# Patient Record
Sex: FEMALE | Race: WHITE | NOT HISPANIC OR LATINO | Employment: FULL TIME | URBAN - METROPOLITAN AREA
[De-identification: names, ages, dates, MRNs, and addresses within clinical notes are randomized per-mention and may not be internally consistent; named-entity substitution may affect disease eponyms.]

---

## 2017-02-13 DIAGNOSIS — J45.41 MODERATE PERSISTENT ASTHMA WITH ACUTE EXACERBATION: ICD-10-CM

## 2017-02-13 RX ORDER — FLUTICASONE PROPIONATE 50 MCG
1-2 SPRAY, SUSPENSION (ML) NASAL DAILY
Qty: 16 G | Refills: 5 | Status: SHIPPED | OUTPATIENT
Start: 2017-02-13 | End: 2019-11-05

## 2017-02-13 NOTE — TELEPHONE ENCOUNTER
Fluticasone      Last Written Prescription Date: 10/09/15  Last Fill Quantity: 16,  # refills: 5   Last Office Visit with [facility], [facility] or [facility] prescribing provider: 11/29/16

## 2017-02-14 ENCOUNTER — OFFICE VISIT (OUTPATIENT)
Dept: FAMILY MEDICINE | Facility: CLINIC | Age: 52
End: 2017-02-14
Payer: COMMERCIAL

## 2017-02-14 VITALS
TEMPERATURE: 98 F | BODY MASS INDEX: 32.03 KG/M2 | WEIGHT: 203 LBS | HEART RATE: 82 BPM | DIASTOLIC BLOOD PRESSURE: 85 MMHG | SYSTOLIC BLOOD PRESSURE: 146 MMHG

## 2017-02-14 DIAGNOSIS — J01.01 ACUTE RECURRENT MAXILLARY SINUSITIS: Primary | ICD-10-CM

## 2017-02-14 PROCEDURE — 99214 OFFICE O/P EST MOD 30 MIN: CPT | Performed by: NURSE PRACTITIONER

## 2017-02-14 RX ORDER — DOXYCYCLINE 100 MG/1
100 CAPSULE ORAL 2 TIMES DAILY
Qty: 28 CAPSULE | Refills: 0 | Status: SHIPPED | OUTPATIENT
Start: 2017-02-14 | End: 2017-02-28

## 2017-02-14 NOTE — NURSING NOTE
"Chief Complaint   Patient presents with     Sinus Problem       Initial /85  Pulse 82  Temp 98  F (36.7  C) (Oral)  Wt 203 lb (92.1 kg)  LMP 05/15/2014 (Approximate)  BMI 32.03 kg/m2 Estimated body mass index is 32.03 kg/(m^2) as calculated from the following:    Height as of 11/4/16: 5' 6.75\" (1.695 m).    Weight as of this encounter: 203 lb (92.1 kg).  Medication Reconciliation: complete    "

## 2017-02-14 NOTE — PROGRESS NOTES
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  Additional history: as documented    Patient Active Problem List   Diagnosis     Dry eye syndrome     CARDIOVASCULAR SCREENING; LDL GOAL LESS THAN 160     Seasonal allergic rhinitis     Allergic rhinitis due to animal dander     Rhinitis, allergic to other allergen     House dust mite allergy     Moderate persistent asthma     PTSD (post-traumatic stress disorder)     Vitamin D deficiency     Hypersomnia     Esophageal reflux (GERD)     Depression with anxiety     Acute cystitis with hematuria     Past Surgical History   Procedure Laterality Date     Hysteroscopy         Social History   Substance Use Topics     Smoking status: Never Smoker     Smokeless tobacco: Never Used     Alcohol use No     Family History   Problem Relation Age of Onset     Hypertension Mother      Alzheimer Disease Mother      DIABETES Mother      Hypertension Father      Alzheimer Disease Paternal Grandmother      Psychotic Disorder Paternal Grandmother      bipolar     HEART DISEASE Paternal Grandfather      Breast Cancer Maternal Grandmother      Thyroid Disease Brother      CANCER Sister      thyroid     DIABETES Sister      CEREBROVASCULAR DISEASE No family hx of      Glaucoma No family hx of       Macular Degeneration No family hx of          Current Outpatient Prescriptions   Medication Sig Dispense Refill     doxycycline Monohydrate 100 MG CAPS Take 1 capsule (100 mg) by mouth 2 times daily for 14 days 28 capsule 0     fluticasone (FLONASE) 50 MCG/ACT spray Spray 1-2 sprays into both nostrils daily 16 g 5     montelukast (SINGULAIR) 10 MG tablet Take 1 tablet (10 mg) by mouth At Bedtime 90 tablet 1     fluticasone-salmeterol (ADVAIR) 500-50 MCG/DOSE diskus inhaler Inhale 1 puff into the lungs 2 times daily 3 Inhaler 1     albuterol (PROAIR HFA, PROVENTIL HFA, VENTOLIN HFA) 108 (90 BASE) MCG/ACT inhaler Inhale 2 puffs into the lungs every 4 hours as needed for shortness of breath / dyspnea 1 Inhaler 3     amLODIPine (NORVASC) 5 MG tablet Take 1 tablet (5 mg) by mouth daily 90 tablet 3     ALPRAZolam (XANAX) 0.5 MG tablet Take 1-2 tablets (0.5-1 mg) by mouth 3 times daily as needed for anxiety 30 tablet 1     DULoxetine (CYMBALTA) 60 MG capsule Take 1 capsule (60 mg) by mouth 2 times daily 180 capsule 3     omeprazole (PRILOSEC) 20 MG capsule Take 20 mg by mouth daily.       clindamycin (CLEOCIN) 300 MG capsule Take 1 capsule (300 mg) by mouth 4 times daily (Patient not taking: Reported on 2/14/2017) 40 capsule 0     albuterol (2.5 MG/3ML) 0.083% nebulizer solution Take 1 vial (2.5 mg) by nebulization every 4 hours as needed for shortness of breath / dyspnea (Patient not taking: Reported on 2/14/2017) 1 Box 0     nitroglycerin (NITRODUR) 0.4 MG/HR Place 1 patch onto the skin daily Reported on 2/14/2017       armodafinil (NUVIGIL) 150 MG TABS Take 1 tablet (150 mg) by mouth every morning (Patient not taking: Reported on 2/14/2017) 30 tablet 0     lovastatin (MEVACOR) 20 MG tablet Take 1 tablet (20 mg) by mouth At Bedtime (Patient not taking: Reported on 2/14/2017) 90 tablet 3     fish oil-omega-3 fatty acids (FISH OIL) 1000 MG capsule Take 2 g by mouth 3 times daily Reported on 2/14/2017       Allergies    Allergen Reactions     Ampicillin Rash     Cipro [Quinolones] Anaphylaxis     Macrobid [Nitrofuran Derivatives] GI Disturbance     BP Readings from Last 3 Encounters:   02/14/17 146/85   12/23/16 123/67   11/29/16 128/73    Wt Readings from Last 3 Encounters:   02/14/17 203 lb (92.1 kg)   12/23/16 198 lb (89.8 kg)   11/29/16 201 lb 12.8 oz (91.5 kg)              Labs reviewed in EPIC  Problem list, Medication list, Allergies, and Medical/Social/Surgical histories reviewed in [facility] and updated as appropriate.    ROS:  Constitutional, HEENT, cardiovascular, pulmonary, GI, , musculoskeletal, neuro, skin, endocrine and psych systems are negative, except as otherwise noted.    OBJECTIVE:                                                    /85  Pulse 82  Temp 98  F (36.7  C) (Oral)  Wt 203 lb (92.1 kg)  LMP 05/15/2014 (Approximate)  BMI 32.03 kg/m2  Body mass index is 32.03 kg/(m^2).  GENERAL: healthy, alert and no distress  EYES: Eyes grossly normal to inspection, PERRL and conjunctivae and sclerae normal  HENT: ear canals and TM's normal, nose and mouth without ulcers or lesions  RESP: lungs clear to auscultation - no rales, rhonchi or wheezes  CV: regular rate and rhythm, normal S1 S2, no S3 or S4, no murmur, click or rub, no peripheral edema and peripheral pulses strong  NEURO: Normal strength and tone, mentation intact and speech normal  LYMPH: no cervical, supraclavicular, axillary, or inguinal adenopathy    Diagnostic Test Results:  none      ASSESSMENT/PLAN:                                                    Hypertension; controlled   Associated with the following complications:    Heart Disease- MI d/t broken heart disease   Plan:  No changes in the patient's current treatment plan        ICD-10-CM    1. Acute recurrent maxillary sinusitis J01.01 doxycycline Monohydrate 100 MG CAPS       See Patient Instructions: Ibuprophen, push fluids, rest. You can stop antibiotics at 10 days if you are feeling better  as we discussed. Please let me know if you have any health care questions or concerns.     HOUSTON Robertson  [facility]

## 2017-02-14 NOTE — PATIENT INSTRUCTIONS
Ibuprophen, push fluids, rest. You can stop antibiotics at 10 days if you are feeling better as we discussed. Please let me know if you have any health care questions or concerns.   Sinusitis (Antibiotic Treatment)    The sinuses are air-filled spaces within the bones of the face. They connect to the inside of the nose. Sinusitis is an inflammation of the tissue lining the sinus cavity. Sinus inflammation can occur during a cold. It can also be due to allergies to pollens and other particles in the air. Sinusitis can cause symptoms of sinus congestion and fullness. A sinus infection causes fever, headache and facial pain. There is often green or yellow drainage from the nose or into the back of the throat (post-nasal drip). You have been given antibiotics to treat this condition.  Home care:    Take the full course of antibiotics as instructed. Do not stop taking them, even if you feel better.    Drink plenty of water, hot tea, and other liquids. This may help thin mucus. It also may promote sinus drainage.    Heat may help soothe painful areas of the face. Use a towel soaked in hot water. Or,  the shower and direct the hot spray onto your face. Using a vaporizer along with a menthol rub at night may also help.     An expectorant containing guaifenesin may help thin the mucus and promote drainage from the sinuses.    Over-the-counter decongestants may be used unless a similar medicine was prescribed. Nasal sprays work the fastest. Use one that contains phenylephrine or oxymetazoline. First blow the nose gently. Then use the spray. Do not use these medicines more often than directed on the label or symptoms may get worse. You may also use tablets containing pseudoephedrine. Avoid products that combine ingredients, because side effects may be increased. Read labels. You can also ask the pharmacist for help. (NOTE: Persons with high blood pressure should not use decongestants. They can raise blood  pressure.)    Over-the-counter antihistamines may help if allergies contributed to your sinusitis.      Do not use nasal rinses or irrigation during an acute sinus infection, unless told to by your health care provider. Rinsing may spread the infection to other sinuses.    Use acetaminophen or ibuprofen to control pain, unless another pain medicine was prescribed. (If you have chronic liver or kidney disease or ever had a stomach ulcer, talk with your doctor before using these medicines. Aspirin should never be used in anyone under 18 years of age who is ill with a fever. It may cause severe liver damage.)    Don't smoke. This can worsen symptoms.  Follow-up care  Follow up with your healthcare provider or our staff if you are not improving within the next week.  When to seek medical advice  Call your healthcare provider if any of these occur:    Facial pain or headache becoming more severe    Stiff neck    Unusual drowsiness or confusion    Swelling of the forehead or eyelids    Vision problems, including blurred or double vision    Fever of 100.4 F (38 C) or higher, or as directed by your healthcare provider    Seizure    Breathing problems    Symptoms not resolving within 10 days    8387-3205 [employer] [address] [place] [address]. All rights reserved. This information is not intended as a substitute for professional medical care. Always follow your healthcare professional's instructions.

## 2017-02-14 NOTE — MR AVS SNAPSHOT
After Visit Summary   2/14/2017    Lisette LINCOLN Ukaegbu    MRN: [id number]           Patient Information     Date Of Birth          1965        Visit Information        Provider Department      2/14/2017 3:00 PM Prerna Garcia NP [facility]        Today's Diagnoses     Acute recurrent maxillary sinusitis    -  1      Care Instructions    Ibuprophen, push fluids, rest. You can stop antibiotics at 10 days if you are feeling better as we discussed. Please let me know if you have any health care questions or concerns.   Sinusitis (Antibiotic Treatment)    The sinuses are air-filled spaces within the bones of the face. They connect to the inside of the nose. Sinusitis is an inflammation of the tissue lining the sinus cavity. Sinus inflammation can occur during a cold. It can also be due to allergies to pollens and other particles in the air. Sinusitis can cause symptoms of sinus congestion and fullness. A sinus infection causes fever, headache and facial pain. There is often green or yellow drainage from the nose or into the back of the throat (post-nasal drip). You have been given antibiotics to treat this condition.  Home care:    Take the full course of antibiotics as instructed. Do not stop taking them, even if you feel better.    Drink plenty of water, hot tea, and other liquids. This may help thin mucus. It also may promote sinus drainage.    Heat may help soothe painful areas of the face. Use a towel soaked in hot water. Or,  the shower and direct the hot spray onto your face. Using a vaporizer along with a menthol rub at night may also help.     An expectorant containing guaifenesin may help thin the mucus and promote drainage from the sinuses.    Over-the-counter decongestants may be used unless a similar medicine was prescribed. Nasal sprays work the fastest. Use one that contains phenylephrine or oxymetazoline. First blow the nose gently. Then use the spray. Do not use these  medicines more often than directed on the label or symptoms may get worse. You may also use tablets containing pseudoephedrine. Avoid products that combine ingredients, because side effects may be increased. Read labels. You can also ask the pharmacist for help. (NOTE: Persons with high blood pressure should not use decongestants. They can raise blood pressure.)    Over-the-counter antihistamines may help if allergies contributed to your sinusitis.      Do not use nasal rinses or irrigation during an acute sinus infection, unless told to by your health care provider. Rinsing may spread the infection to other sinuses.    Use acetaminophen or ibuprofen to control pain, unless another pain medicine was prescribed. (If you have chronic liver or kidney disease or ever had a stomach ulcer, talk with your doctor before using these medicines. Aspirin should never be used in anyone under 18 years of age who is ill with a fever. It may cause severe liver damage.)    Don't smoke. This can worsen symptoms.  Follow-up care  Follow up with your healthcare provider or our staff if you are not improving within the next week.  When to seek medical advice  Call your healthcare provider if any of these occur:    Facial pain or headache becoming more severe    Stiff neck    Unusual drowsiness or confusion    Swelling of the forehead or eyelids    Vision problems, including blurred or double vision    Fever of 100.4 F (38 C) or higher, or as directed by your healthcare provider    Seizure    Breathing problems    Symptoms not resolving within 10 days    7030-8813 [employer]. [address] [place] [address]. All rights reserved. This information is not intended as a substitute for professional medical care. Always follow your healthcare professional's instructions.              Follow-ups after your visit        Who to contact     Normal or non-critical lab and imaging results will be communicated to you by  MyChart, letter or phone within 4 business days after the clinic has received the results. If you do not hear from us within 7 days, please contact the clinic through [employer] or phone. If you have a critical or abnormal lab result, we will notify you by phone as soon as possible.  Submit refill requests through [email or web address] or call your pharmacy and they will forward the refill request to us. Please allow 3 business days for your refill to be completed.          If you need to speak with a  for additional information , please call: [phone]             Additional Information About Your Visit        [email or web address] Information     [email or web address] gives you secure access to your electronic health record. If you see a primary care provider, you can also send messages to your care team and make appointments. If you have questions, please call your primary care clinic.  If you do not have a primary care provider, please call [phone] and they will assist you.        Care EveryWhere ID     This is your Care EveryWhere ID. This could be used by other organizations to access your [place] medical records  [id number]        Your Vitals Were     Pulse Temperature Last Period BMI (Body Mass Index)          82 98  F (36.7  C) (Oral) 05/15/2014 (Approximate) 32.03 kg/m2         Blood Pressure from Last 3 Encounters:   02/14/17 146/85   12/23/16 123/67   11/29/16 128/73    Weight from Last 3 Encounters:   02/14/17 203 lb (92.1 kg)   12/23/16 198 lb (89.8 kg)   11/29/16 201 lb 12.8 oz (91.5 kg)              Today, you had the following     No orders found for display         Today's Medication Changes          These changes are accurate as of: 2/14/17  3:37 PM.  If you have any questions, ask your nurse or doctor.               Start taking these medicines.        Dose/Directions    doxycycline Monohydrate 100 MG Caps   Used for:  Acute recurrent maxillary sinusitis   Started by:  Prerna Garcia NP        Dose:  100 mg    Take 1 capsule (100 mg) by mouth 2 times daily for 14 days   Quantity:  28 capsule   Refills:  0            Where to get your medicines      These medications were sent to [id number] - [facility] [address] AT [facility] [address]  [address] [facility], [facility] [address]     Phone:  [phone]     doxycycline Monohydrate 100 MG Caps                Primary Care Provider Office Phone # Fax #    Kolton Yin PA-C [phone]       [facility] [address] [facility] [address]        Thank you!     Thank you for choosing [facility]  for your care. Our goal is always to provide you with excellent care. Hearing back from our patients is one way we can continue to improve our services. Please take a few minutes to complete the written survey that you may receive in the mail after your visit with us. Thank you!             Your Updated Medication List - Protect others around you: Learn how to safely use, store and throw away your medicines at www.disposemymeds.org.          This list is accurate as of: 2/14/17  3:37 PM.  Always use your most recent med list.                   Brand Name Dispense Instructions for use    * albuterol (2.5 MG/3ML) 0.083% neb solution     1 Box    Take 1 vial (2.5 mg) by nebulization every 4 hours as needed for shortness of breath / dyspnea       * albuterol 108 (90 BASE) MCG/ACT Inhaler    PROAIR HFA/PROVENTIL HFA/VENTOLIN HFA    1 Inhaler    Inhale 2 puffs into the lungs every 4 hours as needed for shortness of breath / dyspnea       ALPRAZolam 0.5 MG tablet    XANAX    30 tablet    Take 1-2 tablets (0.5-1 mg) by mouth 3 times daily as needed for anxiety       amLODIPine 5 MG tablet    NORVASC    90 tablet    Take 1 tablet (5 mg) by mouth daily       armodafinil 150 MG Tabs tablet    NUVIGIL    30 tablet    Take 1 tablet (150 mg) by mouth every morning       clindamycin 300 MG capsule    CLEOCIN    40  capsule    Take 1 capsule (300 mg) by mouth 4 times daily       doxycycline Monohydrate 100 MG Caps     28 capsule    Take 1 capsule (100 mg) by mouth 2 times daily for 14 days       DULoxetine 60 MG EC capsule    CYMBALTA    180 capsule    Take 1 capsule (60 mg) by mouth 2 times daily       fish oil-omega-3 fatty acids 1000 MG capsule      Take 2 g by mouth 3 times daily Reported on 2/14/2017       fluticasone 50 MCG/ACT spray    FLONASE    16 g    Spray 1-2 sprays into both nostrils daily       fluticasone-salmeterol 500-50 MCG/DOSE diskus inhaler    ADVAIR    3 Inhaler    Inhale 1 puff into the lungs 2 times daily       lovastatin 20 MG tablet    MEVACOR    90 tablet    Take 1 tablet (20 mg) by mouth At Bedtime       montelukast 10 MG tablet    SINGULAIR    90 tablet    Take 1 tablet (10 mg) by mouth At Bedtime       nitroglycerin 0.4 MG/HR 24 hr patch    NITRODUR     Place 1 patch onto the skin daily Reported on 2/14/2017       omeprazole 20 MG CR capsule    priLOSEC     Take 20 mg by mouth daily.       * Notice:  This list has 2 medication(s) that are the same as other medications prescribed for you. Read the directions carefully, and ask your doctor or other care provider to review them with you.

## 2017-03-13 ENCOUNTER — RADIANT APPOINTMENT (OUTPATIENT)
Dept: GENERAL RADIOLOGY | Facility: CLINIC | Age: 52
End: 2017-03-13
Attending: PHYSICIAN ASSISTANT
Payer: COMMERCIAL

## 2017-03-13 ENCOUNTER — OFFICE VISIT (OUTPATIENT)
Dept: FAMILY MEDICINE | Facility: CLINIC | Age: 52
End: 2017-03-13
Payer: COMMERCIAL

## 2017-03-13 VITALS
DIASTOLIC BLOOD PRESSURE: 82 MMHG | OXYGEN SATURATION: 99 % | TEMPERATURE: 98.4 F | SYSTOLIC BLOOD PRESSURE: 134 MMHG | HEART RATE: 79 BPM | BODY MASS INDEX: 31.43 KG/M2 | WEIGHT: 199.2 LBS

## 2017-03-13 DIAGNOSIS — R81 GLUCOSURIA: ICD-10-CM

## 2017-03-13 DIAGNOSIS — E11.9 TYPE 2 DIABETES MELLITUS WITHOUT COMPLICATION, WITHOUT LONG-TERM CURRENT USE OF INSULIN (H): ICD-10-CM

## 2017-03-13 DIAGNOSIS — Z78.0 POSTMENOPAUSAL STATUS: ICD-10-CM

## 2017-03-13 DIAGNOSIS — J06.9 VIRAL UPPER RESPIRATORY TRACT INFECTION: ICD-10-CM

## 2017-03-13 DIAGNOSIS — R35.0 URINARY FREQUENCY: Primary | ICD-10-CM

## 2017-03-13 DIAGNOSIS — R10.2 PELVIC PAIN IN FEMALE: ICD-10-CM

## 2017-03-13 DIAGNOSIS — R49.0 HOARSENESS: ICD-10-CM

## 2017-03-13 DIAGNOSIS — J01.01 ACUTE RECURRENT MAXILLARY SINUSITIS: ICD-10-CM

## 2017-03-13 DIAGNOSIS — R10.11 RUQ ABDOMINAL PAIN: ICD-10-CM

## 2017-03-13 LAB
ALBUMIN UR-MCNC: ABNORMAL MG/DL
APPEARANCE UR: CLEAR
BACTERIA #/AREA URNS HPF: ABNORMAL /HPF
BILIRUB UR QL STRIP: ABNORMAL
COLOR UR AUTO: YELLOW
CREAT UR-MCNC: 386 MG/DL
ERYTHROCYTE [DISTWIDTH] IN BLOOD BY AUTOMATED COUNT: 12.3 % (ref 10–15)
GLUCOSE BLD-MCNC: 166 MG/DL (ref 70–99)
GLUCOSE UR STRIP-MCNC: 250 MG/DL
HBA1C MFR BLD: 8 % (ref 4.3–6)
HCT VFR BLD AUTO: 41.6 % (ref 35–47)
HETEROPH AB SER QL: NEGATIVE
HGB BLD-MCNC: 13.8 G/DL (ref 11.7–15.7)
HGB UR QL STRIP: NEGATIVE
HYALINE CASTS #/AREA URNS LPF: ABNORMAL /LPF (ref 0–2)
KETONES UR STRIP-MCNC: ABNORMAL MG/DL
LEUKOCYTE ESTERASE UR QL STRIP: NEGATIVE
MCH RBC QN AUTO: 30.2 PG (ref 26.5–33)
MCHC RBC AUTO-ENTMCNC: 33.2 G/DL (ref 31.5–36.5)
MCV RBC AUTO: 91 FL (ref 78–100)
MICROALBUMIN UR-MCNC: 46 MG/L
MICROALBUMIN/CREAT UR: 11.81 MG/G CR (ref 0–25)
MUCOUS THREADS #/AREA URNS LPF: PRESENT /LPF
NITRATE UR QL: NEGATIVE
NON-SQ EPI CELLS #/AREA URNS LPF: ABNORMAL /LPF
PH UR STRIP: 5 PH (ref 5–7)
PLATELET # BLD AUTO: 253 10E9/L (ref 150–450)
RBC # BLD AUTO: 4.57 10E12/L (ref 3.8–5.2)
RBC #/AREA URNS AUTO: ABNORMAL /HPF (ref 0–2)
SP GR UR STRIP: >1.03 (ref 1–1.03)
URN SPEC COLLECT METH UR: ABNORMAL
UROBILINOGEN UR STRIP-ACNC: 0.2 EU/DL (ref 0.2–1)
WBC # BLD AUTO: 6.6 10E9/L (ref 4–11)
WBC #/AREA URNS AUTO: ABNORMAL /HPF (ref 0–2)

## 2017-03-13 PROCEDURE — 82947 ASSAY GLUCOSE BLOOD QUANT: CPT | Performed by: PHYSICIAN ASSISTANT

## 2017-03-13 PROCEDURE — 82043 UR ALBUMIN QUANTITATIVE: CPT | Performed by: PHYSICIAN ASSISTANT

## 2017-03-13 PROCEDURE — 81001 URINALYSIS AUTO W/SCOPE: CPT | Performed by: PHYSICIAN ASSISTANT

## 2017-03-13 PROCEDURE — 86308 HETEROPHILE ANTIBODY SCREEN: CPT | Performed by: PHYSICIAN ASSISTANT

## 2017-03-13 PROCEDURE — 85027 COMPLETE CBC AUTOMATED: CPT | Performed by: PHYSICIAN ASSISTANT

## 2017-03-13 PROCEDURE — 36415 COLL VENOUS BLD VENIPUNCTURE: CPT | Performed by: PHYSICIAN ASSISTANT

## 2017-03-13 PROCEDURE — 99207 C FOOT EXAM  NO CHARGE: CPT | Performed by: PHYSICIAN ASSISTANT

## 2017-03-13 PROCEDURE — 70210 X-RAY EXAM OF SINUSES: CPT

## 2017-03-13 PROCEDURE — 99214 OFFICE O/P EST MOD 30 MIN: CPT | Performed by: PHYSICIAN ASSISTANT

## 2017-03-13 PROCEDURE — 83036 HEMOGLOBIN GLYCOSYLATED A1C: CPT | Performed by: PHYSICIAN ASSISTANT

## 2017-03-13 RX ORDER — SULFAMETHOXAZOLE/TRIMETHOPRIM 800-160 MG
1 TABLET ORAL 2 TIMES DAILY
Qty: 20 TABLET | Refills: 0 | Status: SHIPPED | OUTPATIENT
Start: 2017-03-13 | End: 2017-05-04

## 2017-03-13 RX ORDER — DESOGESTREL AND ETHINYL ESTRADIOL 0.15-0.03
1 KIT ORAL DAILY
Qty: 28 TABLET | Refills: 0 | Status: SHIPPED | OUTPATIENT
Start: 2017-03-13 | End: 2017-03-16

## 2017-03-13 RX ORDER — METFORMIN HCL 500 MG
500 TABLET, EXTENDED RELEASE 24 HR ORAL
Qty: 180 TABLET | Refills: 0 | Status: SHIPPED | OUTPATIENT
Start: 2017-03-13 | End: 2017-05-04

## 2017-03-13 NOTE — PROGRESS NOTES
SUBJECTIVE:                                                    Lisette Owens is a 52 year old female who presents to clinic today for the following health issues:      RESPIRATORY SYMPTOMS      Duration: 3-4 months     Description  rhinorrhea, facial pain/pressure, cough, chills and ear pain bilateral    Severity: severe    Accompanying signs and symptoms: None    History (predisposing factors):  none    Precipitating or alleviating factors: None    Therapies tried and outcome:  Patient has been on 3 courses of antibiotics and just can't seem to get better.      URINARY TRACT SYMPTOMS      Duration: 3 months     Description  dysuria, frequency and urgency    Intensity:  moderate    Accompanying signs and symptoms:  Fever/chills: no   Flank pain YES  Nausea and vomiting: YES  Vaginal symptoms: none  Abdominal/Pelvic Pain: YES    History  History of frequent UTI's: YES  History of kidney stones: no   Sexually Active: no   Possibility of pregnancy: No    Precipitating or alleviating factors: None    Therapies tried and outcome: none          Some bladder pain. Some irritative voiding symptoms. Some nocturia.  Some intermittent ruq abd pain. No n/v/d/c .   Problem list and histories reviewed & adjusted, as indicated.  Additional history: as documented    Patient Active Problem List   Diagnosis     Dry eye syndrome     CARDIOVASCULAR SCREENING; LDL GOAL LESS THAN 160     Seasonal allergic rhinitis     Allergic rhinitis due to animal dander     Rhinitis, allergic to other allergen     House dust mite allergy     Moderate persistent asthma     PTSD (post-traumatic stress disorder)     Vitamin D deficiency     Hypersomnia     Esophageal reflux (GERD)     Depression with anxiety     Acute cystitis with hematuria     Type 2 diabetes mellitus without complication, without long-term current use of insulin (H)     Past Surgical History   Procedure Laterality Date     Hysteroscopy         Social History   Substance Use Topics      Smoking status: Never Smoker     Smokeless tobacco: Never Used     Alcohol use No     Family History   Problem Relation Age of Onset     Hypertension Mother      Alzheimer Disease Mother      DIABETES Mother      Hypertension Father      Alzheimer Disease Paternal Grandmother      Psychotic Disorder Paternal Grandmother      bipolar     HEART DISEASE Paternal Grandfather      Breast Cancer Maternal Grandmother      Thyroid Disease Brother      CANCER Sister      thyroid     DIABETES Sister      CEREBROVASCULAR DISEASE No family hx of      Glaucoma No family hx of      Macular Degeneration No family hx of          BP Readings from Last 3 Encounters:   03/13/17 134/82   02/14/17 146/85   12/23/16 123/67    Wt Readings from Last 3 Encounters:   03/13/17 199 lb 3.2 oz (90.4 kg)   02/14/17 203 lb (92.1 kg)   12/23/16 198 lb (89.8 kg)                    Reviewed and updated as needed this visit by clinical staff  Tobacco  Allergies  Meds  Problems  Med Hx  Surg Hx  Fam Hx  Soc Hx        Reviewed and updated as needed this visit by Provider  Tobacco  Allergies  Meds  Problems  Med Hx  Surg Hx  Fam Hx  Soc Hx          All other systems negative except as outline above  OBJECTIVE:  Eye exam - right eye normal lid, conjunctiva, cornea, pupil and fundus, left eye normal lid, conjunctiva, cornea, pupil and fundus.  ENT exam reveals - bilateral TM fluid noted, neck without nodes, throat normal without erythema or exudate and sinuses tender.  CHEST:no tachypnea, retractions or cyanosis, mild inspiratory wheezing heard diffusely throughout both lungs and S1, S2 normal, no murmur, no gallop, rate regular.  Thyroid not palpable, not enlarged, no nodules detected.  ABDOMEN: mild tenderness in the RUQ and lower abdomen area, without rebound, guarding, mass or organomegaly. Abdomen is soft and bowel sounds are normal.  Foot exam - both sides normal; no swelling, tenderness or skin or vascular lesions. Color and  temperature is normal. Sensation is intact. Peripheral pulses are palpable. Toenails are normal.    Lisette was seen today for cough and uti.    Diagnoses and all orders for this visit:    Urinary frequency  -     *UA reflex to Microscopic and Culture ([facility] (except [place] and [place])  -     Urine Microscopic    Viral upper respiratory tract infection  -     XR Sinus 1/2 Views; Future  -     CBC with platelets  -     Mononucleosis screen    Hoarseness  -     OTOLARYNGOLOGY REFERRAL    Pelvic pain in female    Postmenopausal status  -     desogestrel-ethinyl estradiol (APRI) 0.15-30 MG-MCG per tablet; Take 1 tablet by mouth daily    RUQ abdominal pain  -     US Abdomen Limited; Future    Glucosuria  -     Glucose whole blood  -     Hemoglobin A1c    Type 2 diabetes mellitus without complication, without long-term current use of insulin (H)  -     order for DME; Glucometer, brand as covered by insurance.  -     order for DME; Glucometer, brand as covered by insurance.  -     order for DME; Lancets bid and prn  -     FOOT EXAM  -     Albumin Random Urine Quantitative  -     aspirin  MG EC tablet; Take 1 tablet (325 mg) by mouth daily  -     DIABETES EDUCATOR REFERRAL  -     metFORMIN (GLUCOPHAGE-XR) 500 MG 24 hr tablet; Take 1 tablet (500 mg) by mouth daily (with dinner) Take 1 tab daily for 1 week, then increase to two tabs daily thereafter    Acute recurrent maxillary sinusitis  -     sulfamethoxazole-trimethoprim (BACTRIM DS/SEPTRA DS) 800-160 MG per tablet; Take 1 tablet by mouth 2 times daily      Patient Instructions     Blood sugar goals:  Prior to meals:    At bedtime:  110-150    More exercise        Diabetes: Understanding Carbohydrates, Fats, and Protein  Food is a source of fuel and nourishment for your body. It s also a source of pleasure. Having diabetes doesn t mean you have to eat special foods or give up desserts. Instead, your dietitian can show you how  to plan meals to suit your body. To start, learn how different foods affect blood sugar.    Carbohydrates  Carbohydrates are the main source of fuel for the body. Carbohydrates raise blood sugar. Many people think carbohydrates are only found in pasta or bread. But carbohydrates are actually in many kinds of foods:    Sugars occur naturally in foods such as fruit, milk, honey, and molasses. Sugars can also be added to many foods, from cereals and yogurt to candy and desserts. Sugars raise blood sugar.    Starches are found in bread, cereals, pasta, and dried beans. They re also found in corn, peas, potatoes, yam, acorn squash, and butternut squash. Starches also raise blood sugar.     Fiber is found in foods such as vegetables, fruits, and whole grains. Unlike other carbs, fiber isn t digested or absorbed. So it doesn t raise blood sugar. In fact, fiber can help keep blood sugar from rising too fast. It also helps keep blood cholesterol at a healthy level.     Did You Know?  Even though carbohydrates raise blood sugar, it s best to have some in every meal. They are an important part of a healthy diet.   Fat  Fat is an energy source that can be stored until needed. Fat does not raise blood sugar. However, it can raise blood cholesterol, increasing the risk of heart disease. Fat is also high in calories, which can cause weight gain. Not all types of fat are the same.  More Healthy:    Monounsaturated fats are mostly found in vegetable oils, such as olive, canola, and peanut oils. They are also found in avocados and some nuts. Monounsaturated fats are healthy for your heart. That s because they lower LDL (unhealthy) cholesterol.    Polyunsaturated fats are mostly found in vegetable oils, such as corn, safflower, and soybean oils. They are also found in some seeds, nuts, and fish. Polyunsaturated fats lower LDL (unhealthy) cholesterol. So, choosing them instead of saturated fats is healthy for your heart. Certain  unsaturated fats can help lower triglycerides.   Less Healthy:    Saturated fats are found in animal products, such as meat, poultry, whole milk, lard, and butter. Saturated fats raise LDL cholesterol and are not healthy for your heart.    Hydrogenated oils and trans fats are formed when vegetable oils are processed into solid fats. They are found in many processed foods. Hydrogenated oils and trans fats raise LDL cholesterol and lower HDL (healthy) cholesterol. They are not healthy for your heart.  Protein  Protein helps the body build and repair muscle and other tissue. Protein has little or no effect on blood sugar. However, many foods that contain protein also contain saturated fat. By choosing low-fat protein sources, you can get the benefits of protein without the extra fat:    Plant protein is found in dry beans and peas, nuts, and soy products, such as tofu and soymilk. These sources tend to be cholesterol-free and low in saturated fat.    Animal protein is found in fish, poultry, meat, cheese, milk, and eggs. These contain cholesterol and can be high in saturated fat. Aim for lean, lower-fat choices.    7584-3572 [employer]. [address] [place] [address]. All rights reserved. This information is not intended as a substitute for professional medical care. Always follow your healthcare professional's instructions.        Eating Out When You Have Diabetes  Eating right is an important part of keeping your blood sugar in your target range. You just need to make healthy choices.    Tips for restaurant meals  When you eat away from home try these tips:    Try to schedule your dining-out meal at your normal meal time. Make a reservation if possible, so you don't have to wait to eat. If you can't make a reservation, try to arrive at the restaurant at a less-busy time to cut down your wait time.    Call ahead to see if the restaurant can meet your dietary needs if you've never been there  before. Or you can go online to see the menu ahead of time.    Carry some crackers with you in case the restaurant needs you to wait until you can be served.    Ask how foods are prepared before you order.    Instead of fried, sautéed, or breaded foods, choose ones that are broiled, steamed, grilled, or baked.    Ask for sauces, gravies, and dressings on the side.    Only eat an amount that fits your meal plan. Remember: You can take home the leftovers.    Save dessert for special occasions. Then choose a small dessert or share one with a friend or family member.  Make healthy choices  Fast food    Garden salad with light dressing on the side    Baked potato with vegetables or herbs    Broiled, roasted, or grilled chicken sandwich    Sliced turkey or lean roast beef sandwich  Mexican    Chicken enchilada, without cheese or sour cream     Small burrito with whole beans and chicken    Whole beans (not refried) and rice    Chicken or fish fajitas  Steakhouse    Grilled or broiled lean cuts of beef    Baked potato with vegetables or herbs    Broiled or baked chicken (don t eat the skin)    Steamed vegetables  Asian    Steamed dumplings or potstickers    Broiled, boiled, or steamed meats or fish    Sushi or sashimi    Steamed rice or boiled noodles (one serving is equal to 1/3 cup)    8753-1812 [employer]. [address] [place] [address]. All rights reserved. This information is not intended as a substitute for professional medical care. Always follow your healthcare professional's instructions.        Before You Start Your Diabetes Exercise Plan  Fitness plays a special role for people who have diabetes. Being fit means becoming healthier by adding activity to your day. Talk to your doctor or other health care provider before getting started. He or she may want you to have a checkup before you become more active. Also, having certain tests first helps you and your doctor learn how you will  respond to a fitness program.    Your Checkup  A medical checkup helps ensure that your fitness plan will bring you the most benefit. It also keeps at a minimum the risks that may come with physical activity. As part of your checkup:    You may have a test called a hemoglobin A1c. The A1c test measures your average glucose (sugar) level over 2 to 3 months. A1C is usually given as a percentage. But it is now also given as a number representing estimated Average Glucose (eAG). Unlike the A1C percentage, eAG gives you a number similar to the numbers listed on your daily glucose monitor.    Your doctor may check the health of your heart. Diabetes can cause heart problems. But a fitness program can help these problems get better. Being fit can also help improve your cholesterol and blood pressure levels.    Your doctor may check the health of your feet. People who have diabetes can have problems with their feet. Your doctor can check your feet before you become more active.  If You Have an Exercise Stress Test  An exercise stress test can show how your heart responds to activity. You will have small electrodes placed on your chest. You will then walk on a treadmill or ride an exercise bike while your heart rate is monitored. If you have this test, your doctor will give you more details.       9284-2873 [employer]. [address] [place] [address], [place] [address]. All rights reserved. This information is not intended as a substitute for professional medical care. Always follow your healthcare professional's instructions.        Healthy Meals for Diabetes  Ask your healthcare team to help you make a meal plan that fits your needs. Your meal plan tells you when to eat your meals and snacks, what kinds of foods to eat, and how much of each food to eat. You don t have to give up all the foods you like. But you do need to follow some guidelines.  Choose healthy carbohydrates    Starches, sugars, and fiber are all types  of carbohydrates. Fiber can help lower your cholesterol and triglycerides. Fiber is also healthy for your heart. You should have 20 to 35 grams of total fiber each day. Fiber-rich foods include:    Whole-grain breads and cereals    Bulgur wheat    Brown rice       Whole-wheat pasta    Fruits and vegetables    Dry beans, and peas   It s important to keep track of the amount of carbohydrates you eat. This can help you keep the right balance of physical activity and medicine. The amount of carbohydrates needed will vary for each person. It depends on many things such as your health, the medicines you take, and how active you are. Your healthcare team will help you figure out the right amount of carbohydrates for you. You may start with around 45 to 60 grams of carbohydrates per meal, depending on your situation.   Here are some examples of foods containing about 15 grams of carbohydrates (1 serving of carbohydrates):    1/2 cup of canned or frozen fruit    A small piece of fresh fruit (4 ounces)    1 slice of bread    1/2 cup of oatmeal    1/3 cup of rice    4 to 6 crackers    1/2 English muffin    1/2 cup of black beans    1/4 of a large baked potato (3 ounces)    2/3 cup of plain fat-free yogurt    1 cup of soup    1/2 cup of casserole    6 chicken nuggets    2-inch square brownie or cake without frosting    2 small cookies    1/2 cup of ice cream or sherbet  Choose healthy protein foods  Eating protein that is low in fat can help you control your weight. It also helps keep your heart healthy. Low-fat protein foods include:    Fish    Plant proteins, such as dry beans and peas, nuts, and soy products like tofu and soymilk    Lean meat with all visible fat removed    Poultry with the skin removed    Low-fat or nonfat milk, cheese, and yogurt  Limit unhealthy fats and sugar  Saturated and trans fats are unhealthy for your heart. They raise LDL (bad) cholesterol. Fat is also high in calories, so it can make you gain  weight. To cut down on unhealthy fats and sugar, limit these foods:    Butter or margarine    Palm and palm kernel oils and coconut oil    Cream    Cheese    Crouch    Lunch meats       Ice cream    Sweet bakery goods such as pies, muffins, and donuts    Jams and jellies    Candy bars    Regular sodas   How much to eat  The amount of food you eat affects your blood sugar. It also affects your weight. Your health care team will tell you how much of each type of food you should eat.    Use measuring cups and spoons and a food scale to measure serving sizes.    Learn what a correct serving size looks like on your plate. This will help when you are away from home and can t measure your servings.    Eat only the number of servings given on your meal plan for each food. Don t take seconds.    Learn to read food labels. Be sure to look at serving size, total carbohydrates, fiber, calories, sugar, and saturated and trans fats.  When to eat  Your meal plan will likely include breakfast, lunch, dinner, and some snacks.    Try to eat your meals and snacks at about the same times each day.    Eat all your meals and snacks. Skipping a meal or snack can make your blood sugar drop too low. It can also cause you to eat too much at the next meal or snack. Then your blood sugar could get too high.    9071-5150 [employer]. [address] [place] [address], [place] [address]. All rights reserved. This information is not intended as a substitute for professional medical care. Always follow your healthcare professional's instructions.        Managing Stress When You Have Diabetes  Getting used to life with a chronic condition can be hard. You might find yourself feeling angry, sad, or even afraid. Rest assured, these feelings are normal. But excess stress or sadness can actually affect your blood sugar. Learn to watch for signs of these feelings. And know that you can get help.  Talking with your health care team  Learning to control  blood sugar can sometimes be frustrating. You may have questions or fears about how diabetes may change your life. Your health care team is there to help you and answer questions. They can show you how to follow your meal plan, be more active, and check your blood sugar. Don t be afraid to ask your health care team for help.  Asking others for help    You don t have to deal with diabetes alone. Support from family, friends, or a diabetes support group can help you take better care of yourself. Ask others to:    Listen to your feelings. This will help you work through fear or anger.    Eat the same meals you eat. Your meal plan will be healthy for family and friends, too.    Exercise with you. Exercise is good for everyone. It strengthens the heart and helps relieve stress.    Go with you to visit your health care team. This will help your loved ones learn what you need to do.  Taking time to relax  Learning to relax and doing things you enjoy may reduce stress. Staying active also helps.    Ways to relax  To relax your muscles and calm your mind:    Sit or lie back in a chair. Take a slow, deep breath. Hold it for 5 counts. Then breathe out slowly through the mouth. Keep doing this until you feel relaxed.    As you breathe deeply, tense and then relax the muscles in your body. Start with your feet and work up your body to your neck and face.    Picture yourself in a peaceful place, such as the beach. Feel the warm sand. Hear the waves. Smell the ocean. Doing this will help you feel more relaxed.  Activities that can help  Focus your mind on things you like. This may include:    Enjoying a hobby    Meditating or praying    Taking a walk with a friend    Exercising    Taking care of pets    Keeping a journal    Joining a social club or group    Learning yoga or leticia chi    Spending time with people you care about  Recognize depression  Many people feel sad or down when they first hear that they have diabetes. But  frequent feelings of helplessness or hopelessness are symptoms of depression. Although depression is a serious problem, it can be treated. If you are having trouble sleeping or eating, or if you feel overwhelmed, contact your health care provider. Don t wait! Get the support you need to feel good about managing diabetes.     2181-9506 [employer]. [address] [place] [address], [place] [address]. All rights reserved. This information is not intended as a substitute for professional medical care. Always follow your healthcare professional's instructions.        Diabetes: Meal Planning  You can help keep your blood sugar level in your target range by eating healthy foods. Your health care team can help you create a low-fat, nutritious meal plan. Take an active role in your diabetes management by following your meal plan and working with your health care team.    Make Your Meal Plan  A meal plan gives guidelines for the types and amounts of food you should eat. The goal is to balance food and insulin (or other diabetes medications). Your dietitian will help you make a flexible meal plan that includes many foods that you like.  Watch Serving Sizes  Your meal plan will group foods by servings. To learn how much a serving is, start by measuring food portions at each meal. Soon you ll know what a serving looks like on your plate. Ask your health care provider about how to balance servings of different foods.  Eat from All the Food Groups  The basis of a healthy meal plan is variety (eating lots of different foods). Choose lean meats, fresh fruits and vegetables, whole grains, and low-fat or nonfat dairy products. Eating a wide variety of foods provides the nutrients your body needs. It can also keep you from getting bored with your meal plan.  Learn About Carbohydrates, Fats, and Protein    Carbohydrates are starches and sugars. They are found in many foods, including fruit, bread, pasta, milk, and sweets. Of all  the foods you eat, carbohydrates have the most effect on your blood sugar. Your dietitian may teach you about carb counting, a way to figure out the amount of carbohydrates in a meal.    Fats have the most calories. They also have the most effect on your weight and your risk of heart disease. When you have diabetes, it s important to control your weight and protect your heart. Foods that are high in fat include whole milk, cheese, snack foods, and desserts.    Protein is important for building and repairing muscles and bones. Choose low-fat protein sources, such as fish, egg whites, and skinless chicken.  Reduce Liquid Sugars  Extra calories from sodas, sports drinks, and fruit drinks make it hard to keep blood sugar in range. Cut as many liquid sugars from your meal plan as you can.  This includes most fruit juices, which are often high in natural or added sugar. Instead, drink plenty of water and other sugar-free beverages.  Eat Less Fat  If you need to lose weight, try to reduce the amount of fat in your diet. This can also help lower your cholesterol level to keep blood vessels healthier. Cut fat by using only small amounts of liquid oil for cooking. Read food labels carefully to avoid foods with unhealthy trans fats.     Timing Your Meals  When it comes to blood sugar control, when you eat is as important as what you eat. You may need to eat several small meals spaced evenly throughout the day to stay in your target range. So don t skip breakfast or wait until late in the day to get most of your calories. Doing so can cause your blood sugar to rise too high or fall too low.     4543-3667 [employer]. [address] [place] [address], [place] [address]. All rights reserved. This information is not intended as a substitute for professional medical care. Always follow your healthcare professional's instructions.        Oral Medications for Type 2 Diabetes  Diabetes pills can help to manage your blood sugar.  These pills are not insulin. They work to manage your blood sugar in several ways. You may be given a combination of medications. Always follow your health care provider's instructions.  Some pills may put you at higher risk for low blood sugar (hypoglycemia). Watch for symptoms of low blood sugar. Symptoms are listed below. Call your doctor if low blood sugar occurs often.  Type of diabetes pills  Sulfonylureas  These pills help your body make more insulin. They are usually taken 30 minutes before a meal. Possible side effects include hypoglycemia.  Alpha-glucosidase inhibitors  These pills slow the digestion of sugars and starches. They can help keep your blood sugar from going too high after a meal. Take them with the first bite of each main meal. Possible side effects include:    Abdominal pain    Diarrhea    Excess gas (flatulence)    Thiazolidinediones  These pills help your muscle cells use insulin better. Your doctor may order lab tests to check the function of your liver before prescribing these pills and regularly while you are taking them. Possible side effects include:     Weight gain    Extra fluid in your body and swelling    Increased risk for heart failure    Osteoporosis and increased risk for broken bones  Biguanides  These pills help control the amount of glucose in your blood. They do this by decreasing the amount of glucose made by your liver and helping your muscles use insulin more effectively. These medications are usually taken with with or after each meal. Possible side effects include:    Diarrhea    Nausea    Vomiting    Abdominal bloating    Excess gas (flatulence)    Metallic taste in mouth   Meglitinides  These pills increase your insulin for a short period of time. They are usually taken before meals. Possible side effects include:    Low blood sugar    Diarrhea    Headache     Slightly increased risk for heart problems  DPP-4 inhibitors  These pills help lower blood sugar levels in  people with type 2 diabetes. They are less likely to cause hypoglycemia, unless you take them with a sulfonylurea. They are taken once a day. Possible side effects include:    Upper respiratory tract infection    Stuffy or runny nose    Sore throat    Headache  Other side effects are under investigation.  SGLT-2 inhibitors  These pills help lower blood sugar levels in people with type 2 diabetes by increasing the amount of sugar that leaks into the urine. Possible side effects include:    Urinary tract infections    Genital infections, especially in women   Dopamine D2 receptor agonist (bromocriptine mesylate)  These pills help lower blood sugar levels in people with type 2 diabetes. Possible side effects of this medicine include:    Nausea    Vomiting    Fatigue    Dizziness    Headaches  Combination pills  These medications may help keep your blood glucose within your target range. They also help your pancreas make more insulin and may help your muscles use insulin more effectively. Side effects depend on which type of combination you use. Your healthcare provider can tell you more.     Watch for symptoms of hypoglycemia    Headaches    Shakiness or dizziness    Hunger    Cold, clammy skin; sweating    A hard, fast heartbeat    Confusion or irritability           6604-3517 [employer] [address] [place] [address], [place] [address]. All rights reserved. This information is not intended as a substitute for professional medical care. Always follow your healthcare professional's instructions.

## 2017-03-13 NOTE — PATIENT INSTRUCTIONS
Blood sugar goals:  Prior to meals:    At bedtime:  110-150    More exercise        Diabetes: Understanding Carbohydrates, Fats, and Protein  Food is a source of fuel and nourishment for your body. It s also a source of pleasure. Having diabetes doesn t mean you have to eat special foods or give up desserts. Instead, your dietitian can show you how to plan meals to suit your body. To start, learn how different foods affect blood sugar.    Carbohydrates  Carbohydrates are the main source of fuel for the body. Carbohydrates raise blood sugar. Many people think carbohydrates are only found in pasta or bread. But carbohydrates are actually in many kinds of foods:    Sugars occur naturally in foods such as fruit, milk, honey, and molasses. Sugars can also be added to many foods, from cereals and yogurt to candy and desserts. Sugars raise blood sugar.    Starches are found in bread, cereals, pasta, and dried beans. They re also found in corn, peas, potatoes, yam, acorn squash, and butternut squash. Starches also raise blood sugar.     Fiber is found in foods such as vegetables, fruits, and whole grains. Unlike other carbs, fiber isn t digested or absorbed. So it doesn t raise blood sugar. In fact, fiber can help keep blood sugar from rising too fast. It also helps keep blood cholesterol at a healthy level.     Did You Know?  Even though carbohydrates raise blood sugar, it s best to have some in every meal. They are an important part of a healthy diet.   Fat  Fat is an energy source that can be stored until needed. Fat does not raise blood sugar. However, it can raise blood cholesterol, increasing the risk of heart disease. Fat is also high in calories, which can cause weight gain. Not all types of fat are the same.  More Healthy:    Monounsaturated fats are mostly found in vegetable oils, such as olive, canola, and peanut oils. They are also found in avocados and some nuts. Monounsaturated fats are healthy for your  heart. That s because they lower LDL (unhealthy) cholesterol.    Polyunsaturated fats are mostly found in vegetable oils, such as corn, safflower, and soybean oils. They are also found in some seeds, nuts, and fish. Polyunsaturated fats lower LDL (unhealthy) cholesterol. So, choosing them instead of saturated fats is healthy for your heart. Certain unsaturated fats can help lower triglycerides.   Less Healthy:    Saturated fats are found in animal products, such as meat, poultry, whole milk, lard, and butter. Saturated fats raise LDL cholesterol and are not healthy for your heart.    Hydrogenated oils and trans fats are formed when vegetable oils are processed into solid fats. They are found in many processed foods. Hydrogenated oils and trans fats raise LDL cholesterol and lower HDL (healthy) cholesterol. They are not healthy for your heart.  Protein  Protein helps the body build and repair muscle and other tissue. Protein has little or no effect on blood sugar. However, many foods that contain protein also contain saturated fat. By choosing low-fat protein sources, you can get the benefits of protein without the extra fat:    Plant protein is found in dry beans and peas, nuts, and soy products, such as tofu and soymilk. These sources tend to be cholesterol-free and low in saturated fat.    Animal protein is found in fish, poultry, meat, cheese, milk, and eggs. These contain cholesterol and can be high in saturated fat. Aim for lean, lower-fat choices.    1758-1339 [employer]. [address] [address], [place] [address]. All rights reserved. This information is not intended as a substitute for professional medical care. Always follow your healthcare professional's instructions.        Eating Out When You Have Diabetes  Eating right is an important part of keeping your blood sugar in your target range. You just need to make healthy choices.    Tips for restaurant meals  When you eat away from home try  these tips:    Try to schedule your dining-out meal at your normal meal time. Make a reservation if possible, so you don't have to wait to eat. If you can't make a reservation, try to arrive at the restaurant at a less-busy time to cut down your wait time.    Call ahead to see if the restaurant can meet your dietary needs if you've never been there before. Or you can go online to see the menu ahead of time.    Carry some crackers with you in case the restaurant needs you to wait until you can be served.    Ask how foods are prepared before you order.    Instead of fried, sautéed, or breaded foods, choose ones that are broiled, steamed, grilled, or baked.    Ask for sauces, gravies, and dressings on the side.    Only eat an amount that fits your meal plan. Remember: You can take home the leftovers.    Save dessert for special occasions. Then choose a small dessert or share one with a friend or family member.  Make healthy choices  Fast food    Garden salad with light dressing on the side    Baked potato with vegetables or herbs    Broiled, roasted, or grilled chicken sandwich    Sliced turkey or lean roast beef sandwich  Mexican    Chicken enchilada, without cheese or sour cream     Small burrito with whole beans and chicken    Whole beans (not refried) and rice    Chicken or fish fajitas  Steakhouse    Grilled or broiled lean cuts of beef    Baked potato with vegetables or herbs    Broiled or baked chicken (don t eat the skin)    Steamed vegetables  Asian    Steamed dumplings or potstickers    Broiled, boiled, or steamed meats or fish    Sushi or sashimi    Steamed rice or boiled noodles (one serving is equal to 1/3 cup)    5354-1634 [employer]. [address] [place] [address], [place] [address]. All rights reserved. This information is not intended as a substitute for professional medical care. Always follow your healthcare professional's instructions.        Before You Start Your Diabetes Exercise  Plan  Fitness plays a special role for people who have diabetes. Being fit means becoming healthier by adding activity to your day. Talk to your doctor or other health care provider before getting started. He or she may want you to have a checkup before you become more active. Also, having certain tests first helps you and your doctor learn how you will respond to a fitness program.    Your Checkup  A medical checkup helps ensure that your fitness plan will bring you the most benefit. It also keeps at a minimum the risks that may come with physical activity. As part of your checkup:    You may have a test called a hemoglobin A1c. The A1c test measures your average glucose (sugar) level over 2 to 3 months. A1C is usually given as a percentage. But it is now also given as a number representing estimated Average Glucose (eAG). Unlike the A1C percentage, eAG gives you a number similar to the numbers listed on your daily glucose monitor.    Your doctor may check the health of your heart. Diabetes can cause heart problems. But a fitness program can help these problems get better. Being fit can also help improve your cholesterol and blood pressure levels.    Your doctor may check the health of your feet. People who have diabetes can have problems with their feet. Your doctor can check your feet before you become more active.  If You Have an Exercise Stress Test  An exercise stress test can show how your heart responds to activity. You will have small electrodes placed on your chest. You will then walk on a treadmill or ride an exercise bike while your heart rate is monitored. If you have this test, your doctor will give you more details.       1440-8916 [employer]. [address] [place] [address], [place] [address]. All rights reserved. This information is not intended as a substitute for professional medical care. Always follow your healthcare professional's instructions.        Healthy Meals for Diabetes  Ask your  healthcare team to help you make a meal plan that fits your needs. Your meal plan tells you when to eat your meals and snacks, what kinds of foods to eat, and how much of each food to eat. You don t have to give up all the foods you like. But you do need to follow some guidelines.  Choose healthy carbohydrates    Starches, sugars, and fiber are all types of carbohydrates. Fiber can help lower your cholesterol and triglycerides. Fiber is also healthy for your heart. You should have 20 to 35 grams of total fiber each day. Fiber-rich foods include:    Whole-grain breads and cereals    Bulgur wheat    Brown rice       Whole-wheat pasta    Fruits and vegetables    Dry beans, and peas   It s important to keep track of the amount of carbohydrates you eat. This can help you keep the right balance of physical activity and medicine. The amount of carbohydrates needed will vary for each person. It depends on many things such as your health, the medicines you take, and how active you are. Your healthcare team will help you figure out the right amount of carbohydrates for you. You may start with around 45 to 60 grams of carbohydrates per meal, depending on your situation.   Here are some examples of foods containing about 15 grams of carbohydrates (1 serving of carbohydrates):    1/2 cup of canned or frozen fruit    A small piece of fresh fruit (4 ounces)    1 slice of bread    1/2 cup of oatmeal    1/3 cup of rice    4 to 6 crackers    1/2 English muffin    1/2 cup of black beans    1/4 of a large baked potato (3 ounces)    2/3 cup of plain fat-free yogurt    1 cup of soup    1/2 cup of casserole    6 chicken nuggets    2-inch square brownie or cake without frosting    2 small cookies    1/2 cup of ice cream or sherbet  Choose healthy protein foods  Eating protein that is low in fat can help you control your weight. It also helps keep your heart healthy. Low-fat protein foods include:    Fish    Plant proteins, such as dry  beans and peas, nuts, and soy products like tofu and soymilk    Lean meat with all visible fat removed    Poultry with the skin removed    Low-fat or nonfat milk, cheese, and yogurt  Limit unhealthy fats and sugar  Saturated and trans fats are unhealthy for your heart. They raise LDL (bad) cholesterol. Fat is also high in calories, so it can make you gain weight. To cut down on unhealthy fats and sugar, limit these foods:    Butter or margarine    Palm and palm kernel oils and coconut oil    Cream    Cheese    Crouch    Lunch meats       Ice cream    Sweet bakery goods such as pies, muffins, and donuts    Jams and jellies    Candy bars    Regular sodas   How much to eat  The amount of food you eat affects your blood sugar. It also affects your weight. Your health care team will tell you how much of each type of food you should eat.    Use measuring cups and spoons and a food scale to measure serving sizes.    Learn what a correct serving size looks like on your plate. This will help when you are away from home and can t measure your servings.    Eat only the number of servings given on your meal plan for each food. Don t take seconds.    Learn to read food labels. Be sure to look at serving size, total carbohydrates, fiber, calories, sugar, and saturated and trans fats.  When to eat  Your meal plan will likely include breakfast, lunch, dinner, and some snacks.    Try to eat your meals and snacks at about the same times each day.    Eat all your meals and snacks. Skipping a meal or snack can make your blood sugar drop too low. It can also cause you to eat too much at the next meal or snack. Then your blood sugar could get too high.    1681-2760 [employer]. [address] [place] [address], [place] [address]. All rights reserved. This information is not intended as a substitute for professional medical care. Always follow your healthcare professional's instructions.        Managing Stress When You Have  Diabetes  Getting used to life with a chronic condition can be hard. You might find yourself feeling angry, sad, or even afraid. Rest assured, these feelings are normal. But excess stress or sadness can actually affect your blood sugar. Learn to watch for signs of these feelings. And know that you can get help.  Talking with your health care team  Learning to control blood sugar can sometimes be frustrating. You may have questions or fears about how diabetes may change your life. Your health care team is there to help you and answer questions. They can show you how to follow your meal plan, be more active, and check your blood sugar. Don t be afraid to ask your health care team for help.  Asking others for help    You don t have to deal with diabetes alone. Support from family, friends, or a diabetes support group can help you take better care of yourself. Ask others to:    Listen to your feelings. This will help you work through fear or anger.    Eat the same meals you eat. Your meal plan will be healthy for family and friends, too.    Exercise with you. Exercise is good for everyone. It strengthens the heart and helps relieve stress.    Go with you to visit your health care team. This will help your loved ones learn what you need to do.  Taking time to relax  Learning to relax and doing things you enjoy may reduce stress. Staying active also helps.    Ways to relax  To relax your muscles and calm your mind:    Sit or lie back in a chair. Take a slow, deep breath. Hold it for 5 counts. Then breathe out slowly through the mouth. Keep doing this until you feel relaxed.    As you breathe deeply, tense and then relax the muscles in your body. Start with your feet and work up your body to your neck and face.    Picture yourself in a peaceful place, such as the beach. Feel the warm sand. Hear the waves. Smell the ocean. Doing this will help you feel more relaxed.  Activities that can help  Focus your mind on things you  like. This may include:    Enjoying a hobby    Meditating or praying    Taking a walk with a friend    Exercising    Taking care of pets    Keeping a journal    Joining a social club or group    Learning yoga or leticia chi    Spending time with people you care about  Recognize depression  Many people feel sad or down when they first hear that they have diabetes. But frequent feelings of helplessness or hopelessness are symptoms of depression. Although depression is a serious problem, it can be treated. If you are having trouble sleeping or eating, or if you feel overwhelmed, contact your health care provider. Don t wait! Get the support you need to feel good about managing diabetes.     9008-4642 [employer]. [address] [place] [address], [place] [address]. All rights reserved. This information is not intended as a substitute for professional medical care. Always follow your healthcare professional's instructions.        Diabetes: Meal Planning  You can help keep your blood sugar level in your target range by eating healthy foods. Your health care team can help you create a low-fat, nutritious meal plan. Take an active role in your diabetes management by following your meal plan and working with your health care team.    Make Your Meal Plan  A meal plan gives guidelines for the types and amounts of food you should eat. The goal is to balance food and insulin (or other diabetes medications). Your dietitian will help you make a flexible meal plan that includes many foods that you like.  Watch Serving Sizes  Your meal plan will group foods by servings. To learn how much a serving is, start by measuring food portions at each meal. Soon you ll know what a serving looks like on your plate. Ask your health care provider about how to balance servings of different foods.  Eat from All the Food Groups  The basis of a healthy meal plan is variety (eating lots of different foods). Choose lean meats, fresh fruits and  vegetables, whole grains, and low-fat or nonfat dairy products. Eating a wide variety of foods provides the nutrients your body needs. It can also keep you from getting bored with your meal plan.  Learn About Carbohydrates, Fats, and Protein    Carbohydrates are starches and sugars. They are found in many foods, including fruit, bread, pasta, milk, and sweets. Of all the foods you eat, carbohydrates have the most effect on your blood sugar. Your dietitian may teach you about carb counting, a way to figure out the amount of carbohydrates in a meal.    Fats have the most calories. They also have the most effect on your weight and your risk of heart disease. When you have diabetes, it s important to control your weight and protect your heart. Foods that are high in fat include whole milk, cheese, snack foods, and desserts.    Protein is important for building and repairing muscles and bones. Choose low-fat protein sources, such as fish, egg whites, and skinless chicken.  Reduce Liquid Sugars  Extra calories from sodas, sports drinks, and fruit drinks make it hard to keep blood sugar in range. Cut as many liquid sugars from your meal plan as you can.  This includes most fruit juices, which are often high in natural or added sugar. Instead, drink plenty of water and other sugar-free beverages.  Eat Less Fat  If you need to lose weight, try to reduce the amount of fat in your diet. This can also help lower your cholesterol level to keep blood vessels healthier. Cut fat by using only small amounts of liquid oil for cooking. Read food labels carefully to avoid foods with unhealthy trans fats.     Timing Your Meals  When it comes to blood sugar control, when you eat is as important as what you eat. You may need to eat several small meals spaced evenly throughout the day to stay in your target range. So don t skip breakfast or wait until late in the day to get most of your calories. Doing so can cause your blood sugar to rise  too high or fall too low.     4651-8496 [employer]. [address] [place] [address], [place] [address]. All rights reserved. This information is not intended as a substitute for professional medical care. Always follow your healthcare professional's instructions.        Oral Medications for Type 2 Diabetes  Diabetes pills can help to manage your blood sugar. These pills are not insulin. They work to manage your blood sugar in several ways. You may be given a combination of medications. Always follow your health care provider's instructions.  Some pills may put you at higher risk for low blood sugar (hypoglycemia). Watch for symptoms of low blood sugar. Symptoms are listed below. Call your doctor if low blood sugar occurs often.  Type of diabetes pills  Sulfonylureas  These pills help your body make more insulin. They are usually taken 30 minutes before a meal. Possible side effects include hypoglycemia.  Alpha-glucosidase inhibitors  These pills slow the digestion of sugars and starches. They can help keep your blood sugar from going too high after a meal. Take them with the first bite of each main meal. Possible side effects include:    Abdominal pain    Diarrhea    Excess gas (flatulence)    Thiazolidinediones  These pills help your muscle cells use insulin better. Your doctor may order lab tests to check the function of your liver before prescribing these pills and regularly while you are taking them. Possible side effects include:     Weight gain    Extra fluid in your body and swelling    Increased risk for heart failure    Osteoporosis and increased risk for broken bones  Biguanides  These pills help control the amount of glucose in your blood. They do this by decreasing the amount of glucose made by your liver and helping your muscles use insulin more effectively. These medications are usually taken with with or after each meal. Possible side effects  include:    Diarrhea    Nausea    Vomiting    Abdominal bloating    Excess gas (flatulence)    Metallic taste in mouth   Meglitinides  These pills increase your insulin for a short period of time. They are usually taken before meals. Possible side effects include:    Low blood sugar    Diarrhea    Headache     Slightly increased risk for heart problems  DPP-4 inhibitors  These pills help lower blood sugar levels in people with type 2 diabetes. They are less likely to cause hypoglycemia, unless you take them with a sulfonylurea. They are taken once a day. Possible side effects include:    Upper respiratory tract infection    Stuffy or runny nose    Sore throat    Headache  Other side effects are under investigation.  SGLT-2 inhibitors  These pills help lower blood sugar levels in people with type 2 diabetes by increasing the amount of sugar that leaks into the urine. Possible side effects include:    Urinary tract infections    Genital infections, especially in women   Dopamine D2 receptor agonist (bromocriptine mesylate)  These pills help lower blood sugar levels in people with type 2 diabetes. Possible side effects of this medicine include:    Nausea    Vomiting    Fatigue    Dizziness    Headaches  Combination pills  These medications may help keep your blood glucose within your target range. They also help your pancreas make more insulin and may help your muscles use insulin more effectively. Side effects depend on which type of combination you use. Your healthcare provider can tell you more.     Watch for symptoms of hypoglycemia    Headaches    Shakiness or dizziness    Hunger    Cold, clammy skin; sweating    A hard, fast heartbeat    Confusion or irritability           2644-5323 [employer]. [address] [place] [address], [place] [address]. All rights reserved. This information is not intended as a substitute for professional medical care. Always follow your healthcare professional's  instructions.

## 2017-03-13 NOTE — NURSING NOTE
"Chief Complaint   Patient presents with     Cough     UTI       Initial /85  Pulse 79  Temp 98.4  F (36.9  C) (Oral)  Wt 199 lb 3.2 oz (90.4 kg)  LMP 05/15/2014 (Approximate)  SpO2 99%  BMI 31.43 kg/m2 Estimated body mass index is 31.43 kg/(m^2) as calculated from the following:    Height as of 11/4/16: 5' 6.75\" (1.695 m).    Weight as of this encounter: 199 lb 3.2 oz (90.4 kg).  Medication Reconciliation: complete       Leidy Hinojosa CMA        "

## 2017-03-13 NOTE — MR AVS SNAPSHOT
After Visit Summary   3/13/2017    Lisette LINCOLN Ukaegbu    MRN: [id number]           Patient Information     Date Of Birth          1965        Visit Information        Provider Department      3/13/2017 1:40 PM Kolton Yin PA-C [facility]        Today's Diagnoses     Urinary frequency    -  1    Viral upper respiratory tract infection        Hoarseness        Pelvic pain in female        Postmenopausal status        RUQ abdominal pain        Glucosuria        Type 2 diabetes mellitus without complication, without long-term current use of insulin (H)        Acute recurrent maxillary sinusitis          Care Instructions    Blood sugar goals:  Prior to meals:    At bedtime:  110-150    More exercise        Diabetes: Understanding Carbohydrates, Fats, and Protein  Food is a source of fuel and nourishment for your body. It s also a source of pleasure. Having diabetes doesn t mean you have to eat special foods or give up desserts. Instead, your dietitian can show you how to plan meals to suit your body. To start, learn how different foods affect blood sugar.    Carbohydrates  Carbohydrates are the main source of fuel for the body. Carbohydrates raise blood sugar. Many people think carbohydrates are only found in pasta or bread. But carbohydrates are actually in many kinds of foods:    Sugars occur naturally in foods such as fruit, milk, honey, and molasses. Sugars can also be added to many foods, from cereals and yogurt to candy and desserts. Sugars raise blood sugar.    Starches are found in bread, cereals, pasta, and dried beans. They re also found in corn, peas, potatoes, yam, acorn squash, and butternut squash. Starches also raise blood sugar.     Fiber is found in foods such as vegetables, fruits, and whole grains. Unlike other carbs, fiber isn t digested or absorbed. So it doesn t raise blood sugar. In fact, fiber can help keep blood sugar from rising too fast. It also helps  keep blood cholesterol at a healthy level.     Did You Know?  Even though carbohydrates raise blood sugar, it s best to have some in every meal. They are an important part of a healthy diet.   Fat  Fat is an energy source that can be stored until needed. Fat does not raise blood sugar. However, it can raise blood cholesterol, increasing the risk of heart disease. Fat is also high in calories, which can cause weight gain. Not all types of fat are the same.  More Healthy:    Monounsaturated fats are mostly found in vegetable oils, such as olive, canola, and peanut oils. They are also found in avocados and some nuts. Monounsaturated fats are healthy for your heart. That s because they lower LDL (unhealthy) cholesterol.    Polyunsaturated fats are mostly found in vegetable oils, such as corn, safflower, and soybean oils. They are also found in some seeds, nuts, and fish. Polyunsaturated fats lower LDL (unhealthy) cholesterol. So, choosing them instead of saturated fats is healthy for your heart. Certain unsaturated fats can help lower triglycerides.   Less Healthy:    Saturated fats are found in animal products, such as meat, poultry, whole milk, lard, and butter. Saturated fats raise LDL cholesterol and are not healthy for your heart.    Hydrogenated oils and trans fats are formed when vegetable oils are processed into solid fats. They are found in many processed foods. Hydrogenated oils and trans fats raise LDL cholesterol and lower HDL (healthy) cholesterol. They are not healthy for your heart.  Protein  Protein helps the body build and repair muscle and other tissue. Protein has little or no effect on blood sugar. However, many foods that contain protein also contain saturated fat. By choosing low-fat protein sources, you can get the benefits of protein without the extra fat:    Plant protein is found in dry beans and peas, nuts, and soy products, such as tofu and soymilk. These sources tend to be cholesterol-free  and low in saturated fat.    Animal protein is found in fish, poultry, meat, cheese, milk, and eggs. These contain cholesterol and can be high in saturated fat. Aim for lean, lower-fat choices.    6554-0742 [employer]. [address] [place] [address], [place] [address]. All rights reserved. This information is not intended as a substitute for professional medical care. Always follow your healthcare professional's instructions.        Eating Out When You Have Diabetes  Eating right is an important part of keeping your blood sugar in your target range. You just need to make healthy choices.    Tips for restaurant meals  When you eat away from home try these tips:    Try to schedule your dining-out meal at your normal meal time. Make a reservation if possible, so you don't have to wait to eat. If you can't make a reservation, try to arrive at the restaurant at a less-busy time to cut down your wait time.    Call ahead to see if the restaurant can meet your dietary needs if you've never been there before. Or you can go online to see the menu ahead of time.    Carry some crackers with you in case the restaurant needs you to wait until you can be served.    Ask how foods are prepared before you order.    Instead of fried, sautéed, or breaded foods, choose ones that are broiled, steamed, grilled, or baked.    Ask for sauces, gravies, and dressings on the side.    Only eat an amount that fits your meal plan. Remember: You can take home the leftovers.    Save dessert for special occasions. Then choose a small dessert or share one with a friend or family member.  Make healthy choices  Fast food    Garden salad with light dressing on the side    Baked potato with vegetables or herbs    Broiled, roasted, or grilled chicken sandwich    Sliced turkey or lean roast beef sandwich  Mexican    Chicken enchilada, without cheese or sour cream     Small burrito with whole beans and chicken    Whole beans (not refried) and  rice    Chicken or fish fajitas  Steakhouse    Grilled or broiled lean cuts of beef    Baked potato with vegetables or herbs    Broiled or baked chicken (don t eat the skin)    Steamed vegetables  Asian    Steamed dumplings or potstickers    Broiled, boiled, or steamed meats or fish    Sushi or sashimi    Steamed rice or boiled noodles (one serving is equal to 1/3 cup)    2539-0304 [employer]. [address] [place] [address]. All rights reserved. This information is not intended as a substitute for professional medical care. Always follow your healthcare professional's instructions.        Before You Start Your Diabetes Exercise Plan  Fitness plays a special role for people who have diabetes. Being fit means becoming healthier by adding activity to your day. Talk to your doctor or other health care provider before getting started. He or she may want you to have a checkup before you become more active. Also, having certain tests first helps you and your doctor learn how you will respond to a fitness program.    Your Checkup  A medical checkup helps ensure that your fitness plan will bring you the most benefit. It also keeps at a minimum the risks that may come with physical activity. As part of your checkup:    You may have a test called a hemoglobin A1c. The A1c test measures your average glucose (sugar) level over 2 to 3 months. A1C is usually given as a percentage. But it is now also given as a number representing estimated Average Glucose (eAG). Unlike the A1C percentage, eAG gives you a number similar to the numbers listed on your daily glucose monitor.    Your doctor may check the health of your heart. Diabetes can cause heart problems. But a fitness program can help these problems get better. Being fit can also help improve your cholesterol and blood pressure levels.    Your doctor may check the health of your feet. People who have diabetes can have problems with their feet. Your  doctor can check your feet before you become more active.  If You Have an Exercise Stress Test  An exercise stress test can show how your heart responds to activity. You will have small electrodes placed on your chest. You will then walk on a treadmill or ride an exercise bike while your heart rate is monitored. If you have this test, your doctor will give you more details.       9976-9054 [employer]. [address] [place] [address], [place] [address]. All rights reserved. This information is not intended as a substitute for professional medical care. Always follow your healthcare professional's instructions.        Healthy Meals for Diabetes  Ask your healthcare team to help you make a meal plan that fits your needs. Your meal plan tells you when to eat your meals and snacks, what kinds of foods to eat, and how much of each food to eat. You don t have to give up all the foods you like. But you do need to follow some guidelines.  Choose healthy carbohydrates    Starches, sugars, and fiber are all types of carbohydrates. Fiber can help lower your cholesterol and triglycerides. Fiber is also healthy for your heart. You should have 20 to 35 grams of total fiber each day. Fiber-rich foods include:    Whole-grain breads and cereals    Bulgur wheat    Brown rice       Whole-wheat pasta    Fruits and vegetables    Dry beans, and peas   It s important to keep track of the amount of carbohydrates you eat. This can help you keep the right balance of physical activity and medicine. The amount of carbohydrates needed will vary for each person. It depends on many things such as your health, the medicines you take, and how active you are. Your healthcare team will help you figure out the right amount of carbohydrates for you. You may start with around 45 to 60 grams of carbohydrates per meal, depending on your situation.   Here are some examples of foods containing about 15 grams of carbohydrates (1 serving of  carbohydrates):    1/2 cup of canned or frozen fruit    A small piece of fresh fruit (4 ounces)    1 slice of bread    1/2 cup of oatmeal    1/3 cup of rice    4 to 6 crackers    1/2 English muffin    1/2 cup of black beans    1/4 of a large baked potato (3 ounces)    2/3 cup of plain fat-free yogurt    1 cup of soup    1/2 cup of casserole    6 chicken nuggets    2-inch square brownie or cake without frosting    2 small cookies    1/2 cup of ice cream or sherbet  Choose healthy protein foods  Eating protein that is low in fat can help you control your weight. It also helps keep your heart healthy. Low-fat protein foods include:    Fish    Plant proteins, such as dry beans and peas, nuts, and soy products like tofu and soymilk    Lean meat with all visible fat removed    Poultry with the skin removed    Low-fat or nonfat milk, cheese, and yogurt  Limit unhealthy fats and sugar  Saturated and trans fats are unhealthy for your heart. They raise LDL (bad) cholesterol. Fat is also high in calories, so it can make you gain weight. To cut down on unhealthy fats and sugar, limit these foods:    Butter or margarine    Palm and palm kernel oils and coconut oil    Cream    Cheese    Crouch    Lunch meats       Ice cream    Sweet bakery goods such as pies, muffins, and donuts    Jams and jellies    Candy bars    Regular sodas   How much to eat  The amount of food you eat affects your blood sugar. It also affects your weight. Your health care team will tell you how much of each type of food you should eat.    Use measuring cups and spoons and a food scale to measure serving sizes.    Learn what a correct serving size looks like on your plate. This will help when you are away from home and can t measure your servings.    Eat only the number of servings given on your meal plan for each food. Don t take seconds.    Learn to read food labels. Be sure to look at serving size, total carbohydrates, fiber, calories, sugar, and saturated  and trans fats.  When to eat  Your meal plan will likely include breakfast, lunch, dinner, and some snacks.    Try to eat your meals and snacks at about the same times each day.    Eat all your meals and snacks. Skipping a meal or snack can make your blood sugar drop too low. It can also cause you to eat too much at the next meal or snack. Then your blood sugar could get too high.    7069-8818 [employer]. [address] [place] [address], [place] [address]. All rights reserved. This information is not intended as a substitute for professional medical care. Always follow your healthcare professional's instructions.        Managing Stress When You Have Diabetes  Getting used to life with a chronic condition can be hard. You might find yourself feeling angry, sad, or even afraid. Rest assured, these feelings are normal. But excess stress or sadness can actually affect your blood sugar. Learn to watch for signs of these feelings. And know that you can get help.  Talking with your health care team  Learning to control blood sugar can sometimes be frustrating. You may have questions or fears about how diabetes may change your life. Your health care team is there to help you and answer questions. They can show you how to follow your meal plan, be more active, and check your blood sugar. Don t be afraid to ask your health care team for help.  Asking others for help    You don t have to deal with diabetes alone. Support from family, friends, or a diabetes support group can help you take better care of yourself. Ask others to:    Listen to your feelings. This will help you work through fear or anger.    Eat the same meals you eat. Your meal plan will be healthy for family and friends, too.    Exercise with you. Exercise is good for everyone. It strengthens the heart and helps relieve stress.    Go with you to visit your health care team. This will help your loved ones learn what you need to do.  Taking time to  relax  Learning to relax and doing things you enjoy may reduce stress. Staying active also helps.    Ways to relax  To relax your muscles and calm your mind:    Sit or lie back in a chair. Take a slow, deep breath. Hold it for 5 counts. Then breathe out slowly through the mouth. Keep doing this until you feel relaxed.    As you breathe deeply, tense and then relax the muscles in your body. Start with your feet and work up your body to your neck and face.    Picture yourself in a peaceful place, such as the beach. Feel the warm sand. Hear the waves. Smell the ocean. Doing this will help you feel more relaxed.  Activities that can help  Focus your mind on things you like. This may include:    Enjoying a hobby    Meditating or praying    Taking a walk with a friend    Exercising    Taking care of pets    Keeping a journal    Joining a social club or group    Learning yoga or leticia chi    Spending time with people you care about  Recognize depression  Many people feel sad or down when they first hear that they have diabetes. But frequent feelings of helplessness or hopelessness are symptoms of depression. Although depression is a serious problem, it can be treated. If you are having trouble sleeping or eating, or if you feel overwhelmed, contact your health care provider. Don t wait! Get the support you need to feel good about managing diabetes.     4349-0637 [employer]. [address] [place] [address], [place] [address]. All rights reserved. This information is not intended as a substitute for professional medical care. Always follow your healthcare professional's instructions.        Diabetes: Meal Planning  You can help keep your blood sugar level in your target range by eating healthy foods. Your health care team can help you create a low-fat, nutritious meal plan. Take an active role in your diabetes management by following your meal plan and working with your health care team.    Make Your Meal Plan  A meal  plan gives guidelines for the types and amounts of food you should eat. The goal is to balance food and insulin (or other diabetes medications). Your dietitian will help you make a flexible meal plan that includes many foods that you like.  Watch Serving Sizes  Your meal plan will group foods by servings. To learn how much a serving is, start by measuring food portions at each meal. Soon you ll know what a serving looks like on your plate. Ask your health care provider about how to balance servings of different foods.  Eat from All the Food Groups  The basis of a healthy meal plan is variety (eating lots of different foods). Choose lean meats, fresh fruits and vegetables, whole grains, and low-fat or nonfat dairy products. Eating a wide variety of foods provides the nutrients your body needs. It can also keep you from getting bored with your meal plan.  Learn About Carbohydrates, Fats, and Protein    Carbohydrates are starches and sugars. They are found in many foods, including fruit, bread, pasta, milk, and sweets. Of all the foods you eat, carbohydrates have the most effect on your blood sugar. Your dietitian may teach you about carb counting, a way to figure out the amount of carbohydrates in a meal.    Fats have the most calories. They also have the most effect on your weight and your risk of heart disease. When you have diabetes, it s important to control your weight and protect your heart. Foods that are high in fat include whole milk, cheese, snack foods, and desserts.    Protein is important for building and repairing muscles and bones. Choose low-fat protein sources, such as fish, egg whites, and skinless chicken.  Reduce Liquid Sugars  Extra calories from sodas, sports drinks, and fruit drinks make it hard to keep blood sugar in range. Cut as many liquid sugars from your meal plan as you can.  This includes most fruit juices, which are often high in natural or added sugar. Instead, drink plenty of water  and other sugar-free beverages.  Eat Less Fat  If you need to lose weight, try to reduce the amount of fat in your diet. This can also help lower your cholesterol level to keep blood vessels healthier. Cut fat by using only small amounts of liquid oil for cooking. Read food labels carefully to avoid foods with unhealthy trans fats.     Timing Your Meals  When it comes to blood sugar control, when you eat is as important as what you eat. You may need to eat several small meals spaced evenly throughout the day to stay in your target range. So don t skip breakfast or wait until late in the day to get most of your calories. Doing so can cause your blood sugar to rise too high or fall too low.     6999-1953 [employer]. [address] [place] [address], [place] [address]. All rights reserved. This information is not intended as a substitute for professional medical care. Always follow your healthcare professional's instructions.        Oral Medications for Type 2 Diabetes  Diabetes pills can help to manage your blood sugar. These pills are not insulin. They work to manage your blood sugar in several ways. You may be given a combination of medications. Always follow your health care provider's instructions.  Some pills may put you at higher risk for low blood sugar (hypoglycemia). Watch for symptoms of low blood sugar. Symptoms are listed below. Call your doctor if low blood sugar occurs often.  Type of diabetes pills  Sulfonylureas  These pills help your body make more insulin. They are usually taken 30 minutes before a meal. Possible side effects include hypoglycemia.  Alpha-glucosidase inhibitors  These pills slow the digestion of sugars and starches. They can help keep your blood sugar from going too high after a meal. Take them with the first bite of each main meal. Possible side effects include:    Abdominal pain    Diarrhea    Excess gas (flatulence)    Thiazolidinediones  These pills help your muscle cells use  insulin better. Your doctor may order lab tests to check the function of your liver before prescribing these pills and regularly while you are taking them. Possible side effects include:     Weight gain    Extra fluid in your body and swelling    Increased risk for heart failure    Osteoporosis and increased risk for broken bones  Biguanides  These pills help control the amount of glucose in your blood. They do this by decreasing the amount of glucose made by your liver and helping your muscles use insulin more effectively. These medications are usually taken with with or after each meal. Possible side effects include:    Diarrhea    Nausea    Vomiting    Abdominal bloating    Excess gas (flatulence)    Metallic taste in mouth   Meglitinides  These pills increase your insulin for a short period of time. They are usually taken before meals. Possible side effects include:    Low blood sugar    Diarrhea    Headache     Slightly increased risk for heart problems  DPP-4 inhibitors  These pills help lower blood sugar levels in people with type 2 diabetes. They are less likely to cause hypoglycemia, unless you take them with a sulfonylurea. They are taken once a day. Possible side effects include:    Upper respiratory tract infection    Stuffy or runny nose    Sore throat    Headache  Other side effects are under investigation.  SGLT-2 inhibitors  These pills help lower blood sugar levels in people with type 2 diabetes by increasing the amount of sugar that leaks into the urine. Possible side effects include:    Urinary tract infections    Genital infections, especially in women   Dopamine D2 receptor agonist (bromocriptine mesylate)  These pills help lower blood sugar levels in people with type 2 diabetes. Possible side effects of this medicine include:    Nausea    Vomiting    Fatigue    Dizziness    Headaches  Combination pills  These medications may help keep your blood glucose within your target range. They also help  your pancreas make more insulin and may help your muscles use insulin more effectively. Side effects depend on which type of combination you use. Your healthcare provider can tell you more.     Watch for symptoms of hypoglycemia    Headaches    Shakiness or dizziness    Hunger    Cold, clammy skin; sweating    A hard, fast heartbeat    Confusion or irritability           9391-5579 [employer]. [address] [place] [address]. All rights reserved. This information is not intended as a substitute for professional medical care. Always follow your healthcare professional's instructions.              Follow-ups after your visit        Additional Services     DIABETES EDUCATOR REFERRAL       DIABETES SELF MANAGEMENT TRAINING (DSMT)      Your provider has referred you to Diabetes Education: [facility]: Diabetes Education - All [facility] [phone]   [email or web address]    Type of training and number of hours: New Diagnosis: Initial group DSMT - 10 hours.      Medicare covers: 10 hours of initial DSMT in 12 month period from the time of first visit, plus 2 hours of follow-up DSMT annually, and additional hours as requested for insulin training.    Diabetes Type: Type 2 - On Oral Medication             Diabetes Co-Morbidities: none               A1C Goal:  <7.0       A1C is: Lab Results       Component                Value               Date                       A1C                      8.0                 03/13/2017              If an urgent visit is needed or A1C is above 12, Care Team to call the Diabetes Education Team at [phone] or send an In Basket message to the Diabetes Education Pool (P DIAB ED-PATIENT CARE).    Diabetes Education Topics: Comprehensive Knowledge Assessment and Instruction          MEDICAL NUTRITION THERAPY (MNT) for Diabetes    Medical Nutrition Therapy with a Registered Dietitian can be provided in coordination with  Diabetes Self-Management Training to assist in achieving optimal diabetes management.                           Medicare will cover: 3 hours initial MNT in 12 month period after first visit, plus 2 hours of follow-up MNT annually    Please be aware that coverage of these services is subject to the terms and limitations of your health insurance plan.  Call member services at your health plan to determine Diabetes Self-Management Training benefits and ask which blood glucose monitor brands are covered by your plan.      Please bring the following with you to your appointment:    (1)  List of current medications   (2)  List of Blood Glucose Monitor brands that are covered by your insurance plan  (3)  Blood Glucose Monitor and log book  (4)   Food records for the 3 days prior to your visit    The Certified Diabetes Educator may make diabetes medication adjustments per the CDE Protocol and Collaborative Practice Agreement.            OTOLARYNGOLOGY REFERRAL       Your provider has referred you to: GREGORY: [facility] [phone]   [email or web address]    Please be aware that coverage of these services is subject to the terms and limitations of your health insurance plan.  Call member services at your health plan with any benefit or coverage questions.      Please bring the following with you to your appointment:    (1) Any X-Rays, CTs or MRIs which have been performed.  Contact the facility where they were done to arrange for  prior to your scheduled appointment.   (2) List of current medications  (3) This referral request   (4) Any documents/labs given to you for this referral                  Future tests that were ordered for you today     Open Future Orders        Priority Expected Expires Ordered    US Abdomen Limited Routine  3/13/2018 3/13/2017            Who to contact     Normal or non-critical lab and imaging results will be communicated to you by MyChart, letter  or phone within 4 business days after the clinic has received the results. If you do not hear from us within 7 days, please contact the clinic through AddFleet or phone. If you have a critical or abnormal lab result, we will notify you by phone as soon as possible.  Submit refill requests through AddFleet or call your pharmacy and they will forward the refill request to us. Please allow 3 business days for your refill to be completed.          If you need to speak with a  for additional information , please call: [phone]             Additional Information About Your Visit        AddFleet Information     AddFleet gives you secure access to your electronic health record. If you see a primary care provider, you can also send messages to your care team and make appointments. If you have questions, please call your primary care clinic.  If you do not have a primary care provider, please call [phone] and they will assist you.        Care EveryWhere ID     This is your Care EveryWhere ID. This could be used by other organizations to access your [place] medical records  [id number]        Your Vitals Were     Pulse Temperature Last Period Pulse Oximetry BMI (Body Mass Index)       79 98.4  F (36.9  C) (Oral) 05/15/2014 (Approximate) 99% 31.43 kg/m2        Blood Pressure from Last 3 Encounters:   03/13/17 134/82   02/14/17 146/85   12/23/16 123/67    Weight from Last 3 Encounters:   03/13/17 199 lb 3.2 oz (90.4 kg)   02/14/17 203 lb (92.1 kg)   12/23/16 198 lb (89.8 kg)              We Performed the Following     *UA reflex to Microscopic and Culture ([facility] (except [place] and [place])     Albumin Random Urine Quantitative     CBC with platelets     DIABETES EDUCATOR REFERRAL     FOOT EXAM     Glucose whole blood     Hemoglobin A1c     Mononucleosis screen     OTOLARYNGOLOGY REFERRAL     Urine Microscopic          Today's Medication Changes          These changes  are accurate as of: 3/13/17  3:19 PM.  If you have any questions, ask your nurse or doctor.               Start taking these medicines.        Dose/Directions    aspirin  MG EC tablet   Used for:  Type 2 diabetes mellitus without complication, without long-term current use of insulin (H)   Started by:  Kolton Yin PA-C        Dose:  325 mg   Take 1 tablet (325 mg) by mouth daily   Quantity:  90 tablet   Refills:  3       desogestrel-ethinyl estradiol 0.15-30 MG-MCG per tablet   Commonly known as:  APRI   Used for:  Postmenopausal status   Started by:  Kolton Yin PA-C        Dose:  1 tablet   Take 1 tablet by mouth daily   Quantity:  28 tablet   Refills:  0       metFORMIN 500 MG 24 hr tablet   Commonly known as:  GLUCOPHAGE-XR   Used for:  Type 2 diabetes mellitus without complication, without long-term current use of insulin (H)   Started by:  Kolton Yin PA-C        Dose:  500 mg   Take 1 tablet (500 mg) by mouth daily (with dinner) Take 1 tab daily for 1 week, then increase to two tabs daily thereafter   Quantity:  180 tablet   Refills:  0       * order for DME   Used for:  Type 2 diabetes mellitus without complication, without long-term current use of insulin (H)   Started by:  Kolton Yin PA-C        Glucometer, brand as covered by insurance.   Quantity:  1 each   Refills:  0       * order for DME   Used for:  Type 2 diabetes mellitus without complication, without long-term current use of insulin (H)   Started by:  Kolton Yin PA-C        Glucometer, brand as covered by insurance.   Quantity:  1 each   Refills:  0       * order for DME   Used for:  Type 2 diabetes mellitus without complication, without long-term current use of insulin (H)   Started by:  Kolton Yin PA-C        Lancets bid and prn   Quantity:  200 each   Refills:  4       sulfamethoxazole-trimethoprim 800-160 MG per tablet   Commonly known as:  BACTRIM DS/SEPTRA DS   Used for:  Acute recurrent  maxillary sinusitis   Started by:  Kolton Yin PA-C        Dose:  1 tablet   Take 1 tablet by mouth 2 times daily   Quantity:  20 tablet   Refills:  0       * Notice:  This list has 3 medication(s) that are the same as other medications prescribed for you. Read the directions carefully, and ask your doctor or other care provider to review them with you.         Where to get your medicines      These medications were sent to [facility] [address] [place] [address] AT [address]  [address] [facility] [address]     Phone:  [phone]     aspirin  MG EC tablet    desogestrel-ethinyl estradiol 0.15-30 MG-MCG per tablet    metFORMIN 500 MG 24 hr tablet    sulfamethoxazole-trimethoprim 800-160 MG per tablet         Some of these will need a paper prescription and others can be bought over the counter.  Ask your nurse if you have questions.     Bring a paper prescription for each of these medications     order for DME    order for DME    order for DME                Primary Care Provider Office Phone # Fax #    Kolton Yin PA-C [phone]       [facility] [address] [facility] [address]        Thank you!     Thank you for choosing [facility]  for your care. Our goal is always to provide you with excellent care. Hearing back from our patients is one way we can continue to improve our services. Please take a few minutes to complete the written survey that you may receive in the mail after your visit with us. Thank you!             Your Updated Medication List - Protect others around you: Learn how to safely use, store and throw away your medicines at www.disposemymeds.org.          This list is accurate as of: 3/13/17  3:19 PM.  Always use your most recent med list.                   Brand Name Dispense Instructions for use    * albuterol (2.5 MG/3ML) 0.083% neb solution     1 Box    Take 1 vial (2.5 mg) by  nebulization every 4 hours as needed for shortness of breath / dyspnea       * albuterol 108 (90 BASE) MCG/ACT Inhaler    PROAIR HFA/PROVENTIL HFA/VENTOLIN HFA    1 Inhaler    Inhale 2 puffs into the lungs every 4 hours as needed for shortness of breath / dyspnea       ALPRAZolam 0.5 MG tablet    XANAX    30 tablet    Take 1-2 tablets (0.5-1 mg) by mouth 3 times daily as needed for anxiety       amLODIPine 5 MG tablet    NORVASC    90 tablet    Take 1 tablet (5 mg) by mouth daily       armodafinil 150 MG Tabs tablet    NUVIGIL    30 tablet    Take 1 tablet (150 mg) by mouth every morning       aspirin  MG EC tablet     90 tablet    Take 1 tablet (325 mg) by mouth daily       clindamycin 300 MG capsule    CLEOCIN    40 capsule    Take 1 capsule (300 mg) by mouth 4 times daily       desogestrel-ethinyl estradiol 0.15-30 MG-MCG per tablet    APRI    28 tablet    Take 1 tablet by mouth daily       DULoxetine 60 MG EC capsule    CYMBALTA    180 capsule    Take 1 capsule (60 mg) by mouth 2 times daily       fish oil-omega-3 fatty acids 1000 MG capsule      Take 2 g by mouth 3 times daily Reported on 2/14/2017       fluticasone 50 MCG/ACT spray    FLONASE    16 g    Spray 1-2 sprays into both nostrils daily       fluticasone-salmeterol 500-50 MCG/DOSE diskus inhaler    ADVAIR    3 Inhaler    Inhale 1 puff into the lungs 2 times daily       lovastatin 20 MG tablet    MEVACOR    90 tablet    Take 1 tablet (20 mg) by mouth At Bedtime       metFORMIN 500 MG 24 hr tablet    GLUCOPHAGE-XR    180 tablet    Take 1 tablet (500 mg) by mouth daily (with dinner) Take 1 tab daily for 1 week, then increase to two tabs daily thereafter       montelukast 10 MG tablet    SINGULAIR    90 tablet    Take 1 tablet (10 mg) by mouth At Bedtime       nitroglycerin 0.4 MG/HR 24 hr patch    NITRODUR     Place 1 patch onto the skin daily Reported on 2/14/2017       omeprazole 20 MG CR capsule    priLOSEC     Take 20 mg by mouth daily.       *  order for DME     1 each    Glucometer, brand as covered by insurance.       * order for DME     1 each    Glucometer, brand as covered by insurance.       * order for DME     200 each    Lancets bid and prn       sulfamethoxazole-trimethoprim 800-160 MG per tablet    BACTRIM DS/SEPTRA DS    20 tablet    Take 1 tablet by mouth 2 times daily       * Notice:  This list has 5 medication(s) that are the same as other medications prescribed for you. Read the directions carefully, and ask your doctor or other care provider to review them with you.

## 2017-03-14 ENCOUNTER — TELEPHONE (OUTPATIENT)
Dept: FAMILY MEDICINE | Facility: CLINIC | Age: 52
End: 2017-03-14

## 2017-03-14 NOTE — TELEPHONE ENCOUNTER
Patient wants to know why she was prescribed a contraceptive, Reclipsen, for her newly diagnosed diabetes. Ok to leave a message.  She has had a hysterectomy. Please advise.

## 2017-03-16 ENCOUNTER — RADIANT APPOINTMENT (OUTPATIENT)
Dept: ULTRASOUND IMAGING | Facility: CLINIC | Age: 52
End: 2017-03-16
Attending: PHYSICIAN ASSISTANT
Payer: COMMERCIAL

## 2017-03-16 DIAGNOSIS — R10.13 ABDOMINAL PAIN, EPIGASTRIC: ICD-10-CM

## 2017-03-16 PROCEDURE — 76705 ECHO EXAM OF ABDOMEN: CPT

## 2017-03-29 DIAGNOSIS — F43.10 PTSD (POST-TRAUMATIC STRESS DISORDER): ICD-10-CM

## 2017-03-29 DIAGNOSIS — F41.8 DEPRESSION WITH ANXIETY: ICD-10-CM

## 2017-03-29 NOTE — TELEPHONE ENCOUNTER
DULOXETINE    Last Written Prescription Date: 7-21-16  Last Fill Quantity: 180, # refills: 3  Last Office Visit with [facility], P or [facility] prescribing provider: 3-13-17        BP Readings from Last 3 Encounters:   03/13/17 134/82   02/14/17 146/85   12/23/16 123/67     Pulse: (for [place])  Creatinine   Date Value Ref Range Status   04/15/2016 0.81 0.52 - 1.04 mg/dL Final   ]    Last PHQ-9 score on record=   PHQ-9 SCORE 11/29/2016   Total Score -   Total Score 19

## 2017-03-29 NOTE — TELEPHONE ENCOUNTER
Routing refill request to provider for review/approval because:  Patient due for labs in April and PHQ >4        Lab Results   Component Value Date    AST 31 04/15/2016     Lab Results   Component Value Date    ALT 48 04/15/2016

## 2017-03-30 RX ORDER — DULOXETIN HYDROCHLORIDE 60 MG/1
60 CAPSULE, DELAYED RELEASE ORAL 2 TIMES DAILY
Qty: 180 CAPSULE | Refills: 3 | Status: SHIPPED | OUTPATIENT
Start: 2017-03-30 | End: 2017-08-18

## 2017-04-21 ENCOUNTER — ALLIED HEALTH/NURSE VISIT (OUTPATIENT)
Dept: EDUCATION SERVICES | Facility: CLINIC | Age: 52
End: 2017-04-21
Payer: COMMERCIAL

## 2017-04-21 VITALS — BODY MASS INDEX: 29.38 KG/M2 | WEIGHT: 186.2 LBS

## 2017-04-21 DIAGNOSIS — E11.9 TYPE 2 DIABETES MELLITUS WITHOUT COMPLICATION, WITHOUT LONG-TERM CURRENT USE OF INSULIN (H): Primary | ICD-10-CM

## 2017-04-21 DIAGNOSIS — J45.41 MODERATE PERSISTENT ASTHMA WITH ACUTE EXACERBATION: ICD-10-CM

## 2017-04-21 DIAGNOSIS — J45.40 MODERATE PERSISTENT ASTHMA, UNCOMPLICATED: ICD-10-CM

## 2017-04-21 PROCEDURE — G0108 DIAB MANAGE TRN  PER INDIV: HCPCS

## 2017-04-21 RX ORDER — ALBUTEROL SULFATE 0.83 MG/ML
1 SOLUTION RESPIRATORY (INHALATION) EVERY 4 HOURS PRN
Qty: 1 BOX | Refills: 1 | Status: SHIPPED | OUTPATIENT
Start: 2017-04-21 | End: 2017-08-18

## 2017-04-21 NOTE — MR AVS SNAPSHOT
After Visit Summary   4/21/2017    Lisette LINCOLN Ukaegbu    MRN: [id number]           Patient Information     Date Of Birth          1965        Visit Information        Provider Department      4/21/2017 9:00 AM [facility]        Care Instructions    1. Check your blood sugars twice daily (fasting and an alternative time). If you run out of test strips call me     2. Aim for around 30-45 grams of carbohydrates at meals and 0-15 grams at snacks.     3. Activity helps to lower blood sugars. Recommend 30 minutes 5 days/week     FOLLOW-UP: Tuesday June 20th at 1:30pm at the [facility]         Follow-ups after your visit        Your next 10 appointments already scheduled     Jun 20, 2017  1:30 PM CDT   Diabetic Education with [facility]   [facility] [place] ([facility])    [address] [facility]  [place] [id number] [phone]              Who to contact     If you have questions or need follow up information about today's clinic visit or your schedule please contact [facility] directly at [phone].  Normal or non-critical lab and imaging results will be communicated to you by Guestyhart, letter or phone within 4 business days after the clinic has received the results. If you do not hear from us within 7 days, please contact the clinic through Twiliot or phone. If you have a critical or abnormal lab result, we will notify you by phone as soon as possible.  Submit refill requests through iOmando or call your pharmacy and they will forward the refill request to us. Please allow 3 business days for your refill to be completed.          Additional Information About Your Visit        Guestyhart Information     iOmando gives you secure access to your electronic health record. If you see a primary care provider, you can also send messages to your care team and make appointments. If you have questions, please call your primary  [facility] clinic.  If you do not have a primary care provider, please call [phone] and they will assist you.        Care EveryWhere ID     This is your Care EveryWhere ID. This could be used by other organizations to access your [facility] medical records  [id number]        Your Vitals Were     Last Period                   05/15/2014 (Approximate)            Blood Pressure from Last 3 Encounters:   03/13/17 134/82   02/14/17 146/85   12/23/16 123/67    Weight from Last 3 Encounters:   03/13/17 199 lb 3.2 oz (90.4 kg)   02/14/17 203 lb (92.1 kg)   12/23/16 198 lb (89.8 kg)              Today, you had the following     No orders found for display       Primary Care Provider Office Phone # Fax #    Kolton Yin PA-C [phone]       [facility] [address]  [facility] [address]        Thank you!     Thank you for choosing [facility]  for your care. Our goal is always to provide you with excellent care. Hearing back from our patients is one way we can continue to improve our services. Please take a few minutes to complete the written survey that you may receive in the mail after your visit with us. Thank you!             Your Updated Medication List - Protect others around you: Learn how to safely use, store and throw away your medicines at www.disposemymeds.org.          This list is accurate as of: 4/21/17 10:04 AM.  Always use your most recent med list.                   Brand Name Dispense Instructions for use    * albuterol (2.5 MG/3ML) 0.083% neb solution     1 Box    Take 1 vial (2.5 mg) by nebulization every 4 hours as needed for shortness of breath / dyspnea       * albuterol 108 (90 BASE) MCG/ACT Inhaler    PROAIR HFA/PROVENTIL HFA/VENTOLIN HFA    1 Inhaler    Inhale 2 puffs into the lungs every 4 hours as needed for shortness of breath / dyspnea       ALPRAZolam 0.5 MG tablet    XANAX    30 tablet    Take 1-2 tablets (0.5-1 mg) by mouth 3 times daily as needed for  anxiety       amLODIPine 5 MG tablet    NORVASC    90 tablet    Take 1 tablet (5 mg) by mouth daily       armodafinil 150 MG Tabs tablet    NUVIGIL    30 tablet    Take 1 tablet (150 mg) by mouth every morning       aspirin  MG EC tablet     90 tablet    Take 1 tablet (325 mg) by mouth daily       clindamycin 300 MG capsule    CLEOCIN    40 capsule    Take 1 capsule (300 mg) by mouth 4 times daily       DULoxetine 60 MG EC capsule    CYMBALTA    180 capsule    Take 1 capsule (60 mg) by mouth 2 times daily       fish oil-omega-3 fatty acids 1000 MG capsule      Take 2 g by mouth 3 times daily Reported on 2/14/2017       fluticasone 50 MCG/ACT spray    FLONASE    16 g    Spray 1-2 sprays into both nostrils daily       fluticasone-salmeterol 500-50 MCG/DOSE diskus inhaler    ADVAIR    3 Inhaler    Inhale 1 puff into the lungs 2 times daily       lovastatin 20 MG tablet    MEVACOR    90 tablet    Take 1 tablet (20 mg) by mouth At Bedtime       metFORMIN 500 MG 24 hr tablet    GLUCOPHAGE-XR    180 tablet    Take 1 tablet (500 mg) by mouth daily (with dinner) Take 1 tab daily for 1 week, then increase to two tabs daily thereafter       montelukast 10 MG tablet    SINGULAIR    90 tablet    Take 1 tablet (10 mg) by mouth At Bedtime       nitroglycerin 0.4 MG/HR 24 hr patch    NITRODUR     Place 1 patch onto the skin daily Reported on 2/14/2017       omeprazole 20 MG CR capsule    priLOSEC     Take 20 mg by mouth daily.       * order for DME     1 each    Glucometer, brand as covered by insurance.       * order for DME     1 each    Glucometer, brand as covered by insurance.       * order for DME     200 each    Lancets bid and prn       sulfamethoxazole-trimethoprim 800-160 MG per tablet    BACTRIM DS/SEPTRA DS    20 tablet    Take 1 tablet by mouth 2 times daily       * Notice:  This list has 5 medication(s) that are the same as other medications prescribed for you. Read the directions carefully, and ask your doctor  or other care provider to review them with you.

## 2017-04-21 NOTE — PROGRESS NOTES
Diabetes Self Management Training: Initial Assessment Visit for Newly Diagnosed Patients (Complete AADE Goals Flowsheet)    Lisette Owens presents today for education and evaluation of glucose control related to Type 2 diabetes.    She is accompanied by self    Patient's diabetes management related comments/concerns: feels that stress and recent trauma (loss of daughter ~9 months ago) sparked onset of type 2 diabetes, though she does feel she has some insulin resistance in her genetics. Has decreased her starch intake and has seen her blood sugars improve.     Patient's emotional response to diabetes: expresses readiness to learn    Patient would like this visit to be focused around the following diabetes-related behaviors and goals: Diabetes pathophysiology, Assistance with making lifestyle changes, Self-care behavioral goal setting, Healthy Eating, Being Active and Monitoring    ASSESSMENT:  Patient Problem List and Family Medical History reviewed for relevant medical history, current medical status, and diabetes risk factors.    Current Diabetes Management per Patient:  Taking diabetes medications?   yes:     Diabetes Medication(s)     Biguanides Sig    metFORMIN (GLUCOPHAGE-XR) 500 MG 24 hr tablet Take 1 tablet (500 mg) by mouth daily with breakfast and 1 tablet (500 mg) with dinner         Past Diabetes Education: Newly diagnosed    Patient glucose self monitoring as follows: 2-4 times daily.   BG meter: Accu-Chek [place] meter  BG results (most to least recent): fasting glucose- 98, 108, 105, 120, 111, 114, 112, 135, pre-lunch glucose- 93, post-lunch glucose- 87, pre-supper glucose- 90, 109, post-supper glucose- 111, 168, 133, 98 and bedtime- 93, 95, 97, 89     BG values are: In goal  Patient's most recent   Lab Results   Component Value Date    A1C 8.0 03/13/2017    is not meeting goal of <7.0    Nutrition:  Patient eats 3 meals per day and has a low intake of carbohydrates. She has cut out all  "bread/noodles/rice/pizza since her dx though still does have potatoes and some starchy vegetables. She typically consumes a lot of vegetables but doesn't like salad dressing. She does not eat a lot of meat, reports it is related to it's texture. She consumes veggie burgers and recognizes they are higher in carbohydrate than a meat source of protein. She has been consuming some WG crackers such as Triscuits. She is consuming nuts such as macadamia, almonds and cashews. She will not consume fruit as she realized it raises her blood sugars too much. She watches the amount of sodium in her diet. She reports having sweet potatoes but the portion is very small.     Breakfast -  Protein drink + super foods + veggie burger (or may not)     Beverages: Tea -unsweetened (used to consume but hasn't been since dx as wasn't sure she could have it and Water throughout/day. Stated she has never consumed sugar sweetened beverages     Cultural/Gnosticism diet restrictions: none mentioned      Biggest Challenge to Healthy Eating: portion control and knowing what to eat    Physical Activity:    Resistance training/cardio (15-20 min) 3-4 days/week  Stated she has noticed the benefit exercise has on her blood sugars     Diabetes Risk Factors:  family history, age over 45 years, hyperlipidemia, hypertriglyceridemia and overweight/obesity    Diabetes Complications:  Not discussed today.    Vitals:  Wt 186 lb 3.2 oz (84.5 kg)  LMP 05/15/2014 (Approximate)  BMI 29.38 kg/m2  Estimated body mass index is 29.38 kg/(m^2) as calculated from the following:    Height as of 11/4/16: 5' 6.75\" (1.695 m).    Weight as of this encounter: 186 lb 3.2 oz (84.5 kg).   Last 3 BP:   BP Readings from Last 3 Encounters:   03/13/17 134/82   02/14/17 146/85   12/23/16 123/67       History   Smoking Status     Never Smoker   Smokeless Tobacco     Never Used       Labs:  Lab Results   Component Value Date    A1C 8.0 03/13/2017     Lab Results   Component Value " Date    GLC 85 04/15/2016     Lab Results   Component Value Date    LDL 94 04/11/2014     HDL Cholesterol   Date Value Ref Range Status   04/11/2014 46 (L) >50 mg/dL Final   ]  GFR Estimate   Date Value Ref Range Status   04/15/2016 75 >60 mL/min/1.7m2 Final     Comment:     Non  GFR Calc     GFR Estimate If Black   Date Value Ref Range Status   04/15/2016 >90   GFR Calc   >60 mL/min/1.7m2 Final     Lab Results   Component Value Date    CR 0.81 04/15/2016     No results found for: MICROALBUMIN    Socio/Economic Considerations:    Support system: family    Health Beliefs and Attitudes:   Patient Activation Measure Survey Score:  KUMAR Score (Last Two) 11/26/2014   KUMAR Raw Score 52   Activation Score 100   KUMAR Level 4       Stage of Change: ACTION (Actively working towards change)      Diabetes knowledge and skills assessment:     Patient is knowledgeable in diabetes management concepts related to: Healthy Eating, Being Active and Monitoring    Patient needs further education on the following diabetes management concepts: Healthy Eating, Being Active, Monitoring, Taking Medication, Problem Solving, Reducing Risks and Healthy Coping    Barriers to Learning Assessment: No Barriers identified    Based on learning assessment above, most appropriate setting for further diabetes education would be: Group class or Individual setting.    INTERVENTION:   Education provided today on:  AADE Self-Care Behaviors:  Healthy Eating: carbohydrate counting, consistency in amount, composition, and timing of food intake, weight reduction, heart healthy diet, portion control, plate planning method and label reading  Being Active: relationship to blood glucose and describe appropriate activity program  Monitoring: log and interpret results, individual blood glucose targets and frequency of monitoring  Taking Medication: action of prescribed medication, side effects of prescribed medications and when to take  medications  Healthy Coping: utilize support systems and methods for coping with stress-patient stated she is currently in therapy and is in a grief support group     Opportunities for ongoing education and support in diabetes-self management were discussed.    Pt verbalized understanding of concepts discussed and recommendations provided today.       Education Materials Provided:  [facility] Understanding Diabetes Booklet, Safe Disposal Options for Needles & Syringes, BG Log Sheet, Medication Information on Metformin and My Plate Planner    PLAN:  See Patient Instructions for co-developed, patient-stated behavior change goals.  AVS printed and provided to patient today.    FOLLOW-UP:  Follow-up appointment scheduled on June 20th.  Education topics to cover at the next diabetes education visit(s): BG review, heart healthy eating, medications, reducing risks- A1c update?  Chart routed to referring provider.    Ongoing plan for education and support: Written resources (magazines, books, etc.), Follow-up visit with diabetes educator in 2 months and Follow-up with primary care provider    Jeannette Ashraf RD, LD, CDE  Time Spent: 60 minutes  Encounter Type: Individual    Any diabetes medication dose changes were made via the CDE Protocol and Collaborative Practice Agreement with the patient's referring provider. A copy of this encounter was shared with the provider.

## 2017-04-21 NOTE — PATIENT INSTRUCTIONS
1. Check your blood sugars twice daily (fasting and an alternative time). If you run out of test strips call me     2. Aim for around 30-45 grams of carbohydrates at meals and 0-15 grams at snacks.     3. Activity helps to lower blood sugars. Recommend 30 minutes 5 days/week     FOLLOW-UP: Tuesday June 20th at 1:30pm at the [facility]

## 2017-04-21 NOTE — Clinical Note
YAZ Machado only- I saw Lisette today for initial diab ed. I will see her again in 2 months  Thank you, Jeannette Ashraf RD, LD, CDE

## 2017-04-24 DIAGNOSIS — J45.40 MODERATE PERSISTENT ASTHMA, UNCOMPLICATED: ICD-10-CM

## 2017-04-24 RX ORDER — ALBUTEROL SULFATE 90 UG/1
2 AEROSOL, METERED RESPIRATORY (INHALATION) EVERY 4 HOURS PRN
Qty: 1 INHALER | Refills: 3 | Status: SHIPPED | OUTPATIENT
Start: 2017-04-24 | End: 2017-08-18

## 2017-04-24 NOTE — TELEPHONE ENCOUNTER
Christiano       Last Written Prescription Date: 02/11/17  Last Fill Quantity: 8.5, # refills: 3    Last Office Visit with [facility], [facility] or [facility] prescribing provider:  03/13/17   Future Office Visit:       Date of Last Asthma Action Plan Letter:   Asthma Action Plan Q1 Year    Topic Date Due     Asthma Action Plan - yearly  11/29/2017      Asthma Control Test:   ACT Total Scores 12/15/2016   ACT TOTAL SCORE (Goal Greater than or Equal to 20) 21   In the past 12 months, how many times did you visit the emergency room for your asthma without being admitted to the hospital? 0   In the past 12 months, how many times were you hospitalized overnight because of your asthma? 0       Date of Last Spirometry Test:   No results found for this or any previous visit.

## 2017-05-04 ENCOUNTER — OFFICE VISIT (OUTPATIENT)
Dept: FAMILY MEDICINE | Facility: CLINIC | Age: 52
End: 2017-05-04
Payer: COMMERCIAL

## 2017-05-04 VITALS
BODY MASS INDEX: 28.72 KG/M2 | WEIGHT: 182 LBS | TEMPERATURE: 98.6 F | OXYGEN SATURATION: 97 % | RESPIRATION RATE: 18 BRPM | DIASTOLIC BLOOD PRESSURE: 82 MMHG | HEART RATE: 78 BPM | SYSTOLIC BLOOD PRESSURE: 135 MMHG

## 2017-05-04 DIAGNOSIS — R82.90 NONSPECIFIC FINDING ON EXAMINATION OF URINE: ICD-10-CM

## 2017-05-04 DIAGNOSIS — N30.01 ACUTE CYSTITIS WITH HEMATURIA: ICD-10-CM

## 2017-05-04 DIAGNOSIS — R30.0 DYSURIA: Primary | ICD-10-CM

## 2017-05-04 DIAGNOSIS — R00.2 PALPITATIONS: ICD-10-CM

## 2017-05-04 LAB
ALBUMIN UR-MCNC: ABNORMAL MG/DL
APPEARANCE UR: ABNORMAL
BACTERIA #/AREA URNS HPF: ABNORMAL /HPF
BILIRUB UR QL STRIP: NEGATIVE
CAOX CRY #/AREA URNS HPF: ABNORMAL /HPF
COLOR UR AUTO: YELLOW
GLUCOSE UR STRIP-MCNC: NEGATIVE MG/DL
HGB UR QL STRIP: ABNORMAL
KETONES UR STRIP-MCNC: NEGATIVE MG/DL
LEUKOCYTE ESTERASE UR QL STRIP: ABNORMAL
NITRATE UR QL: NEGATIVE
PH UR STRIP: 5 PH (ref 5–7)
RBC #/AREA URNS AUTO: ABNORMAL /HPF (ref 0–2)
SP GR UR STRIP: >1.03 (ref 1–1.03)
URN SPEC COLLECT METH UR: ABNORMAL
UROBILINOGEN UR STRIP-ACNC: 0.2 EU/DL (ref 0.2–1)
WBC #/AREA URNS AUTO: ABNORMAL /HPF (ref 0–2)

## 2017-05-04 PROCEDURE — 99214 OFFICE O/P EST MOD 30 MIN: CPT | Performed by: PHYSICIAN ASSISTANT

## 2017-05-04 PROCEDURE — 81001 URINALYSIS AUTO W/SCOPE: CPT | Performed by: PHYSICIAN ASSISTANT

## 2017-05-04 PROCEDURE — 87186 SC STD MICRODIL/AGAR DIL: CPT | Performed by: PHYSICIAN ASSISTANT

## 2017-05-04 PROCEDURE — 87088 URINE BACTERIA CULTURE: CPT | Performed by: PHYSICIAN ASSISTANT

## 2017-05-04 PROCEDURE — 87086 URINE CULTURE/COLONY COUNT: CPT | Performed by: PHYSICIAN ASSISTANT

## 2017-05-04 RX ORDER — DILTIAZEM HYDROCHLORIDE 240 MG/1
240 CAPSULE, EXTENDED RELEASE ORAL DAILY
Qty: 90 CAPSULE | Refills: 1 | Status: SHIPPED | OUTPATIENT
Start: 2017-05-04 | End: 2017-08-18

## 2017-05-04 RX ORDER — SULFAMETHOXAZOLE/TRIMETHOPRIM 800-160 MG
1 TABLET ORAL 2 TIMES DAILY
Qty: 14 TABLET | Refills: 0 | Status: SHIPPED | OUTPATIENT
Start: 2017-05-04 | End: 2017-08-18

## 2017-05-04 NOTE — PROGRESS NOTES
SUBJECTIVE:                                                    Lisette LINCOLN Ukaegbu is a 52 year old female who presents to clinic today for the following health issues:      URINARY TRACT SYMPTOMS     Onset: 3 days    Description:   Painful urination (Dysuria): YES  Blood in urine (Hematuria): no   Delay in urine (Hesitency): YES    Intensity: moderate    Progression of Symptoms:  worsening    Accompanying Signs & Symptoms:  Fever/chills: no   Flank pain YES  Nausea and vomiting: no   Any vaginal symptoms: none  Abdominal/Pelvic Pain: YES   History:   History of frequent UTI's: YES  History of kidney stones: no   Sexually Active: YES  Possibility of pregnancy: No    Precipitating factors:            Therapies Tried and outcome:       Still daily palpitations. Occasional dizziness. No chest pain/. Reviewed zio patch test results from last fall. Pac/pvc's and short run of psvt.       Problem list and histories reviewed & adjusted, as indicated.  Additional history: as documented        Reviewed and updated as needed this visit by clinical staff  Tobacco  Meds       Reviewed and updated as needed this visit by Provider         All other systems negative except as outline above  OBJECTIVE:  Eye exam - right eye normal lid, conjunctiva, cornea, pupil and fundus, left eye normal lid, conjunctiva, cornea, pupil and fundus.  Thyroid not palpable, not enlarged, no nodules detected.  CHEST:chest clear to IPPA, no tachypnea, retractions or cyanosis and S1, S2 normal, no murmur, no gallop, rate regular.  The abdomen is soft without tenderness, guarding, mass, rebound or organomegaly. Bowel sounds are normal. No CVA tenderness or inguinal adenopathy noted.    Lisette was seen today for uti.    Diagnoses and all orders for this visit:    Dysuria  -     *UA reflex to Microscopic and Culture ([place] [facility] (except [place] and [place])  -     Urine Microscopic    Palpitations  -     diltiazem (TIAZAC) 240 MG 24 hr ER  beaded capsule; Take 1 capsule (240 mg) by mouth daily    Acute cystitis with hematuria  -     sulfamethoxazole-trimethoprim (BACTRIM DS/SEPTRA DS) 800-160 MG per tablet; Take 1 tablet by mouth 2 times daily      D/c amlodipine   Advised supportive and symptomatic treatment.  Follow up with Provider - if condition persists or worsens.

## 2017-05-04 NOTE — MR AVS SNAPSHOT
After Visit Summary   5/4/2017    Lisette LINCOLN Ukaegbu    MRN: [id number]           Patient Information     Date Of Birth          1965        Visit Information        Provider Department      5/4/2017 2:40 PM Kolton Yin PA-C [facility]        Today's Diagnoses     Dysuria    -  1    Palpitations        Acute cystitis with hematuria           Follow-ups after your visit        Your next 10 appointments already scheduled     Jun 20, 2017  1:30 PM CDT   Diabetic Education with [facility]   [facility] ([facility])    [address]  [place] [id number] [phone]              Who to contact     Normal or non-critical lab and imaging results will be communicated to you by Benefitterhart, letter or phone within 4 business days after the clinic has received the results. If you do not hear from us within 7 days, please contact the clinic through Benefitterhart or phone. If you have a critical or abnormal lab result, we will notify you by phone as soon as possible.  Submit refill requests through [facility] or call your pharmacy and they will forward the refill request to us. Please allow 3 business days for your refill to be completed.          If you need to speak with a  for additional information , please call: [phone]             Additional Information About Your Visit        Crystalplex Information     [employer]plex gives you secure access to your electronic health record. If you see a primary care provider, you can also send messages to your care team and make appointments. If you have questions, please call your primary care clinic.  If you do not have a primary care provider, please call [phone] and they will assist you.        Care EveryWhere ID     This is your Care EveryWhere ID. This could be used by other organizations to access your [facility] medical records  [id number]        Your Vitals Were     Pulse Temperature  Respirations Last Period Pulse Oximetry BMI (Body Mass Index)    78 98.6  F (37  C) (Tympanic) 18 05/15/2014 (Approximate) 97% 28.72 kg/m2       Blood Pressure from Last 3 Encounters:   05/04/17 135/82   03/13/17 134/82   02/14/17 146/85    Weight from Last 3 Encounters:   05/04/17 182 lb (82.6 kg)   04/21/17 186 lb 3.2 oz (84.5 kg)   03/13/17 199 lb 3.2 oz (90.4 kg)              We Performed the Following     *UA reflex to Microscopic and Culture ([place] [facility] (except [place] and [place])     Urine Microscopic          Today's Medication Changes          These changes are accurate as of: 5/4/17  4:00 PM.  If you have any questions, ask your nurse or doctor.               Start taking these medicines.        Dose/Directions    diltiazem 240 MG 24 hr ER beaded capsule   Commonly known as:  TIAZAC   Used for:  Palpitations   Started by:  Kolton Yin PA-C        Dose:  240 mg   Take 1 capsule (240 mg) by mouth daily   Quantity:  90 capsule   Refills:  1         Stop taking these medicines if you haven't already. Please contact your care team if you have questions.     amLODIPine 5 MG tablet   Commonly known as:  NORVASC   Stopped by:  Kolton Yin PA-C           armodafinil 150 MG Tabs tablet   Commonly known as:  NUVIGIL   Stopped by:  Kolton Yin PA-C           clindamycin 300 MG capsule   Commonly known as:  CLEOCIN   Stopped by:  Kolton Yin PA-C           lovastatin 20 MG tablet   Commonly known as:  MEVACOR   Stopped by:  Kolton Yin PA-C           metFORMIN 500 MG 24 hr tablet   Commonly known as:  GLUCOPHAGE-XR   Stopped by:  Kolton Yin PA-C           montelukast 10 MG tablet   Commonly known as:  SINGULAIR   Stopped by:  Kolton Yin PA-C           omeprazole 20 MG CR capsule   Commonly known as:  priLOSEC   Stopped by:  Kolton Yin PA-C           order for DME   Stopped by:  Kolton Yin PA-C                Where to get your  medicines      These medications were sent to [id number] [place] - [address]  [address], [place] [address]     Phone:  [phone]     diltiazem 240 MG 24 hr ER beaded capsule    sulfamethoxazole-trimethoprim 800-160 MG per tablet                Primary Care Provider Office Phone # Fax #    Kolton Yni PA-C [phone]       [facility] [address] [facility] [address]        Thank you!     Thank you for choosing [facility]  for your care. Our goal is always to provide you with excellent care. Hearing back from our patients is one way we can continue to improve our services. Please take a few minutes to complete the written survey that you may receive in the mail after your visit with us. Thank you!             Your Updated Medication List - Protect others around you: Learn how to safely use, store and throw away your medicines at www.disposemymeds.org.          This list is accurate as of: 5/4/17  4:00 PM.  Always use your most recent med list.                   Brand Name Dispense Instructions for use    * albuterol (2.5 MG/3ML) 0.083% neb solution     1 Box    Take 1 vial (2.5 mg) by nebulization every 4 hours as needed for shortness of breath / dyspnea       * albuterol 108 (90 BASE) MCG/ACT Inhaler    PROAIR HFA/PROVENTIL HFA/VENTOLIN HFA    1 Inhaler    Inhale 2 puffs into the lungs every 4 hours as needed for shortness of breath / dyspnea       ALPRAZolam 0.5 MG tablet    XANAX    30 tablet    Take 1-2 tablets (0.5-1 mg) by mouth 3 times daily as needed for anxiety       aspirin  MG EC tablet     90 tablet    Take 1 tablet (325 mg) by mouth daily       diltiazem 240 MG 24 hr ER beaded capsule    TIAZAC    90 capsule    Take 1 capsule (240 mg) by mouth daily       DULoxetine 60 MG EC capsule    CYMBALTA    180 capsule    Take 1 capsule (60 mg) by mouth 2 times daily        fish oil-omega-3 fatty acids 1000 MG capsule      Take 2 g by mouth 3 times daily Reported on 2/14/2017       fluticasone 50 MCG/ACT spray    FLONASE    16 g    Spray 1-2 sprays into both nostrils daily       fluticasone-salmeterol 500-50 MCG/DOSE diskus inhaler    ADVAIR    3 Inhaler    Inhale 1 puff into the lungs 2 times daily       nitroglycerin 0.4 MG/HR 24 hr patch    NITRODUR     Place 1 patch onto the skin daily Reported on 2/14/2017       sulfamethoxazole-trimethoprim 800-160 MG per tablet    BACTRIM DS/SEPTRA DS    14 tablet    Take 1 tablet by mouth 2 times daily       * Notice:  This list has 2 medication(s) that are the same as other medications prescribed for you. Read the directions carefully, and ask your doctor or other care provider to review them with you.

## 2017-05-06 LAB
BACTERIA SPEC CULT: ABNORMAL
MICRO REPORT STATUS: ABNORMAL
MICROORGANISM SPEC CULT: ABNORMAL
SPECIMEN SOURCE: ABNORMAL

## 2017-05-11 RX ORDER — CEPHALEXIN 500 MG/1
500 CAPSULE ORAL 2 TIMES DAILY
Qty: 20 CAPSULE | Refills: 0 | Status: SHIPPED | OUTPATIENT
Start: 2017-05-11 | End: 2017-08-18

## 2017-06-20 DIAGNOSIS — J45.41 MODERATE PERSISTENT ASTHMA WITH ACUTE EXACERBATION: ICD-10-CM

## 2017-06-20 DIAGNOSIS — J30.2 SEASONAL ALLERGIC RHINITIS, UNSPECIFIED ALLERGIC RHINITIS TRIGGER: ICD-10-CM

## 2017-06-20 RX ORDER — MONTELUKAST SODIUM 10 MG/1
10 TABLET ORAL AT BEDTIME
Qty: 90 TABLET | Refills: 1 | Status: SHIPPED | OUTPATIENT
Start: 2017-06-20 | End: 2017-08-18

## 2017-06-20 NOTE — TELEPHONE ENCOUNTER
Montelukast       Last Written Prescription Date: 03/17/17  Last Fill Quantity: 90, # refills: 1    Last Office Visit with [facility], [facility] or [facility] prescribing provider:  05/04/17   Future Office Visit:       Date of Last Asthma Action Plan Letter:   Asthma Action Plan Q1 Year    Topic Date Due     Asthma Action Plan - yearly  11/29/2017      Asthma Control Test:   ACT Total Scores 12/15/2016   ACT TOTAL SCORE (Goal Greater than or Equal to 20) 21   In the past 12 months, how many times did you visit the emergency room for your asthma without being admitted to the hospital? 0   In the past 12 months, how many times were you hospitalized overnight because of your asthma? 0       Date of Last Spirometry Test:   No results found for this or any previous visit.

## 2017-06-30 DIAGNOSIS — E11.9 TYPE 2 DIABETES MELLITUS WITHOUT COMPLICATION, WITHOUT LONG-TERM CURRENT USE OF INSULIN (H): ICD-10-CM

## 2017-06-30 RX ORDER — METFORMIN HCL 500 MG
TABLET, EXTENDED RELEASE 24 HR ORAL
Qty: 60 TABLET | Refills: 0 | Status: SHIPPED | OUTPATIENT
Start: 2017-06-30 | End: 2017-08-07

## 2017-06-30 NOTE — LETTER
[facility]  [address]  [place] [address]  [phone]      July 1, 2017      Lisette LINCOLN Ukaegbu  [address] [place] [address]        Lisette     Your medication has been approved for metformin.    However, you are due for a follow up appointment for further refills. Please schedule this visit at your earliest convenience.    [facility]

## 2017-06-30 NOTE — TELEPHONE ENCOUNTER
Metformin         Last Written Prescription Date: 06/06/17  Last Fill Quantity: 60, # refills: 0  Last Office Visit with [facility], [facility] or [facility] prescribing provider:  05/04/17        BP Readings from Last 3 Encounters:   05/04/17 135/82   03/13/17 134/82   02/14/17 146/85     Lab Results   Component Value Date    MICROL 46 03/13/2017     Lab Results   Component Value Date    UMALCR 11.81 03/13/2017     Creatinine   Date Value Ref Range Status   04/15/2016 0.81 0.52 - 1.04 mg/dL Final   ]  GFR Estimate   Date Value Ref Range Status   04/15/2016 75 >60 mL/min/1.7m2 Final     Comment:     Non  GFR Calc   04/11/2014 70 >60 mL/min/1.7m2 Final     GFR Estimate If Black   Date Value Ref Range Status   04/15/2016 >90   GFR Calc   >60 mL/min/1.7m2 Final   04/11/2014 85 >60 mL/min/1.7m2 Final     Lab Results   Component Value Date    CHOL 190 04/11/2014     Lab Results   Component Value Date    HDL 46 04/11/2014     Lab Results   Component Value Date    LDL 94 04/11/2014     Lab Results   Component Value Date    TRIG 251 04/11/2014     Lab Results   Component Value Date    CHOLHDLRATIO 4.1 04/11/2014     Lab Results   Component Value Date    AST 31 04/15/2016     Lab Results   Component Value Date    ALT 48 04/15/2016     Lab Results   Component Value Date    A1C 8.0 03/13/2017     Potassium   Date Value Ref Range Status   11/29/2016 3.6 3.4 - 5.3 mmol/L Final

## 2017-08-07 DIAGNOSIS — E11.9 TYPE 2 DIABETES MELLITUS WITHOUT COMPLICATION, WITHOUT LONG-TERM CURRENT USE OF INSULIN (H): ICD-10-CM

## 2017-08-07 NOTE — TELEPHONE ENCOUNTER
Metformin         Last Written Prescription Date: 6/30/17  Last Fill Quantity: 30, # refills: 0  Last Office Visit with [facility], [facility] or [facility] prescribing provider:  5/4/17        BP Readings from Last 3 Encounters:   05/04/17 135/82   03/13/17 134/82   02/14/17 146/85     Lab Results   Component Value Date    MICROL 46 03/13/2017     Lab Results   Component Value Date    UMALCR 11.81 03/13/2017     Creatinine   Date Value Ref Range Status   04/15/2016 0.81 0.52 - 1.04 mg/dL Final   ]  GFR Estimate   Date Value Ref Range Status   04/15/2016 75 >60 mL/min/1.7m2 Final     Comment:     Non  GFR Calc   04/11/2014 70 >60 mL/min/1.7m2 Final     GFR Estimate If Black   Date Value Ref Range Status   04/15/2016 >90   GFR Calc   >60 mL/min/1.7m2 Final   04/11/2014 85 >60 mL/min/1.7m2 Final     Lab Results   Component Value Date    CHOL 190 04/11/2014     Lab Results   Component Value Date    HDL 46 04/11/2014     Lab Results   Component Value Date    LDL 94 04/11/2014     Lab Results   Component Value Date    TRIG 251 04/11/2014     Lab Results   Component Value Date    CHOLHDLRATIO 4.1 04/11/2014     Lab Results   Component Value Date    AST 31 04/15/2016     Lab Results   Component Value Date    ALT 48 04/15/2016     Lab Results   Component Value Date    A1C 8.0 03/13/2017     Potassium   Date Value Ref Range Status   11/29/2016 3.6 3.4 - 5.3 mmol/L Final

## 2017-08-07 NOTE — TELEPHONE ENCOUNTER
Routing refill request to provider for review/approval because:  Zhane given x1 and patient did not follow up, please advise  Patient needs to be seen because:  Due for follow up.  Alison Rush RN

## 2017-08-08 RX ORDER — METFORMIN HCL 500 MG
TABLET, EXTENDED RELEASE 24 HR ORAL
Qty: 60 TABLET | Refills: 0 | Status: SHIPPED | OUTPATIENT
Start: 2017-08-08 | End: 2017-08-18

## 2017-08-18 ENCOUNTER — OFFICE VISIT (OUTPATIENT)
Dept: FAMILY MEDICINE | Facility: CLINIC | Age: 52
End: 2017-08-18
Payer: COMMERCIAL

## 2017-08-18 VITALS
OXYGEN SATURATION: 98 % | RESPIRATION RATE: 18 BRPM | BODY MASS INDEX: 27.93 KG/M2 | DIASTOLIC BLOOD PRESSURE: 79 MMHG | HEART RATE: 80 BPM | WEIGHT: 177 LBS | SYSTOLIC BLOOD PRESSURE: 132 MMHG

## 2017-08-18 DIAGNOSIS — J45.40 MODERATE PERSISTENT ASTHMA, UNCOMPLICATED: ICD-10-CM

## 2017-08-18 DIAGNOSIS — F41.8 DEPRESSION WITH ANXIETY: ICD-10-CM

## 2017-08-18 DIAGNOSIS — E11.9 TYPE 2 DIABETES MELLITUS WITHOUT COMPLICATION, WITHOUT LONG-TERM CURRENT USE OF INSULIN (H): ICD-10-CM

## 2017-08-18 DIAGNOSIS — R00.2 PALPITATIONS: ICD-10-CM

## 2017-08-18 DIAGNOSIS — J45.41 MODERATE PERSISTENT ASTHMA WITH ACUTE EXACERBATION: ICD-10-CM

## 2017-08-18 DIAGNOSIS — Z12.11 SPECIAL SCREENING FOR MALIGNANT NEOPLASMS, COLON: Primary | ICD-10-CM

## 2017-08-18 DIAGNOSIS — F43.10 PTSD (POST-TRAUMATIC STRESS DISORDER): ICD-10-CM

## 2017-08-18 LAB
ANION GAP SERPL CALCULATED.3IONS-SCNC: 12 MMOL/L (ref 3–14)
BUN SERPL-MCNC: 27 MG/DL (ref 7–30)
CALCIUM SERPL-MCNC: 9.3 MG/DL (ref 8.5–10.1)
CHLORIDE SERPL-SCNC: 106 MMOL/L (ref 94–109)
CO2 SERPL-SCNC: 21 MMOL/L (ref 20–32)
CREAT SERPL-MCNC: 0.92 MG/DL (ref 0.52–1.04)
GFR SERPL CREATININE-BSD FRML MDRD: 64 ML/MIN/1.7M2
GLUCOSE BLD-MCNC: 95 MG/DL (ref 70–99)
GLUCOSE SERPL-MCNC: 98 MG/DL (ref 70–99)
HBA1C MFR BLD: 5.6 % (ref 4.3–6)
POTASSIUM SERPL-SCNC: 4.3 MMOL/L (ref 3.4–5.3)
SODIUM SERPL-SCNC: 139 MMOL/L (ref 133–144)

## 2017-08-18 PROCEDURE — 99214 OFFICE O/P EST MOD 30 MIN: CPT | Performed by: PHYSICIAN ASSISTANT

## 2017-08-18 PROCEDURE — 82947 ASSAY GLUCOSE BLOOD QUANT: CPT | Performed by: PHYSICIAN ASSISTANT

## 2017-08-18 PROCEDURE — 36415 COLL VENOUS BLD VENIPUNCTURE: CPT | Performed by: PHYSICIAN ASSISTANT

## 2017-08-18 PROCEDURE — 80048 BASIC METABOLIC PNL TOTAL CA: CPT | Performed by: PHYSICIAN ASSISTANT

## 2017-08-18 PROCEDURE — 83036 HEMOGLOBIN GLYCOSYLATED A1C: CPT | Performed by: PHYSICIAN ASSISTANT

## 2017-08-18 RX ORDER — ALBUTEROL SULFATE 90 UG/1
2 AEROSOL, METERED RESPIRATORY (INHALATION) EVERY 4 HOURS PRN
Qty: 1 INHALER | Refills: 3 | Status: SHIPPED | OUTPATIENT
Start: 2017-08-18 | End: 2018-01-05

## 2017-08-18 RX ORDER — DULOXETIN HYDROCHLORIDE 60 MG/1
60 CAPSULE, DELAYED RELEASE ORAL 2 TIMES DAILY
Qty: 180 CAPSULE | Refills: 3 | Status: SHIPPED | OUTPATIENT
Start: 2017-08-18 | End: 2019-02-09

## 2017-08-18 RX ORDER — METFORMIN HCL 500 MG
TABLET, EXTENDED RELEASE 24 HR ORAL
Qty: 180 TABLET | Refills: 1 | Status: SHIPPED | OUTPATIENT
Start: 2017-08-18 | End: 2017-12-29

## 2017-08-18 RX ORDER — DILTIAZEM HYDROCHLORIDE 240 MG/1
240 CAPSULE, EXTENDED RELEASE ORAL DAILY
Qty: 90 CAPSULE | Refills: 1 | Status: SHIPPED | OUTPATIENT
Start: 2017-08-18 | End: 2018-05-03

## 2017-08-18 RX ORDER — ALPRAZOLAM 0.5 MG
.5-1 TABLET ORAL 3 TIMES DAILY PRN
Qty: 30 TABLET | Refills: 1 | Status: SHIPPED | OUTPATIENT
Start: 2017-08-18 | End: 2020-12-03

## 2017-08-18 RX ORDER — MONTELUKAST SODIUM 10 MG/1
10 TABLET ORAL AT BEDTIME
Qty: 90 TABLET | Refills: 1 | Status: SHIPPED | OUTPATIENT
Start: 2017-08-18 | End: 2018-01-05

## 2017-08-18 RX ORDER — ALBUTEROL SULFATE 0.83 MG/ML
1 SOLUTION RESPIRATORY (INHALATION) EVERY 4 HOURS PRN
Qty: 1 BOX | Refills: 1 | Status: SHIPPED | OUTPATIENT
Start: 2017-08-18 | End: 2018-01-05

## 2017-08-18 NOTE — PROGRESS NOTES
SUBJECTIVE:   Lisette LINCOLN Ukaegbu is a 52 year old female who presents to clinic today for the following health issues:      Diabetes Follow-up      Patient is checking blood sugars: daily--running     Diabetic concerns: None     Symptoms of hypoglycemia (low blood sugar): none     Paresthesias (numbness or burning in feet) or sores: No     Date of last diabetic eye exam: 2017        Amount of exercise or physical activity: 4-5 days/week for an average of 15-30 minutes    Problems taking medications regularly: No    Medication side effects: none  Diet: diabetic          Problem list and histories reviewed & adjusted, as indicated.  Additional history: as documented        Reviewed and updated as needed this visit by clinical staffMeds       Reviewed and updated as needed this visit by Provider         All other systems negative except as outline above  OBJECTIVE:    Eye exam - right eye normal lid, conjunctiva, cornea, pupil and fundus, left eye normal lid, conjunctiva, cornea, pupil and fundus.  Thyroid not palpable, not enlarged, no nodules detected.  CHEST:chest clear to IPPA, no tachypnea, retractions or cyanosis and S1, S2 normal, no murmur, no gallop, rate regular.  Foot exam - both sides normal; no swelling, tenderness or skin or vascular lesions. Color and temperature is normal. Sensation is intact. Peripheral pulses are palpable. Toenails are normal.    Lisette was seen today for diabetes.    Diagnoses and all orders for this visit:    Special screening for malignant neoplasms, colon  -     GASTROENTEROLOGY ADULT REF PROCEDURE ONLY    Type 2 diabetes mellitus without complication, without long-term current use of insulin (H)  -     metFORMIN (GLUCOPHAGE-XR) 500 MG 24 hr tablet; TAKE 1 TABLET BY MOUTH ONCE DAILY WITH DINNER FOR 1 WEEK THEN INCREASE TO 2 TABLETS BY MOUTH DAILY THEREAFTER.  -     Hemoglobin A1c  -     Basic metabolic panel  (Ca, Cl, CO2, Creat, Gluc, K, Na, BUN)  -     Glucose, whole  blood    Moderate persistent asthma with acute exacerbation  -     montelukast (SINGULAIR) 10 MG tablet; Take 1 tablet (10 mg) by mouth At Bedtime  -     fluticasone-salmeterol (ADVAIR) 500-50 MCG/DOSE diskus inhaler; Inhale 1 puff into the lungs 2 times daily    Palpitations  -     diltiazem (TIAZAC) 240 MG 24 hr ER beaded capsule; Take 1 capsule (240 mg) by mouth daily    Moderate persistent asthma, uncomplicated  -     albuterol (PROAIR HFA/PROVENTIL HFA/VENTOLIN HFA) 108 (90 BASE) MCG/ACT Inhaler; Inhale 2 puffs into the lungs every 4 hours as needed for shortness of breath / dyspnea  -     albuterol (2.5 MG/3ML) 0.083% neb solution; Take 1 vial (2.5 mg) by nebulization every 4 hours as needed for shortness of breath / dyspnea    PTSD (post-traumatic stress disorder)  -     DULoxetine (CYMBALTA) 60 MG EC capsule; Take 1 capsule (60 mg) by mouth 2 times daily  -     ALPRAZolam (XANAX) 0.5 MG tablet; Take 1-2 tablets (0.5-1 mg) by mouth 3 times daily as needed for anxiety    Depression with anxiety  -     DULoxetine (CYMBALTA) 60 MG EC capsule; Take 1 capsule (60 mg) by mouth 2 times daily      work on lifestyle modification  Recheck in 6mos.

## 2017-08-18 NOTE — MR AVS SNAPSHOT
After Visit Summary   8/18/2017    Lisette LINCOLN Ukaegbu    MRN: [id number]           Patient Information     Date Of Birth          1965        Visit Information        Provider Department      8/18/2017 2:20 PM Kolton Yin PA-C [facility]        Today's Diagnoses     Special screening for malignant neoplasms, colon    -  1    Type 2 diabetes mellitus without complication, without long-term current use of insulin (H)        Moderate persistent asthma with acute exacerbation        Palpitations        Moderate persistent asthma, uncomplicated        PTSD (post-traumatic stress disorder)        Depression with anxiety           Follow-ups after your visit        Additional Services     GASTROENTEROLOGY ADULT REF PROCEDURE ONLY       Last Lab Result: Creatinine (mg/dL)       Date                     Value                 04/15/2016               0.81             ----------  There is no height or weight on file to calculate BMI.     Needed:  No  Language:  English    Patient will be contacted to schedule procedure.     Please be aware that coverage of these services is subject to the terms and limitations of your health insurance plan.  Call member services at your health plan with any benefit or coverage questions.  Any procedures must be performed at a [place] facility OR coordinated by your clinic's referral office.    Please bring the following with you to your appointment:    (1) Any X-Rays, CTs or MRIs which have been performed.  Contact the facility where they were done to arrange for  prior to your scheduled appointment.    (2) List of current medications   (3) This referral request   (4) Any documents/labs given to you for this referral                  Who to contact     Normal or non-critical lab and imaging results will be communicated to you by MyChart, letter or phone within 4 business days after the clinic has received the results. If you do not hear from  us within 7 days, please contact the clinic through [employer] or phone. If you have a critical or abnormal lab result, we will notify you by phone as soon as possible.  Submit refill requests through [employer] or call your pharmacy and they will forward the refill request to us. Please allow 3 business days for your refill to be completed.          If you need to speak with a  for additional information , please call: [phone]             Additional Information About Your Visit        nxtControl Information     [employer] gives you secure access to your electronic health record. If you see a primary care provider, you can also send messages to your care team and make appointments. If you have questions, please call your primary care clinic.  If you do not have a primary care provider, please call [phone] and they will assist you.        Care EveryWhere ID     This is your Care EveryWhere ID. This could be used by other organizations to access your [facility] medical records  [id number]        Your Vitals Were     Pulse Respirations Last Period Pulse Oximetry BMI (Body Mass Index)       80 18 05/15/2014 (Approximate) 98% 27.93 kg/m2        Blood Pressure from Last 3 Encounters:   08/18/17 132/79   05/04/17 135/82   03/13/17 134/82    Weight from Last 3 Encounters:   08/18/17 177 lb (80.3 kg)   05/04/17 182 lb (82.6 kg)   04/21/17 186 lb 3.2 oz (84.5 kg)              We Performed the Following     Basic metabolic panel  (Ca, Cl, CO2, Creat, Gluc, K, Na, BUN)     GASTROENTEROLOGY ADULT REF PROCEDURE ONLY     Glucose, whole blood     Hemoglobin A1c          Today's Medication Changes          These changes are accurate as of: 8/18/17  3:15 PM.  If you have any questions, ask your nurse or doctor.               These medicines have changed or have updated prescriptions.        Dose/Directions    metFORMIN 500 MG 24 hr tablet   Commonly known as:  GLUCOPHAGE-XR   This may have changed:  See the new  instructions.   Used for:  Type 2 diabetes mellitus without complication, without long-term current use of insulin (H)   Changed by:  Kolton Yin PA-C        TAKE 1 TABLET BY MOUTH ONCE DAILY WITH DINNER FOR 1 WEEK THEN INCREASE TO 2 TABLETS BY MOUTH DAILY THEREAFTER.   Quantity:  180 tablet   Refills:  1         Stop taking these medicines if you haven't already. Please contact your care team if you have questions.     cephALEXin 500 MG capsule   Commonly known as:  KEFLEX   Stopped by:  Kolton Yin PA-C           sulfamethoxazole-trimethoprim 800-160 MG per tablet   Commonly known as:  BACTRIM DS/SEPTRA DS   Stopped by:  Kolton Yin PA-C                Where to get your medicines      These medications were sent to [facility] [address] [place] [address] AT [address] [address]     Phone:  [phone]     albuterol (2.5 MG/3ML) 0.083% neb solution    albuterol 108 (90 BASE) MCG/ACT Inhaler    diltiazem 240 MG 24 hr ER beaded capsule    DULoxetine 60 MG EC capsule    fluticasone-salmeterol 500-50 MCG/DOSE diskus inhaler    metFORMIN 500 MG 24 hr tablet    montelukast 10 MG tablet         Some of these will need a paper prescription and others can be bought over the counter.  Ask your nurse if you have questions.     Bring a paper prescription for each of these medications     ALPRAZolam 0.5 MG tablet                Primary Care Provider Office Phone # Fax #    Kolton Yin PA-C [phone]       [address] [place] [address]        Equal Access to Services     [facility] AH: Hadii yaakov peña Sofaisal, waaxda luqadaha, qaybta kaalmada adeabby, brice stone. So [facility] [phone].    ATENCIÓN: Si habla español, tiene a mosquera disposición servicios gratuitos de asistencia lingüística. Llame al [phone].    We comply with applicable federal civil rights  laws and [place] laws. We do not discriminate on the basis of race, color, national origin, age, disability sex, sexual orientation or gender identity.            Thank you!     Thank you for choosing [facility]  for your care. Our goal is always to provide you with excellent care. Hearing back from our patients is one way we can continue to improve our services. Please take a few minutes to complete the written survey that you may receive in the mail after your visit with us. Thank you!             Your Updated Medication List - Protect others around you: Learn how to safely use, store and throw away your medicines at www.disposemymeds.org.          This list is accurate as of: 8/18/17  3:15 PM.  Always use your most recent med list.                   Brand Name Dispense Instructions for use Diagnosis    * albuterol 108 (90 BASE) MCG/ACT Inhaler    PROAIR HFA/PROVENTIL HFA/VENTOLIN HFA    1 Inhaler    Inhale 2 puffs into the lungs every 4 hours as needed for shortness of breath / dyspnea    Moderate persistent asthma, uncomplicated       * albuterol (2.5 MG/3ML) 0.083% neb solution     1 Box    Take 1 vial (2.5 mg) by nebulization every 4 hours as needed for shortness of breath / dyspnea    Moderate persistent asthma, uncomplicated       ALPRAZolam 0.5 MG tablet    XANAX    30 tablet    Take 1-2 tablets (0.5-1 mg) by mouth 3 times daily as needed for anxiety    PTSD (post-traumatic stress disorder)       aspirin  MG EC tablet     90 tablet    Take 1 tablet (325 mg) by mouth daily    Type 2 diabetes mellitus without complication, without long-term current use of insulin (H)       diltiazem 240 MG 24 hr ER beaded capsule    TIAZAC    90 capsule    Take 1 capsule (240 mg) by mouth daily    Palpitations       DULoxetine 60 MG EC capsule    CYMBALTA    180 capsule    Take 1 capsule (60 mg) by mouth 2 times daily    PTSD (post-traumatic stress disorder), Depression with anxiety       fish oil-omega-3  fatty acids 1000 MG capsule      Take 2 g by mouth 3 times daily Reported on 2/14/2017        fluticasone 50 MCG/ACT spray    FLONASE    16 g    Spray 1-2 sprays into both nostrils daily    Moderate persistent asthma with acute exacerbation       fluticasone-salmeterol 500-50 MCG/DOSE diskus inhaler    ADVAIR    3 Inhaler    Inhale 1 puff into the lungs 2 times daily    Moderate persistent asthma with acute exacerbation       metFORMIN 500 MG 24 hr tablet    GLUCOPHAGE-XR    180 tablet    TAKE 1 TABLET BY MOUTH ONCE DAILY WITH DINNER FOR 1 WEEK THEN INCREASE TO 2 TABLETS BY MOUTH DAILY THEREAFTER.    Type 2 diabetes mellitus without complication, without long-term current use of insulin (H)       montelukast 10 MG tablet    SINGULAIR    90 tablet    Take 1 tablet (10 mg) by mouth At Bedtime    Moderate persistent asthma with acute exacerbation       nitroGLYcerin 0.4 MG/HR 24 hr patch    NITRODUR     Place 1 patch onto the skin daily Reported on 2/14/2017        * Notice:  This list has 2 medication(s) that are the same as other medications prescribed for you. Read the directions carefully, and ask your doctor or other care provider to review them with you.

## 2017-08-19 ASSESSMENT — ASTHMA QUESTIONNAIRES: ACT_TOTALSCORE: 22

## 2017-12-29 ENCOUNTER — TELEPHONE (OUTPATIENT)
Dept: FAMILY MEDICINE | Facility: CLINIC | Age: 52
End: 2017-12-29

## 2017-12-29 DIAGNOSIS — E11.9 TYPE 2 DIABETES MELLITUS WITHOUT COMPLICATION, WITHOUT LONG-TERM CURRENT USE OF INSULIN (H): ICD-10-CM

## 2017-12-29 RX ORDER — METFORMIN HCL 500 MG
TABLET, EXTENDED RELEASE 24 HR ORAL
Qty: 120 TABLET | Refills: 0 | Status: SHIPPED | OUTPATIENT
Start: 2017-12-29 | End: 2018-04-21

## 2017-12-29 NOTE — TELEPHONE ENCOUNTER
Med not on list.  Need to know why patient is requesting RX. Left message on voice mail for patient to call clinic. [phone]

## 2018-01-03 RX ORDER — SUCRALFATE 1 G/1
TABLET ORAL 4 TIMES DAILY
Qty: 120 TABLET | OUTPATIENT
Start: 2018-01-03

## 2018-01-05 ENCOUNTER — OFFICE VISIT (OUTPATIENT)
Dept: FAMILY MEDICINE | Facility: CLINIC | Age: 53
End: 2018-01-05
Payer: COMMERCIAL

## 2018-01-05 VITALS
SYSTOLIC BLOOD PRESSURE: 122 MMHG | TEMPERATURE: 98.1 F | HEART RATE: 73 BPM | BODY MASS INDEX: 27.61 KG/M2 | DIASTOLIC BLOOD PRESSURE: 77 MMHG | WEIGHT: 175 LBS | OXYGEN SATURATION: 95 %

## 2018-01-05 DIAGNOSIS — J45.41 MODERATE PERSISTENT ASTHMA WITH ACUTE EXACERBATION: ICD-10-CM

## 2018-01-05 DIAGNOSIS — J20.9 ACUTE BRONCHITIS, UNSPECIFIED ORGANISM: Primary | ICD-10-CM

## 2018-01-05 DIAGNOSIS — F41.8 DEPRESSION WITH ANXIETY: ICD-10-CM

## 2018-01-05 DIAGNOSIS — Z12.11 SPECIAL SCREENING FOR MALIGNANT NEOPLASMS, COLON: ICD-10-CM

## 2018-01-05 DIAGNOSIS — J45.40 MODERATE PERSISTENT ASTHMA, UNCOMPLICATED: ICD-10-CM

## 2018-01-05 PROCEDURE — 99213 OFFICE O/P EST LOW 20 MIN: CPT | Performed by: PHYSICIAN ASSISTANT

## 2018-01-05 RX ORDER — PREDNISONE 20 MG/1
20 TABLET ORAL 2 TIMES DAILY
Qty: 14 TABLET | Refills: 0 | Status: SHIPPED | OUTPATIENT
Start: 2018-01-05 | End: 2019-02-26

## 2018-01-05 RX ORDER — ALBUTEROL SULFATE 0.83 MG/ML
1 SOLUTION RESPIRATORY (INHALATION) EVERY 4 HOURS PRN
Qty: 1 BOX | Refills: 1 | Status: SHIPPED | OUTPATIENT
Start: 2018-01-05 | End: 2019-05-11

## 2018-01-05 RX ORDER — MONTELUKAST SODIUM 10 MG/1
10 TABLET ORAL AT BEDTIME
Qty: 90 TABLET | Refills: 1 | Status: SHIPPED | OUTPATIENT
Start: 2018-01-05 | End: 2018-05-03

## 2018-01-05 RX ORDER — ALBUTEROL SULFATE 90 UG/1
2 AEROSOL, METERED RESPIRATORY (INHALATION) EVERY 4 HOURS PRN
Qty: 1 INHALER | Refills: 3 | Status: SHIPPED | OUTPATIENT
Start: 2018-01-05 | End: 2018-05-03

## 2018-01-05 RX ORDER — AZITHROMYCIN 250 MG/1
TABLET, FILM COATED ORAL
Qty: 6 TABLET | Refills: 0 | Status: SHIPPED | OUTPATIENT
Start: 2018-01-05 | End: 2018-03-12

## 2018-01-05 NOTE — LETTER
My Depression Action Plan  Name: Lisette LINCOLN Ukaegbu   Date of Birth 1965  Date: 1/4/2018    My doctor: Kolton Yin   [address] [place] [id number] [phone]          GREEN    ZONE   Good Control    What it looks like:     Things are going generally well. You have normal up s and down s. You may even feel depressed from time to time, but bad moods usually last less than a day.   What you need to do:  1. Continue to care for yourself (see self care plan)  2. Check your depression survival kit and update it as needed  3. Follow your physician s recommendations including any medication.  4. Do not stop taking medication unless you consult with your physician first.           YELLOW         ZONE Getting Worse    What it looks like:     Depression is starting to interfere with your life.     It may be hard to get out of bed; you may be starting to isolate yourself from others.    Symptoms of depression are starting to last most all day and this has happened for several days.     You may have suicidal thoughts but they are not constant.   What you need to do:     1. Call your care team, your response to treatment will improve if you keep your care team informed of your progress. Yellow periods are signs an adjustment may need to be made.     2. Continue your self-care, even if you have to fake it!    3. Talk to someone in your support network    4. Open up your depression survival kit           RED    ZONE Medical Alert - Get Help    What it looks like:     Depression is seriously interfering with your life.     You may experience these or other symptoms: You can t get out of bed most days, can t work or engage in other necessary activities, you have trouble taking care of basic hygiene, or basic responsibilities, thoughts of suicide or death that will not go away, self-injurious behavior.     What you need to do:  1. Call your care team and  request a same-day appointment. If they are not available (weekends or after hours) call your local crisis line, emergency room or 911.      Electronically signed by: Chikis Jamison, January 4, 2018    Depression Self Care Plan / Survival Kit    Self-Care for Depression  Here s the deal. Your body and mind are really not as separate as most people think.  What you do and think affects how you feel and how you feel influences what you do and think. This means if you do things that people who feel good do, it will help you feel better.  Sometimes this is all it takes.  There is also a place for medication and therapy depending on how severe your depression is, so be sure to consult with your medical provider and/ or Behavioral Health Consultant if your symptoms are worsening or not improving.     In order to better manage my stress, I will:    Exercise  Get some form of exercise, every day. This will help reduce pain and release endorphins, the  feel good  chemicals in your brain. This is almost as good as taking antidepressants!  This is not the same as joining a gym and then never going! (they count on that by the way ) It can be as simple as just going for a walk or doing some gardening, anything that will get you moving.      Hygiene   Maintain good hygiene (Get out of bed in the morning, Make your bed, Brush your teeth, Take a shower, and Get dressed like you were going to work, even if you are unemployed).  If your clothes don't fit try to get ones that do.    Diet  I will strive to eat foods that are good for me, drink plenty of water, and avoid excessive sugar, caffeine, alcohol, and other mood-altering substances.  Some foods that are helpful in depression are: complex carbohydrates, B vitamins, flaxseed, fish or fish oil, fresh fruits and vegetables.    Psychotherapy  I agree to participate in Individual Therapy (if recommended).    Medication  If prescribed medications, I agree to take them.  Missing doses  can result in serious side effects.  I understand that drinking alcohol, or other illicit drug use, may cause potential side effects.  I will not stop my medication abruptly without first discussing it with my provider.    Staying Connected With Others  I will stay in touch with my friends, family members, and my primary care provider/team.    Use your imagination  Be creative.  We all have a creative side; it doesn t matter if it s oil painting, sand castles, or mud pies! This will also kick up the endorphins.    Witness Beauty  (AKA stop and smell the roses) Take a look outside, even in mid-winter. Notice colors, textures. Watch the squirrels and birds.     Service to others  Be of service to others.  There is always someone else in need.  By helping others we can  get out of ourselves  and remember the really important things.  This also provides opportunities for practicing all the other parts of the program.    Humor  Laugh and be silly!  Adjust your TV habits for less news and crime-drama and more comedy.    Control your stress  Try breathing deep, massage therapy, biofeedback, and meditation. Find time to relax each day.     My support system    Clinic Contact:  Phone number:    Contact 1:  Phone number:    Contact 2:  Phone number:    Jainism/:  Phone number:    Therapist:  Phone number:    Local crisis center:    Phone number:    Other community support:  Phone number:

## 2018-01-05 NOTE — NURSING NOTE
"Chief Complaint   Patient presents with     Asthma       Initial /77  Pulse 73  Temp 98.1  F (36.7  C) (Tympanic)  Wt 175 lb (79.4 kg)  LMP 05/15/2014 (Approximate)  SpO2 95%  BMI 27.61 kg/m2 Estimated body mass index is 27.61 kg/(m^2) as calculated from the following:    Height as of 11/4/16: 5' 6.75\" (1.695 m).    Weight as of this encounter: 175 lb (79.4 kg).  Medication Reconciliation: complete       Leidy Hinojosa CMA      "

## 2018-01-05 NOTE — MR AVS SNAPSHOT
After Visit Summary   1/5/2018    Lisette LINCOLN Ukaegbu    MRN: [id number]           Patient Information     Date Of Birth          1965        Visit Information        Provider Department      1/5/2018 8:20 AM Kolton Yin PA-C [facility]        Today's Diagnoses     Acute bronchitis, unspecified organism    -  1    Depression with anxiety        Moderate persistent asthma        Special screening for malignant neoplasms, colon        Moderate persistent asthma with acute exacerbation        Moderate persistent asthma, uncomplicated           Follow-ups after your visit        Additional Services     GASTROENTEROLOGY ADULT REF PROCEDURE ONLY       Last Lab Result: Creatinine (mg/dL)       Date                     Value                 08/18/2017               0.92             ----------  There is no height or weight on file to calculate BMI.     Needed:  No  Language:  English    Patient will be contacted to schedule procedure.     Please be aware that coverage of these services is subject to the terms and limitations of your health insurance plan.  Call member services at your health plan with any benefit or coverage questions.  Any procedures must be performed at a [place] facility OR coordinated by your clinic's referral office.    Please bring the following with you to your appointment:    (1) Any X-Rays, CTs or MRIs which have been performed.  Contact the facility where they were done to arrange for  prior to your scheduled appointment.    (2) List of current medications   (3) This referral request   (4) Any documents/labs given to you for this referral                  Who to contact     Normal or non-critical lab and imaging results will be communicated to you by MyChart, letter or phone within 4 business days after the clinic has received the results. If you do not hear from us within 7 days, please contact the clinic through MyChart or phone. If you have a  critical or abnormal lab result, we will notify you by phone as soon as possible.  Submit refill requests through EUDOWEB or call your pharmacy and they will forward the refill request to us. Please allow 3 business days for your refill to be completed.          If you need to speak with a  for additional information , please call: [phone]             Additional Information About Your Visit        [facility] Information     EUDOWEB gives you secure access to your electronic health record. If you see a primary care provider, you can also send messages to your care team and make appointments. If you have questions, please call your primary care clinic.  If you do not have a primary care provider, please call [phone] and they will assist you.        Care EveryWhere ID     This is your Care EveryWhere ID. This could be used by other organizations to access your [place] medical records  [id number]        Your Vitals Were     Pulse Temperature Last Period Pulse Oximetry BMI (Body Mass Index)       73 98.1  F (36.7  C) (Tympanic) 05/15/2014 (Approximate) 95% 27.61 kg/m2        Blood Pressure from Last 3 Encounters:   01/05/18 122/77   08/18/17 132/79   05/04/17 135/82    Weight from Last 3 Encounters:   01/05/18 175 lb (79.4 kg)   08/18/17 177 lb (80.3 kg)   05/04/17 182 lb (82.6 kg)              We Performed the Following     DEPRESSION ACTION PLAN (DAP)     GASTROENTEROLOGY ADULT REF PROCEDURE ONLY          Today's Medication Changes          These changes are accurate as of: 1/5/18  9:08 AM.  If you have any questions, ask your nurse or doctor.               Start taking these medicines.        Dose/Directions    azithromycin 250 MG tablet   Commonly known as:  ZITHROMAX   Used for:  Acute bronchitis, unspecified organism   Started by:  Kolton Yin PA-C        Two tablets first day, then one tablet daily for four days.   Quantity:  6 tablet   Refills:  0       predniSONE 20 MG tablet    Commonly known as:  DELTASONE   Used for:  Acute bronchitis, unspecified organism   Started by:  Kolton Yin PA-C        Dose:  20 mg   Take 1 tablet (20 mg) by mouth 2 times daily for 7 days   Quantity:  14 tablet   Refills:  0            Where to get your medicines      These medications were sent to [id number] - [place], [place] - [address] [address]  [address] [address] [address]    Hours:  test fax successful 4/5/04 kr Phone:  [phone]     albuterol (2.5 MG/3ML) 0.083% neb solution    albuterol 108 (90 BASE) MCG/ACT Inhaler    azithromycin 250 MG tablet    fluticasone-salmeterol 500-50 MCG/DOSE diskus inhaler    montelukast 10 MG tablet    predniSONE 20 MG tablet                Primary Care Provider Office Phone # Fax #    Kolton Yin PA-C [phone]       [address]  [place] [address]        Equal Access to Services     [facility]: Hadii aad ku hadasho Soomaali, waaxda luqadaha, qaybta kaalmada adeegyada, waxay idiin hayradamesn jeri tuttle . So [facility] [phone].    ATENCIÓN: Si habla español, tiene a mosquera disposición servicios gratuitos de asistencia lingüística. [facility] [phone].    We comply with applicable federal civil rights laws and [place] laws. We do not discriminate on the basis of race, color, national origin, age, disability, sex, sexual orientation, or gender identity.            Thank you!     Thank you for choosing [facility]  for your care. Our goal is always to provide you with excellent care. Hearing back from our patients is one way we can continue to improve our services. Please take a few minutes to complete the written survey that you may receive in the mail after your visit with us. Thank you!             Your Updated Medication List - Protect others around you: Learn how to safely use, store and throw away your medicines at www.disposemymeds.org.          This list is accurate as of: 1/5/18   9:08 AM.  Always use your most recent med list.                   Brand Name Dispense Instructions for use Diagnosis    * albuterol 108 (90 BASE) MCG/ACT Inhaler    PROAIR HFA/PROVENTIL HFA/VENTOLIN HFA    1 Inhaler    Inhale 2 puffs into the lungs every 4 hours as needed for shortness of breath / dyspnea    Moderate persistent asthma, uncomplicated       * albuterol (2.5 MG/3ML) 0.083% neb solution     1 Box    Take 1 vial (2.5 mg) by nebulization every 4 hours as needed for shortness of breath / dyspnea    Moderate persistent asthma, uncomplicated       ALPRAZolam 0.5 MG tablet    XANAX    30 tablet    Take 1-2 tablets (0.5-1 mg) by mouth 3 times daily as needed for anxiety    PTSD (post-traumatic stress disorder)       aspirin  MG EC tablet     90 tablet    Take 1 tablet (325 mg) by mouth daily    Type 2 diabetes mellitus without complication, without long-term current use of insulin (H)       azithromycin 250 MG tablet    ZITHROMAX    6 tablet    Two tablets first day, then one tablet daily for four days.    Acute bronchitis, unspecified organism       diltiazem 240 MG 24 hr ER beaded capsule    TIAZAC    90 capsule    Take 1 capsule (240 mg) by mouth daily    Palpitations       DULoxetine 60 MG EC capsule    CYMBALTA    180 capsule    Take 1 capsule (60 mg) by mouth 2 times daily    PTSD (post-traumatic stress disorder), Depression with anxiety       fish oil-omega-3 fatty acids 1000 MG capsule      Take 2 g by mouth 3 times daily Reported on 2/14/2017        fluticasone 50 MCG/ACT spray    FLONASE    16 g    Spray 1-2 sprays into both nostrils daily    Moderate persistent asthma with acute exacerbation       fluticasone-salmeterol 500-50 MCG/DOSE diskus inhaler    ADVAIR    3 Inhaler    Inhale 1 puff into the lungs 2 times daily    Moderate persistent asthma with acute exacerbation       metFORMIN 500 MG 24 hr tablet    GLUCOPHAGE-XR    120 tablet    TAKE 1 TABLET BY MOUTH ONCE DAILY WITH DINNER FOR 1  WEEK THEN INCREASE TO 2 TABLETS BY MOUTH DAILY THEREAFTER.    Type 2 diabetes mellitus without complication, without long-term current use of insulin (H)       montelukast 10 MG tablet    SINGULAIR    90 tablet    Take 1 tablet (10 mg) by mouth At Bedtime    Moderate persistent asthma with acute exacerbation       nitroGLYcerin 0.4 MG/HR 24 hr patch    NITRODUR     Place 1 patch onto the skin daily Reported on 2/14/2017        predniSONE 20 MG tablet    DELTASONE    14 tablet    Take 1 tablet (20 mg) by mouth 2 times daily for 7 days    Acute bronchitis, unspecified organism       * Notice:  This list has 2 medication(s) that are the same as other medications prescribed for you. Read the directions carefully, and ask your doctor or other care provider to review them with you.

## 2018-01-05 NOTE — PROGRESS NOTES
SUBJECTIVE:   Lisette Owens is a 52 year old female who presents to clinic today for the following health issues:      Asthma Follow-Up    Was ACT completed today?    Yes    ACT Total Scores 8/18/2017   ACT TOTAL SCORE -   ASTHMA ER VISITS -   ASTHMA HOSPITALIZATIONS -   ACT TOTAL SCORE (Goal Greater than or Equal to 20) 22   In the past 12 months, how many times did you visit the emergency room for your asthma without being admitted to the hospital? 0   In the past 12 months, how many times were you hospitalized overnight because of your asthma? 0       Recent asthma triggers that patient is dealing with: None            Amount of exercise or physical activity: 4-5 days/week for an average of 30-45 minutes    Problems taking medications regularly: No    Medication side effects: none    Diet: regular (no restrictions)            Problem list and histories reviewed & adjusted, as indicated.  Additional history: as documented    Patient Active Problem List   Diagnosis     Dry eye syndrome     CARDIOVASCULAR SCREENING; LDL GOAL LESS THAN 160     Seasonal allergic rhinitis     Allergic rhinitis due to animal dander     Rhinitis, allergic to other allergen     House dust mite allergy     Moderate persistent asthma     PTSD (post-traumatic stress disorder)     Vitamin D deficiency     Hypersomnia     Esophageal reflux (GERD)     Depression with anxiety     Acute cystitis with hematuria     Type 2 diabetes mellitus without complication, without long-term current use of insulin (H)     Past Surgical History:   Procedure Laterality Date     HYSTEROSCOPY         Social History   Substance Use Topics     Smoking status: Never Smoker     Smokeless tobacco: Never Used     Alcohol use No     Family History   Problem Relation Age of Onset     Hypertension Mother      Alzheimer Disease Mother      DIABETES Mother      Hypertension Father      Alzheimer Disease Paternal Grandmother      Psychotic Disorder Paternal Grandmother       bipolar     HEART DISEASE Paternal Grandfather      Breast Cancer Maternal Grandmother      Thyroid Disease Brother      CANCER Sister      thyroid     DIABETES Sister      CEREBROVASCULAR DISEASE No family hx of      Glaucoma No family hx of      Macular Degeneration No family hx of          Current Outpatient Prescriptions   Medication Sig Dispense Refill     metFORMIN (GLUCOPHAGE-XR) 500 MG 24 hr tablet TAKE 1 TABLET BY MOUTH ONCE DAILY WITH DINNER FOR 1 WEEK THEN INCREASE TO 2 TABLETS BY MOUTH DAILY THEREAFTER. 120 tablet 0     montelukast (SINGULAIR) 10 MG tablet Take 1 tablet (10 mg) by mouth At Bedtime 90 tablet 1     diltiazem (TIAZAC) 240 MG 24 hr ER beaded capsule Take 1 capsule (240 mg) by mouth daily 90 capsule 1     albuterol (PROAIR HFA/PROVENTIL HFA/VENTOLIN HFA) 108 (90 BASE) MCG/ACT Inhaler Inhale 2 puffs into the lungs every 4 hours as needed for shortness of breath / dyspnea 1 Inhaler 3     fluticasone-salmeterol (ADVAIR) 500-50 MCG/DOSE diskus inhaler Inhale 1 puff into the lungs 2 times daily 3 Inhaler 1     DULoxetine (CYMBALTA) 60 MG EC capsule Take 1 capsule (60 mg) by mouth 2 times daily 180 capsule 3     ALPRAZolam (XANAX) 0.5 MG tablet Take 1-2 tablets (0.5-1 mg) by mouth 3 times daily as needed for anxiety 30 tablet 1     albuterol (2.5 MG/3ML) 0.083% neb solution Take 1 vial (2.5 mg) by nebulization every 4 hours as needed for shortness of breath / dyspnea 1 Box 1     aspirin  MG EC tablet Take 1 tablet (325 mg) by mouth daily 90 tablet 3     fluticasone (FLONASE) 50 MCG/ACT spray Spray 1-2 sprays into both nostrils daily 16 g 5     nitroglycerin (NITRODUR) 0.4 MG/HR Place 1 patch onto the skin daily Reported on 2/14/2017       fish oil-omega-3 fatty acids (FISH OIL) 1000 MG capsule Take 2 g by mouth 3 times daily Reported on 2/14/2017       Allergies   Allergen Reactions     Ampicillin Rash     Cipro [Quinolones] Anaphylaxis     Macrobid [Nitrofuran Derivatives] GI Disturbance      Recent Labs   Lab Test  08/18/17   1500  03/13/17   1449  11/29/16   1711  04/15/16   1605 03/30/16 04/11/14   0934   A1C  5.6  8.0*   --    --    --    --    LDL   --    --    --    --    --   94   HDL   --    --    --    --    --   46*   TRIG   --    --    --    --    --   251*   ALT   --    --    --   48   --    --    CR  0.92   --    --   0.81   --   0.86   GFRESTIMATED  64   --    --   75   --   70   GFRESTBLACK  78   --    --   >90   GFR Calc     --   85   POTASSIUM  4.3   --   3.6  3.7   --   4.1   TSH   --    --   0.65   --   2.35   --       BP Readings from Last 3 Encounters:   01/05/18 122/77   08/18/17 132/79   05/04/17 135/82    Wt Readings from Last 3 Encounters:   01/05/18 175 lb (79.4 kg)   08/18/17 177 lb (80.3 kg)   05/04/17 182 lb (82.6 kg)                  Labs reviewed in EPIC          Reviewed and updated as needed this visit by [facility]       Reviewed and updated as needed this visit by Provider         All other systems negative except as outline above  OBJECTIVE:  ENT exam reveals - bilateral TM fluid noted, neck without nodes, throat normal without erythema or exudate, maxillary sinus tender, post nasal drip noted, nasal mucosa congested and nasal mucosa pale and congested.  CHEST:no tachypnea, retractions or cyanosis, mild inspiratory wheezing heard diffusely throughout both lungs, mild expiratory wheezing heard diffusely throughout both lungs and S1, S2 normal, no murmur, no gallop, rate regular.  Lisette was seen today for asthma.    Diagnoses and all orders for this visit:    Acute bronchitis, unspecified organism  -     predniSONE (DELTASONE) 20 MG tablet; Take 1 tablet (20 mg) by mouth 2 times daily for 7 days  -     azithromycin (ZITHROMAX) 250 MG tablet; Two tablets first day, then one tablet daily for four days.    Depression with anxiety  -     DEPRESSION ACTION PLAN (DAP)    Moderate persistent asthma    Special screening for malignant  neoplasms, colon  -     GASTROENTEROLOGY ADULT REF PROCEDURE ONLY    Moderate persistent asthma with acute exacerbation  -     montelukast (SINGULAIR) 10 MG tablet; Take 1 tablet (10 mg) by mouth At Bedtime  -     fluticasone-salmeterol (ADVAIR) 500-50 MCG/DOSE diskus inhaler; Inhale 1 puff into the lungs 2 times daily    Moderate persistent asthma, uncomplicated  -     albuterol (PROAIR HFA/PROVENTIL HFA/VENTOLIN HFA) 108 (90 BASE) MCG/ACT Inhaler; Inhale 2 puffs into the lungs every 4 hours as needed for shortness of breath / dyspnea  -     albuterol (2.5 MG/3ML) 0.083% neb solution; Take 1 vial (2.5 mg) by nebulization every 4 hours as needed for shortness of breath / dyspnea      Advised supportive and symptomatic treatment.  Follow up with Provider - if condition persists or worsens.

## 2018-01-23 ENCOUNTER — TELEPHONE (OUTPATIENT)
Dept: FAMILY MEDICINE | Facility: CLINIC | Age: 53
End: 2018-01-23

## 2018-01-23 DIAGNOSIS — J45.41 MODERATE PERSISTENT ASTHMA WITH ACUTE EXACERBATION: Primary | ICD-10-CM

## 2018-01-23 RX ORDER — PREDNISONE 20 MG/1
60 TABLET ORAL DAILY
Qty: 9 TABLET | Refills: 0 | Status: SHIPPED | OUTPATIENT
Start: 2018-01-23 | End: 2018-01-26

## 2018-01-23 NOTE — TELEPHONE ENCOUNTER
Patient was seen recently for asthma and a cough. She got better, but she is having a flare up. The nebs aren't holding her very long. She wants to know if she can get some prednisone for a few days. If she could start at 60 mg for a couple of days it will help her better. She also inhaled a lot of hair dust in the air the other day while caring for her animals.  Please advise.  She will make an appt. For Thursday. Ok to leave a message.

## 2018-03-12 ENCOUNTER — OFFICE VISIT (OUTPATIENT)
Dept: FAMILY MEDICINE | Facility: CLINIC | Age: 53
End: 2018-03-12
Payer: COMMERCIAL

## 2018-03-12 VITALS
DIASTOLIC BLOOD PRESSURE: 78 MMHG | TEMPERATURE: 98.3 F | WEIGHT: 181.6 LBS | OXYGEN SATURATION: 97 % | SYSTOLIC BLOOD PRESSURE: 110 MMHG | BODY MASS INDEX: 28.66 KG/M2 | HEART RATE: 77 BPM

## 2018-03-12 DIAGNOSIS — J45.41 ASTHMATIC BRONCHITIS, MODERATE PERSISTENT, WITH ACUTE EXACERBATION: Primary | ICD-10-CM

## 2018-03-12 PROCEDURE — 94640 AIRWAY INHALATION TREATMENT: CPT | Performed by: FAMILY MEDICINE

## 2018-03-12 PROCEDURE — 99214 OFFICE O/P EST MOD 30 MIN: CPT | Mod: 25 | Performed by: FAMILY MEDICINE

## 2018-03-12 RX ORDER — DOXYCYCLINE 100 MG/1
100 CAPSULE ORAL 2 TIMES DAILY
Qty: 14 CAPSULE | Refills: 0 | Status: SHIPPED | OUTPATIENT
Start: 2018-03-12 | End: 2018-03-16

## 2018-03-12 RX ORDER — PREDNISONE 20 MG/1
TABLET ORAL
Qty: 20 TABLET | Refills: 0 | Status: SHIPPED | OUTPATIENT
Start: 2018-03-12 | End: 2018-03-16

## 2018-03-12 NOTE — MR AVS SNAPSHOT
After Visit Summary   3/12/2018    Lisetet LINCOLN Ukaegbu    MRN: [id number]           Patient Information     Date Of Birth          1965        Visit Information        Provider Department      3/12/2018 3:00 PM Luzma Sauceda MD [facility]        Today's Diagnoses     Asthmatic bronchitis, moderate persistent, with acute exacerbation    -  1       Follow-ups after your visit        Follow-up notes from your care team     Return in about 2 weeks (around 3/26/2018), or if symptoms worsen or fail to improve.      Who to contact     Normal or non-critical lab and imaging results will be communicated to you by Steviehart, letter or phone within 4 business days after the clinic has received the results. If you do not hear from us within 7 days, please contact the clinic through WedWut or phone. If you have a critical or abnormal lab result, we will notify you by phone as soon as possible.  Submit refill requests through AlertEnterprise or call your pharmacy and they will forward the refill request to us. Please allow 3 business days for your refill to be completed.          If you need to speak with a  for additional information , please call: [phone]             Additional Information About Your Visit        MyChart Information     AlertEnterprise gives you secure access to your electronic health record. If you see a primary care provider, you can also send messages to your care team and make appointments. If you have questions, please call your primary care clinic.  If you do not have a primary care provider, please call [phone] and they will assist you.        Care EveryWhere ID     This is your Care EveryWhere ID. This could be used by other organizations to access your [place] medical records  [id number]        Your Vitals Were     Pulse Temperature Last Period Pulse Oximetry BMI (Body Mass Index)       77 98.3  F (36.8  C) (Tympanic) 05/15/2014 (Approximate) 90% 28.66 kg/m2         Blood Pressure from Last 3 Encounters:   03/12/18 148/75   01/05/18 122/77   08/18/17 132/79    Weight from Last 3 Encounters:   03/12/18 181 lb 9.6 oz (82.4 kg)   01/05/18 175 lb (79.4 kg)   08/18/17 177 lb (80.3 kg)              Today, you had the following     No orders found for display         Today's Medication Changes          These changes are accurate as of 3/12/18  4:14 PM.  If you have any questions, ask your nurse or doctor.               Start taking these medicines.        Dose/Directions    doxycycline 100 MG capsule   Commonly known as:  VIBRAMYCIN   Used for:  Asthmatic bronchitis, moderate persistent, with acute exacerbation   Started by:  Luzma Sauceda MD        Dose:  100 mg   Take 1 capsule (100 mg) by mouth 2 times daily for 7 days   Quantity:  14 capsule   Refills:  0       predniSONE 20 MG tablet   Commonly known as:  DELTASONE   Used for:  Asthmatic bronchitis, moderate persistent, with acute exacerbation   Started by:  Luzma Sauceda MD        Take 3 tabs (60 mg) by mouth daily x 3 days, 2 tabs (40 mg) daily x 3 days, 1 tab (20 mg) daily x 3 days, then 1/2 tab (10 mg) x 3 days.   Quantity:  20 tablet   Refills:  0            Where to get your medicines      These medications were sent to [id number] - [place], [place] - [address] [address]  [address] [address] [address]    Hours:  test fax successful 4/5/04 kr Phone:  [phone]     doxycycline 100 MG capsule    predniSONE 20 MG tablet                Primary Care Provider Office Phone # Fax #    Kolton Yin PA-C [phone]       [address] [place] [address]        Equal Access to Services     TYSON NARVAEZ AH: Matteo Ricci, obdulio patel, amanda morales, brice Malin [facility] [phone].    ATENCIÓN: Si habla español, tiene a mosquera disposición servicios gratuitos de asistencia lingüística. Llame al [phone].    We  comply with applicable federal civil rights laws and [place] laws. We do not discriminate on the basis of race, color, national origin, age, disability, sex, sexual orientation, or gender identity.            Thank you!     Thank you for choosing [facility]  for your care. Our goal is always to provide you with excellent care. Hearing back from our patients is one way we can continue to improve our services. Please take a few minutes to complete the written survey that you may receive in the mail after your visit with us. Thank you!             Your Updated Medication List - Protect others around you: Learn how to safely use, store and throw away your medicines at www.disposemymeds.org.          This list is accurate as of 3/12/18  4:14 PM.  Always use your most recent med list.                   Brand Name Dispense Instructions for use Diagnosis    * albuterol 108 (90 BASE) MCG/ACT Inhaler    PROAIR HFA/PROVENTIL HFA/VENTOLIN HFA    1 Inhaler    Inhale 2 puffs into the lungs every 4 hours as needed for shortness of breath / dyspnea    Moderate persistent asthma, uncomplicated       * albuterol (2.5 MG/3ML) 0.083% neb solution     1 Box    Take 1 vial (2.5 mg) by nebulization every 4 hours as needed for shortness of breath / dyspnea    Moderate persistent asthma, uncomplicated       ALPRAZolam 0.5 MG tablet    XANAX    30 tablet    Take 1-2 tablets (0.5-1 mg) by mouth 3 times daily as needed for anxiety    PTSD (post-traumatic stress disorder)       aspirin  MG EC tablet     90 tablet    Take 1 tablet (325 mg) by mouth daily    Type 2 diabetes mellitus without complication, without long-term current use of insulin (H)       diltiazem 240 MG 24 hr ER beaded capsule    TIAZAC    90 capsule    Take 1 capsule (240 mg) by mouth daily    Palpitations       doxycycline 100 MG capsule    VIBRAMYCIN    14 capsule    Take 1 capsule (100 mg) by mouth 2 times daily for 7 days    Asthmatic bronchitis,  moderate persistent, with acute exacerbation       DULoxetine 60 MG EC capsule    CYMBALTA    180 capsule    Take 1 capsule (60 mg) by mouth 2 times daily    PTSD (post-traumatic stress disorder), Depression with anxiety       fish oil-omega-3 fatty acids 1000 MG capsule      Take 2 g by mouth 3 times daily Reported on 2/14/2017        fluticasone 50 MCG/ACT spray    FLONASE    16 g    Spray 1-2 sprays into both nostrils daily    Moderate persistent asthma with acute exacerbation       fluticasone-salmeterol 500-50 MCG/DOSE diskus inhaler    ADVAIR    3 Inhaler    Inhale 1 puff into the lungs 2 times daily    Moderate persistent asthma with acute exacerbation       metFORMIN 500 MG 24 hr tablet    GLUCOPHAGE-XR    120 tablet    TAKE 1 TABLET BY MOUTH ONCE DAILY WITH DINNER FOR 1 WEEK THEN INCREASE TO 2 TABLETS BY MOUTH DAILY THEREAFTER.    Type 2 diabetes mellitus without complication, without long-term current use of insulin (H)       montelukast 10 MG tablet    SINGULAIR    90 tablet    Take 1 tablet (10 mg) by mouth At Bedtime    Moderate persistent asthma with acute exacerbation       nitroGLYcerin 0.4 MG/HR 24 hr patch    NITRODUR     Place 1 patch onto the skin daily Reported on 2/14/2017        predniSONE 20 MG tablet    DELTASONE    20 tablet    Take 3 tabs (60 mg) by mouth daily x 3 days, 2 tabs (40 mg) daily x 3 days, 1 tab (20 mg) daily x 3 days, then 1/2 tab (10 mg) x 3 days.    Asthmatic bronchitis, moderate persistent, with acute exacerbation       * Notice:  This list has 2 medication(s) that are the same as other medications prescribed for you. Read the directions carefully, and ask your doctor or other care provider to review them with you.

## 2018-03-12 NOTE — NURSING NOTE
The following nebulizer treatment was given:     MEDICATION: Duoneb  : Calvin  LOT #: [id number]  EXPIRATION DATE:  05/2019  NDC # 7109-7007-87       Magdalena Patel MA

## 2018-03-12 NOTE — NURSING NOTE
"Chief Complaint   Patient presents with     Sick       Initial /75  Pulse 77  Temp 98.3  F (36.8  C) (Tympanic)  Wt 181 lb 9.6 oz (82.4 kg)  LMP 05/15/2014 (Approximate)  SpO2 90%  BMI 28.66 kg/m2 Estimated body mass index is 28.66 kg/(m^2) as calculated from the following:    Height as of 11/4/16: 5' 6.75\" (1.695 m).    Weight as of this encounter: 181 lb 9.6 oz (82.4 kg).  Medication Reconciliation: complete   Urmila Calderon MA      "

## 2018-03-12 NOTE — PROGRESS NOTES
SUBJECTIVE:  Lisette LINCOLN Ukaegbu is a 53 year old female who presents with the following concerns;              Symptoms: cc Present Absent Comment   Fever/Chills   x Low grade at night   Fatigue  x     Muscle Aches  x     Eye Irritation   x    Sneezing   x    Nasal Ray/Drg  x  pnd clear   Sinus Pressure/Pain   x    Loss of smell   x    Dental pain   x    Sore Throat  x  Slight from drainage   Swollen Glands       Ear Pain/Fullness   x    Cough  x  Productive yellow   Wheeze  x     Chest Pain  x     Shortness of breath  x     Rash   x    Other         Symptom duration:  started last thursday   Sympom severity:  moderate   Treatments tried:  albuterol nebs and inhalers   Contacts:  work-teacher       Medications updated and reviewed.  Current Outpatient Prescriptions   Medication     montelukast (SINGULAIR) 10 MG tablet     albuterol (PROAIR HFA/PROVENTIL HFA/VENTOLIN HFA) 108 (90 BASE) MCG/ACT Inhaler     albuterol (2.5 MG/3ML) 0.083% neb solution     fluticasone-salmeterol (ADVAIR) 500-50 MCG/DOSE diskus inhaler     metFORMIN (GLUCOPHAGE-XR) 500 MG 24 hr tablet     diltiazem (TIAZAC) 240 MG 24 hr ER beaded capsule     DULoxetine (CYMBALTA) 60 MG EC capsule     aspirin  MG EC tablet     fluticasone (FLONASE) 50 MCG/ACT spray     fish oil-omega-3 fatty acids (FISH OIL) 1000 MG capsule     ALPRAZolam (XANAX) 0.5 MG tablet     nitroglycerin (NITRODUR) 0.4 MG/HR     No current facility-administered medications for this visit.        Past, family and surgical history is updated and reviewed in the record.    ROS:  Other than noted above, general, HEENT, respiratory, cardiac and gastrointestinal systems are negative.    OBJECTIVE:  /75  Pulse 77  Temp 98.3  F (36.8  C) (Tympanic)  Wt 181 lb 9.6 oz (82.4 kg)  LMP 05/15/2014 (Approximate)  SpO2 90%  BMI 28.66 kg/m2  GENERAL:  Alert, no acute distress  EYES:  PERRL, EOM normal, conjunctiva and lids normal  HEENT:  Ears and TMs normal, oral mucosa and  posterior oropharynx normal  RESP:  Lungs clear to auscultation.  CV: normal rate, regular rhythm, no murmur or gallop.        Assessment:  Lisette was seen today for sick.    Diagnoses and all orders for this visit:    Asthmatic bronchitis, moderate persistent, with acute exacerbation  -     predniSONE (DELTASONE) 20 MG tablet; Take 3 tabs (60 mg) by mouth daily x 3 days, 2 tabs (40 mg) daily x 3 days, 1 tab (20 mg) daily x 3 days, then 1/2 tab (10 mg) x 3 days.  -     doxycycline (VIBRAMYCIN) 100 MG capsule; Take 1 capsule (100 mg) by mouth 2 times daily for 7 days           Follow up if symptoms fail to improve or worsen.      The patient was in agreement with the plan today and had no questions or concerns prior to leaving the clinic.    Luzma Sauceda M.D    [facility]

## 2018-03-13 ASSESSMENT — ASTHMA QUESTIONNAIRES: ACT_TOTALSCORE: 15

## 2018-03-16 ENCOUNTER — OFFICE VISIT (OUTPATIENT)
Dept: FAMILY MEDICINE | Facility: CLINIC | Age: 53
End: 2018-03-16
Payer: COMMERCIAL

## 2018-03-16 ENCOUNTER — TELEPHONE (OUTPATIENT)
Dept: FAMILY MEDICINE | Facility: CLINIC | Age: 53
End: 2018-03-16

## 2018-03-16 ENCOUNTER — NURSE TRIAGE (OUTPATIENT)
Dept: NURSING | Facility: CLINIC | Age: 53
End: 2018-03-16

## 2018-03-16 VITALS
SYSTOLIC BLOOD PRESSURE: 132 MMHG | WEIGHT: 181 LBS | DIASTOLIC BLOOD PRESSURE: 82 MMHG | OXYGEN SATURATION: 96 % | BODY MASS INDEX: 28.56 KG/M2 | HEART RATE: 74 BPM | TEMPERATURE: 98.5 F | RESPIRATION RATE: 16 BRPM

## 2018-03-16 DIAGNOSIS — R05.9 COUGH: ICD-10-CM

## 2018-03-16 DIAGNOSIS — J20.9 ACUTE BRONCHITIS, UNSPECIFIED ORGANISM: Primary | ICD-10-CM

## 2018-03-16 PROCEDURE — 99213 OFFICE O/P EST LOW 20 MIN: CPT | Performed by: PHYSICIAN ASSISTANT

## 2018-03-16 RX ORDER — CODEINE PHOSPHATE AND GUAIFENESIN 10; 100 MG/5ML; MG/5ML
2 SOLUTION ORAL
Qty: 120 ML | Refills: 0 | Status: SHIPPED | OUTPATIENT
Start: 2018-03-16 | End: 2018-06-11

## 2018-03-16 RX ORDER — METHYLPREDNISOLONE 4 MG
TABLET, DOSE PACK ORAL
Qty: 21 TABLET | Refills: 0 | Status: SHIPPED | OUTPATIENT
Start: 2018-03-16 | End: 2018-06-11

## 2018-03-16 RX ORDER — CLARITHROMYCIN 250 MG/1
250 TABLET, FILM COATED ORAL 2 TIMES DAILY
Qty: 20 TABLET | Refills: 0 | Status: SHIPPED | OUTPATIENT
Start: 2018-03-16 | End: 2018-09-19

## 2018-03-16 RX ORDER — BENZONATATE 200 MG/1
200 CAPSULE ORAL 3 TIMES DAILY PRN
Qty: 21 CAPSULE | Refills: 0 | Status: SHIPPED | OUTPATIENT
Start: 2018-03-16 | End: 2018-06-11

## 2018-03-16 NOTE — TELEPHONE ENCOUNTER
"Pharmacy inquiring about drug interaction;  Review of EMR reveals; \"      Warnings Override History for clarithromycin (BIAXIN) 250 MG tablet [[id number]]   Overridden by Kolton Yin PA-C on 03/16/18 0928   Drug-Drug   1. MACROLIDES AND KETOLIDES / SALMETEROL [Level: Major]   Other Orders: fluticasone-salmeterol (ADVAIR) 500-50 MCG/DOSE diskus inhaler      2. MACROLIDES / DILTIAZEM [Level: Major]   Other Orders: diltiazem (TIAZAC) 240 MG 24 hr ER beaded capsule        Read to pharmacist verbatim and accepted  Kristie Palumbo RN  FNA    Additional Information    Pharmacy calling with prescription question and triager answers question    Negative: [1] DOUBLE DOSE (an extra dose or lesser amount) of over-the-counter (OTC) drug AND [2] NO symptoms (all triage questions negative)    Negative: [1] DOUBLE DOSE (an extra dose or lesser amount) of antibiotic drug AND [2] NO symptoms (all triage questions negative)    Negative: [1] DOUBLE DOSE (an extra dose or lesser amount) of prescription drug AND [2] NO symptoms (Exception: a double dose of antibiotics)    Negative: Diabetes drug error or overdose (e.g., insulin or extra dose)    Negative: [1] Request for URGENT new prescription or refill of \"essential\" medication (i.e., likelihood of harm to patient if not taken) AND [2] triager unable to fill per unit policy    Negative: [1] Prescription not at pharmacy AND [2] was prescribed today by PCP    Negative: Pharmacy calling with prescription questions and triager unable to answer question    Negative: Caller has URGENT medication question about med that PCP prescribed and triager unable to answer question    Negative: Caller has NON-URGENT medication question about med that PCP prescribed and triager unable to answer question    Negative: Caller requesting a NON-URGENT new prescription or refill and triager unable to refill per unit policy    Negative: MORE THAN A DOUBLE DOSE of a prescription or over-the-counter (OTC) " drug    Negative: [1] DOUBLE DOSE (an extra dose or lesser amount) of over-the-counter (OTC) drug AND [2] any symptoms (e.g., dizziness, nausea, pain, sleepiness)    Negative: [1] DOUBLE DOSE (an extra dose or lesser amount) of prescription drug AND [2] any symptoms (e.g., dizziness, nausea, pain, sleepiness)    Negative: Took another person's prescription drug    Protocols used: MEDICATION QUESTION CALL-ADULT-

## 2018-03-16 NOTE — MR AVS SNAPSHOT
After Visit Summary   3/16/2018    Lisette LINCOLN Ukaegbu    MRN: [id number]           Patient Information     Date Of Birth          1965        Visit Information        Provider Department      3/16/2018 3:00 PM Kolton Yin PA-C [facility]        Today's Diagnoses     Acute bronchitis, unspecified organism    -  1    Cough           Follow-ups after your visit        Your next 10 appointments already scheduled     Mar 27, 2018  1:20 PM CDT   Office Visit with Kolton Yin PA-C   [facility] ([facility])    [address]  [place] [id number] [phone]           Bring a current list of meds and any records pertaining to this visit. For Physicals, please bring immunization records and any forms needing to be filled out. Please arrive 10 minutes early to complete paperwork.              Who to contact     Normal or non-critical lab and imaging results will be communicated to you by Vicarioushart, letter or phone within 4 business days after the clinic has received the results. If you do not hear from us within 7 days, please contact the clinic through iLinkt or phone. If you have a critical or abnormal lab result, we will notify you by phone as soon as possible.  Submit refill requests through [facility] or call your pharmacy and they will forward the refill request to us. Please allow 3 business days for your refill to be completed.          If you need to speak with a  for additional information , please call: [phone]             Additional Information About Your Visit        [facility] Information     [facility] gives you secure access to your electronic health record. If you see a primary care provider, you can also send messages to your care team and make appointments. If you have questions, please call your primary care clinic.  If you do not have a primary care provider, please call [phone] and they will assist you.         Care EveryWhere ID     This is your Care EveryWhere ID. This could be used by other organizations to access your [facility] medical records  [id number]        Your Vitals Were     Pulse Temperature Respirations Last Period Pulse Oximetry BMI (Body Mass Index)    74 98.5  F (36.9  C) (Tympanic) 16 05/15/2014 (Approximate) 96% 28.56 kg/m2       Blood Pressure from Last 3 Encounters:   03/16/18 132/82   03/12/18 110/78   01/05/18 122/77    Weight from Last 3 Encounters:   03/16/18 181 lb (82.1 kg)   03/12/18 181 lb 9.6 oz (82.4 kg)   01/05/18 175 lb (79.4 kg)              Today, you had the following     No orders found for display         Today's Medication Changes          These changes are accurate as of 3/16/18  3:33 PM.  If you have any questions, ask your nurse or doctor.               Start taking these medicines.        Dose/Directions    benzonatate 200 MG capsule   Commonly known as:  TESSALON   Used for:  Cough   Started by:  Kolton Yin PA-C        Dose:  200 mg   Take 1 capsule (200 mg) by mouth 3 times daily as needed for cough   Quantity:  21 capsule   Refills:  0       clarithromycin 250 MG tablet   Commonly known as:  BIAXIN   Used for:  Acute bronchitis, unspecified organism   Started by:  Kolton Yin PA-C        Dose:  250 mg   Take 1 tablet (250 mg) by mouth 2 times daily   Quantity:  20 tablet   Refills:  0       guaiFENesin-codeine 100-10 MG/5ML Soln solution   Commonly known as:  ROBITUSSIN AC   Used for:  Cough   Started by:  Kolton Yin PA-C        Dose:  2 tsp.   Take 10 mLs by mouth nightly as needed   Quantity:  120 mL   Refills:  0       methylPREDNISolone 4 MG tablet   Commonly known as:  MEDROL DOSEPAK   Used for:  Acute bronchitis, unspecified organism   Started by:  Kolton Yin PA-C        Follow package instructions   Quantity:  21 tablet   Refills:  0         Stop taking these medicines if you haven't already. Please contact your care team if you have  questions.     doxycycline 100 MG capsule   Commonly known as:  VIBRAMYCIN   Stopped by:  Kolton Yin PA-C           predniSONE 20 MG tablet   Commonly known as:  DELTASONE   Stopped by:  Kolton Yin PA-C                Where to get your medicines      These medications were sent to [id number] - [place], [place] - [address]  [address] [address]    Hours:  test fax successful 4/5/04 kr Phone:  [phone]     benzonatate 200 MG capsule    clarithromycin 250 MG tablet    methylPREDNISolone 4 MG tablet         Some of these will need a paper prescription and others can be bought over the counter.  Ask your nurse if you have questions.     Bring a paper prescription for each of these medications     guaiFENesin-codeine 100-10 MG/5ML Soln solution                Primary Care Provider Office Phone # Fax #    Kolton Yin PA-C [phone]       [address] [place] [address]        Equal Access to Services     [facility]: Hadii aad ku hadasho Soomaali, waaxda luqadaha, qaybta kaalmada adeegyada, waxay idiin hayaan jeri khvini tuttle . So [facility] [place] [phone].    ATENCIÓN: Si habla español, tiene a mosquera disposición servicios gratuitos de asistencia lingüística. Llame al [phone].    We comply with applicable federal civil rights laws and [place] laws. We do not discriminate on the basis of race, color, national origin, age, disability, sex, sexual orientation, or gender identity.            Thank you!     Thank you for choosing [facility]  for your care. Our goal is always to provide you with excellent care. Hearing back from our patients is one way we can continue to improve our services. Please take a few minutes to complete the written survey that you may receive in the mail after your visit with us. Thank you!             Your Updated Medication List - Protect others around you: Learn how to safely use, store and  throw away your medicines at www.disposemymeds.org.          This list is accurate as of 3/16/18  3:33 PM.  Always use your most recent med list.                   Brand Name Dispense Instructions for use Diagnosis    * albuterol 108 (90 BASE) MCG/ACT Inhaler    PROAIR HFA/PROVENTIL HFA/VENTOLIN HFA    1 Inhaler    Inhale 2 puffs into the lungs every 4 hours as needed for shortness of breath / dyspnea    Moderate persistent asthma, uncomplicated       * albuterol (2.5 MG/3ML) 0.083% neb solution     1 Box    Take 1 vial (2.5 mg) by nebulization every 4 hours as needed for shortness of breath / dyspnea    Moderate persistent asthma, uncomplicated       ALPRAZolam 0.5 MG tablet    XANAX    30 tablet    Take 1-2 tablets (0.5-1 mg) by mouth 3 times daily as needed for anxiety    PTSD (post-traumatic stress disorder)       aspirin  MG EC tablet     90 tablet    Take 1 tablet (325 mg) by mouth daily    Type 2 diabetes mellitus without complication, without long-term current use of insulin (H)       benzonatate 200 MG capsule    TESSALON    21 capsule    Take 1 capsule (200 mg) by mouth 3 times daily as needed for cough    Cough       clarithromycin 250 MG tablet    BIAXIN    20 tablet    Take 1 tablet (250 mg) by mouth 2 times daily    Acute bronchitis, unspecified organism       diltiazem 240 MG 24 hr ER beaded capsule    TIAZAC    90 capsule    Take 1 capsule (240 mg) by mouth daily    Palpitations       DULoxetine 60 MG EC capsule    CYMBALTA    180 capsule    Take 1 capsule (60 mg) by mouth 2 times daily    PTSD (post-traumatic stress disorder), Depression with anxiety       fish oil-omega-3 fatty acids 1000 MG capsule      Take 2 g by mouth 3 times daily Reported on 2/14/2017        fluticasone 50 MCG/ACT spray    FLONASE    16 g    Spray 1-2 sprays into both nostrils daily    Moderate persistent asthma with acute exacerbation       fluticasone-salmeterol 500-50 MCG/DOSE diskus inhaler    ADVAIR    3 Inhaler     Inhale 1 puff into the lungs 2 times daily    Moderate persistent asthma with acute exacerbation       guaiFENesin-codeine 100-10 MG/5ML Soln solution    ROBITUSSIN AC    120 mL    Take 10 mLs by mouth nightly as needed    Cough       metFORMIN 500 MG 24 hr tablet    GLUCOPHAGE-XR    120 tablet    TAKE 1 TABLET BY MOUTH ONCE DAILY WITH DINNER FOR 1 WEEK THEN INCREASE TO 2 TABLETS BY MOUTH DAILY THEREAFTER.    Type 2 diabetes mellitus without complication, without long-term current use of insulin (H)       methylPREDNISolone 4 MG tablet    MEDROL DOSEPAK    21 tablet    Follow package instructions    Acute bronchitis, unspecified organism       montelukast 10 MG tablet    SINGULAIR    90 tablet    Take 1 tablet (10 mg) by mouth At Bedtime    Moderate persistent asthma with acute exacerbation       nitroGLYcerin 0.4 MG/HR 24 hr patch    NITRODUR     Place 1 patch onto the skin daily Reported on 2/14/2017        * Notice:  This list has 2 medication(s) that are the same as other medications prescribed for you. Read the directions carefully, and ask your doctor or other care provider to review them with you.

## 2018-03-19 NOTE — TELEPHONE ENCOUNTER
She's had a history of some gi intolerance to erythromycin, not an allergy. She'll be ok to take the biaxin I prescribed.

## 2018-04-21 DIAGNOSIS — E11.9 TYPE 2 DIABETES MELLITUS WITHOUT COMPLICATION, WITHOUT LONG-TERM CURRENT USE OF INSULIN (H): ICD-10-CM

## 2018-04-22 NOTE — TELEPHONE ENCOUNTER
"Requested Prescriptions   Pending Prescriptions Disp Refills     metFORMIN (GLUCOPHAGE-XR) 500 MG 24 hr tablet [Pharmacy Med Name: MetFORMIN HCl ER Oral Tablet Extended Release 24 Hour 500 MG] 120 tablet 0    Last Written Prescription Date:  03/02/18  Last Fill Quantity: 120,  # refills: 0   Last office visit: 3/16/2018 with prescribing provider:  EMILEE Yin Future Office Visit:     Sig: TAKE 1 TABLET BY MOUTH ONCE DAILY WITH DINNER FOR 1 WEEK. THEN INCREASE TO 2 TABLETS BY MOUTH DAILY THEREAFTER.    Biguanide Agents Failed    4/21/2018 11:06 AM       Failed - Patient has documented LDL within the past 12 mos.    Recent Labs   Lab Test  04/11/14   0934   LDL  94            Failed - Patient has had a Microalbumin in the past 12 mos.    Recent Labs   Lab Test  03/13/17   1412   MICROL  46   UMALCR  11.81            Failed - Patient has documented A1c within the specified period of time.    Recent Labs   Lab Test  08/18/17   1500   A1C  5.6            Passed - Blood pressure less than 140/90 in past 6 months    BP Readings from Last 3 Encounters:   03/16/18 132/82   03/12/18 110/78   01/05/18 122/77                Passed - Patient is age 10 or older       Passed - Patient's CR is NOT>1.4 OR Patient's EGFR is NOT<45 within past 12 mos.    Recent Labs   Lab Test  08/18/17   1500   GFRESTIMATED  64   GFRESTBLACK  78       Recent Labs   Lab Test  08/18/17   1500   CR  0.92            Passed - Patient does NOT have a diagnosis of CHF.       Passed - Patient is not pregnant       Passed - Patient has not had a positive pregnancy test within the past 12 mos.        Passed - Recent (6 mo) or future (30 days) visit within the authorizing provider's specialty    Patient had office visit in the last 6 months or has a visit in the next 30 days with authorizing provider or within the authorizing provider's specialty.  See \"Patient Info\" tab in inbasket, or \"Choose Columns\" in Meds & Orders section of the refill encounter.        "

## 2018-04-27 ENCOUNTER — TELEPHONE (OUTPATIENT)
Dept: FAMILY MEDICINE | Facility: CLINIC | Age: 53
End: 2018-04-27

## 2018-04-27 RX ORDER — METFORMIN HCL 500 MG
1000 TABLET, EXTENDED RELEASE 24 HR ORAL
Qty: 60 TABLET | Refills: 0 | Status: SHIPPED | OUTPATIENT
Start: 2018-04-27 | End: 2018-05-03

## 2018-04-27 NOTE — TELEPHONE ENCOUNTER
Reason for Call:  Medication or medication refill:    Do you use a [facility]?  Name of the pharmacy and phone number for the current request:  [facility]    Name of the medication requested: Metformin    Other request: She is completely out of medication. Did make an appointment to be seen next week. She would like it sent in today if possible.     Can we leave a detailed message on this number? YES    Phone number patient can be reached at: Home number on file [phone] (home)    Best Time: any     Call taken on 4/27/2018 at 10:12 AM by Yelena Light

## 2018-04-27 NOTE — TELEPHONE ENCOUNTER
3 attempts have been made to contact patient. Please approve/deny medication and staff will send letter. Thank you. Aliyah Snyder MA

## 2018-04-27 NOTE — TELEPHONE ENCOUNTER
Script refilled by provider just a few minutes ago.  Patient notified and voiced understanding and agreement.  Alison Rush RN

## 2018-05-03 ENCOUNTER — TELEPHONE (OUTPATIENT)
Dept: FAMILY MEDICINE | Facility: CLINIC | Age: 53
End: 2018-05-03

## 2018-05-03 ENCOUNTER — OFFICE VISIT (OUTPATIENT)
Dept: FAMILY MEDICINE | Facility: CLINIC | Age: 53
End: 2018-05-03
Payer: COMMERCIAL

## 2018-05-03 VITALS
HEIGHT: 67 IN | WEIGHT: 187 LBS | HEART RATE: 63 BPM | OXYGEN SATURATION: 98 % | BODY MASS INDEX: 29.35 KG/M2 | RESPIRATION RATE: 18 BRPM | DIASTOLIC BLOOD PRESSURE: 77 MMHG | SYSTOLIC BLOOD PRESSURE: 131 MMHG | TEMPERATURE: 97.8 F

## 2018-05-03 DIAGNOSIS — F41.8 DEPRESSION WITH ANXIETY: ICD-10-CM

## 2018-05-03 DIAGNOSIS — R21 RASH: ICD-10-CM

## 2018-05-03 DIAGNOSIS — J45.41 MODERATE PERSISTENT ASTHMA WITH ACUTE EXACERBATION: ICD-10-CM

## 2018-05-03 DIAGNOSIS — L01.00 IMPETIGO: ICD-10-CM

## 2018-05-03 DIAGNOSIS — E11.9 TYPE 2 DIABETES MELLITUS WITHOUT COMPLICATION, WITHOUT LONG-TERM CURRENT USE OF INSULIN (H): ICD-10-CM

## 2018-05-03 DIAGNOSIS — F43.10 PTSD (POST-TRAUMATIC STRESS DISORDER): ICD-10-CM

## 2018-05-03 DIAGNOSIS — J45.40 MODERATE PERSISTENT ASTHMA, UNCOMPLICATED: ICD-10-CM

## 2018-05-03 DIAGNOSIS — R00.2 PALPITATIONS: ICD-10-CM

## 2018-05-03 DIAGNOSIS — Z13.6 CARDIOVASCULAR SCREENING; LDL GOAL LESS THAN 160: Primary | ICD-10-CM

## 2018-05-03 DIAGNOSIS — Z12.11 SPECIAL SCREENING FOR MALIGNANT NEOPLASMS, COLON: ICD-10-CM

## 2018-05-03 LAB
CHOLEST SERPL-MCNC: 186 MG/DL
CREAT UR-MCNC: 62 MG/DL
HBA1C MFR BLD: 6 % (ref 0–5.6)
HDLC SERPL-MCNC: 53 MG/DL
LDLC SERPL CALC-MCNC: 95 MG/DL
MICROALBUMIN UR-MCNC: 6 MG/L
MICROALBUMIN/CREAT UR: 9.82 MG/G CR (ref 0–25)
NONHDLC SERPL-MCNC: 133 MG/DL
TRIGL SERPL-MCNC: 191 MG/DL

## 2018-05-03 PROCEDURE — 99207 C FOOT EXAM  NO CHARGE: CPT | Performed by: PHYSICIAN ASSISTANT

## 2018-05-03 PROCEDURE — 36415 COLL VENOUS BLD VENIPUNCTURE: CPT | Performed by: PHYSICIAN ASSISTANT

## 2018-05-03 PROCEDURE — 80061 LIPID PANEL: CPT | Performed by: PHYSICIAN ASSISTANT

## 2018-05-03 PROCEDURE — 82043 UR ALBUMIN QUANTITATIVE: CPT | Performed by: PHYSICIAN ASSISTANT

## 2018-05-03 PROCEDURE — 83036 HEMOGLOBIN GLYCOSYLATED A1C: CPT | Performed by: PHYSICIAN ASSISTANT

## 2018-05-03 PROCEDURE — 99214 OFFICE O/P EST MOD 30 MIN: CPT | Performed by: PHYSICIAN ASSISTANT

## 2018-05-03 RX ORDER — LISINOPRIL 5 MG/1
5 TABLET ORAL DAILY
Qty: 90 TABLET | Refills: 3 | Status: SHIPPED | OUTPATIENT
Start: 2018-05-03 | End: 2019-05-11

## 2018-05-03 RX ORDER — ALBUTEROL SULFATE 90 UG/1
2 AEROSOL, METERED RESPIRATORY (INHALATION) EVERY 4 HOURS PRN
Qty: 1 INHALER | Refills: 11 | Status: SHIPPED | OUTPATIENT
Start: 2018-05-03 | End: 2019-05-11

## 2018-05-03 RX ORDER — MONTELUKAST SODIUM 10 MG/1
10 TABLET ORAL AT BEDTIME
Qty: 90 TABLET | Refills: 3 | Status: SHIPPED | OUTPATIENT
Start: 2018-05-03 | End: 2019-05-11

## 2018-05-03 RX ORDER — METFORMIN HCL 500 MG
1000 TABLET, EXTENDED RELEASE 24 HR ORAL
Qty: 180 TABLET | Refills: 1 | Status: SHIPPED | OUTPATIENT
Start: 2018-05-03 | End: 2018-11-26

## 2018-05-03 RX ORDER — DILTIAZEM HYDROCHLORIDE 240 MG/1
240 CAPSULE, EXTENDED RELEASE ORAL DAILY
Qty: 90 CAPSULE | Refills: 1 | Status: SHIPPED | OUTPATIENT
Start: 2018-05-03 | End: 2018-11-03

## 2018-05-03 RX ORDER — DOXYCYCLINE 100 MG/1
100 TABLET ORAL 2 TIMES DAILY
Qty: 60 TABLET | Refills: 0 | Status: SHIPPED | OUTPATIENT
Start: 2018-05-03 | End: 2018-05-17

## 2018-05-03 NOTE — MR AVS SNAPSHOT
After Visit Summary   5/3/2018    Lisette LINCOLN Ukaegbu    MRN: [id number]           Patient Information     Date Of Birth          1965        Visit Information        Provider Department      5/3/2018 7:40 AM Kolton Yin PA-C [facility]        Today's Diagnoses     CARDIOVASCULAR SCREENING; LDL GOAL LESS THAN 160    -  1    Type 2 diabetes mellitus without complication, without long-term current use of insulin (H)        Special screening for malignant neoplasms, colon        Palpitations        PTSD (post-traumatic stress disorder)        Depression with anxiety        Moderate persistent asthma with acute exacerbation        Moderate persistent asthma, uncomplicated        Rash        Impetigo           Follow-ups after your visit        Additional Services     DERMATOLOGY REFERRAL       Your provider has referred you to: [facility]: [facility] [phone]   [email or web address]    Please be aware that coverage of these services is subject to the terms and limitations of your health insurance plan.  Call member services at your health plan with any benefit or coverage questions.      Please bring the following with you to your appointment:    (1) Any X-Rays, CTs or MRIs which have been performed.  Contact the facility where they were done to arrange for  prior to your scheduled appointment.    (2) List of current medications  (3) This referral request   (4) Any documents/labs given to you for this referral            GASTROENTEROLOGY ADULT REF PROCEDURE ONLY [facility] [phone]; No Provider Preference       Last Lab Result: Creatinine (mg/dL)       Date                     Value                 08/18/2017               0.92             ----------  Body mass index is 29.51 kg/(m^2).     Needed:  No  Language:  English    Patient will be contacted to schedule procedure.     Please be aware that coverage of  these services is subject to the terms and limitations of your health insurance plan.  Call member services at your health plan with any benefit or coverage questions.  Any procedures must be performed at a [facility] facility OR coordinated by your clinic's referral office.    Please bring the following with you to your appointment:    (1) Any X-Rays, CTs or MRIs which have been performed.  Contact the facility where they were done to arrange for  prior to your scheduled appointment.    (2) List of current medications   (3) This referral request   (4) Any documents/labs given to you for this referral                  Who to contact     Normal or non-critical lab and imaging results will be communicated to you by HumanAPIhart, letter or phone within 4 business days after the clinic has received the results. If you do not hear from us within 7 days, please contact the clinic through Everypostt or phone. If you have a critical or abnormal lab result, we will notify you by phone as soon as possible.  Submit refill requests through [employer] or call your pharmacy and they will forward the refill request to us. Please allow 3 business days for your refill to be completed.          If you need to speak with a  for additional information , please call: [phone]             Additional Information About Your Visit        [employer] Information     [employer] gives you secure access to your electronic health record. If you see a primary care provider, you can also send messages to your care team and make appointments. If you have questions, please call your primary care clinic.  If you do not have a primary care provider, please call [phone] and they will assist you.        Care EveryWhere ID     This is your Care EveryWhere ID. This could be used by other organizations to access your [facility] medical records  [id number]        Your Vitals Were     Pulse Temperature Respirations Height Last Period Pulse  "Oximetry    63 97.8  F (36.6  C) (Tympanic) 18 5' 6.75\" (1.695 m) 05/15/2014 (Approximate) 98%    BMI (Body Mass Index)                   29.51 kg/m2            Blood Pressure from Last 3 Encounters:   05/03/18 131/77   03/16/18 132/82   03/12/18 110/78    Weight from Last 3 Encounters:   05/03/18 187 lb (84.8 kg)   03/16/18 181 lb (82.1 kg)   03/12/18 181 lb 9.6 oz (82.4 kg)              We Performed the Following     Albumin Random Urine Quantitative with Creat Ratio     DERMATOLOGY REFERRAL     FOOT EXAM     GASTROENTEROLOGY ADULT REF PROCEDURE ONLY [facility] [phone]; No Provider Preference     Hemoglobin A1c     Lipid panel reflex to direct LDL Fasting          Today's Medication Changes          These changes are accurate as of 5/3/18  8:30 AM.  If you have any questions, ask your nurse or doctor.               Start taking these medicines.        Dose/Directions    doxycycline Monohydrate 100 MG Tabs   Used for:  Impetigo, Rash   Started by:  Kolton Yin PA-C        Dose:  100 mg   Take 100 mg by mouth 2 times daily for 14 days   Quantity:  60 tablet   Refills:  0       lisinopril 5 MG tablet   Commonly known as:  PRINIVIL/ZESTRIL   Used for:  Type 2 diabetes mellitus without complication, without long-term current use of insulin (H)   Started by:  Kolton Yin PA-C        Dose:  5 mg   Take 1 tablet (5 mg) by mouth daily   Quantity:  90 tablet   Refills:  3            Where to get your medicines      These medications were sent to [id number] - [place], [place] - [address] [address]  [address] [address] [address]    Hours:  test fax successful 4/5/04  Phone:  [phone]     albuterol 108 (90 Base) MCG/ACT Inhaler    diltiazem 240 MG 24 hr ER beaded capsule    doxycycline Monohydrate 100 MG Tabs    fluticasone-salmeterol 500-50 MCG/DOSE diskus inhaler    lisinopril 5 MG tablet    metFORMIN 500 MG 24 hr tablet    montelukast 10 MG tablet                " Primary Care Provider Office Phone # Fax #    Kolton Yin PA-C [phone]       [address]  [facility] [address]        Equal Access to Services     VERA NARVAEZ : Hadii aad ku hadasho Soomaali, waaxda luqadaha, qaybta kaalmada adeegyada, waxay idiin hayradamesn adebernardino lagunas laRitacasa stone. So [facility] [phone].    ATENCIÓN: Si habla español, tiene a mosquera disposición servicios gratuitos de asistencia lingüística. [facility] [phone].    We comply with applicable federal civil rights laws and [place] laws. We do not discriminate on the basis of race, color, national origin, age, disability, sex, sexual orientation, or gender identity.            Thank you!     Thank you for choosing [facility]  for your care. Our goal is always to provide you with excellent care. Hearing back from our patients is one way we can continue to improve our services. Please take a few minutes to complete the written survey that you may receive in the mail after your visit with us. Thank you!             Your Updated Medication List - Protect others around you: Learn how to safely use, store and throw away your medicines at www.disposemymeds.org.          This list is accurate as of 5/3/18  8:30 AM.  Always use your most recent med list.                   Brand Name Dispense Instructions for use Diagnosis    * albuterol (2.5 MG/3ML) 0.083% neb solution     1 Box    Take 1 vial (2.5 mg) by nebulization every 4 hours as needed for shortness of breath / dyspnea    Moderate persistent asthma, uncomplicated       * albuterol 108 (90 Base) MCG/ACT Inhaler    PROAIR HFA/PROVENTIL HFA/VENTOLIN HFA    1 Inhaler    Inhale 2 puffs into the lungs every 4 hours as needed for shortness of breath / dyspnea    Moderate persistent asthma, uncomplicated       ALPRAZolam 0.5 MG tablet    XANAX    30 tablet    Take 1-2 tablets (0.5-1 mg) by mouth 3 times daily as needed for anxiety    PTSD (post-traumatic stress disorder)        aspirin 325 MG EC tablet     90 tablet    Take 1 tablet (325 mg) by mouth daily    Type 2 diabetes mellitus without complication, without long-term current use of insulin (H)       benzonatate 200 MG capsule    TESSALON    21 capsule    Take 1 capsule (200 mg) by mouth 3 times daily as needed for cough    Cough       clarithromycin 250 MG tablet    BIAXIN    20 tablet    Take 1 tablet (250 mg) by mouth 2 times daily    Acute bronchitis, unspecified organism       diltiazem 240 MG 24 hr ER beaded capsule    TIAZAC    90 capsule    Take 1 capsule (240 mg) by mouth daily    Palpitations       doxycycline Monohydrate 100 MG Tabs     60 tablet    Take 100 mg by mouth 2 times daily for 14 days    Impetigo, Rash       DULoxetine 60 MG EC capsule    CYMBALTA    180 capsule    Take 1 capsule (60 mg) by mouth 2 times daily    PTSD (post-traumatic stress disorder), Depression with anxiety       fish oil-omega-3 fatty acids 1000 MG capsule      Take 2 g by mouth 3 times daily Reported on 2/14/2017        fluticasone 50 MCG/ACT spray    FLONASE    16 g    Spray 1-2 sprays into both nostrils daily    Moderate persistent asthma with acute exacerbation       fluticasone-salmeterol 500-50 MCG/DOSE diskus inhaler    ADVAIR    3 Inhaler    Inhale 1 puff into the lungs 2 times daily    Moderate persistent asthma with acute exacerbation       guaiFENesin-codeine 100-10 MG/5ML Soln solution    ROBITUSSIN AC    120 mL    Take 10 mLs by mouth nightly as needed    Cough       lisinopril 5 MG tablet    PRINIVIL/ZESTRIL    90 tablet    Take 1 tablet (5 mg) by mouth daily    Type 2 diabetes mellitus without complication, without long-term current use of insulin (H)       metFORMIN 500 MG 24 hr tablet    GLUCOPHAGE-XR    180 tablet    Take 2 tablets (1,000 mg) by mouth daily (with dinner)    Type 2 diabetes mellitus without complication, without long-term current use of insulin (H)       methylPREDNISolone 4 MG tablet    MEDROL DOSEPAK    21  tablet    Follow package instructions    Acute bronchitis, unspecified organism       montelukast 10 MG tablet    SINGULAIR    90 tablet    Take 1 tablet (10 mg) by mouth At Bedtime    Moderate persistent asthma with acute exacerbation       nitroGLYcerin 0.4 MG/HR 24 hr patch    NITRODUR     Place 1 patch onto the skin daily Reported on 2/14/2017        * Notice:  This list has 2 medication(s) that are the same as other medications prescribed for you. Read the directions carefully, and ask your doctor or other care provider to review them with you.

## 2018-05-03 NOTE — PROGRESS NOTES
SUBJECTIVE:   Lisette LINCOLN Ukaegbu is a 53 year old female who presents to clinic today for the following health issues:      Diabetes Follow-up    Patient is checking blood sugars: once daily.  Results are as follows:         am - [id number]    Diabetic concerns: None     Symptoms of hypoglycemia (low blood sugar): none     Paresthesias (numbness or burning in feet) or sores: No     Date of last diabetic eye exam: 2018    BP Readings from Last 2 Encounters:   05/03/18 131/77   03/16/18 132/82     Hemoglobin A1C (%)   Date Value   05/03/2018 6.0 (H)   08/18/2017 5.6     LDL Cholesterol Calculated (mg/dL)   Date Value   04/11/2014 94   07/28/2005 119@       Amount of exercise or physical activity: 2-3 days/week for an average of 15-30 minutes    Problems taking medications regularly: No    Medication side effects: none    Diet: regular (no restrictions)            Problem list and histories reviewed & adjusted, as indicated.  Additional history: as documented        Reviewed and updated as needed this visit by clinical staff  Tobacco  Allergies  Meds  Problems  Med Hx  Surg Hx  Fam Hx  Soc Hx        Reviewed and updated as needed this visit by Provider  Tobacco  Allergies  Meds  Problems  Med Hx  Surg Hx  Fam Hx  Soc Hx        All other systems negative except as outline above  OBJECTIVE:  Eye exam - right eye normal lid, conjunctiva, cornea, pupil and fundus, left eye normal lid, conjunctiva, cornea, pupil and fundus.  Thyroid not palpable, not enlarged, no nodules detected.  CHEST:chest clear to IPPA, no tachypnea, retractions or cyanosis and S1, S2 normal, no murmur, no gallop, rate regular.  Foot exam - both sides normal; no swelling, tenderness or skin or vascular lesions. Color and temperature is normal. Sensation is intact. Peripheral pulses are palpable. Toenails are normal.  Two open sores on right shin with some surrounding redness and tenderness.     Lisette was seen today for  diabetes.    Diagnoses and all orders for this visit:    CARDIOVASCULAR SCREENING; LDL GOAL LESS THAN 160  -     Lipid panel reflex to direct LDL Fasting    Type 2 diabetes mellitus without complication, without long-term current use of insulin (H)  -     Hemoglobin A1c  -     FOOT EXAM  -     metFORMIN (GLUCOPHAGE-XR) 500 MG 24 hr tablet; Take 2 tablets (1,000 mg) by mouth daily (with dinner)  -     Albumin Random Urine Quantitative with Creat Ratio  -     lisinopril (PRINIVIL/ZESTRIL) 5 MG tablet; Take 1 tablet (5 mg) by mouth daily    Special screening for malignant neoplasms, colon  -     GASTROENTEROLOGY ADULT REF PROCEDURE ONLY [facility] [phone]; No Provider Preference    Palpitations  -     diltiazem (TIAZAC) 240 MG 24 hr ER beaded capsule; Take 1 capsule (240 mg) by mouth daily    PTSD (post-traumatic stress disorder)    Depression with anxiety    Moderate persistent asthma with acute exacerbation  -     fluticasone-salmeterol (ADVAIR) 500-50 MCG/DOSE diskus inhaler; Inhale 1 puff into the lungs 2 times daily  -     montelukast (SINGULAIR) 10 MG tablet; Take 1 tablet (10 mg) by mouth At Bedtime    Moderate persistent asthma, uncomplicated  -     albuterol (PROAIR HFA/PROVENTIL HFA/VENTOLIN HFA) 108 (90 Base) MCG/ACT Inhaler; Inhale 2 puffs into the lungs every 4 hours as needed for shortness of breath / dyspnea    Rash  -     doxycycline Monohydrate 100 MG TABS; Take 100 mg by mouth 2 times daily for 14 days  -     DERMATOLOGY REFERRAL    Impetigo  -     doxycycline Monohydrate 100 MG TABS; Take 100 mg by mouth 2 times daily for 14 days      work on lifestyle modification  Recheck in 6 mos

## 2018-05-03 NOTE — TELEPHONE ENCOUNTER
Ramakrishna called with a question on the qty for Doxycycline -  1 tablet twice a for 14 day and the qty is 60 and should 28.  Please advise.

## 2018-05-03 NOTE — TELEPHONE ENCOUNTER
Please clarify medication 1 tabs twice daily for 14 days is 28 tabs. Order states 60 tabs   doxycycline Monohydrate 100 MG TABS 60 tablet 0 5/3/2018 5/17/2018 --     Sig: Take 100 mg by mouth 2 times daily for 14 days     Class: E-Prescribe

## 2018-06-11 ENCOUNTER — OFFICE VISIT (OUTPATIENT)
Dept: DERMATOLOGY | Facility: CLINIC | Age: 53
End: 2018-06-11
Payer: COMMERCIAL

## 2018-06-11 VITALS — TEMPERATURE: 97.6 F | SYSTOLIC BLOOD PRESSURE: 113 MMHG | HEART RATE: 87 BPM | DIASTOLIC BLOOD PRESSURE: 65 MMHG

## 2018-06-11 DIAGNOSIS — L28.1 PRURIGO NODULARIS: Primary | ICD-10-CM

## 2018-06-11 PROCEDURE — 36415 COLL VENOUS BLD VENIPUNCTURE: CPT | Performed by: DERMATOLOGY

## 2018-06-11 PROCEDURE — 99243 OFF/OP CNSLTJ NEW/EST LOW 30: CPT | Mod: 25 | Performed by: DERMATOLOGY

## 2018-06-11 PROCEDURE — 86235 NUCLEAR ANTIGEN ANTIBODY: CPT | Performed by: DERMATOLOGY

## 2018-06-11 PROCEDURE — 96910 PHOTCHMTX TAR&UVB/PTRLTM&UVB: CPT | Performed by: DERMATOLOGY

## 2018-06-11 PROCEDURE — 86038 ANTINUCLEAR ANTIBODIES: CPT | Performed by: DERMATOLOGY

## 2018-06-11 RX ORDER — GABAPENTIN 100 MG/1
100 CAPSULE ORAL 3 TIMES DAILY
Qty: 90 CAPSULE | Refills: 1 | Status: SHIPPED | OUTPATIENT
Start: 2018-06-11 | End: 2018-09-19

## 2018-06-11 NOTE — LETTER
6/11/2018         RE: Lisette Owens  [address] [place] [address]        Dear Colleague,    Thank you for referring your patient, Lisette Owens, to the [facility]. Please see a copy of my visit note below.    Lisette Owens , a 53 year old year old female patient, I was asked to see by Dr. Yin for prurigo nodularis.  She has had this for years.  Bx x2.  Got worse after her daughter was killed last summer.  Patient reports the following symptoms:  itching .  Patient reports the following previous treatments [place] TAC.  Patient reports the following modifying factors none.  Associated symptoms: none.  Patient has no other skin complaints today.  Remainder of the HPI, Meds, PMH, Allergies, FH, and SH was reviewed in chart.      Past Medical History:   Diagnosis Date     Allergic rhinitis due to animal dander      Amblyopia     LT     Arthritis      Colon polyp      Endometriosis      House dust mite allergy      Moderate persistent asthma      Rhinitis, allergic to other allergen      Seasonal allergic rhinitis 7/25/05 skin tests pos. for: cat/dog/horse/DM/M/T/G/RW per Dr. Granado    pt. did IT from 7/06 to 11/5/07 to: cat/dog/DM/M/T/G/W dc'd per self.      Type 2 diabetes mellitus without complication, without long-term current use of insulin (H) 3/13/2017       Past Surgical History:   Procedure Laterality Date     HYSTEROSCOPY          Family History   Problem Relation Age of Onset     Hypertension Mother      Alzheimer Disease Mother      DIABETES Mother      Hypertension Father      Skin Cancer Father      Alzheimer Disease Paternal Grandmother      Psychotic Disorder Paternal Grandmother      bipolar     HEART DISEASE Paternal Grandfather      Breast Cancer Maternal Grandmother      Thyroid Disease Brother      Thyroid Disease Sister      DIABETES Sister      Skin Cancer Sister      CEREBROVASCULAR DISEASE No family hx of      Glaucoma No family hx of      Macular Degeneration  No family hx of        Social History     Social History     Marital status:      Spouse name: N/A     Number of children: N/A     Years of education: N/A     Occupational History     Not on file.     Social History Main Topics     Smoking status: Never Smoker     Smokeless tobacco: Never Used     Alcohol use No     Drug use: No     Sexual activity: Yes     Partners: Male     Other Topics Concern     Not on file     Social History Narrative       Outpatient Encounter Prescriptions as of 6/11/2018   Medication Sig Dispense Refill     albuterol (2.5 MG/3ML) 0.083% neb solution Take 1 vial (2.5 mg) by nebulization every 4 hours as needed for shortness of breath / dyspnea 1 Box 1     ALPRAZolam (XANAX) 0.5 MG tablet Take 1-2 tablets (0.5-1 mg) by mouth 3 times daily as needed for anxiety 30 tablet 1     aspirin  MG EC tablet Take 1 tablet (325 mg) by mouth daily 90 tablet 3     DULoxetine (CYMBALTA) 60 MG EC capsule Take 1 capsule (60 mg) by mouth 2 times daily 180 capsule 3     fluticasone (FLONASE) 50 MCG/ACT spray Spray 1-2 sprays into both nostrils daily 16 g 5     nitroglycerin (NITRODUR) 0.4 MG/HR Place 1 patch onto the skin daily Reported on 2/14/2017       albuterol (PROAIR HFA/PROVENTIL HFA/VENTOLIN HFA) 108 (90 Base) MCG/ACT Inhaler Inhale 2 puffs into the lungs every 4 hours as needed for shortness of breath / dyspnea 1 Inhaler 11     clarithromycin (BIAXIN) 250 MG tablet Take 1 tablet (250 mg) by mouth 2 times daily 20 tablet 0     diltiazem (TIAZAC) 240 MG 24 hr ER beaded capsule Take 1 capsule (240 mg) by mouth daily 90 capsule 1     fish oil-omega-3 fatty acids (FISH OIL) 1000 MG capsule Take 2 g by mouth 3 times daily Reported on 2/14/2017       fluticasone-salmeterol (ADVAIR) 500-50 MCG/DOSE diskus inhaler Inhale 1 puff into the lungs 2 times daily 3 Inhaler 11     lisinopril (PRINIVIL/ZESTRIL) 5 MG tablet Take 1 tablet (5 mg) by mouth daily 90 tablet 3     metFORMIN (GLUCOPHAGE-XR) 500 MG  24 hr tablet Take 2 tablets (1,000 mg) by mouth daily (with dinner) 180 tablet 1     montelukast (SINGULAIR) 10 MG tablet Take 1 tablet (10 mg) by mouth At Bedtime 90 tablet 3     [DISCONTINUED] benzonatate (TESSALON) 200 MG capsule Take 1 capsule (200 mg) by mouth 3 times daily as needed for cough 21 capsule 0     [DISCONTINUED] guaiFENesin-codeine (ROBITUSSIN AC) 100-10 MG/5ML SOLN solution Take 10 mLs by mouth nightly as needed 120 mL 0     [DISCONTINUED] methylPREDNISolone (MEDROL DOSEPAK) 4 MG tablet Follow package instructions 21 tablet 0     No facility-administered encounter medications on file as of 6/11/2018.              Review Of Systems  Skin: As above  Eyes: negative  Ears/Nose/Throat: negative  Respiratory: No shortness of breath, dyspnea on exertion, cough, or hemoptysis  Cardiovascular: negative  Gastrointestinal: negative  Genitourinary: negative  Musculoskeletal: negative  Neurologic: negative  Psychiatric: negative  Hematologic/Lymphatic/Immunologic: negative  Endocrine: negative      O:   NAD, WDWN, Alert & Oriented, Mood & Affect wnl, Vitals stable   Here today alone   /65  Pulse 87  Temp 97.6  F (36.4  C) (Tympanic)  LMP 05/15/2014 (Approximate)   General appearance tejal ii   Vitals stable   Alert, oriented and in no acute distress     White scarred macules on trunk and ext   Red hyperkeratotic papules on trunkan d ant legs       The remainder of expanded problem focused exam was unremarkable; the following areas were examined:  scalp/hair, conjunctiva/lids, face, neck, lips, chest, digits/nails, RUE, LUE.      Eyes: Conjunctivae/lids:Normal     ENT: Lips, buccal mucosa, tongue: normal    MSK:Normal    Cardiovascular: peripheral edema none    Pulm: Breathing Normal    Lymph Nodes: No Head and Neck Lymphadenopathy     Neuro/Psych: Orientation:Normal; Mood/Affect:Normal      A/P:  1. Prurigo nodularis  Pathophysiology discussed with pateint   claritin daily  zuyrtec bedtime  Gabapentin  100 three times daily  nbuvb discussed with patient   Check izabela today   Skin care regimen reviewed with patient: Eliminate harsh soaps, i.e. Dial, zest, irsih spring; Mild soaps such as Cetaphil or Dove sensitive skin, avoid hot or cold showers, aggressive use of emollients including vanicream, cetaphil or cerave discussed with patient.  Return to clinic 4 weeks  NARROWBAND ULTRAVIOLET B (NBUVB) CONSENT FORM  Narrowband ultraviolet B (NB UVB) is a type of phototherapy (light treatment) used to treat various skin conditions, including psoriasis, atopic dermatitis (eczema), and itching. This treatment exposes your skin to ultraviolet (UV) light for varying lengths of time. Possible benefits include improvement of existing lesions and reduction of new lesions. NB UVB will not lead to a permanent cure, but can effectively control or improve your condition, sometimes over extended periods of time.   Each patient will vary in the number of treatments required per week and the time it will take to reach significant improvement or clearance. Most patients initially require 3 treatments per week. Typically, treatments start with only a few seconds of UV light exposure and gradually increase as determined by your provider. It may take 15 to 25 treatments or longer to improve your condition. Not all patients will respond quickly or clear completely. However, in many cases, NBUVB has resulted in near-total clearing or remission.   The possible risks and side effects of NB UVB are:       Sunburn or blistering. This may occur at any time during therapy. Certain medications may also cause you to sunburn. Please inform us of any medications you are taking, especially new medications during your treatment.      Theoretical increased risk of skin cancer. However, this has not been demonstrated in many studies with psoriasis patients and UVB treatment.       Dryness and itching.       Skin aging, including an increase in freckling,  wrinkles, and dark spots.       Eye damage and cataracts. This is preventable with required protective goggles worn during treatments.       Increased frequency of cold sores (herpes labialis). If you have a history of cold sores, apply sunscreen on your lips to reduce the risk of an outbreak.       Increase in genital cancer in men with long-term UVB exposure (>300 treatments). This risk is decreased with use of a shield on the genital area.       Worsening of other medical conditions, such as lupus erythematosus or other sun-sensitive conditions.     I have fully read and understand the above information. I have discussed the nature of the proposed treatment, as well as treatment alternatives, with my provider. I understand that no one completely knows the long-term effects of NBUVB. I authorize my provider to prescribe NBUVB. This authorization also extends to my provider s associates to carry out treatment. I understand that I am free to withdraw my consent and stop treatment at any time.           Eye protection was put in place.  Zinc oxide applied to lips and nipples for photoprotection.  minerial oil applied.     154 mj 23 seconds      Complications none; skin care routine.  The patient was discharged in good condition and will return at next office visit for continued NBUVB.    Skin care regimen reviewed with patient: Eliminate harsh soaps, i.e. Dial, zest, irsih spring; Mild soaps such as Cetaphil or Dove sensitive skin, avoid hot or cold showers, aggressive use of emollients including vanicream, cetaphil or cerave discussed with patient.        Again, thank you for allowing me to participate in the care of your patient.        Sincerely,        Charlie Encarnacion MD

## 2018-06-11 NOTE — PROGRESS NOTES
Lisette Owens , a 53 year old year old female patient, I was asked to see by Dr. Yin for prurigo nodularis.  She has had this for years.  Bx x2.  Got worse after her daughter was killed last summer.  Patient reports the following symptoms:  itching .  Patient reports the following previous treatments [place] TAC.  Patient reports the following modifying factors none.  Associated symptoms: none.  Patient has no other skin complaints today.  Remainder of the HPI, Meds, PMH, Allergies, FH, and SH was reviewed in chart.      Past Medical History:   Diagnosis Date     Allergic rhinitis due to animal dander      Amblyopia     LT     Arthritis      Colon polyp      Endometriosis      House dust mite allergy      Moderate persistent asthma      Rhinitis, allergic to other allergen      Seasonal allergic rhinitis 7/25/05 skin tests pos. for: cat/dog/horse/DM/M/T/G/RW per Dr. Granado    pt. did IT from 7/06 to 11/5/07 to: cat/dog/DM/M/T/G/W dc'd per self.      Type 2 diabetes mellitus without complication, without long-term current use of insulin (H) 3/13/2017       Past Surgical History:   Procedure Laterality Date     HYSTEROSCOPY          Family History   Problem Relation Age of Onset     Hypertension Mother      Alzheimer Disease Mother      DIABETES Mother      Hypertension Father      Skin Cancer Father      Alzheimer Disease Paternal Grandmother      Psychotic Disorder Paternal Grandmother      bipolar     HEART DISEASE Paternal Grandfather      Breast Cancer Maternal Grandmother      Thyroid Disease Brother      Thyroid Disease Sister      DIABETES Sister      Skin Cancer Sister      CEREBROVASCULAR DISEASE No family hx of      Glaucoma No family hx of      Macular Degeneration No family hx of        Social History     Social History     Marital status:      Spouse name: N/A     Number of children: N/A     Years of education: N/A     Occupational History     Not on file.     Social History Main Topics      Smoking status: Never Smoker     Smokeless tobacco: Never Used     Alcohol use No     Drug use: No     Sexual activity: Yes     Partners: Male     Other Topics Concern     Not on file     Social History Narrative       Outpatient Encounter Prescriptions as of 6/11/2018   Medication Sig Dispense Refill     albuterol (2.5 MG/3ML) 0.083% neb solution Take 1 vial (2.5 mg) by nebulization every 4 hours as needed for shortness of breath / dyspnea 1 Box 1     ALPRAZolam (XANAX) 0.5 MG tablet Take 1-2 tablets (0.5-1 mg) by mouth 3 times daily as needed for anxiety 30 tablet 1     aspirin  MG EC tablet Take 1 tablet (325 mg) by mouth daily 90 tablet 3     DULoxetine (CYMBALTA) 60 MG EC capsule Take 1 capsule (60 mg) by mouth 2 times daily 180 capsule 3     fluticasone (FLONASE) 50 MCG/ACT spray Spray 1-2 sprays into both nostrils daily 16 g 5     nitroglycerin (NITRODUR) 0.4 MG/HR Place 1 patch onto the skin daily Reported on 2/14/2017       albuterol (PROAIR HFA/PROVENTIL HFA/VENTOLIN HFA) 108 (90 Base) MCG/ACT Inhaler Inhale 2 puffs into the lungs every 4 hours as needed for shortness of breath / dyspnea 1 Inhaler 11     clarithromycin (BIAXIN) 250 MG tablet Take 1 tablet (250 mg) by mouth 2 times daily 20 tablet 0     diltiazem (TIAZAC) 240 MG 24 hr ER beaded capsule Take 1 capsule (240 mg) by mouth daily 90 capsule 1     fish oil-omega-3 fatty acids (FISH OIL) 1000 MG capsule Take 2 g by mouth 3 times daily Reported on 2/14/2017       fluticasone-salmeterol (ADVAIR) 500-50 MCG/DOSE diskus inhaler Inhale 1 puff into the lungs 2 times daily 3 Inhaler 11     lisinopril (PRINIVIL/ZESTRIL) 5 MG tablet Take 1 tablet (5 mg) by mouth daily 90 tablet 3     metFORMIN (GLUCOPHAGE-XR) 500 MG 24 hr tablet Take 2 tablets (1,000 mg) by mouth daily (with dinner) 180 tablet 1     montelukast (SINGULAIR) 10 MG tablet Take 1 tablet (10 mg) by mouth At Bedtime 90 tablet 3     [DISCONTINUED] benzonatate (TESSALON) 200 MG capsule Take 1  capsule (200 mg) by mouth 3 times daily as needed for cough 21 capsule 0     [DISCONTINUED] guaiFENesin-codeine (ROBITUSSIN AC) 100-10 MG/5ML SOLN solution Take 10 mLs by mouth nightly as needed 120 mL 0     [DISCONTINUED] methylPREDNISolone (MEDROL DOSEPAK) 4 MG tablet Follow package instructions 21 tablet 0     No facility-administered encounter medications on file as of 6/11/2018.              Review Of Systems  Skin: As above  Eyes: negative  Ears/Nose/Throat: negative  Respiratory: No shortness of breath, dyspnea on exertion, cough, or hemoptysis  Cardiovascular: negative  Gastrointestinal: negative  Genitourinary: negative  Musculoskeletal: negative  Neurologic: negative  Psychiatric: negative  Hematologic/Lymphatic/Immunologic: negative  Endocrine: negative      O:   NAD, WDWN, Alert & Oriented, Mood & Affect wnl, Vitals stable   Here today alone   /65  Pulse 87  Temp 97.6  F (36.4  C) (Tympanic)  LMP 05/15/2014 (Approximate)   General appearance tejal ii   Vitals stable   Alert, oriented and in no acute distress     White scarred macules on trunk and ext   Red hyperkeratotic papules on trunkan d ant legs       The remainder of expanded problem focused exam was unremarkable; the following areas were examined:  scalp/hair, conjunctiva/lids, face, neck, lips, chest, digits/nails, RUE, LUE.      Eyes: Conjunctivae/lids:Normal     ENT: Lips, buccal mucosa, tongue: normal    MSK:Normal    Cardiovascular: peripheral edema none    Pulm: Breathing Normal    Lymph Nodes: No Head and Neck Lymphadenopathy     Neuro/Psych: Orientation:Normal; Mood/Affect:Normal      A/P:  1. Prurigo nodularis  Pathophysiology discussed with pateint   claritin daily  zuyrtec bedtime  Gabapentin 100 three times daily  nbuvb discussed with patient   Check izabela today   Skin care regimen reviewed with patient: Eliminate harsh soaps, i.e. Dial, zest, irsih spring; Mild soaps such as Cetaphil or Dove sensitive skin, avoid hot or cold  showers, aggressive use of emollients including vanicream, cetaphil or cerave discussed with patient.  Return to clinic 4 weeks  NARROWBAND ULTRAVIOLET B (NBUVB) CONSENT FORM  Narrowband ultraviolet B (NB UVB) is a type of phototherapy (light treatment) used to treat various skin conditions, including psoriasis, atopic dermatitis (eczema), and itching. This treatment exposes your skin to ultraviolet (UV) light for varying lengths of time. Possible benefits include improvement of existing lesions and reduction of new lesions. NB UVB will not lead to a permanent cure, but can effectively control or improve your condition, sometimes over extended periods of time.   Each patient will vary in the number of treatments required per week and the time it will take to reach significant improvement or clearance. Most patients initially require 3 treatments per week. Typically, treatments start with only a few seconds of UV light exposure and gradually increase as determined by your provider. It may take 15 to 25 treatments or longer to improve your condition. Not all patients will respond quickly or clear completely. However, in many cases, NBUVB has resulted in near-total clearing or remission.   The possible risks and side effects of NB UVB are:       Sunburn or blistering. This may occur at any time during therapy. Certain medications may also cause you to sunburn. Please inform us of any medications you are taking, especially new medications during your treatment.      Theoretical increased risk of skin cancer. However, this has not been demonstrated in many studies with psoriasis patients and UVB treatment.       Dryness and itching.       Skin aging, including an increase in freckling, wrinkles, and dark spots.       Eye damage and cataracts. This is preventable with required protective goggles worn during treatments.       Increased frequency of cold sores (herpes labialis). If you have a history of cold sores, apply  sunscreen on your lips to reduce the risk of an outbreak.       Increase in genital cancer in men with long-term UVB exposure (>300 treatments). This risk is decreased with use of a shield on the genital area.       Worsening of other medical conditions, such as lupus erythematosus or other sun-sensitive conditions.     I have fully read and understand the above information. I have discussed the nature of the proposed treatment, as well as treatment alternatives, with my provider. I understand that no one completely knows the long-term effects of NBUVB. I authorize my provider to prescribe NBUVB. This authorization also extends to my provider s associates to carry out treatment. I understand that I am free to withdraw my consent and stop treatment at any time.           Eye protection was put in place.  Zinc oxide applied to lips and nipples for photoprotection.  minerial oil applied.     154 mj 23 seconds      Complications none; skin care routine.  The patient was discharged in good condition and will return at next office visit for continued NBUVB.    Skin care regimen reviewed with patient: Eliminate harsh soaps, i.e. Dial, zest, irsih spring; Mild soaps such as Cetaphil or Dove sensitive skin, avoid hot or cold showers, aggressive use of emollients including vanicream, cetaphil or cerave discussed with patient.

## 2018-06-11 NOTE — PATIENT INSTRUCTIONS
-Claritin every morning  -Zyrtec every night  -Gabapentin as directed  -Begin UVB light treatments 3x/ week  -Recheck in 4 weeks

## 2018-06-11 NOTE — NURSING NOTE
"Initial /65  Pulse 87  Temp 97.6  F (36.4  C) (Tympanic)  LMP 05/15/2014 (Approximate) Estimated body mass index is 29.51 kg/(m^2) as calculated from the following:    Height as of 5/3/18: 1.695 m (5' 6.75\").    Weight as of 5/3/18: 84.8 kg (187 lb). .    Jenn Hawk LPN    "

## 2018-06-11 NOTE — MR AVS SNAPSHOT
After Visit Summary   6/11/2018    Lisette LINCOLN Ukaegbu    MRN: [id number]           Patient Information     Date Of Birth          1965        Visit Information        Provider Department      6/11/2018 1:30 PM Charlie Encarnacion MD [facility]        Care Instructions    -Claritin every morning  -Zyrtec every night  -Gabapentin as directed  -Begin UVB light treatments 3x/ week  -Recheck in 4 weeks          Follow-ups after your visit        Who to contact     If you have questions or need follow up information about today's clinic visit or your schedule please contact [facility] directly at [phone].  Normal or non-critical lab and imaging results will be communicated to you by MyChart, letter or phone within 4 business days after the clinic has received the results. If you do not hear from us within 7 days, please contact the clinic through ChangeTiphart or phone. If you have a critical or abnormal lab result, we will notify you by phone as soon as possible.  Submit refill requests through [email or web address] or call your pharmacy and they will forward the refill request to us. Please allow 3 business days for your refill to be completed.          Additional Information About Your Visit        MyChart Information     [email or web address] gives you secure access to your electronic health record. If you see a primary care provider, you can also send messages to your care team and make appointments. If you have questions, please call your primary care clinic.  If you do not have a primary care provider, please call [phone] and they will assist you.        Care EveryWhere ID     This is your Care EveryWhere ID. This could be used by other organizations to access your [place] medical records  [id number]        Your Vitals Were     Pulse Temperature Last Period             87 97.6  F (36.4  C) (Tympanic) 05/15/2014 (Approximate)          Blood Pressure from Last 3 Encounters:   06/11/18 113/65    05/03/18 131/77   03/16/18 132/82    Weight from Last 3 Encounters:   05/03/18 84.8 kg (187 lb)   03/16/18 82.1 kg (181 lb)   03/12/18 82.4 kg (181 lb 9.6 oz)              Today, you had the following     No orders found for display       Primary Care Provider Office Phone # Fax #    Kolton Valdezalison Yin PA-C [phone]       [address] [place] [address]        Equal Access to Services     [facility]: Hadii aad ku hadasho Soomaali, waaxda luqadaha, qaybta kaalmada adeegyada, waxay idiin hayaan adeeg khvini tuttle . So [facility] [phone].    ATENCIÓN: Si habla español, tiene a mosquera disposición servicios gratuitos de asistencia lingüística. [facility] [phone].    We comply with applicable federal civil rights laws and [place] laws. We do not discriminate on the basis of race, color, national origin, age, disability, sex, sexual orientation, or gender identity.            Thank you!     Thank you for choosing [facility]  for your care. Our goal is always to provide you with excellent care. Hearing back from our patients is one way we can continue to improve our services. Please take a few minutes to complete the written survey that you may receive in the mail after your visit with us. Thank you!             Your Updated Medication List - Protect others around you: Learn how to safely use, store and throw away your medicines at www.disposemymeds.org.          This list is accurate as of 6/11/18  2:24 PM.  Always use your most recent med list.                   Brand Name Dispense Instructions for use Diagnosis    * albuterol (2.5 MG/3ML) 0.083% neb solution     1 Box    Take 1 vial (2.5 mg) by nebulization every 4 hours as needed for shortness of breath / dyspnea    Moderate persistent asthma, uncomplicated       * albuterol 108 (90 Base) MCG/ACT Inhaler    PROAIR HFA/PROVENTIL HFA/VENTOLIN HFA    1 Inhaler    Inhale 2 puffs into the lungs every 4 hours as needed for shortness  of breath / dyspnea    Moderate persistent asthma, uncomplicated       ALPRAZolam 0.5 MG tablet    XANAX    30 tablet    Take 1-2 tablets (0.5-1 mg) by mouth 3 times daily as needed for anxiety    PTSD (post-traumatic stress disorder)       aspirin 325 MG EC tablet     90 tablet    Take 1 tablet (325 mg) by mouth daily    Type 2 diabetes mellitus without complication, without long-term current use of insulin (H)       clarithromycin 250 MG tablet    BIAXIN    20 tablet    Take 1 tablet (250 mg) by mouth 2 times daily    Acute bronchitis, unspecified organism       diltiazem 240 MG 24 hr ER beaded capsule    TIAZAC    90 capsule    Take 1 capsule (240 mg) by mouth daily    Palpitations       DULoxetine 60 MG EC capsule    CYMBALTA    180 capsule    Take 1 capsule (60 mg) by mouth 2 times daily    PTSD (post-traumatic stress disorder), Depression with anxiety       fish oil-omega-3 fatty acids 1000 MG capsule      Take 2 g by mouth 3 times daily Reported on 2/14/2017        fluticasone 50 MCG/ACT spray    FLONASE    16 g    Spray 1-2 sprays into both nostrils daily    Moderate persistent asthma with acute exacerbation       fluticasone-salmeterol 500-50 MCG/DOSE diskus inhaler    ADVAIR    3 Inhaler    Inhale 1 puff into the lungs 2 times daily    Moderate persistent asthma with acute exacerbation       lisinopril 5 MG tablet    PRINIVIL/ZESTRIL    90 tablet    Take 1 tablet (5 mg) by mouth daily    Type 2 diabetes mellitus without complication, without long-term current use of insulin (H)       metFORMIN 500 MG 24 hr tablet    GLUCOPHAGE-XR    180 tablet    Take 2 tablets (1,000 mg) by mouth daily (with dinner)    Type 2 diabetes mellitus without complication, without long-term current use of insulin (H)       montelukast 10 MG tablet    SINGULAIR    90 tablet    Take 1 tablet (10 mg) by mouth At Bedtime    Moderate persistent asthma with acute exacerbation       nitroGLYcerin 0.4 MG/HR 24 hr patch    NITRODUR      Place 1 patch onto the skin daily Reported on 2/14/2017        * Notice:  This list has 2 medication(s) that are the same as other medications prescribed for you. Read the directions carefully, and ask your doctor or other care provider to review them with you.

## 2018-06-12 LAB
ANA SER QL IF: NEGATIVE
ENA RNP IGG SER IA-ACNC: 0.3 AI (ref 0–0.9)
ENA SCL70 IGG SER IA-ACNC: <0.2 AI (ref 0–0.9)
ENA SM IGG SER-ACNC: <0.2 AI (ref 0–0.9)
ENA SS-A IGG SER IA-ACNC: <0.2 AI (ref 0–0.9)
ENA SS-B IGG SER IA-ACNC: <0.2 AI (ref 0–0.9)

## 2018-06-13 ENCOUNTER — OFFICE VISIT (OUTPATIENT)
Dept: DERMATOLOGY | Facility: CLINIC | Age: 53
End: 2018-06-13
Payer: COMMERCIAL

## 2018-06-13 DIAGNOSIS — L28.1 PRURIGO NODULARIS: Primary | ICD-10-CM

## 2018-06-13 PROCEDURE — 96910 PHOTCHMTX TAR&UVB/PTRLTM&UVB: CPT | Performed by: PHYSICIAN ASSISTANT

## 2018-06-13 NOTE — MR AVS SNAPSHOT
After Visit Summary   6/13/2018    Lisette LINCOLN Ukaegbu    MRN: [id number]           Patient Information     Date Of Birth          1965        Visit Information        Provider Department      6/13/2018 1:30 PM Tawana Ravi PA-C [facility]        Today's Diagnoses     Prurigo nodularis    -  1       Follow-ups after your visit        Your next 10 appointments already scheduled     Jun 14, 2018  1:30 PM CDT   [facility] Visit with [place] [facility]   [facility]    [address] [place]  [place] [address]   [phone]            Jun 18, 2018  1:30 PM CDT   [place] Visit [place] [facility] [place]   [facility]    [address]  [place] [address]   [phone]            Jun 20, 2018  1:45 PM CDT   [place] [facility]   [facility]    [address]  [place] [address]   [phone]            Jun 21, 2018  1:30 PM CDT   [place] [facility] [place]   [facility]    [address]  [place] [address]   [phone]            Jun 25, 2018  1:30 PM CDT   [place] [facility] [place]   [facility]    [address]  [place] [address]   [phone]            Jun 26, 2018  1:30 PM CDT   [place] [facility] [place]   [facility]    [address]  [place] [address]   [phone]            Jun 29, 2018  1:30 PM CDT   [place] [facility] [place]   [facility]    [address]  [place] [address]   [phone]            Jul 02, 2018  1:30 PM CDT   [place] [facility] [place]   [facility]    [address]  [place] [address]    [phone]            Jul 03, 2018  1:30 PM CDT   [place] [facility] [place] [facility] [place]   [facility]    [address]  [facility] [address] [phone]            Jul 05, 2018  1:30 PM CDT   [place] Visit with [place] [facility] [place]   [facility]    [address]  [facility] [address] [phone]              Who to contact     If you have questions or need follow up information about today's clinic visit or your schedule please contact [facility] directly at [phone].  Normal or non-critical lab and imaging results will be communicated to you by GeoPayhart, letter or phone within 4 business days after the clinic has received the results. If you do not hear from us within 7 days, please contact the clinic through GeoPayhart or phone. If you have a critical or abnormal lab result, we will notify you by phone as soon as possible.  Submit refill requests through IDbyME or call your pharmacy and they will forward the refill request to us. Please allow 3 business days for your refill to be completed.          Additional Information About Your Visit        MyChart Information     IDbyME gives you secure access to your electronic health record. If you see a primary care provider, you can also send messages to your care team and make appointments. If you have questions, please call your primary care clinic.  If you do not have a primary care provider, please call [phone] and they will assist you.        Care EveryWhere ID     This is your Care EveryWhere ID. This could be used by other organizations to access your [place] medical records  [id number]        Your Vitals Were     Last Period                   05/15/2014 (Approximate)            Blood Pressure from Last 3 Encounters:   06/11/18 113/65   05/03/18 131/77   03/16/18 132/82    Weight from Last 3 Encounters:   05/03/18 84.8 kg (187 lb)    03/16/18 82.1 kg (181 lb)   03/12/18 82.4 kg (181 lb 9.6 oz)              We Performed the Following     PHOTOCHEMOTHERAPY WITH UV-B        Primary Care Provider Office Phone # Fax #    Kolton Yin PA-C [phone] [address] [place] [address]        Equal Access to Services     [facility]: Hadii aad ku hadasho Soomaali, waaxda luqadaha, qaybta kaalmada adeegyada, waxay idiin hayaan adeeg kharash la'aan . So [facility] [phone].    ATENCIÓN: Si habla espelia, tiene a mosquera disposición servicios gratuitos de asistencia lingüística. Llame al [phone].    We comply with applicable federal civil rights laws and [place] laws. We do not discriminate on the basis of race, color, national origin, age, disability, sex, sexual orientation, or gender identity.            Thank you!     Thank you for choosing [facility]  for your care. Our goal is always to provide you with excellent care. Hearing back from our patients is one way we can continue to improve our services. Please take a few minutes to complete the written survey that you may receive in the mail after your visit with us. Thank you!             Your Updated Medication List - Protect others around you: Learn how to safely use, store and throw away your medicines at www.disposemymeds.org.          This list is accurate as of 6/13/18  1:59 PM.  Always use your most recent med list.                   Brand Name Dispense Instructions for use Diagnosis    * albuterol (2.5 MG/3ML) 0.083% neb solution     1 Box    Take 1 vial (2.5 mg) by nebulization every 4 hours as needed for shortness of breath / dyspnea    Moderate persistent asthma, uncomplicated       * albuterol 108 (90 Base) MCG/ACT Inhaler    PROAIR HFA/PROVENTIL HFA/VENTOLIN HFA    1 Inhaler    Inhale 2 puffs into the lungs every 4 hours as needed for shortness of breath / dyspnea    Moderate persistent asthma, uncomplicated       ALPRAZolam 0.5 MG tablet     XANAX    30 tablet    Take 1-2 tablets (0.5-1 mg) by mouth 3 times daily as needed for anxiety    PTSD (post-traumatic stress disorder)       aspirin 325 MG EC tablet     90 tablet    Take 1 tablet (325 mg) by mouth daily    Type 2 diabetes mellitus without complication, without long-term current use of insulin (H)       clarithromycin 250 MG tablet    BIAXIN    20 tablet    Take 1 tablet (250 mg) by mouth 2 times daily    Acute bronchitis, unspecified organism       diltiazem 240 MG 24 hr ER beaded capsule    TIAZAC    90 capsule    Take 1 capsule (240 mg) by mouth daily    Palpitations       DULoxetine 60 MG EC capsule    CYMBALTA    180 capsule    Take 1 capsule (60 mg) by mouth 2 times daily    PTSD (post-traumatic stress disorder), Depression with anxiety       fish oil-omega-3 fatty acids 1000 MG capsule      Take 2 g by mouth 3 times daily Reported on 2/14/2017        fluticasone 50 MCG/ACT spray    FLONASE    16 g    Spray 1-2 sprays into both nostrils daily    Moderate persistent asthma with acute exacerbation       fluticasone-salmeterol 500-50 MCG/DOSE diskus inhaler    ADVAIR    3 Inhaler    Inhale 1 puff into the lungs 2 times daily    Moderate persistent asthma with acute exacerbation       gabapentin 100 MG capsule    NEURONTIN    90 capsule    Take 1 capsule (100 mg) by mouth 3 times daily    Prurigo nodularis       lisinopril 5 MG tablet    PRINIVIL/ZESTRIL    90 tablet    Take 1 tablet (5 mg) by mouth daily    Type 2 diabetes mellitus without complication, without long-term current use of insulin (H)       metFORMIN 500 MG 24 hr tablet    GLUCOPHAGE-XR    180 tablet    Take 2 tablets (1,000 mg) by mouth daily (with dinner)    Type 2 diabetes mellitus without complication, without long-term current use of insulin (H)       montelukast 10 MG tablet    SINGULAIR    90 tablet    Take 1 tablet (10 mg) by mouth At Bedtime    Moderate persistent asthma with acute exacerbation       nitroGLYcerin 0.4 MG/HR  24 hr patch    NITRODUR     Place 1 patch onto the skin daily Reported on 2/14/2017        * Notice:  This list has 2 medication(s) that are the same as other medications prescribed for you. Read the directions carefully, and ask your doctor or other care provider to review them with you.

## 2018-06-13 NOTE — PROGRESS NOTES
NB-UVB treatment for prurigo nodularis  Treatment #2  No issues   photoproection on eyes, lips, nipples  169 mj 27 seconds today  Mineral oil applied  Goal 3 times weekly x 12 weeks

## 2018-06-14 ENCOUNTER — OFFICE VISIT (OUTPATIENT)
Dept: DERMATOLOGY | Facility: CLINIC | Age: 53
End: 2018-06-14
Payer: COMMERCIAL

## 2018-06-14 DIAGNOSIS — L28.1 PRURIGO NODULARIS: Primary | ICD-10-CM

## 2018-06-14 PROCEDURE — 96910 PHOTCHMTX TAR&UVB/PTRLTM&UVB: CPT | Performed by: PHYSICIAN ASSISTANT

## 2018-06-14 NOTE — PROGRESS NOTES
NB-UVB treatment for prurigo nodularis  Treatment #3  No issues   photoproection on eyes, lips, nipples  180 mj 30 seconds today  Mineral oil applied  Goal 3 times weekly x 12 weeks

## 2018-06-14 NOTE — MR AVS SNAPSHOT
After Visit Summary   6/14/2018    Lisette LINCOLN Ukaegbu    MRN: [id number]           Patient Information     Date Of Birth          1965        Visit Information        Provider Department      6/14/2018 1:30 PM Tawana Ravi PA-C [facility]        Today's Diagnoses     Prurigo nodularis    -  1       Follow-ups after your visit        Your next 10 appointments already scheduled     Jun 18, 2018  1:30 PM CDT   [place] [facility]   [facility] ([facility])    [address]  [facility] [place] [place] [address]   [phone]            Jun 20, 2018  1:45 PM CDT   [place] [facility]   [facility] ([facility])    [address]  [facility] [place] [facility] [place] [address]   [phone]            Jun 21, 2018  1:30 PM CDT   [place] [facility]   [facility]    [address]  [facility] [place] [place] [address]   [phone]            Jun 25, 2018  1:30 PM CDT   [place] [facility]   [facility] ([facility]    [address]  [facility] [place] [place] [address]   [phone]            Jun 26, 2018  1:30 PM CDT   [place]   [facility]    [address]  [facility] [place] [facility] [place] [address]   [phone]            Jun 29, 2018  1:30 PM CDT   [place] [facility]   [facility] ([facility]    [address]  [facility] [place] [facility] [place] [address]   [phone]            Jul 02, 2018  1:30 PM CDT   [place] [facility]   [facility]    [address]  [facility] [place] [facility] [place] [address]   [phone]            Jul 03, 2018  1:30 PM CDT   [place] [facility]   [facility] ([facility]    [address]  [place] [address]    [phone]            Jul 05, 2018  1:30 PM CDT   [place] Visit [place]   [facility] ([facility]    [address]  [facility] [place] [address] [phone]            Jul 09, 2018  1:30 PM CDT   [place] Visit [place]   [facility] ([facility])    [address]  [facility] [place] [address] [phone]              Who to contact     If you have questions or need follow up information about today's clinic visit or your schedule please contact [facility] directly at [phone].  Normal or non-critical lab and imaging results will be communicated to you by [employer], letter or phone within 4 business days after the clinic has received the results. If you do not hear from us within 7 days, please contact the clinic through [employer] or phone. If you have a critical or abnormal lab result, we will notify you by phone as soon as possible.  Submit refill requests through [employer] or call your pharmacy and they will forward the refill request to us. Please allow 3 business days for your refill to be completed.          Additional Information About Your Visit        MyChart Information     [employer] gives you secure access to your electronic health record. If you see a primary care provider, you can also send messages to your care team and make appointments. If you have questions, please call your primary care clinic.  If you do not have a primary care provider, please call [phone] and they will assist you.        Care EveryWhere ID     This is your Care EveryWhere ID. This could be used by other organizations to access your [place] medical records  [id number]        Your Vitals Were     Last Period                   05/15/2014 (Approximate)            Blood Pressure from Last 3 Encounters:   06/11/18 113/65   05/03/18 131/77   03/16/18 132/82    Weight from Last 3 Encounters:   05/03/18 84.8 kg (187 lb)    03/16/18 82.1 kg (181 lb)   03/12/18 82.4 kg (181 lb 9.6 oz)              We Performed the Following     PHOTOCHEMOTHERAPY WITH UV-B        Primary Care Provider Office Phone # Fax #    Kolton Yin PA-C [phone] [address] [place] [address]        Equal Access to Services     [facility]: Hadii aad ku hadasho Soomaali, waaxda luqadaha, qaybta kaalmada adeegyada, waxay idiin hayaan adeeg kharash la'aan . So [facility] [phone].    ATENCIÓN: Si habla espelia, tiene a mosquera disposición servicios gratuitos de asistencia lingüística. Llame al [phone].    We comply with applicable federal civil rights laws and [place] laws. We do not discriminate on the basis of race, color, national origin, age, disability, sex, sexual orientation, or gender identity.            Thank you!     Thank you for choosing [facility]  for your care. Our goal is always to provide you with excellent care. Hearing back from our patients is one way we can continue to improve our services. Please take a few minutes to complete the written survey that you may receive in the mail after your visit with us. Thank you!             Your Updated Medication List - Protect others around you: Learn how to safely use, store and throw away your medicines at www.disposemymeds.org.          This list is accurate as of 6/14/18  4:42 PM.  Always use your most recent med list.                   Brand Name Dispense Instructions for use Diagnosis    * albuterol (2.5 MG/3ML) 0.083% neb solution     1 Box    Take 1 vial (2.5 mg) by nebulization every 4 hours as needed for shortness of breath / dyspnea    Moderate persistent asthma, uncomplicated       * albuterol 108 (90 Base) MCG/ACT Inhaler    PROAIR HFA/PROVENTIL HFA/VENTOLIN HFA    1 Inhaler    Inhale 2 puffs into the lungs every 4 hours as needed for shortness of breath / dyspnea    Moderate persistent asthma, uncomplicated       ALPRAZolam 0.5 MG tablet     XANAX    30 tablet    Take 1-2 tablets (0.5-1 mg) by mouth 3 times daily as needed for anxiety    PTSD (post-traumatic stress disorder)       aspirin 325 MG EC tablet     90 tablet    Take 1 tablet (325 mg) by mouth daily    Type 2 diabetes mellitus without complication, without long-term current use of insulin (H)       clarithromycin 250 MG tablet    BIAXIN    20 tablet    Take 1 tablet (250 mg) by mouth 2 times daily    Acute bronchitis, unspecified organism       diltiazem 240 MG 24 hr ER beaded capsule    TIAZAC    90 capsule    Take 1 capsule (240 mg) by mouth daily    Palpitations       DULoxetine 60 MG EC capsule    CYMBALTA    180 capsule    Take 1 capsule (60 mg) by mouth 2 times daily    PTSD (post-traumatic stress disorder), Depression with anxiety       fish oil-omega-3 fatty acids 1000 MG capsule      Take 2 g by mouth 3 times daily Reported on 2/14/2017        fluticasone 50 MCG/ACT spray    FLONASE    16 g    Spray 1-2 sprays into both nostrils daily    Moderate persistent asthma with acute exacerbation       fluticasone-salmeterol 500-50 MCG/DOSE diskus inhaler    ADVAIR    3 Inhaler    Inhale 1 puff into the lungs 2 times daily    Moderate persistent asthma with acute exacerbation       gabapentin 100 MG capsule    NEURONTIN    90 capsule    Take 1 capsule (100 mg) by mouth 3 times daily    Prurigo nodularis       lisinopril 5 MG tablet    PRINIVIL/ZESTRIL    90 tablet    Take 1 tablet (5 mg) by mouth daily    Type 2 diabetes mellitus without complication, without long-term current use of insulin (H)       metFORMIN 500 MG 24 hr tablet    GLUCOPHAGE-XR    180 tablet    Take 2 tablets (1,000 mg) by mouth daily (with dinner)    Type 2 diabetes mellitus without complication, without long-term current use of insulin (H)       montelukast 10 MG tablet    SINGULAIR    90 tablet    Take 1 tablet (10 mg) by mouth At Bedtime    Moderate persistent asthma with acute exacerbation       nitroGLYcerin 0.4 MG/HR  24 hr patch    NITRODUR     Place 1 patch onto the skin daily Reported on 2/14/2017        * Notice:  This list has 2 medication(s) that are the same as other medications prescribed for you. Read the directions carefully, and ask your doctor or other care provider to review them with you.

## 2018-06-14 NOTE — LETTER
6/14/2018         RE: Lisette Owens  [address] [place] [address]        Dear Colleague,    Thank you for referring your patient, Lisette Owens, to the [facility]. Please see a copy of my visit note below.    NB-UVB treatment for prurigo nodularis  Treatment #3  No issues   photoproection on eyes, lips, nipples  180 mj 30 seconds today  Mineral oil applied  Goal 3 times weekly x 12 weeks    Again, thank you for allowing me to participate in the care of your patient.        Sincerely,        Tawana Rodrigez PA-C

## 2018-06-18 ENCOUNTER — OFFICE VISIT (OUTPATIENT)
Dept: DERMATOLOGY | Facility: CLINIC | Age: 53
End: 2018-06-18
Payer: COMMERCIAL

## 2018-06-18 DIAGNOSIS — L28.1 PRURIGO NODULARIS: Primary | ICD-10-CM

## 2018-06-18 PROCEDURE — 96910 PHOTCHMTX TAR&UVB/PTRLTM&UVB: CPT | Performed by: DERMATOLOGY

## 2018-06-18 NOTE — LETTER
6/18/2018         RE: Lisette Owens  [address] [place] [address]        Dear Colleague,    Thank you for referring your patient, Lisette Owens, to the [facility]. Please see a copy of my visit note below.    NB-UVB treatment for prurigo nodularis  Treatment #4  No issues   photoproection on eyes, lips, nipples  205 mj 31 seconds today  Mineral oil applied  Goal 3 times weekly x 12 weeks    Again, thank you for allowing me to participate in the care of your patient.        Sincerely,        Charlie Encarnacion MD

## 2018-06-18 NOTE — PROGRESS NOTES
NB-UVB treatment for prurigo nodularis  Treatment #4  No issues   photoproection on eyes, lips, nipples  205 mj 31 seconds today  Mineral oil applied  Goal 3 times weekly x 12 weeks

## 2018-06-18 NOTE — MR AVS SNAPSHOT
After Visit Summary   6/18/2018    Lisette LINCOLN Ukaegbu    MRN: [id number]           Patient Information     Date Of Birth          1965        Visit Information        Provider Department      6/18/2018 1:30 PM Charlie Encarnacion MD [facility]        Today's Diagnoses     Prurigo nodularis    -  1       Follow-ups after your visit        Your next 10 appointments already scheduled     Jun 20, 2018  1:45 PM CDT   [place]   [facility] ([facility])    [address]  [facility] [place] [address]   [phone]            Jun 21, 2018  1:30 PM CDT   [place]   [facility] ([facility])    [address]  [facility] [place] [address]   [phone]            Jun 25, 2018  1:30 PM CDT   [place]   [facility] ([facility])    [address]  [facility] [place] [address]   [phone]            Jun 26, 2018  1:30 PM CDT   [place]   [facility] ([facility])    [address]  [facility] [place] [address]   [phone]            Jun 29, 2018  1:30 PM CDT   [place]   [facility] ([facility])    [address]  [facility] [place] [address]   [phone]            Jul 02, 2018  1:30 PM CDT   [place] with [place]   [facility] ([facility])    [address]  [facility] [place] [address]   [phone]            Jul 03, 2018  1:30 PM CDT   [place]   [facility] ([facility])    [address]  [facility] [place] [address]   [phone]            Jul 05, 2018  1:30 PM CDT   [place] Visit [place]   [facility] ([facility])    [address]  [place] [address]    [phone]            Jul 09, 2018  1:30 PM CDT   [facility] Visit with [place] [facility]   [facility]    [address] [facility] [address] [phone]            Jul 09, 2018  1:45 PM CDT   Return Visit with Charlie Encarnacion MD   [facility] ([facility])    [address] [facility] [address] [phone]              Who to contact     If you have questions or need follow up information about today's clinic visit or your schedule please contact [facility] directly at [phone].  Normal or non-critical lab and imaging results will be communicated to you by [email or web address], letter or phone within 4 business days after the clinic has received the results. If you do not hear from us within 7 days, please contact the clinic through [email or web address] or phone. If you have a critical or abnormal lab result, we will notify you by phone as soon as possible.  Submit refill requests through [employer] or call your pharmacy and they will forward the refill request to us. Please allow 3 business days for your refill to be completed.          Additional Information About Your Visit        [employer] Information     [employer] gives you secure access to your electronic health record. If you see a primary care provider, you can also send messages to your care team and make appointments. If you have questions, please call your primary care clinic.  If you do not have a primary care provider, please call [phone] and they will assist you.        Care EveryWhere ID     This is your Care EveryWhere ID. This could be used by other organizations to access your [place] medical records  [id number]        Your Vitals Were     Last Period                   05/15/2014 (Approximate)            Blood Pressure from Last 3 Encounters:   06/11/18 113/65   05/03/18 131/77   03/16/18 132/82    Weight from Last 3 Encounters:   05/03/18 84.8 kg (187  lb)   03/16/18 82.1 kg (181 lb)   03/12/18 82.4 kg (181 lb 9.6 oz)              We Performed the Following     CHARGE - PHOTOCHEMOTHERAPY WITH UV-B        Primary Care Provider Office Phone # Fax #    Kolton Yin PA-C [phone] [address]  [place] [address]        Equal Access to Services     [facility]: Hadii aad ku hadasho Soomaali, waaxda luqadaha, qaybta kaalmada adeegyada, waxay idiin hayaan adeeg kharash la'aan . So [facility] [phone].    ATENCIÓN: Si habla espelia, tiene a mosquera disposición servicios gratuitos de asistencia lingüística. Llame al [phone].    We comply with applicable federal civil rights laws and [place] laws. We do not discriminate on the basis of race, color, national origin, age, disability, sex, sexual orientation, or gender identity.            Thank you!     Thank you for choosing [facility]  for your care. Our goal is always to provide you with excellent care. Hearing back from our patients is one way we can continue to improve our services. Please take a few minutes to complete the written survey that you may receive in the mail after your visit with us. Thank you!             Your Updated Medication List - Protect others around you: Learn how to safely use, store and throw away your medicines at www.disposemymeds.org.          This list is accurate as of 6/18/18  1:58 PM.  Always use your most recent med list.                   Brand Name Dispense Instructions for use Diagnosis    * albuterol (2.5 MG/3ML) 0.083% neb solution     1 Box    Take 1 vial (2.5 mg) by nebulization every 4 hours as needed for shortness of breath / dyspnea    Moderate persistent asthma, uncomplicated       * albuterol 108 (90 Base) MCG/ACT Inhaler    PROAIR HFA/PROVENTIL HFA/VENTOLIN HFA    1 Inhaler    Inhale 2 puffs into the lungs every 4 hours as needed for shortness of breath / dyspnea    Moderate persistent asthma, uncomplicated       ALPRAZolam 0.5  MG tablet    XANAX    30 tablet    Take 1-2 tablets (0.5-1 mg) by mouth 3 times daily as needed for anxiety    PTSD (post-traumatic stress disorder)       aspirin 325 MG EC tablet     90 tablet    Take 1 tablet (325 mg) by mouth daily    Type 2 diabetes mellitus without complication, without long-term current use of insulin (H)       clarithromycin 250 MG tablet    BIAXIN    20 tablet    Take 1 tablet (250 mg) by mouth 2 times daily    Acute bronchitis, unspecified organism       diltiazem 240 MG 24 hr ER beaded capsule    TIAZAC    90 capsule    Take 1 capsule (240 mg) by mouth daily    Palpitations       DULoxetine 60 MG EC capsule    CYMBALTA    180 capsule    Take 1 capsule (60 mg) by mouth 2 times daily    PTSD (post-traumatic stress disorder), Depression with anxiety       fish oil-omega-3 fatty acids 1000 MG capsule      Take 2 g by mouth 3 times daily Reported on 2/14/2017        fluticasone 50 MCG/ACT spray    FLONASE    16 g    Spray 1-2 sprays into both nostrils daily    Moderate persistent asthma with acute exacerbation       fluticasone-salmeterol 500-50 MCG/DOSE diskus inhaler    ADVAIR    3 Inhaler    Inhale 1 puff into the lungs 2 times daily    Moderate persistent asthma with acute exacerbation       gabapentin 100 MG capsule    NEURONTIN    90 capsule    Take 1 capsule (100 mg) by mouth 3 times daily    Prurigo nodularis       lisinopril 5 MG tablet    PRINIVIL/ZESTRIL    90 tablet    Take 1 tablet (5 mg) by mouth daily    Type 2 diabetes mellitus without complication, without long-term current use of insulin (H)       metFORMIN 500 MG 24 hr tablet    GLUCOPHAGE-XR    180 tablet    Take 2 tablets (1,000 mg) by mouth daily (with dinner)    Type 2 diabetes mellitus without complication, without long-term current use of insulin (H)       montelukast 10 MG tablet    SINGULAIR    90 tablet    Take 1 tablet (10 mg) by mouth At Bedtime    Moderate persistent asthma with acute exacerbation        nitroGLYcerin 0.4 MG/HR 24 hr patch    NITRODUR     Place 1 patch onto the skin daily Reported on 2/14/2017        * Notice:  This list has 2 medication(s) that are the same as other medications prescribed for you. Read the directions carefully, and ask your doctor or other care provider to review them with you.

## 2018-06-20 ENCOUNTER — OFFICE VISIT (OUTPATIENT)
Dept: DERMATOLOGY | Facility: CLINIC | Age: 53
End: 2018-06-20
Payer: COMMERCIAL

## 2018-06-20 DIAGNOSIS — L28.1 PRURIGO NODULARIS: Primary | ICD-10-CM

## 2018-06-20 PROCEDURE — 96910 PHOTCHMTX TAR&UVB/PTRLTM&UVB: CPT | Performed by: PHYSICIAN ASSISTANT

## 2018-06-20 NOTE — PROGRESS NOTES
NB-UVB treatment for prurigo nodularis  Treatment #5  No issues   photoproection on eyes, lips, nipples  235 mj 33 seconds today  Mineral oil applied  Goal 3 times weekly x 12 weeks

## 2018-06-20 NOTE — MR AVS SNAPSHOT
After Visit Summary   6/20/2018    Lisette LINCOLN Ukaegbu    MRN: [id number]           Patient Information     Date Of Birth          1965        Visit Information        Provider Department      6/20/2018 1:45 PM Tawana Ravi PA-C [facility]        Today's Diagnoses     Prurigo nodularis    -  1       Follow-ups after your visit        Your next 10 appointments already scheduled     Jun 21, 2018  1:30 PM CDT   [place] Visit with [place]   [facility]    [address]  [facility] [place] [address]   [phone]            Jun 25, 2018  1:30 PM CDT   [place] Visit with [place]   [facility]    [address]  [facility] [place] [address]   [phone]            Jun 26, 2018  1:30 PM CDT   [place] Visit with [place]   [facility]    [address]  [place] [address]   [phone]            Jun 29, 2018  1:30 PM CDT   [place] with [place]   [facility]    [address]  [facility] [place] [address]   [phone]            Jul 02, 2018  1:30 PM CDT   [place] Visit with [place]   [facility]    [address]  [facility] [place] [address]   [phone]            Jul 03, 2018  1:30 PM CDT   [place] Visit with [place]   [facility]    [address]  [facility] [place] [address]   [phone]            Jul 05, 2018  1:30 PM CDT   [place] Visit with [place]   [facility]    [address]  [facility] [place] [address]   [phone]            Jul 09, 2018  1:30 PM CDT   [place] Visit with [place]   [facility]    [address]  [place] [address]    [phone]            Jul 09, 2018  1:45 PM CDT   Return Visit with Charlie Encarnacion MD   [facility] ([facility]    [address] [place] [id number] [phone]            Jul 11, 2018  1:30 PM CDT   Return Visit with HARPREET MARIEE PROC ROOM   [facility] ([facility])    [address] [address] [phone]              Who to contact     If you have questions or need follow up information about today's clinic visit or your schedule please contact [facility] directly at [phone].  Normal or non-critical lab and imaging results will be communicated to you by Hooptaphart, letter or phone within 4 business days after the clinic has received the results. If you do not hear from us within 7 days, please contact the clinic through Hooptaphart or phone. If you have a critical or abnormal lab result, we will notify you by phone as soon as possible.  Submit refill requests through ComQi or call your pharmacy and they will forward the refill request to us. Please allow 3 business days for your refill to be completed.          Additional Information About Your Visit        HooptapharLemko Information     ComQi gives you secure access to your electronic health record. If you see a primary care provider, you can also send messages to your care team and make appointments. If you have questions, please call your primary care clinic.  If you do not have a primary care provider, please call [phone] and they will assist you.        Care EveryWhere ID     This is your Care EveryWhere ID. This could be used by other organizations to access your [place] medical records  [id number]        Your Vitals Were     Last Period                   05/15/2014 (Approximate)            Blood Pressure from Last 3 Encounters:   06/11/18 113/65   05/03/18 131/77   03/16/18 132/82    Weight from Last 3 Encounters:   05/03/18 84.8 kg (187  lb)   03/16/18 82.1 kg (181 lb)   03/12/18 82.4 kg (181 lb 9.6 oz)              We Performed the Following     PHOTOCHEMOTHERAPY WITH UV-B        Primary Care Provider Office Phone # Fax #    Kolton Yin PA-C [phone] [address]  [place] [address]        Equal Access to Services     [facility]: Hadii aad ku hadasho Soomaali, waaxda luqadaha, qaybta kaalmada adeegyada, waxay idiin hayaan adeeg kharash la'aan . So [facility] [phone].    ATENCIÓN: Si rodrick briones, tiene a mosquera disposición servicios gratuitos de asistencia lingüística. Llame al [phone].    We comply with applicable federal civil rights laws and [place] laws. We do not discriminate on the basis of race, color, national origin, age, disability, sex, sexual orientation, or gender identity.            Thank you!     Thank you for choosing [facility]  for your care. Our goal is always to provide you with excellent care. Hearing back from our patients is one way we can continue to improve our services. Please take a few minutes to complete the written survey that you may receive in the mail after your visit with us. Thank you!             Your Updated Medication List - Protect others around you: Learn how to safely use, store and throw away your medicines at www.disposemymeds.org.          This list is accurate as of 6/20/18  2:35 PM.  Always use your most recent med list.                   Brand Name Dispense Instructions for use Diagnosis    * albuterol (2.5 MG/3ML) 0.083% neb solution     1 Box    Take 1 vial (2.5 mg) by nebulization every 4 hours as needed for shortness of breath / dyspnea    Moderate persistent asthma, uncomplicated       * albuterol 108 (90 Base) MCG/ACT Inhaler    PROAIR HFA/PROVENTIL HFA/VENTOLIN HFA    1 Inhaler    Inhale 2 puffs into the lungs every 4 hours as needed for shortness of breath / dyspnea    Moderate persistent asthma, uncomplicated       ALPRAZolam 0.5 MG  tablet    XANAX    30 tablet    Take 1-2 tablets (0.5-1 mg) by mouth 3 times daily as needed for anxiety    PTSD (post-traumatic stress disorder)       aspirin 325 MG EC tablet     90 tablet    Take 1 tablet (325 mg) by mouth daily    Type 2 diabetes mellitus without complication, without long-term current use of insulin (H)       clarithromycin 250 MG tablet    BIAXIN    20 tablet    Take 1 tablet (250 mg) by mouth 2 times daily    Acute bronchitis, unspecified organism       diltiazem 240 MG 24 hr ER beaded capsule    TIAZAC    90 capsule    Take 1 capsule (240 mg) by mouth daily    Palpitations       DULoxetine 60 MG EC capsule    CYMBALTA    180 capsule    Take 1 capsule (60 mg) by mouth 2 times daily    PTSD (post-traumatic stress disorder), Depression with anxiety       fish oil-omega-3 fatty acids 1000 MG capsule      Take 2 g by mouth 3 times daily Reported on 2/14/2017        fluticasone 50 MCG/ACT spray    FLONASE    16 g    Spray 1-2 sprays into both nostrils daily    Moderate persistent asthma with acute exacerbation       fluticasone-salmeterol 500-50 MCG/DOSE diskus inhaler    ADVAIR    3 Inhaler    Inhale 1 puff into the lungs 2 times daily    Moderate persistent asthma with acute exacerbation       gabapentin 100 MG capsule    NEURONTIN    90 capsule    Take 1 capsule (100 mg) by mouth 3 times daily    Prurigo nodularis       lisinopril 5 MG tablet    PRINIVIL/ZESTRIL    90 tablet    Take 1 tablet (5 mg) by mouth daily    Type 2 diabetes mellitus without complication, without long-term current use of insulin (H)       metFORMIN 500 MG 24 hr tablet    GLUCOPHAGE-XR    180 tablet    Take 2 tablets (1,000 mg) by mouth daily (with dinner)    Type 2 diabetes mellitus without complication, without long-term current use of insulin (H)       montelukast 10 MG tablet    SINGULAIR    90 tablet    Take 1 tablet (10 mg) by mouth At Bedtime    Moderate persistent asthma with acute exacerbation       nitroGLYcerin  0.4 MG/HR 24 hr patch    NITRODUR     Place 1 patch onto the skin daily Reported on 2/14/2017        * Notice:  This list has 2 medication(s) that are the same as other medications prescribed for you. Read the directions carefully, and ask your doctor or other care provider to review them with you.

## 2018-06-20 NOTE — LETTER
6/20/2018         RE: Lisette Owens  [address] [place] [address]        Dear Colleague,    Thank you for referring your patient, Lisette Owens, to the [facility]. Please see a copy of my visit note below.    NB-UVB treatment for prurigo nodularis  Treatment #5  No issues   photoproection on eyes, lips, nipples  235 mj 33 seconds today  Mineral oil applied  Goal 3 times weekly x 12 weeks    Again, thank you for allowing me to participate in the care of your patient.        Sincerely,        Tawana Rodrigez PA-C

## 2018-06-21 ENCOUNTER — OFFICE VISIT (OUTPATIENT)
Dept: DERMATOLOGY | Facility: CLINIC | Age: 53
End: 2018-06-21
Payer: COMMERCIAL

## 2018-06-21 DIAGNOSIS — L28.1 PRURIGO NODULARIS: Primary | ICD-10-CM

## 2018-06-21 PROCEDURE — 96910 PHOTCHMTX TAR&UVB/PTRLTM&UVB: CPT | Performed by: PHYSICIAN ASSISTANT

## 2018-06-21 NOTE — MR AVS SNAPSHOT
After Visit Summary   6/21/2018    Lisette LINCOLN Ukaegbu    MRN: [id number]           Patient Information     Date Of Birth          1965        Visit Information        Provider Department      6/21/2018 1:30 PM Do Bradley PA-C [facility]        Today's Diagnoses     Prurigo nodularis    -  1       Follow-ups after your visit        Your next 10 appointments already scheduled     Jun 25, 2018  1:30 PM CDT   [place] Visit with [place]   [facility] ([facility])    [address]  [facility] [place] [address]   [phone]            Jun 26, 2018  1:30 PM CDT   Return Visit with [place]   [facility] ([facility])    [address]  [facility] [place] [address]   [phone]            Jun 29, 2018  1:30 PM CDT   [place] Visit with [place]   [facility] ([facility])    [address]  [place] [address]   [phone]            Jul 02, 2018  1:30 PM CDT   [place] Visit with [place]   [facility] ([facility])    [address]  [place] [address]   [phone]            Jul 03, 2018  1:30 PM CDT   [place] Visit with [place]   [facility] ([facility])    [address]  [facility] [place] [address]   [phone]            Jul 05, 2018  1:30 PM CDT   [place] Visit with [place]   [facility] ([facility])    [address]  [facility] [place] [place] [address]   [phone]            Jul 09, 2018  1:30 PM CDT   [place] Visit with [place]   [facility] ([facility])    [address] [place] [address]   [phone]            Jul 09, 2018  1:45 PM CDT   Return Visit with Charlie Encarnacion MD   [facility] ([facility])    [address]  [address]   [phone]            Jul 11, 2018  1:30 PM CDT   [place] Visit with [place]   [facility]    [address]  [facility] [address]   [phone]            Jul 12, 2018  1:30 PM CDT   Return Visit with [place]   [facility] ([facility])    [address]  [facility] [place] [address]   [phone]              Who to contact     If you have questions or need follow up information about today's clinic visit or your schedule please contact [facility] directly at [phone].  Normal or non-critical lab and imaging results will be communicated to you by Learneroohart, letter or phone within 4 business days after the clinic has received the results. If you do not hear from us within 7 days, please contact the clinic through Learneroohart or phone. If you have a critical or abnormal lab result, we will notify you by phone as soon as possible.  Submit refill requests through [employer] or call your pharmacy and they will forward the refill request to us. Please allow 3 business days for your refill to be completed.          Additional Information About Your Visit        [employer] Information     [employer] gives you secure access to your electronic health record. If you see a primary care provider, you can also send messages to your care team and make appointments. If you have questions, please call your primary care clinic.  If you do not have a primary care provider, please call [phone] and they will assist you.        Care EveryWhere ID     This is your Care EveryWhere ID. This could be used by other organizations to access your [place] medical records  [id number]        Your Vitals Were     Last Period                   05/15/2014 (Approximate)            Blood Pressure from Last 3 Encounters:   06/11/18 113/65   05/03/18 131/77   03/16/18 132/82    Weight from Last 3 Encounters:   05/03/18 84.8 kg  (187 lb)   03/16/18 82.1 kg (181 lb)   03/12/18 82.4 kg (181 lb 9.6 oz)              We Performed the Following     CHARGE - PHOTOCHEMOTHERAPY WITH UV-B        Primary Care Provider Office Phone # Fax #    Kolton Yin PA-C [phone]       [address]  [place] [address]        Equal Access to Services     [facility] : Hadii aad ku hadasho Soomaali, waaxda luqadaha, qaybta kaalmada adeegyada, waxay idiin hayaan adeeg kharash la'casa . So [facility] [phone].    ATENCIÓN: Si reneela anali, tiene a mosquera disposición servicios gratuitos de asistencia lingüística. [facility] [phone].    We comply with applicable federal civil rights laws and [place] laws. We do not discriminate on the basis of race, color, national origin, age, disability, sex, sexual orientation, or gender identity.            Thank you!     Thank you for choosing [facility]  for your care. Our goal is always to provide you with excellent care. Hearing back from our patients is one way we can continue to improve our services. Please take a few minutes to complete the written survey that you may receive in the mail after your visit with us. Thank you!             Your Updated Medication List - Protect others around you: Learn how to safely use, store and throw away your medicines at www.disposemymeds.org.          This list is accurate as of 6/21/18  1:37 PM.  Always use your most recent med list.                   Brand Name Dispense Instructions for use Diagnosis    * albuterol (2.5 MG/3ML) 0.083% neb solution     1 Box    Take 1 vial (2.5 mg) by nebulization every 4 hours as needed for shortness of breath / dyspnea    Moderate persistent asthma, uncomplicated       * albuterol 108 (90 Base) MCG/ACT Inhaler    PROAIR HFA/PROVENTIL HFA/VENTOLIN HFA    1 Inhaler    Inhale 2 puffs into the lungs every 4 hours as needed for shortness of breath / dyspnea    Moderate persistent asthma, uncomplicated        ALPRAZolam 0.5 MG tablet    XANAX    30 tablet    Take 1-2 tablets (0.5-1 mg) by mouth 3 times daily as needed for anxiety    PTSD (post-traumatic stress disorder)       aspirin 325 MG EC tablet     90 tablet    Take 1 tablet (325 mg) by mouth daily    Type 2 diabetes mellitus without complication, without long-term current use of insulin (H)       clarithromycin 250 MG tablet    BIAXIN    20 tablet    Take 1 tablet (250 mg) by mouth 2 times daily    Acute bronchitis, unspecified organism       diltiazem 240 MG 24 hr ER beaded capsule    TIAZAC    90 capsule    Take 1 capsule (240 mg) by mouth daily    Palpitations       DULoxetine 60 MG EC capsule    CYMBALTA    180 capsule    Take 1 capsule (60 mg) by mouth 2 times daily    PTSD (post-traumatic stress disorder), Depression with anxiety       fish oil-omega-3 fatty acids 1000 MG capsule      Take 2 g by mouth 3 times daily Reported on 2/14/2017        fluticasone 50 MCG/ACT spray    FLONASE    16 g    Spray 1-2 sprays into both nostrils daily    Moderate persistent asthma with acute exacerbation       fluticasone-salmeterol 500-50 MCG/DOSE diskus inhaler    ADVAIR    3 Inhaler    Inhale 1 puff into the lungs 2 times daily    Moderate persistent asthma with acute exacerbation       gabapentin 100 MG capsule    NEURONTIN    90 capsule    Take 1 capsule (100 mg) by mouth 3 times daily    Prurigo nodularis       lisinopril 5 MG tablet    PRINIVIL/ZESTRIL    90 tablet    Take 1 tablet (5 mg) by mouth daily    Type 2 diabetes mellitus without complication, without long-term current use of insulin (H)       metFORMIN 500 MG 24 hr tablet    GLUCOPHAGE-XR    180 tablet    Take 2 tablets (1,000 mg) by mouth daily (with dinner)    Type 2 diabetes mellitus without complication, without long-term current use of insulin (H)       montelukast 10 MG tablet    SINGULAIR    90 tablet    Take 1 tablet (10 mg) by mouth At Bedtime    Moderate persistent asthma with acute exacerbation        nitroGLYcerin 0.4 MG/HR 24 hr patch    NITRODUR     Place 1 patch onto the skin daily Reported on 2/14/2017        * Notice:  This list has 2 medication(s) that are the same as other medications prescribed for you. Read the directions carefully, and ask your doctor or other care provider to review them with you.

## 2018-06-21 NOTE — PROGRESS NOTES
NB-UVB treatment for prurigo nodularis  Treatment #6  No issues   photoproection on eyes, lips, nipples  260 mj 40 seconds today  Mineral oil applied  Goal 3 times weekly x 12 weeks

## 2018-06-21 NOTE — LETTER
6/21/2018         RE: Lisette Owesn  [address] [place] [address]        Dear Colleague,    Thank you for referring your patient, Lisette Owens, to the [facility]. Please see a copy of my visit note below.    NB-UVB treatment for prurigo nodularis  Treatment #6  No issues   photoproection on eyes, lips, nipples  260 mj 40 seconds today  Mineral oil applied  Goal 3 times weekly x 12 weeks    Again, thank you for allowing me to participate in the care of your patient.        Sincerely,        Do Cisse PA-C

## 2018-06-26 ENCOUNTER — OFFICE VISIT (OUTPATIENT)
Dept: DERMATOLOGY | Facility: CLINIC | Age: 53
End: 2018-06-26
Payer: COMMERCIAL

## 2018-06-26 DIAGNOSIS — L28.1 PRURIGO NODULARIS: Primary | ICD-10-CM

## 2018-06-26 PROCEDURE — 96910 PHOTCHMTX TAR&UVB/PTRLTM&UVB: CPT | Performed by: PHYSICIAN ASSISTANT

## 2018-06-26 NOTE — LETTER
6/26/2018         RE: Lisette Owens  [address] [place] [address]        Dear Colleague,    Thank you for referring your patient, Lisette Owens, to the [facility]. Please see a copy of my visit note below.    NB-UVB treatment for prurigo nodularis  Treatment #7  No issues   photoproection on eyes, lips, nipples  286 mj 41 seconds today  Mineral oil applied  Goal 3 times weekly x 12 weeks    Again, thank you for allowing me to participate in the care of your patient.        Sincerely,        Tawana Rodrigez PA-C

## 2018-06-26 NOTE — MR AVS SNAPSHOT
After Visit Summary   6/26/2018    Lisette LINCOLN Ukaegbu    MRN: [id number]           Patient Information     Date Of Birth          1965        Visit Information        Provider Department      6/26/2018 10:00 AM Tawana Ravi PA-C [facility]        Today's Diagnoses     Prurigo nodularis    -  1       Follow-ups after your visit        Your next 10 appointments already scheduled     Jun 28, 2018 10:30 AM CDT   [place] Visit with [place]   [facility] ([facility])    [address]  [place] [address]   [phone]            Jul 02, 2018  1:30 PM CDT   [place] Visit with [place]   [facility] ([facility])    [address]  [place] [address]   [phone]            Jul 03, 2018  1:30 PM CDT   [place] Visit with [place]   [facility] ([facility])    [address]  [place] [address]   [phone]            Jul 05, 2018  1:30 PM CDT   [place]   [facility] ([facility])    [address]  [place] [address]   [phone]            Jul 09, 2018  1:30 PM CDT   [place] Visit with [place]   [facility] ([facility])    [address]  [place] [address]   [phone]            Jul 09, 2018  1:45 PM CDT   Return Visit with Charlie Encarnacion MD   [facility] ([facility])    [address]  [place] [address]   [phone]            Jul 11, 2018  1:30 PM CDT   [place]   [facility] ([facility])    [address] [facility] [place] [address]   [phone]            Jul 12, 2018  1:30 PM CDT   [place] Visit [place]   [facility] ([facility])    [address] [facility]  [address]   [phone]            Jul 16, 2018  1:30 PM CDT   [place] Visit with [place]   [facility] ([facility]    [address]  [facility] [place] [address]   [phone]            Jul 18, 2018  1:30 PM CDT   [place] Visit with [place]   [facility] ([facility])    [address]  [facility] [place] [address]   [phone]              Who to contact     If you have questions or need follow up information about today's clinic visit or your schedule please contact [facility] directly at [phone].  Normal or non-critical lab and imaging results will be communicated to you by [employer], letter or phone within 4 business days after the clinic has received the results. If you do not hear from us within 7 days, please contact the clinic through [employer] or phone. If you have a critical or abnormal lab result, we will notify you by phone as soon as possible.  Submit refill requests through [employer]eting or call your pharmacy and they will forward the refill request to us. Please allow 3 business days for your refill to be completed.          Additional Information About Your Visit        [employer] Information     [employer] gives you secure access to your electronic health record. If you see a primary care provider, you can also send messages to your care team and make appointments. If you have questions, please call your primary care clinic.  If you do not have a primary care provider, please call [phone] and they will assist you.        Care EveryWhere ID     This is your Care EveryWhere ID. This could be used by other organizations to access your [place] medical records  [id number]        Your Vitals Were     Last Period                   05/15/2014 (Approximate)            Blood Pressure from Last 3 Encounters:   06/11/18 113/65   05/03/18 131/77   03/16/18 132/82    Weight from Last 3 Encounters:   05/03/18 84.8 kg  (187 lb)   03/16/18 82.1 kg (181 lb)   03/12/18 82.4 kg (181 lb 9.6 oz)              We Performed the Following     PHOTOCHEMOTHERAPY WITH UV-B        Primary Care Provider Office Phone # Fax #    Kolton Yin PA-C [phone] [address]  [place] [address]        Equal Access to Services     [facility]: Hadii aad ku hadasho Soomaali, waaxda luqadaha, qaybta kaalmada adeegyada, waxay idiin hayaan adeeg kharash la'aan . So [facility] [phone].    ATENCIÓN: Si reneela anali, tiene a mosquera disposición servicios gratuitos de asistencia lingüística. Llame al [phone].    We comply with applicable federal civil rights laws and [place] laws. We do not discriminate on the basis of race, color, national origin, age, disability, sex, sexual orientation, or gender identity.            Thank you!     Thank you for choosing [facility]  for your care. Our goal is always to provide you with excellent care. Hearing back from our patients is one way we can continue to improve our services. Please take a few minutes to complete the written survey that you may receive in the mail after your visit with us. Thank you!             Your Updated Medication List - Protect others around you: Learn how to safely use, store and throw away your medicines at www.disposemymeds.org.          This list is accurate as of 6/26/18 11:59 PM.  Always use your most recent med list.                   Brand Name Dispense Instructions for use Diagnosis    * albuterol (2.5 MG/3ML) 0.083% neb solution     1 Box    Take 1 vial (2.5 mg) by nebulization every 4 hours as needed for shortness of breath / dyspnea    Moderate persistent asthma, uncomplicated       * albuterol 108 (90 Base) MCG/ACT Inhaler    PROAIR HFA/PROVENTIL HFA/VENTOLIN HFA    1 Inhaler    Inhale 2 puffs into the lungs every 4 hours as needed for shortness of breath / dyspnea    Moderate persistent asthma, uncomplicated       ALPRAZolam 0.5 MG  tablet    XANAX    30 tablet    Take 1-2 tablets (0.5-1 mg) by mouth 3 times daily as needed for anxiety    PTSD (post-traumatic stress disorder)       aspirin 325 MG EC tablet     90 tablet    Take 1 tablet (325 mg) by mouth daily    Type 2 diabetes mellitus without complication, without long-term current use of insulin (H)       clarithromycin 250 MG tablet    BIAXIN    20 tablet    Take 1 tablet (250 mg) by mouth 2 times daily    Acute bronchitis, unspecified organism       diltiazem 240 MG 24 hr ER beaded capsule    TIAZAC    90 capsule    Take 1 capsule (240 mg) by mouth daily    Palpitations       DULoxetine 60 MG EC capsule    CYMBALTA    180 capsule    Take 1 capsule (60 mg) by mouth 2 times daily    PTSD (post-traumatic stress disorder), Depression with anxiety       fish oil-omega-3 fatty acids 1000 MG capsule      Take 2 g by mouth 3 times daily Reported on 2/14/2017        fluticasone 50 MCG/ACT spray    FLONASE    16 g    Spray 1-2 sprays into both nostrils daily    Moderate persistent asthma with acute exacerbation       fluticasone-salmeterol 500-50 MCG/DOSE diskus inhaler    ADVAIR    3 Inhaler    Inhale 1 puff into the lungs 2 times daily    Moderate persistent asthma with acute exacerbation       gabapentin 100 MG capsule    NEURONTIN    90 capsule    Take 1 capsule (100 mg) by mouth 3 times daily    Prurigo nodularis       lisinopril 5 MG tablet    PRINIVIL/ZESTRIL    90 tablet    Take 1 tablet (5 mg) by mouth daily    Type 2 diabetes mellitus without complication, without long-term current use of insulin (H)       metFORMIN 500 MG 24 hr tablet    GLUCOPHAGE-XR    180 tablet    Take 2 tablets (1,000 mg) by mouth daily (with dinner)    Type 2 diabetes mellitus without complication, without long-term current use of insulin (H)       montelukast 10 MG tablet    SINGULAIR    90 tablet    Take 1 tablet (10 mg) by mouth At Bedtime    Moderate persistent asthma with acute exacerbation       nitroGLYcerin  0.4 MG/HR 24 hr patch    NITRODUR     Place 1 patch onto the skin daily Reported on 2/14/2017        * Notice:  This list has 2 medication(s) that are the same as other medications prescribed for you. Read the directions carefully, and ask your doctor or other care provider to review them with you.

## 2018-06-27 ENCOUNTER — OFFICE VISIT (OUTPATIENT)
Dept: DERMATOLOGY | Facility: CLINIC | Age: 53
End: 2018-06-27
Payer: COMMERCIAL

## 2018-06-27 DIAGNOSIS — L28.1 PRURIGO NODULARIS: Primary | ICD-10-CM

## 2018-06-27 PROCEDURE — 96910 PHOTCHMTX TAR&UVB/PTRLTM&UVB: CPT | Performed by: PHYSICIAN ASSISTANT

## 2018-06-27 NOTE — MR AVS SNAPSHOT
After Visit Summary   6/27/2018    Lisette LINCOLN Ukaegbu    MRN: [id number]           Patient Information     Date Of Birth          1965        Visit Information        Provider Department      6/27/2018 10:45 AM Tawana Ravi PA-C [facility]        Today's Diagnoses     Prurigo nodularis    -  1       Follow-ups after your visit        Your next 10 appointments already scheduled     Jun 28, 2018 10:30 AM CDT   [place] Visit with [place]   [facility] ([facility])    [address]  [place] [address]   [phone]            Jul 02, 2018  1:30 PM CDT   [place] Visit with [place]   [facility] ([facility])    [address]  [place] [address]   [phone]            Jul 03, 2018  1:30 PM CDT   [place] Visit with [place]   [facility] ([facility])    [address]  [place] [address]   [phone]            Jul 05, 2018  1:30 PM CDT   [place] Visit with [place]   [facility] ([facility])    [address]  [place] [address]   [phone]            Jul 09, 2018  1:30 PM CDT   [place] Visit with [place]   [facility] ([facility])    [address]  [place] [address]   [phone]            Jul 09, 2018  1:45 PM CDT   Return Visit with Charlie Encarnacion MD   [facility] ([facility])    [address]  [place] [address]   [phone]            Jul 11, 2018  1:30 PM CDT   [place] Visit with [place]   [facility] ([facility])    [address] [facility] [place] [address]   [phone]            Jul 12, 2018  1:30 PM CDT   [place] Visit with [place]   [facility] ([facility])    [address] [facility]  [address]   [phone]            Jul 16, 2018  1:30 PM CDT   [place] Visit with [place]   [facility]    [address]  [place] [facility] [place] [address]   [phone]            Jul 18, 2018  1:30 PM CDT   [place] Visit with [place]   [facility] ([facility])    [address]  [facility] [place] [address]   [phone]              Who to contact     If you have questions or need follow up information about today's clinic visit or your schedule please contact [facility] directly at [phone].  Normal or non-critical lab and imaging results will be communicated to you by [employer], letter or phone within 4 business days after the clinic has received the results. If you do not hear from us within 7 days, please contact the clinic through [employer] or phone. If you have a critical or abnormal lab result, we will notify you by phone as soon as possible.  Submit refill requests through [employer] or call your pharmacy and they will forward the refill request to us. Please allow 3 business days for your refill to be completed.          Additional Information About Your Visit        [employer] Information     [employer] gives you secure access to your electronic health record. If you see a primary care provider, you can also send messages to your care team and make appointments. If you have questions, please call your primary care clinic.  If you do not have a primary care provider, please call [phone] and they will assist you.        Care EveryWhere ID     This is your Care EveryWhere ID. This could be used by other organizations to access your [place] medical records  [id number]        Your Vitals Were     Last Period                   05/15/2014 (Approximate)            Blood Pressure from Last 3 Encounters:   06/11/18 113/65   05/03/18 131/77   03/16/18 132/82    Weight from Last 3 Encounters:   05/03/18 84.8 kg  (187 lb)   03/16/18 82.1 kg (181 lb)   03/12/18 82.4 kg (181 lb 9.6 oz)              We Performed the Following     PHOTOCHEMOTHERAPY WITH UV-B        Primary Care Provider Office Phone # Fax #    Koltongisel Yin PA-C [phone] [address]  [place] [address]        Equal Access to Services     [facility]: Hadii aad ku hadasho Soomaali, waaxda luqadaha, qaybta kaalmada adeegyada, waxay idiin hayaan adeeg kharash la'aan . So [facility] [phone].    ATENCIÓN: Si reneela anali, tiene a mosquera disposición servicios gratuitos de asistencia lingüística. Llame al [phone].    We comply with applicable federal civil rights laws and [place] laws. We do not discriminate on the basis of race, color, national origin, age, disability, sex, sexual orientation, or gender identity.            Thank you!     Thank you for choosing [facility]  for your care. Our goal is always to provide you with excellent care. Hearing back from our patients is one way we can continue to improve our services. Please take a few minutes to complete the written survey that you may receive in the mail after your visit with us. Thank you!             Your Updated Medication List - Protect others around you: Learn how to safely use, store and throw away your medicines at www.disposemymeds.org.          This list is accurate as of 6/27/18 11:30 AM.  Always use your most recent med list.                   Brand Name Dispense Instructions for use Diagnosis    * albuterol (2.5 MG/3ML) 0.083% neb solution     1 Box    Take 1 vial (2.5 mg) by nebulization every 4 hours as needed for shortness of breath / dyspnea    Moderate persistent asthma, uncomplicated       * albuterol 108 (90 Base) MCG/ACT Inhaler    PROAIR HFA/PROVENTIL HFA/VENTOLIN HFA    1 Inhaler    Inhale 2 puffs into the lungs every 4 hours as needed for shortness of breath / dyspnea    Moderate persistent asthma, uncomplicated       ALPRAZolam 0.5 MG  tablet    XANAX    30 tablet    Take 1-2 tablets (0.5-1 mg) by mouth 3 times daily as needed for anxiety    PTSD (post-traumatic stress disorder)       aspirin 325 MG EC tablet     90 tablet    Take 1 tablet (325 mg) by mouth daily    Type 2 diabetes mellitus without complication, without long-term current use of insulin (H)       clarithromycin 250 MG tablet    BIAXIN    20 tablet    Take 1 tablet (250 mg) by mouth 2 times daily    Acute bronchitis, unspecified organism       diltiazem 240 MG 24 hr ER beaded capsule    TIAZAC    90 capsule    Take 1 capsule (240 mg) by mouth daily    Palpitations       DULoxetine 60 MG EC capsule    CYMBALTA    180 capsule    Take 1 capsule (60 mg) by mouth 2 times daily    PTSD (post-traumatic stress disorder), Depression with anxiety       fish oil-omega-3 fatty acids 1000 MG capsule      Take 2 g by mouth 3 times daily Reported on 2/14/2017        fluticasone 50 MCG/ACT spray    FLONASE    16 g    Spray 1-2 sprays into both nostrils daily    Moderate persistent asthma with acute exacerbation       fluticasone-salmeterol 500-50 MCG/DOSE diskus inhaler    ADVAIR    3 Inhaler    Inhale 1 puff into the lungs 2 times daily    Moderate persistent asthma with acute exacerbation       gabapentin 100 MG capsule    NEURONTIN    90 capsule    Take 1 capsule (100 mg) by mouth 3 times daily    Prurigo nodularis       lisinopril 5 MG tablet    PRINIVIL/ZESTRIL    90 tablet    Take 1 tablet (5 mg) by mouth daily    Type 2 diabetes mellitus without complication, without long-term current use of insulin (H)       metFORMIN 500 MG 24 hr tablet    GLUCOPHAGE-XR    180 tablet    Take 2 tablets (1,000 mg) by mouth daily (with dinner)    Type 2 diabetes mellitus without complication, without long-term current use of insulin (H)       montelukast 10 MG tablet    SINGULAIR    90 tablet    Take 1 tablet (10 mg) by mouth At Bedtime    Moderate persistent asthma with acute exacerbation       nitroGLYcerin  0.4 MG/HR 24 hr patch    NITRODUR     Place 1 patch onto the skin daily Reported on 2/14/2017        * Notice:  This list has 2 medication(s) that are the same as other medications prescribed for you. Read the directions carefully, and ask your doctor or other care provider to review them with you.

## 2018-06-27 NOTE — PROGRESS NOTES
NB-UVB treatment for prurigo nodularis  Treatment #7  No issues   photoproection on eyes, lips, nipples  315 mj 47 seconds today  Mineral oil applied  Goal 3 times weekly x 12 weeks

## 2018-06-27 NOTE — LETTER
6/27/2018         RE: Lisette Ownes  [address] [place] [address]        Dear Colleague,    Thank you for referring your patient, Lisette Owens, to the [facility]. Please see a copy of my visit note below.    NB-UVB treatment for prurigo nodularis  Treatment #7  No issues   photoproection on eyes, lips, nipples  315 mj 47 seconds today  Mineral oil applied  Goal 3 times weekly x 12 weeks    Again, thank you for allowing me to participate in the care of your patient.        Sincerely,        Tawana Rodrigez PA-C

## 2018-06-28 ENCOUNTER — OFFICE VISIT (OUTPATIENT)
Dept: DERMATOLOGY | Facility: CLINIC | Age: 53
End: 2018-06-28
Payer: COMMERCIAL

## 2018-06-28 DIAGNOSIS — L28.1 PRURIGO NODULARIS: Primary | ICD-10-CM

## 2018-06-28 PROCEDURE — 96910 PHOTCHMTX TAR&UVB/PTRLTM&UVB: CPT | Performed by: PHYSICIAN ASSISTANT

## 2018-06-28 NOTE — PROGRESS NOTES
NB-UVB treatment for prurigo nodularis  Treatment #7  No issues   photoproection on eyes, lips, nipples  286 mj 41 seconds today  Mineral oil applied  Goal 3 times weekly x 12 weeks

## 2018-06-28 NOTE — LETTER
6/28/2018         RE: Lisette Owens  [address] [place] [address]        Dear Colleague,    Thank you for referring your patient, Lisette Owens, to the [facility]. Please see a copy of my visit note below.    NB-UVB treatment for prurigo nodularis  Treatment #8  No issues   photoproection on eyes, lips, nipples  347 mj 53 seconds today  Mineral oil applied  Goal 3 times weekly x 12 weeks    Again, thank you for allowing me to participate in the care of your patient.        Sincerely,        Tawana Rodrigez PA-C

## 2018-06-28 NOTE — MR AVS SNAPSHOT
After Visit Summary   6/28/2018    Lisette LINCOLN Ukaegbu    MRN: [id number]           Patient Information     Date Of Birth          1965        Visit Information        Provider Department      6/28/2018 10:30 AM Tawana Ravi PA-C [facility]        Today's Diagnoses     Prurigo nodularis    -  1       Follow-ups after your visit        Your next 10 appointments already scheduled     Jul 02, 2018  1:30 PM CDT   [place] with [place] [facility]   [facility]    [address]  [place] [address]   [phone]            Jul 03, 2018  1:30 PM CDT   [place] with [place] [facility] [place] [facility]   [facility]    [address]  [place] [address]   [phone]            Jul 05, 2018  1:30 PM CDT   [place] [facility] [facility]   [facility]    [address]  [place] [address]   [phone]            Jul 09, 2018  1:30 PM CDT   [place] [facility] [facility]   [facility]    [address]  [place] [address]   [phone]            Jul 09, 2018  1:45 PM CDT   Return Visit with Charlie Encarnacion MD   [facility]    [address]  [place] [address]   [phone]            Jul 11, 2018  1:30 PM CDT   [place] [facility]   [facility]    [address]  [place] [address]   [phone]            Jul 12, 2018  1:30 PM CDT   [place] [facility]   [facility]    [address] [place] [address]   [phone]            Jul 16, 2018  1:30 PM CDT   [place]   [facility]    [address] [place]  [address]   [phone]            Jul 18, 2018  1:30 PM CDT   [facility]   [facility]    [address]  [facility] [address]   [phone]              Who to contact     If you have questions or need follow up information about today's clinic visit or your schedule please contact [facility] directly at [phone].  Normal or non-critical lab and imaging results will be communicated to you by MOD Systemshart, letter or phone within 4 business days after the clinic has received the results. If you do not hear from us within 7 days, please contact the clinic through MOD Systemshart or phone. If you have a critical or abnormal lab result, we will notify you by phone as soon as possible.  Submit refill requests through [employer] or call your pharmacy and they will forward the refill request to us. Please allow 3 business days for your refill to be completed.          Additional Information About Your Visit        MOD Systemshart Information     [employer] gives you secure access to your electronic health record. If you see a primary care provider, you can also send messages to your care team and make appointments. If you have questions, please call your primary care clinic.  If you do not have a primary care provider, please call [phone] and they will assist you.        Care EveryWhere ID     This is your Care EveryWhere ID. This could be used by other organizations to access your [place] medical records  [id number]        Your Vitals Were     Last Period                   05/15/2014 (Approximate)            Blood Pressure from Last 3 Encounters:   06/11/18 113/65   05/03/18 131/77   03/16/18 132/82    Weight from Last 3 Encounters:   05/03/18 84.8 kg (187 lb)   03/16/18 82.1 kg (181 lb)   03/12/18 82.4 kg (181 lb 9.6 oz)              We Performed the Following     PHOTOCHEMOTHERAPY WITH UV-B        Primary Care Provider Office Phone # Fax #     Kolton Yin PA-C [phone]       [address]  [place] [address]        Equal Access to Services     CHANDRIKATYSON BRICE : Hadii aad ku hadrko Soomaali, waaxda luqadaha, qaybta kaalmada adeegyada, brice junemaria eugenia chase. So [facility] [phone].    ATENCIÓN: Si habla español, tiene a mosquera disposición servicios gratuitos de asistencia lingüística. Llame al [phone].    We comply with applicable federal civil rights laws and [place] laws. We do not discriminate on the basis of race, color, national origin, age, disability, sex, sexual orientation, or gender identity.            Thank you!     Thank you for choosing [facility]  for your care. Our goal is always to provide you with excellent care. Hearing back from our patients is one way we can continue to improve our services. Please take a few minutes to complete the written survey that you may receive in the mail after your visit with us. Thank you!             Your Updated Medication List - Protect others around you: Learn how to safely use, store and throw away your medicines at www.disposemymeds.org.          This list is accurate as of 6/28/18 10:24 PM.  Always use your most recent med list.                   Brand Name Dispense Instructions for use Diagnosis    * albuterol (2.5 MG/3ML) 0.083% neb solution     1 Box    Take 1 vial (2.5 mg) by nebulization every 4 hours as needed for shortness of breath / dyspnea    Moderate persistent asthma, uncomplicated       * albuterol 108 (90 Base) MCG/ACT Inhaler    PROAIR HFA/PROVENTIL HFA/VENTOLIN HFA    1 Inhaler    Inhale 2 puffs into the lungs every 4 hours as needed for shortness of breath / dyspnea    Moderate persistent asthma, uncomplicated       ALPRAZolam 0.5 MG tablet    XANAX    30 tablet    Take 1-2 tablets (0.5-1 mg) by mouth 3 times daily as needed for anxiety    PTSD (post-traumatic stress disorder)       aspirin 325 MG EC tablet     90 tablet     Take 1 tablet (325 mg) by mouth daily    Type 2 diabetes mellitus without complication, without long-term current use of insulin (H)       clarithromycin 250 MG tablet    BIAXIN    20 tablet    Take 1 tablet (250 mg) by mouth 2 times daily    Acute bronchitis, unspecified organism       diltiazem 240 MG 24 hr ER beaded capsule    TIAZAC    90 capsule    Take 1 capsule (240 mg) by mouth daily    Palpitations       DULoxetine 60 MG EC capsule    CYMBALTA    180 capsule    Take 1 capsule (60 mg) by mouth 2 times daily    PTSD (post-traumatic stress disorder), Depression with anxiety       fish oil-omega-3 fatty acids 1000 MG capsule      Take 2 g by mouth 3 times daily Reported on 2/14/2017        fluticasone 50 MCG/ACT spray    FLONASE    16 g    Spray 1-2 sprays into both nostrils daily    Moderate persistent asthma with acute exacerbation       fluticasone-salmeterol 500-50 MCG/DOSE diskus inhaler    ADVAIR    3 Inhaler    Inhale 1 puff into the lungs 2 times daily    Moderate persistent asthma with acute exacerbation       gabapentin 100 MG capsule    NEURONTIN    90 capsule    Take 1 capsule (100 mg) by mouth 3 times daily    Prurigo nodularis       lisinopril 5 MG tablet    PRINIVIL/ZESTRIL    90 tablet    Take 1 tablet (5 mg) by mouth daily    Type 2 diabetes mellitus without complication, without long-term current use of insulin (H)       metFORMIN 500 MG 24 hr tablet    GLUCOPHAGE-XR    180 tablet    Take 2 tablets (1,000 mg) by mouth daily (with dinner)    Type 2 diabetes mellitus without complication, without long-term current use of insulin (H)       montelukast 10 MG tablet    SINGULAIR    90 tablet    Take 1 tablet (10 mg) by mouth At Bedtime    Moderate persistent asthma with acute exacerbation       nitroGLYcerin 0.4 MG/HR 24 hr patch    NITRODUR     Place 1 patch onto the skin daily Reported on 2/14/2017        * Notice:  This list has 2 medication(s) that are the same as other medications prescribed  for you. Read the directions carefully, and ask your doctor or other care provider to review them with you.

## 2018-06-29 NOTE — PROGRESS NOTES
NB-UVB treatment for prurigo nodularis  Treatment #8  No issues   photoproection on eyes, lips, nipples  347 mj 53 seconds today  Mineral oil applied  Goal 3 times weekly x 12 weeks

## 2018-07-02 ENCOUNTER — TRANSFERRED RECORDS (OUTPATIENT)
Dept: HEALTH INFORMATION MANAGEMENT | Facility: CLINIC | Age: 53
End: 2018-07-02

## 2018-07-02 ENCOUNTER — OFFICE VISIT (OUTPATIENT)
Dept: DERMATOLOGY | Facility: CLINIC | Age: 53
End: 2018-07-02
Payer: COMMERCIAL

## 2018-07-02 DIAGNOSIS — L28.1 PRURIGO NODULARIS: Primary | ICD-10-CM

## 2018-07-02 PROCEDURE — 96910 PHOTCHMTX TAR&UVB/PTRLTM&UVB: CPT | Performed by: PHYSICIAN ASSISTANT

## 2018-07-02 NOTE — PROGRESS NOTES
NB-UVB treatment for prurigo nodularis  Treatment #9  No issues   photoproection on eyes, lips, nipples  348 mj 53 seconds today  Mineral oil applied  Goal 3 times weekly x 12 weeks

## 2018-07-02 NOTE — LETTER
7/2/2018         RE: Lisette Owens  [address] [place] [address]        Dear Colleague,    Thank you for referring your patient, Lisette Owens, to the [facility]. Please see a copy of my visit note below.    NB-UVB treatment for prurigo nodularis  Treatment #9  No issues   photoproection on eyes, lips, nipples  348 mj 53 seconds today  Mineral oil applied  Goal 3 times weekly x 12 weeks    Again, thank you for allowing me to participate in the care of your patient.        Sincerely,        Do Cisse PA-C

## 2018-07-02 NOTE — MR AVS SNAPSHOT
After Visit Summary   7/2/2018    Lisette LINCOLN Ukaegbu    MRN: [id number]           Patient Information     Date Of Birth          1965        Visit Information        Provider Department      7/2/2018 1:30 PM Do Bradley PA-C [facility]        Today's Diagnoses     Prurigo nodularis    -  1       Follow-ups after your visit        Your next 10 appointments already scheduled     Jul 02, 2018  1:30 PM CDT   Return Visit with Do Bradley PA-C   [facility] ([facility])    [address]  [place] [address]   [phone]            Jul 03, 2018  1:30 PM CDT   [place] Visit with [place] [facility]   [facility] ([facility])    [address]  [place] [address]   [phone]            Jul 05, 2018  1:30 PM CDT   [place]   [facility] ([facility])    [address]  [place] [address]   [phone]            Jul 09, 2018  1:30 PM CDT   [place]   [facility] ([facility])    [address]  [place] [address]   [phone]            Jul 09, 2018  1:45 PM CDT   Return Visit with Charlie Encarnacion MD   [facility] ([facility])    [address]  [place] [address]   [phone]            Jul 11, 2018  1:30 PM CDT   [place]   [facility] ([facility])    [address]  [place] [address]   [phone]            Jul 12, 2018  1:30 PM CDT   [place]   [facility] ([facility])    [address] [facility] [place] [address]   [phone]            Jul 16, 2018  1:30 PM CDT   [place]   [facility] ([facility])    [address]  [address]   [phone]            Jul 18, 2018  1:30 PM CDT   Return Visit with [facility] PROC ROOM   [facility]    [address]  [facility] [address]   [phone]              Who to contact     If you have questions or need follow up information about today's clinic visit or your schedule please contact [facility] directly at [phone].  Normal or non-critical lab and imaging results will be communicated to you by WISErghart, letter or phone within 4 business days after the clinic has received the results. If you do not hear from us within 7 days, please contact the clinic through W[employer] or phone. If you have a critical or abnormal lab result, we will notify you by phone as soon as possible.  Submit refill requests through [employer] or call your pharmacy and they will forward the refill request to us. Please allow 3 business days for your refill to be completed.          Additional Information About Your Visit        WISErghart Information     [employer] gives you secure access to your electronic health record. If you see a primary care provider, you can also send messages to your care team and make appointments. If you have questions, please call your primary care clinic.  If you do not have a primary care provider, please call [phone] and they will assist you.        Care EveryWhere ID     This is your Care EveryWhere ID. This could be used by other organizations to access your [place] medical records  [id number]        Your Vitals Were     Last Period                   05/15/2014 (Approximate)            Blood Pressure from Last 3 Encounters:   06/11/18 113/65   05/03/18 131/77   03/16/18 132/82    Weight from Last 3 Encounters:   05/03/18 84.8 kg (187 lb)   03/16/18 82.1 kg (181 lb)   03/12/18 82.4 kg (181 lb 9.6 oz)              We Performed the Following     CHARGE - PHOTOCHEMOTHERAPY WITH UV-B        Primary Care Provider Office Phone #  Fax #    Kolton Yin PA-C [phone]       [address]  [place] [address]        Equal Access to Services     VERA NARVAEZ : Hadii aad ku hadrko Soomaali, waaxda luqadaha, qaybta kaalmada adeegyada, waxfrantz lagunas laednamaria eugenia chase. So [facility] [phone].    ATENCIÓN: Si habla español, tiene a mosquera disposición servicios gratuitos de asistencia lingüística. Llame al [phone].    We comply with applicable federal civil rights laws and [place] laws. We do not discriminate on the basis of race, color, national origin, age, disability, sex, sexual orientation, or gender identity.            Thank you!     Thank you for choosing [facility]  for your care. Our goal is always to provide you with excellent care. Hearing back from our patients is one way we can continue to improve our services. Please take a few minutes to complete the written survey that you may receive in the mail after your visit with us. Thank you!             Your Updated Medication List - Protect others around you: Learn how to safely use, store and throw away your medicines at www.disposemymeds.org.          This list is accurate as of 7/2/18 12:28 PM.  Always use your most recent med list.                   Brand Name Dispense Instructions for use Diagnosis    * albuterol (2.5 MG/3ML) 0.083% neb solution     1 Box    Take 1 vial (2.5 mg) by nebulization every 4 hours as needed for shortness of breath / dyspnea    Moderate persistent asthma, uncomplicated       * albuterol 108 (90 Base) MCG/ACT Inhaler    PROAIR HFA/PROVENTIL HFA/VENTOLIN HFA    1 Inhaler    Inhale 2 puffs into the lungs every 4 hours as needed for shortness of breath / dyspnea    Moderate persistent asthma, uncomplicated       ALPRAZolam 0.5 MG tablet    XANAX    30 tablet    Take 1-2 tablets (0.5-1 mg) by mouth 3 times daily as needed for anxiety    PTSD (post-traumatic stress disorder)       aspirin 325 MG EC tablet     90  tablet    Take 1 tablet (325 mg) by mouth daily    Type 2 diabetes mellitus without complication, without long-term current use of insulin (H)       clarithromycin 250 MG tablet    BIAXIN    20 tablet    Take 1 tablet (250 mg) by mouth 2 times daily    Acute bronchitis, unspecified organism       diltiazem 240 MG 24 hr ER beaded capsule    TIAZAC    90 capsule    Take 1 capsule (240 mg) by mouth daily    Palpitations       DULoxetine 60 MG EC capsule    CYMBALTA    180 capsule    Take 1 capsule (60 mg) by mouth 2 times daily    PTSD (post-traumatic stress disorder), Depression with anxiety       fish oil-omega-3 fatty acids 1000 MG capsule      Take 2 g by mouth 3 times daily Reported on 2/14/2017        fluticasone 50 MCG/ACT spray    FLONASE    16 g    Spray 1-2 sprays into both nostrils daily    Moderate persistent asthma with acute exacerbation       fluticasone-salmeterol 500-50 MCG/DOSE diskus inhaler    ADVAIR    3 Inhaler    Inhale 1 puff into the lungs 2 times daily    Moderate persistent asthma with acute exacerbation       gabapentin 100 MG capsule    NEURONTIN    90 capsule    Take 1 capsule (100 mg) by mouth 3 times daily    Prurigo nodularis       lisinopril 5 MG tablet    PRINIVIL/ZESTRIL    90 tablet    Take 1 tablet (5 mg) by mouth daily    Type 2 diabetes mellitus without complication, without long-term current use of insulin (H)       metFORMIN 500 MG 24 hr tablet    GLUCOPHAGE-XR    180 tablet    Take 2 tablets (1,000 mg) by mouth daily (with dinner)    Type 2 diabetes mellitus without complication, without long-term current use of insulin (H)       montelukast 10 MG tablet    SINGULAIR    90 tablet    Take 1 tablet (10 mg) by mouth At Bedtime    Moderate persistent asthma with acute exacerbation       nitroGLYcerin 0.4 MG/HR 24 hr patch    NITRODUR     Place 1 patch onto the skin daily Reported on 2/14/2017        * Notice:  This list has 2 medication(s) that are the same as other medications  prescribed for you. Read the directions carefully, and ask your doctor or other care provider to review them with you.

## 2018-07-03 ENCOUNTER — OFFICE VISIT (OUTPATIENT)
Dept: DERMATOLOGY | Facility: CLINIC | Age: 53
End: 2018-07-03
Payer: COMMERCIAL

## 2018-07-03 DIAGNOSIS — L28.1 PRURIGO NODULARIS: Primary | ICD-10-CM

## 2018-07-03 PROCEDURE — 96910 PHOTCHMTX TAR&UVB/PTRLTM&UVB: CPT | Performed by: PHYSICIAN ASSISTANT

## 2018-07-03 NOTE — MR AVS SNAPSHOT
After Visit Summary   7/3/2018    Lisette LINCOLN Ukaegbu    MRN: [id number]           Patient Information     Date Of Birth          1965        Visit Information        Provider Department      7/3/2018 1:30 PM Tawana Ravi PA-C [facility]        Today's Diagnoses     Prurigo nodularis    -  1       Follow-ups after your visit        Your next 10 appointments already scheduled     Jul 05, 2018  1:30 PM CDT   [place] Visit with [place]   [facility] ([facility]    [address]  [place] [address]   [phone]            Jul 09, 2018  1:30 PM CDT   [place] Visit with [place]   [facility] ([facility]    [address]  [place] [address]   [phone]            Jul 09, 2018  1:45 PM CDT   Return Visit with Charlie Encarnacion MD   [facility] ([facility]    [address]  [facility] [address]   [phone]            Jul 11, 2018  1:30 PM CDT   [place]   [facility]    [address]  [place] [address]   [phone]            Jul 12, 2018  1:30 PM CDT   [place] Visit [place]   [facility] ([facility]    [address]  [place] [address]   [phone]            Jul 16, 2018  1:30 PM CDT   [place] Visit [place]   [facility]    [address] [facility] [place] [address]   [phone]            Jul 18, 2018  1:30 PM CDT   [place] Visit [place]   [facility] ([facility]    [address] [facility] [place] [address]   [phone]              Who to contact     If you have questions or need follow up information about today's clinic visit or your schedule please contact [facility]  directly at [phone].  Normal or non-critical lab and imaging results will be communicated to you by MyChart, letter or phone within 4 business days after the clinic has received the results. If you do not hear from us within 7 days, please contact the clinic through [email or web address] or phone. If you have a critical or abnormal lab result, we will notify you by phone as soon as possible.  Submit refill requests through Tech[employer] or call your pharmacy and they will forward the refill request to us. Please allow 3 business days for your refill to be completed.          Additional Information About Your Visit        [email or web address] Information     Tech[employer] gives you secure access to your electronic health record. If you see a primary care provider, you can also send messages to your care team and make appointments. If you have questions, please call your primary care clinic.  If you do not have a primary care provider, please call [phone] and they will assist you.        Care EveryWhere ID     This is your Care EveryWhere ID. This could be used by other organizations to access your [facility] medical records  [id number]        Your Vitals Were     Last Period                   05/15/2014 (Approximate)            Blood Pressure from Last 3 Encounters:   06/11/18 113/65   05/03/18 131/77   03/16/18 132/82    Weight from Last 3 Encounters:   05/03/18 84.8 kg (187 lb)   03/16/18 82.1 kg (181 lb)   03/12/18 82.4 kg (181 lb 9.6 oz)              We Performed the Following     PHOTOCHEMOTHERAPY WITH UV-B        Primary Care Provider Office Phone # Fax #    Koltongisel Yin PA-C [phone]       [address] [place] [address]        Equal Access to Services     [facility]: Hadii aad ku hadasho Soomaali, waaxda luqadaha, qaybta kaalmada adeegyada, brice tuttle . So [facility] [phone].    ATENCIÓN: Si habla español, tiene a mosquera disposición servicios gratuitos de asistencia lingüística.  Ameena mahoney [phone].    We comply with applicable federal civil rights laws and [place] laws. We do not discriminate on the basis of race, color, national origin, age, disability, sex, sexual orientation, or gender identity.            Thank you!     Thank you for choosing [facility]  for your care. Our goal is always to provide you with excellent care. Hearing back from our patients is one way we can continue to improve our services. Please take a few minutes to complete the written survey that you may receive in the mail after your visit with us. Thank you!             Your Updated Medication List - Protect others around you: Learn how to safely use, store and throw away your medicines at www.disposemymeds.org.          This list is accurate as of 7/3/18 11:59 PM.  Always use your most recent med list.                   Brand Name Dispense Instructions for use Diagnosis    * albuterol (2.5 MG/3ML) 0.083% neb solution     1 Box    Take 1 vial (2.5 mg) by nebulization every 4 hours as needed for shortness of breath / dyspnea    Moderate persistent asthma, uncomplicated       * albuterol 108 (90 Base) MCG/ACT Inhaler    PROAIR HFA/PROVENTIL HFA/VENTOLIN HFA    1 Inhaler    Inhale 2 puffs into the lungs every 4 hours as needed for shortness of breath / dyspnea    Moderate persistent asthma, uncomplicated       ALPRAZolam 0.5 MG tablet    XANAX    30 tablet    Take 1-2 tablets (0.5-1 mg) by mouth 3 times daily as needed for anxiety    PTSD (post-traumatic stress disorder)       aspirin 325 MG EC tablet     90 tablet    Take 1 tablet (325 mg) by mouth daily    Type 2 diabetes mellitus without complication, without long-term current use of insulin (H)       clarithromycin 250 MG tablet    BIAXIN    20 tablet    Take 1 tablet (250 mg) by mouth 2 times daily    Acute bronchitis, unspecified organism       diltiazem 240 MG 24 hr ER beaded capsule    TIAZAC    90 capsule    Take 1 capsule (240 mg) by mouth  daily    Palpitations       DULoxetine 60 MG EC capsule    CYMBALTA    180 capsule    Take 1 capsule (60 mg) by mouth 2 times daily    PTSD (post-traumatic stress disorder), Depression with anxiety       fish oil-omega-3 fatty acids 1000 MG capsule      Take 2 g by mouth 3 times daily Reported on 2/14/2017        fluticasone 50 MCG/ACT spray    FLONASE    16 g    Spray 1-2 sprays into both nostrils daily    Moderate persistent asthma with acute exacerbation       fluticasone-salmeterol 500-50 MCG/DOSE diskus inhaler    ADVAIR    3 Inhaler    Inhale 1 puff into the lungs 2 times daily    Moderate persistent asthma with acute exacerbation       gabapentin 100 MG capsule    NEURONTIN    90 capsule    Take 1 capsule (100 mg) by mouth 3 times daily    Prurigo nodularis       lisinopril 5 MG tablet    PRINIVIL/ZESTRIL    90 tablet    Take 1 tablet (5 mg) by mouth daily    Type 2 diabetes mellitus without complication, without long-term current use of insulin (H)       metFORMIN 500 MG 24 hr tablet    GLUCOPHAGE-XR    180 tablet    Take 2 tablets (1,000 mg) by mouth daily (with dinner)    Type 2 diabetes mellitus without complication, without long-term current use of insulin (H)       montelukast 10 MG tablet    SINGULAIR    90 tablet    Take 1 tablet (10 mg) by mouth At Bedtime    Moderate persistent asthma with acute exacerbation       nitroGLYcerin 0.4 MG/HR 24 hr patch    NITRODUR     Place 1 patch onto the skin daily Reported on 2/14/2017        * Notice:  This list has 2 medication(s) that are the same as other medications prescribed for you. Read the directions carefully, and ask your doctor or other care provider to review them with you.

## 2018-07-05 NOTE — PROGRESS NOTES
NB-UVB treatment for prurigo nodularis  Treatment #10  No issues   photoproection on eyes, lips, nipples  370 mj 55 seconds today  Mineral oil applied  Goal 3 times weekly x 12 weeks

## 2018-07-09 ENCOUNTER — APPOINTMENT (OUTPATIENT)
Dept: DERMATOLOGY | Facility: CLINIC | Age: 53
End: 2018-07-09
Payer: COMMERCIAL

## 2018-07-09 ENCOUNTER — OFFICE VISIT (OUTPATIENT)
Dept: DERMATOLOGY | Facility: CLINIC | Age: 53
End: 2018-07-09
Payer: COMMERCIAL

## 2018-07-09 VITALS — DIASTOLIC BLOOD PRESSURE: 65 MMHG | SYSTOLIC BLOOD PRESSURE: 106 MMHG | HEART RATE: 76 BPM | OXYGEN SATURATION: 97 %

## 2018-07-09 DIAGNOSIS — L28.1 PRURIGO NODULARIS: Primary | ICD-10-CM

## 2018-07-09 PROCEDURE — 96910 PHOTCHMTX TAR&UVB/PTRLTM&UVB: CPT | Performed by: DERMATOLOGY

## 2018-07-09 PROCEDURE — 99212 OFFICE O/P EST SF 10 MIN: CPT | Mod: 25 | Performed by: DERMATOLOGY

## 2018-07-09 NOTE — LETTER
7/9/2018         RE: Lisette Owens  [address] [place] [address]        Dear Colleague,    Thank you for referring your patient, Lisette Owens, to the [facility]. Please see a copy of my visit note below.    Lisette Owens is a 53 year old year old female patient here today for f/u prugio nod.  Clearing on light box and gabapentin and xzyrtec and claritin.  Doing great very happy.  Patient has no other skin complaints today.  Remainder of the HPI, Meds, PMH, Allergies, FH, and SH was reviewed in chart.      Past Medical History:   Diagnosis Date     Allergic rhinitis due to animal dander      Amblyopia     LT     Arthritis      Colon polyp      Endometriosis      House dust mite allergy      Moderate persistent asthma      Rhinitis, allergic to other allergen      Seasonal allergic rhinitis 7/25/05 skin tests pos. for: cat/dog/horse/DM/M/T/G/RW per Dr. Granado    pt. did IT from 7/06 to 11/5/07 to: cat/dog/DM/M/T/G/W dc'd per self.      Type 2 diabetes mellitus without complication, without long-term current use of insulin (H) 3/13/2017       Past Surgical History:   Procedure Laterality Date     HYSTEROSCOPY          Family History   Problem Relation Age of Onset     Hypertension Mother      Alzheimer Disease Mother      Diabetes Mother      Hypertension Father      Skin Cancer Father      Alzheimer Disease Paternal Grandmother      Psychotic Disorder Paternal Grandmother      bipolar     HEART DISEASE Paternal Grandfather      Breast Cancer Maternal Grandmother      Thyroid Disease Brother      Thyroid Disease Sister      Diabetes Sister      Skin Cancer Sister      Cerebrovascular Disease No family hx of      Glaucoma No family hx of      Macular Degeneration No family hx of        Social History     Social History     Marital status:      Spouse name: N/A     Number of children: N/A     Years of education: N/A     Occupational History     Not on file.     Social  History Main Topics     Smoking status: Never Smoker     Smokeless tobacco: Never Used     Alcohol use No     Drug use: No     Sexual activity: Yes     Partners: Male     Other Topics Concern     Not on file     Social History Narrative       Outpatient Encounter Prescriptions as of 7/9/2018   Medication Sig Dispense Refill     albuterol (2.5 MG/3ML) 0.083% neb solution Take 1 vial (2.5 mg) by nebulization every 4 hours as needed for shortness of breath / dyspnea 1 Box 1     albuterol (PROAIR HFA/PROVENTIL HFA/VENTOLIN HFA) 108 (90 Base) MCG/ACT Inhaler Inhale 2 puffs into the lungs every 4 hours as needed for shortness of breath / dyspnea 1 Inhaler 11     ALPRAZolam (XANAX) 0.5 MG tablet Take 1-2 tablets (0.5-1 mg) by mouth 3 times daily as needed for anxiety 30 tablet 1     aspirin  MG EC tablet Take 1 tablet (325 mg) by mouth daily 90 tablet 3     clarithromycin (BIAXIN) 250 MG tablet Take 1 tablet (250 mg) by mouth 2 times daily 20 tablet 0     diltiazem (TIAZAC) 240 MG 24 hr ER beaded capsule Take 1 capsule (240 mg) by mouth daily 90 capsule 1     DULoxetine (CYMBALTA) 60 MG EC capsule Take 1 capsule (60 mg) by mouth 2 times daily 180 capsule 3     fish oil-omega-3 fatty acids (FISH OIL) 1000 MG capsule Take 2 g by mouth 3 times daily Reported on 2/14/2017       fluticasone (FLONASE) 50 MCG/ACT spray Spray 1-2 sprays into both nostrils daily 16 g 5     fluticasone-salmeterol (ADVAIR) 500-50 MCG/DOSE diskus inhaler Inhale 1 puff into the lungs 2 times daily 3 Inhaler 11     gabapentin (NEURONTIN) 100 MG capsule Take 1 capsule (100 mg) by mouth 3 times daily 90 capsule 1     lisinopril (PRINIVIL/ZESTRIL) 5 MG tablet Take 1 tablet (5 mg) by mouth daily 90 tablet 3     metFORMIN (GLUCOPHAGE-XR) 500 MG 24 hr tablet Take 2 tablets (1,000 mg) by mouth daily (with dinner) 180 tablet 1     montelukast (SINGULAIR) 10 MG tablet Take 1 tablet (10 mg) by mouth At Bedtime 90 tablet 3     nitroglycerin (NITRODUR) 0.4  MG/HR Place 1 patch onto the skin daily Reported on 2/14/2017       No facility-administered encounter medications on file as of 7/9/2018.              Review Of Systems  Skin: As above  Eyes: negative  Ears/Nose/Throat: negative  Respiratory: No shortness of breath, dyspnea on exertion, cough, or hemoptysis  Cardiovascular: negative  Gastrointestinal: negative  Genitourinary: negative  Musculoskeletal: negative  Neurologic: negative  Psychiatric: negative  Hematologic/Lymphatic/Immunologic: negative  Endocrine: negative      O:   NAD, WDWN, Alert & Oriented, Mood & Affect wnl, Vitals stable   Here today alone   /65  Pulse 76  LMP 05/15/2014 (Approximate)  SpO2 97%   General appearance normal   Vitals stable   Alert, oriented and in no acute distress     PIH on arm snad legs      Eyes: Conjunctivae/lids:Normal     ENT: Lips, buccal mucosa, tongue: normal    MSK:Normal    Cardiovascular: peripheral edema none    Pulm: Breathing Normal    Neuro/Psych: Orientation:Normal; Mood/Affect:Normal      A/P:  1. Prugio clearing!!!!!  Cont light therapy  gabapenting makes her sleepy would stop for now  Return to clinic 12 weeks    NB-UVB treatment for prurigo nodularis  Treatment #11  No issues   photoproection on eyes, lips, nipples  370 mj 55 seconds today  Mineral oil applied  Goal 3 times weekly x 12 weeks        Again, thank you for allowing me to participate in the care of your patient.        Sincerely,        Charlie Encarnacion MD

## 2018-07-09 NOTE — MR AVS SNAPSHOT
After Visit Summary   7/9/2018    Lisette LINCOLN Ukaegbu    MRN: [id number]           Patient Information     Date Of Birth          1965        Visit Information        Provider Department      7/9/2018 1:45 PM Charlie Encarnacion MD [facility]        Today's Diagnoses     Prurigo nodularis    -  1       Follow-ups after your visit        Your next 10 appointments already scheduled     Jul 11, 2018  1:30 PM CDT   [place] Visit with [place]   [facility] ([facility])    [address] [address]   [phone]            Jul 12, 2018  1:30 PM CDT   [place] Visit with [place]   [facility] ([facility])    [address] [address]   [phone]            Jul 16, 2018  1:30 PM CDT   [place] Visit with [place]   [facility] ([facility])    [address] [address]   [phone]            Jul 19, 2018  1:30 PM CDT   [place] Visit with [place]   [facility] ([facility])    [address] [address]   [phone]              Who to contact     If you have questions or need follow up information about today's clinic visit or your schedule please contact [facility] directly at [phone].  Normal or non-critical lab and imaging results will be communicated to you by MyChart, letter or phone within 4 business days after the clinic has received the results. If you do not hear from us within 7 days, please contact the clinic through MyChart or phone. If you have a critical or abnormal lab result, we will notify you by phone as soon as possible.  Submit refill requests through Mapittrackit or call your pharmacy and they will forward the refill request to us. Please allow 3 business days for your refill to be completed.          Additional Information About  Your Visit        MyChart Information     [employer] gives you secure access to your electronic health record. If you see a primary care provider, you can also send messages to your care team and make appointments. If you have questions, please call your primary care clinic.  If you do not have a primary care provider, please call [phone] and they will assist you.        Care EveryWhere ID     This is your Care EveryWhere ID. This could be used by other organizations to access your [facility] medical records  [id number]        Your Vitals Were     Pulse Last Period Pulse Oximetry             76 05/15/2014 (Approximate) 97%          Blood Pressure from Last 3 Encounters:   07/09/18 106/65   06/11/18 113/65   05/03/18 131/77    Weight from Last 3 Encounters:   05/03/18 84.8 kg (187 lb)   03/16/18 82.1 kg (181 lb)   03/12/18 82.4 kg (181 lb 9.6 oz)              We Performed the Following     CHARGE - PHOTOCHEMOTHERAPY WITH UV-B        Primary Care Provider Office Phone # Fax #    Koltno Yin PA-C [phone]       [address]  [place] [address]        Equal Access to Services     TYSON NARVAEZ : Hadii aad ku hadasho Soomaali, waaxda luqadaha, qaybta kaalmada adeegyada, waxfrantz stone. So [facility] [phone].    ATENCIÓN: Si habla español, tiene a mosquera disposición servicios gratuitos de asistencia lingüística. [facility] [phone].    We comply with applicable federal civil rights laws and [place] laws. We do not discriminate on the basis of race, color, national origin, age, disability, sex, sexual orientation, or gender identity.            Thank you!     Thank you for choosing [facility]  for your care. Our goal is always to provide you with excellent care. Hearing back from our patients is one way we can continue to improve our services. Please take a few minutes to complete the written survey that you may receive in the mail after your visit with  us. Thank you!             Your Updated Medication List - Protect others around you: Learn how to safely use, store and throw away your medicines at www.disposemymeds.org.          This list is accurate as of 7/9/18  1:54 PM.  Always use your most recent med list.                   Brand Name Dispense Instructions for use Diagnosis    * albuterol (2.5 MG/3ML) 0.083% neb solution     1 Box    Take 1 vial (2.5 mg) by nebulization every 4 hours as needed for shortness of breath / dyspnea    Moderate persistent asthma, uncomplicated       * albuterol 108 (90 Base) MCG/ACT Inhaler    PROAIR HFA/PROVENTIL HFA/VENTOLIN HFA    1 Inhaler    Inhale 2 puffs into the lungs every 4 hours as needed for shortness of breath / dyspnea    Moderate persistent asthma, uncomplicated       ALPRAZolam 0.5 MG tablet    XANAX    30 tablet    Take 1-2 tablets (0.5-1 mg) by mouth 3 times daily as needed for anxiety    PTSD (post-traumatic stress disorder)       aspirin 325 MG EC tablet     90 tablet    Take 1 tablet (325 mg) by mouth daily    Type 2 diabetes mellitus without complication, without long-term current use of insulin (H)       clarithromycin 250 MG tablet    BIAXIN    20 tablet    Take 1 tablet (250 mg) by mouth 2 times daily    Acute bronchitis, unspecified organism       diltiazem 240 MG 24 hr ER beaded capsule    TIAZAC    90 capsule    Take 1 capsule (240 mg) by mouth daily    Palpitations       DULoxetine 60 MG EC capsule    CYMBALTA    180 capsule    Take 1 capsule (60 mg) by mouth 2 times daily    PTSD (post-traumatic stress disorder), Depression with anxiety       fish oil-omega-3 fatty acids 1000 MG capsule      Take 2 g by mouth 3 times daily Reported on 2/14/2017        fluticasone 50 MCG/ACT spray    FLONASE    16 g    Spray 1-2 sprays into both nostrils daily    Moderate persistent asthma with acute exacerbation       fluticasone-salmeterol 500-50 MCG/DOSE diskus inhaler    ADVAIR    3 Inhaler    Inhale 1 puff into  the lungs 2 times daily    Moderate persistent asthma with acute exacerbation       gabapentin 100 MG capsule    NEURONTIN    90 capsule    Take 1 capsule (100 mg) by mouth 3 times daily    Prurigo nodularis       lisinopril 5 MG tablet    PRINIVIL/ZESTRIL    90 tablet    Take 1 tablet (5 mg) by mouth daily    Type 2 diabetes mellitus without complication, without long-term current use of insulin (H)       metFORMIN 500 MG 24 hr tablet    GLUCOPHAGE-XR    180 tablet    Take 2 tablets (1,000 mg) by mouth daily (with dinner)    Type 2 diabetes mellitus without complication, without long-term current use of insulin (H)       montelukast 10 MG tablet    SINGULAIR    90 tablet    Take 1 tablet (10 mg) by mouth At Bedtime    Moderate persistent asthma with acute exacerbation       nitroGLYcerin 0.4 MG/HR 24 hr patch    NITRODUR     Place 1 patch onto the skin daily Reported on 2/14/2017        * Notice:  This list has 2 medication(s) that are the same as other medications prescribed for you. Read the directions carefully, and ask your doctor or other care provider to review them with you.

## 2018-07-09 NOTE — PROGRESS NOTES
Lisette Owens is a 53 year old year old female patient here today for f/u prugio nod.  Clearing on light box and gabapentin and xzyrtec and claritin.  Doing great very happy.  Patient has no other skin complaints today.  Remainder of the HPI, Meds, PMH, Allergies, FH, and SH was reviewed in chart.      Past Medical History:   Diagnosis Date     Allergic rhinitis due to animal dander      Amblyopia     LT     Arthritis      Colon polyp      Endometriosis      House dust mite allergy      Moderate persistent asthma      Rhinitis, allergic to other allergen      Seasonal allergic rhinitis 7/25/05 skin tests pos. for: cat/dog/horse/DM/M/T/G/RW per Dr. Granado    pt. did IT from 7/06 to 11/5/07 to: cat/dog/DM/M/T/G/W dc'd per self.      Type 2 diabetes mellitus without complication, without long-term current use of insulin (H) 3/13/2017       Past Surgical History:   Procedure Laterality Date     HYSTEROSCOPY          Family History   Problem Relation Age of Onset     Hypertension Mother      Alzheimer Disease Mother      Diabetes Mother      Hypertension Father      Skin Cancer Father      Alzheimer Disease Paternal Grandmother      Psychotic Disorder Paternal Grandmother      bipolar     HEART DISEASE Paternal Grandfather      Breast Cancer Maternal Grandmother      Thyroid Disease Brother      Thyroid Disease Sister      Diabetes Sister      Skin Cancer Sister      Cerebrovascular Disease No family hx of      Glaucoma No family hx of      Macular Degeneration No family hx of        Social History     Social History     Marital status:      Spouse name: N/A     Number of children: N/A     Years of education: N/A     Occupational History     Not on file.     Social History Main Topics     Smoking status: Never Smoker     Smokeless tobacco: Never Used     Alcohol use No     Drug use: No     Sexual activity: Yes     Partners: Male     Other Topics Concern     Not on file     Social History Narrative        Outpatient Encounter Prescriptions as of 7/9/2018   Medication Sig Dispense Refill     albuterol (2.5 MG/3ML) 0.083% neb solution Take 1 vial (2.5 mg) by nebulization every 4 hours as needed for shortness of breath / dyspnea 1 Box 1     albuterol (PROAIR HFA/PROVENTIL HFA/VENTOLIN HFA) 108 (90 Base) MCG/ACT Inhaler Inhale 2 puffs into the lungs every 4 hours as needed for shortness of breath / dyspnea 1 Inhaler 11     ALPRAZolam (XANAX) 0.5 MG tablet Take 1-2 tablets (0.5-1 mg) by mouth 3 times daily as needed for anxiety 30 tablet 1     aspirin  MG EC tablet Take 1 tablet (325 mg) by mouth daily 90 tablet 3     clarithromycin (BIAXIN) 250 MG tablet Take 1 tablet (250 mg) by mouth 2 times daily 20 tablet 0     diltiazem (TIAZAC) 240 MG 24 hr ER beaded capsule Take 1 capsule (240 mg) by mouth daily 90 capsule 1     DULoxetine (CYMBALTA) 60 MG EC capsule Take 1 capsule (60 mg) by mouth 2 times daily 180 capsule 3     fish oil-omega-3 fatty acids (FISH OIL) 1000 MG capsule Take 2 g by mouth 3 times daily Reported on 2/14/2017       fluticasone (FLONASE) 50 MCG/ACT spray Spray 1-2 sprays into both nostrils daily 16 g 5     fluticasone-salmeterol (ADVAIR) 500-50 MCG/DOSE diskus inhaler Inhale 1 puff into the lungs 2 times daily 3 Inhaler 11     gabapentin (NEURONTIN) 100 MG capsule Take 1 capsule (100 mg) by mouth 3 times daily 90 capsule 1     lisinopril (PRINIVIL/ZESTRIL) 5 MG tablet Take 1 tablet (5 mg) by mouth daily 90 tablet 3     metFORMIN (GLUCOPHAGE-XR) 500 MG 24 hr tablet Take 2 tablets (1,000 mg) by mouth daily (with dinner) 180 tablet 1     montelukast (SINGULAIR) 10 MG tablet Take 1 tablet (10 mg) by mouth At Bedtime 90 tablet 3     nitroglycerin (NITRODUR) 0.4 MG/HR Place 1 patch onto the skin daily Reported on 2/14/2017       No facility-administered encounter medications on file as of 7/9/2018.              Review Of Systems  Skin: As above  Eyes: negative  Ears/Nose/Throat:  negative  Respiratory: No shortness of breath, dyspnea on exertion, cough, or hemoptysis  Cardiovascular: negative  Gastrointestinal: negative  Genitourinary: negative  Musculoskeletal: negative  Neurologic: negative  Psychiatric: negative  Hematologic/Lymphatic/Immunologic: negative  Endocrine: negative      O:   NAD, WDWN, Alert & Oriented, Mood & Affect wnl, Vitals stable   Here today alone   /65  Pulse 76  LMP 05/15/2014 (Approximate)  SpO2 97%   General appearance normal   Vitals stable   Alert, oriented and in no acute distress     PIH on arm snad legs      Eyes: Conjunctivae/lids:Normal     ENT: Lips, buccal mucosa, tongue: normal    MSK:Normal    Cardiovascular: peripheral edema none    Pulm: Breathing Normal    Neuro/Psych: Orientation:Normal; Mood/Affect:Normal      A/P:  1. Prugio clearing!!!!!  Cont light therapy  gabapenting makes her sleepy would stop for now  Return to clinic 12 weeks    NB-UVB treatment for prurigo nodularis  Treatment #11  No issues   photoproection on eyes, lips, nipples  370 mj 55 seconds today  Mineral oil applied  Goal 3 times weekly x 12 weeks

## 2018-07-13 ENCOUNTER — OFFICE VISIT (OUTPATIENT)
Dept: DERMATOLOGY | Facility: CLINIC | Age: 53
End: 2018-07-13
Payer: COMMERCIAL

## 2018-07-13 DIAGNOSIS — L28.1 PRURIGO NODULARIS: Primary | ICD-10-CM

## 2018-07-13 PROCEDURE — 96910 PHOTCHMTX TAR&UVB/PTRLTM&UVB: CPT | Performed by: PHYSICIAN ASSISTANT

## 2018-07-13 NOTE — MR AVS SNAPSHOT
After Visit Summary   7/13/2018    Lisette LINCOLN Ukaegbu    MRN: [id number]           Patient Information     Date Of Birth          1965        Visit Information        Provider Department      7/13/2018 11:30 AM Tawana Ravi PA-C [facility]        Today's Diagnoses     Prurigo nodularis    -  1       Follow-ups after your visit        Your next 10 appointments already scheduled     Jul 16, 2018  1:30 PM CDT   Return Visit with [place] [facility]   [facility]    [address] [address] [phone]            Jul 19, 2018  1:30 PM CDT   Return Visit with [place] [facility]   [facility]    [address] [id number] [phone]              Who to contact     If you have questions or need follow up information about today's clinic visit or your schedule please contact [facility] directly at [phone].  Normal or non-critical lab and imaging results will be communicated to you by [employer], letter or phone within 4 business days after the clinic has received the results. If you do not hear from us within 7 days, please contact the clinic through [employer] or phone. If you have a critical or abnormal lab result, we will notify you by phone as soon as possible.  Submit refill requests through [employer] or call your pharmacy and they will forward the refill request to us. Please allow 3 business days for your refill to be completed.          Additional Information About Your Visit        MyChart Information     [employer] gives you secure access to your electronic health record. If you see a primary care provider, you can also send messages to your care team and make appointments. If you have questions, please call your primary care clinic.  If you do not have a primary care provider, please call [phone] and they will assist you.        Care  EveryWhere ID     This is your Care EveryWhere ID. This could be used by other organizations to access your [place] medical records  [id number]        Your Vitals Were     Last Period                   05/15/2014 (Approximate)            Blood Pressure from Last 3 Encounters:   07/09/18 106/65   06/11/18 113/65   05/03/18 131/77    Weight from Last 3 Encounters:   05/03/18 84.8 kg (187 lb)   03/16/18 82.1 kg (181 lb)   03/12/18 82.4 kg (181 lb 9.6 oz)              We Performed the Following     PHOTOCHEMOTHERAPY WITH UV-B        Primary Care Provider Office Phone # Fax #    Kolton Yin PA-C [phone]       [address] [place] [address]        Equal Access to Services     [facility]: Hadii aad ku hadasho Soomaali, waaxda luqadaha, qaybta kaalmada adeegyada, waxfrantz tuttle . So [facility] [phone].    ATENCIÓN: Si habla español, tiene a mosquera disposición servicios gratuitos de asistencia lingüística. Ameena al [phone].    We comply with applicable federal civil rights laws and [place] laws. We do not discriminate on the basis of race, color, national origin, age, disability, sex, sexual orientation, or gender identity.            Thank you!     Thank you for choosing [facility]  for your care. Our goal is always to provide you with excellent care. Hearing back from our patients is one way we can continue to improve our services. Please take a few minutes to complete the written survey that you may receive in the mail after your visit with us. Thank you!             Your Updated Medication List - Protect others around you: Learn how to safely use, store and throw away your medicines at www.disposemymeds.org.          This list is accurate as of 7/13/18 12:18 PM.  Always use your most recent med list.                   Brand Name Dispense Instructions for use Diagnosis    * albuterol (2.5 MG/3ML) 0.083% neb solution     1 Box    Take 1 vial  (2.5 mg) by nebulization every 4 hours as needed for shortness of breath / dyspnea    Moderate persistent asthma, uncomplicated       * albuterol 108 (90 Base) MCG/ACT Inhaler    PROAIR HFA/PROVENTIL HFA/VENTOLIN HFA    1 Inhaler    Inhale 2 puffs into the lungs every 4 hours as needed for shortness of breath / dyspnea    Moderate persistent asthma, uncomplicated       ALPRAZolam 0.5 MG tablet    XANAX    30 tablet    Take 1-2 tablets (0.5-1 mg) by mouth 3 times daily as needed for anxiety    PTSD (post-traumatic stress disorder)       aspirin 325 MG EC tablet     90 tablet    Take 1 tablet (325 mg) by mouth daily    Type 2 diabetes mellitus without complication, without long-term current use of insulin (H)       clarithromycin 250 MG tablet    BIAXIN    20 tablet    Take 1 tablet (250 mg) by mouth 2 times daily    Acute bronchitis, unspecified organism       diltiazem 240 MG 24 hr ER beaded capsule    TIAZAC    90 capsule    Take 1 capsule (240 mg) by mouth daily    Palpitations       DULoxetine 60 MG EC capsule    CYMBALTA    180 capsule    Take 1 capsule (60 mg) by mouth 2 times daily    PTSD (post-traumatic stress disorder), Depression with anxiety       fish oil-omega-3 fatty acids 1000 MG capsule      Take 2 g by mouth 3 times daily Reported on 2/14/2017        fluticasone 50 MCG/ACT spray    FLONASE    16 g    Spray 1-2 sprays into both nostrils daily    Moderate persistent asthma with acute exacerbation       fluticasone-salmeterol 500-50 MCG/DOSE diskus inhaler    ADVAIR    3 Inhaler    Inhale 1 puff into the lungs 2 times daily    Moderate persistent asthma with acute exacerbation       gabapentin 100 MG capsule    NEURONTIN    90 capsule    Take 1 capsule (100 mg) by mouth 3 times daily    Prurigo nodularis       lisinopril 5 MG tablet    PRINIVIL/ZESTRIL    90 tablet    Take 1 tablet (5 mg) by mouth daily    Type 2 diabetes mellitus without complication, without long-term current use of insulin (H)        metFORMIN 500 MG 24 hr tablet    GLUCOPHAGE-XR    180 tablet    Take 2 tablets (1,000 mg) by mouth daily (with dinner)    Type 2 diabetes mellitus without complication, without long-term current use of insulin (H)       montelukast 10 MG tablet    SINGULAIR    90 tablet    Take 1 tablet (10 mg) by mouth At Bedtime    Moderate persistent asthma with acute exacerbation       nitroGLYcerin 0.4 MG/HR 24 hr patch    NITRODUR     Place 1 patch onto the skin daily Reported on 2/14/2017        * Notice:  This list has 2 medication(s) that are the same as other medications prescribed for you. Read the directions carefully, and ask your doctor or other care provider to review them with you.

## 2018-07-13 NOTE — LETTER
7/13/2018         RE: Lisette Owens  [address] [place] [address]        Dear Colleague,    Thank you for referring your patient, Lisette Owens, to the [facility]. Please see a copy of my visit note below.    NB-UVB treatment for prurigo nodularis  Treatment #12  No issues   photoproection on eyes, lips, nipples  462 mj 1 minute 9 seconds today  Mineral oil applied  Goal 3 times weekly x 12 weeks       Again, thank you for allowing me to participate in the care of your patient.        Sincerely,        Tawana Rodrigez PA-C

## 2018-07-16 ENCOUNTER — OFFICE VISIT (OUTPATIENT)
Dept: DERMATOLOGY | Facility: CLINIC | Age: 53
End: 2018-07-16
Payer: COMMERCIAL

## 2018-07-16 DIAGNOSIS — L28.1 PRURIGO NODULARIS: Primary | ICD-10-CM

## 2018-07-16 PROCEDURE — 96910 PHOTCHMTX TAR&UVB/PTRLTM&UVB: CPT | Performed by: DERMATOLOGY

## 2018-07-16 NOTE — PROGRESS NOTES
NB-UVB treatment for prurigo nodularis  Treatment #13  No issues   photoproection on eyes, lips, nipples  508 mj 1 minute 23 seconds today  Mineral oil applied  Goal 3 times weekly x 12 weeks

## 2018-07-16 NOTE — LETTER
7/16/2018         RE: Lisette Owens  [address] [place] [address]        Dear Colleague,    Thank you for referring your patient, Lisette Owens, to the [facility]. Please see a copy of my visit note below.    NB-UVB treatment for prurigo nodularis  Treatment #13  No issues   photoproection on eyes, lips, nipples  508 mj 1 minute 23 seconds today  Mineral oil applied  Goal 3 times weekly x 12 weeks    Again, thank you for allowing me to participate in the care of your patient.        Sincerely,        Charlie Encarnacion MD

## 2018-07-16 NOTE — MR AVS SNAPSHOT
After Visit Summary   7/16/2018    Lisette LINCOLN Ukaegbu    MRN: [id number]           Patient Information     Date Of Birth          1965        Visit Information        Provider Department      7/16/2018 1:30 PM Charlie Encarnacion MD [facility]        Today's Diagnoses     Prurigo nodularis    -  1       Follow-ups after your visit        Your next 10 appointments already scheduled     Jul 19, 2018  1:30 PM CDT   Return Visit with [facility] PROC ROOM   [facility] ([facility])    [address] [address] [phone]              Who to contact     If you have questions or need follow up information about today's clinic visit or your schedule please contact [facility] directly at [phone].  Normal or non-critical lab and imaging results will be communicated to you by MyChart, letter or phone within 4 business days after the clinic has received the results. If you do not hear from us within 7 days, please contact the clinic through MyChart or phone. If you have a critical or abnormal lab result, we will notify you by phone as soon as possible.  Submit refill requests through [employer] or call your pharmacy and they will forward the refill request to us. Please allow 3 business days for your refill to be completed.          Additional Information About Your Visit        MyChart Information     [employer] gives you secure access to your electronic health record. If you see a primary care provider, you can also send messages to your care team and make appointments. If you have questions, please call your primary care clinic.  If you do not have a primary care provider, please call [phone] and they will assist you.        Care EveryWhere ID     This is your Care EveryWhere ID. This could be used by other organizations to access your [place] medical records  [id number]        Your Vitals Were     Last Period                    05/15/2014 (Approximate)            Blood Pressure from Last 3 Encounters:   07/09/18 106/65   06/11/18 113/65   05/03/18 131/77    Weight from Last 3 Encounters:   05/03/18 84.8 kg (187 lb)   03/16/18 82.1 kg (181 lb)   03/12/18 82.4 kg (181 lb 9.6 oz)              We Performed the Following     CHARGE - PHOTOCHEMOTHERAPY WITH UV-B        Primary Care Provider Office Phone # Fax #    Koltongisel Yin PA-C [phone] [address]  [place] [address]        Equal Access to Services     [facility]: Hadii aad ku hadasho Somindyali, waaxda luqadaha, qaybta kaalmada adebernardinoyada, waxfrantz tuttle . So [facility] [phone].    ATENCIÓN: Si habla español, tiene a mosquera disposición servicios gratuitos de asistencia lingüística. [facility] [phone].    We comply with applicable federal civil rights laws and [place] laws. We do not discriminate on the basis of race, color, national origin, age, disability, sex, sexual orientation, or gender identity.            Thank you!     Thank you for choosing [facility]  for your care. Our goal is always to provide you with excellent care. Hearing back from our patients is one way we can continue to improve our services. Please take a few minutes to complete the written survey that you may receive in the mail after your visit with us. Thank you!             Your Updated Medication List - Protect others around you: Learn how to safely use, store and throw away your medicines at www.disposemymeds.org.          This list is accurate as of 7/16/18  1:50 PM.  Always use your most recent med list.                   Brand Name Dispense Instructions for use Diagnosis    * albuterol (2.5 MG/3ML) 0.083% neb solution     1 Box    Take 1 vial (2.5 mg) by nebulization every 4 hours as needed for shortness of breath / dyspnea    Moderate persistent asthma, uncomplicated       * albuterol 108 (90 Base) MCG/ACT Inhaler    PROAIR  HFA/PROVENTIL HFA/VENTOLIN HFA    1 Inhaler    Inhale 2 puffs into the lungs every 4 hours as needed for shortness of breath / dyspnea    Moderate persistent asthma, uncomplicated       ALPRAZolam 0.5 MG tablet    XANAX    30 tablet    Take 1-2 tablets (0.5-1 mg) by mouth 3 times daily as needed for anxiety    PTSD (post-traumatic stress disorder)       aspirin 325 MG EC tablet     90 tablet    Take 1 tablet (325 mg) by mouth daily    Type 2 diabetes mellitus without complication, without long-term current use of insulin (H)       clarithromycin 250 MG tablet    BIAXIN    20 tablet    Take 1 tablet (250 mg) by mouth 2 times daily    Acute bronchitis, unspecified organism       diltiazem 240 MG 24 hr ER beaded capsule    TIAZAC    90 capsule    Take 1 capsule (240 mg) by mouth daily    Palpitations       DULoxetine 60 MG EC capsule    CYMBALTA    180 capsule    Take 1 capsule (60 mg) by mouth 2 times daily    PTSD (post-traumatic stress disorder), Depression with anxiety       fish oil-omega-3 fatty acids 1000 MG capsule      Take 2 g by mouth 3 times daily Reported on 2/14/2017        fluticasone 50 MCG/ACT spray    FLONASE    16 g    Spray 1-2 sprays into both nostrils daily    Moderate persistent asthma with acute exacerbation       fluticasone-salmeterol 500-50 MCG/DOSE diskus inhaler    ADVAIR    3 Inhaler    Inhale 1 puff into the lungs 2 times daily    Moderate persistent asthma with acute exacerbation       gabapentin 100 MG capsule    NEURONTIN    90 capsule    Take 1 capsule (100 mg) by mouth 3 times daily    Prurigo nodularis       lisinopril 5 MG tablet    PRINIVIL/ZESTRIL    90 tablet    Take 1 tablet (5 mg) by mouth daily    Type 2 diabetes mellitus without complication, without long-term current use of insulin (H)       metFORMIN 500 MG 24 hr tablet    GLUCOPHAGE-XR    180 tablet    Take 2 tablets (1,000 mg) by mouth daily (with dinner)    Type 2 diabetes mellitus without complication, without  long-term current use of insulin (H)       montelukast 10 MG tablet    SINGULAIR    90 tablet    Take 1 tablet (10 mg) by mouth At Bedtime    Moderate persistent asthma with acute exacerbation       nitroGLYcerin 0.4 MG/HR 24 hr patch    NITRODUR     Place 1 patch onto the skin daily Reported on 2/14/2017        * Notice:  This list has 2 medication(s) that are the same as other medications prescribed for you. Read the directions carefully, and ask your doctor or other care provider to review them with you.

## 2018-07-19 ENCOUNTER — OFFICE VISIT (OUTPATIENT)
Dept: DERMATOLOGY | Facility: CLINIC | Age: 53
End: 2018-07-19
Payer: COMMERCIAL

## 2018-07-19 DIAGNOSIS — L28.1 PRURIGO NODULARIS: Primary | ICD-10-CM

## 2018-07-19 PROCEDURE — 96910 PHOTCHMTX TAR&UVB/PTRLTM&UVB: CPT | Performed by: PHYSICIAN ASSISTANT

## 2018-07-19 NOTE — MR AVS SNAPSHOT
After Visit Summary   7/19/2018    Lisette LINCOLN Ukaegbu    MRN: [id number]           Patient Information     Date Of Birth          1965        Visit Information        Provider Department      7/19/2018 3:00 PM Tawana Ravi PA-C [facility]        Today's Diagnoses     Prurigo nodularis    -  1       Follow-ups after your visit        Who to contact     If you have questions or need follow up information about today's clinic visit or your schedule please contact [facility] directly at [phone].  Normal or non-critical lab and imaging results will be communicated to you by Plistenhart, letter or phone within 4 business days after the clinic has received the results. If you do not hear from us within 7 days, please contact the clinic through Edicyt or phone. If you have a critical or abnormal lab result, we will notify you by phone as soon as possible.  Submit refill requests through Litesprite or call your pharmacy and they will forward the refill request to us. Please allow 3 business days for your refill to be completed.          Additional Information About Your Visit        MyChart Information     Litesprite gives you secure access to your electronic health record. If you see a primary care provider, you can also send messages to your care team and make appointments. If you have questions, please call your primary care clinic.  If you do not have a primary care provider, please call [phone] and they will assist you.        Care EveryWhere ID     This is your Care EveryWhere ID. This could be used by other organizations to access your [place] medical records  [id number]        Your Vitals Were     Last Period                   05/15/2014 (Approximate)            Blood Pressure from Last 3 Encounters:   07/09/18 106/65   06/11/18 113/65   05/03/18 131/77    Weight from Last 3 Encounters:   05/03/18 84.8 kg (187 lb)   03/16/18 82.1 kg (181 lb)   03/12/18 82.4 kg  (181 lb 9.6 oz)              We Performed the Following     PHOTOCHEMOTHERAPY WITH UV-B        Primary Care Provider Office Phone # Fax #    Kolton Yin PA-C [phone] [address] [place] [address]        Equal Access to Services     [facility]: Hadii aad ku hadasho Soomaali, waaxda luqadaha, qaybta kaalmada adeegyada, waxay idiin hayaan adeeg khacaciash la'aan . So [facility] [phone].    ATENCIÓN: Si habla español, tiene a mosquera disposición servicios gratuitos de asistencia lingüística. [facility] [phone].    We comply with applicable federal civil rights laws and [place] laws. We do not discriminate on the basis of race, color, national origin, age, disability, sex, sexual orientation, or gender identity.            Thank you!     Thank you for choosing [facility]  for your care. Our goal is always to provide you with excellent care. Hearing back from our patients is one way we can continue to improve our services. Please take a few minutes to complete the written survey that you may receive in the mail after your visit with us. Thank you!             Your Updated Medication List - Protect others around you: Learn how to safely use, store and throw away your medicines at www.disposemymeds.org.          This list is accurate as of 7/19/18  4:27 PM.  Always use your most recent med list.                   Brand Name Dispense Instructions for use Diagnosis    * albuterol (2.5 MG/3ML) 0.083% neb solution     1 Box    Take 1 vial (2.5 mg) by nebulization every 4 hours as needed for shortness of breath / dyspnea    Moderate persistent asthma, uncomplicated       * albuterol 108 (90 Base) MCG/ACT Inhaler    PROAIR HFA/PROVENTIL HFA/VENTOLIN HFA    1 Inhaler    Inhale 2 puffs into the lungs every 4 hours as needed for shortness of breath / dyspnea    Moderate persistent asthma, uncomplicated       ALPRAZolam 0.5 MG tablet    XANAX    30 tablet    Take 1-2 tablets (0.5-1  mg) by mouth 3 times daily as needed for anxiety    PTSD (post-traumatic stress disorder)       aspirin 325 MG EC tablet     90 tablet    Take 1 tablet (325 mg) by mouth daily    Type 2 diabetes mellitus without complication, without long-term current use of insulin (H)       clarithromycin 250 MG tablet    BIAXIN    20 tablet    Take 1 tablet (250 mg) by mouth 2 times daily    Acute bronchitis, unspecified organism       diltiazem 240 MG 24 hr ER beaded capsule    TIAZAC    90 capsule    Take 1 capsule (240 mg) by mouth daily    Palpitations       DULoxetine 60 MG EC capsule    CYMBALTA    180 capsule    Take 1 capsule (60 mg) by mouth 2 times daily    PTSD (post-traumatic stress disorder), Depression with anxiety       fish oil-omega-3 fatty acids 1000 MG capsule      Take 2 g by mouth 3 times daily Reported on 2/14/2017        fluticasone 50 MCG/ACT spray    FLONASE    16 g    Spray 1-2 sprays into both nostrils daily    Moderate persistent asthma with acute exacerbation       fluticasone-salmeterol 500-50 MCG/DOSE diskus inhaler    ADVAIR    3 Inhaler    Inhale 1 puff into the lungs 2 times daily    Moderate persistent asthma with acute exacerbation       gabapentin 100 MG capsule    NEURONTIN    90 capsule    Take 1 capsule (100 mg) by mouth 3 times daily    Prurigo nodularis       lisinopril 5 MG tablet    PRINIVIL/ZESTRIL    90 tablet    Take 1 tablet (5 mg) by mouth daily    Type 2 diabetes mellitus without complication, without long-term current use of insulin (H)       metFORMIN 500 MG 24 hr tablet    GLUCOPHAGE-XR    180 tablet    Take 2 tablets (1,000 mg) by mouth daily (with dinner)    Type 2 diabetes mellitus without complication, without long-term current use of insulin (H)       montelukast 10 MG tablet    SINGULAIR    90 tablet    Take 1 tablet (10 mg) by mouth At Bedtime    Moderate persistent asthma with acute exacerbation       nitroGLYcerin 0.4 MG/HR 24 hr patch    NITRODUR     Place 1 patch  onto the skin daily Reported on 2/14/2017        * Notice:  This list has 2 medication(s) that are the same as other medications prescribed for you. Read the directions carefully, and ask your doctor or other care provider to review them with you.

## 2018-07-19 NOTE — LETTER
7/19/2018         RE: Lisette Owens  [address] [place] [address]        Dear Colleague,    Thank you for referring your patient, Lisette Owens, to the [facility]. Please see a copy of my visit note below.    NB-UVB treatment for prurigo nodularis  Treatment #14  No issues   photoproection on eyes, lips, nipples  554 mj 1 minute 29 seconds today  Mineral oil applied  Goal 3 times weekly x 12 weeks    Again, thank you for allowing me to participate in the care of your patient.        Sincerely,        Tawana Rodrigez PA-C

## 2018-07-19 NOTE — PROGRESS NOTES
NB-UVB treatment for prurigo nodularis  Treatment #14  No issues   photoproection on eyes, lips, nipples  554 mj 1 minute 29 seconds today  Mineral oil applied  Goal 3 times weekly x 12 weeks

## 2018-07-25 ENCOUNTER — OFFICE VISIT (OUTPATIENT)
Dept: DERMATOLOGY | Facility: CLINIC | Age: 53
End: 2018-07-25
Payer: COMMERCIAL

## 2018-07-25 DIAGNOSIS — L28.1 PRURIGO NODULARIS: Primary | ICD-10-CM

## 2018-07-25 PROCEDURE — 96910 PHOTCHMTX TAR&UVB/PTRLTM&UVB: CPT | Performed by: PHYSICIAN ASSISTANT

## 2018-07-25 NOTE — MR AVS SNAPSHOT
After Visit Summary   7/25/2018    Lisette LINCOLN Ukaegbu    MRN: [id number]           Patient Information     Date Of Birth          1965        Visit Information        Provider Department      7/25/2018 1:15 PM Tawana Ravi PA-C [facility]        Today's Diagnoses     Prurigo nodularis    -  1       Follow-ups after your visit        Who to contact     If you have questions or need follow up information about today's clinic visit or your schedule please contact [facility] directly at [phone].  Normal or non-critical lab and imaging results will be communicated to you by Corbus Pharmaceuticalshart, letter or phone within 4 business days after the clinic has received the results. If you do not hear from us within 7 days, please contact the clinic through Crowd Factoryt or phone. If you have a critical or abnormal lab result, we will notify you by phone as soon as possible.  Submit refill requests through [email or web address] or call your pharmacy and they will forward the refill request to us. Please allow 3 business days for your refill to be completed.          Additional Information About Your Visit        MyChart Information     [email or web address] gives you secure access to your electronic health record. If you see a primary care provider, you can also send messages to your care team and make appointments. If you have questions, please call your primary care clinic.  If you do not have a primary care provider, please call [phone] and they will assist you.        Care EveryWhere ID     This is your Care EveryWhere ID. This could be used by other organizations to access your [place] medical records  [id number]        Your Vitals Were     Last Period                   05/15/2014 (Approximate)            Blood Pressure from Last 3 Encounters:   07/09/18 106/65   06/11/18 113/65   05/03/18 131/77    Weight from Last 3 Encounters:   05/03/18 84.8 kg (187 lb)   03/16/18 82.1 kg (181 lb)   03/12/18 82.4 kg  (181 lb 9.6 oz)              We Performed the Following     PHOTOCHEMOTHERAPY WITH UV-B        Primary Care Provider Office Phone # Fax #    Kolton Yin PA-C [phone] [address] [place] [address]        Equal Access to Services     [facility]: Hadii aad ku hadasho Soomaali, waaxda luqadaha, qaybta kaalmada adeegyada, waxay idiin hayaan adeeg khacaciash la'aan . So [facility] [phone].    ATENCIÓN: Si habla español, tiene a mosquera disposición servicios gratuitos de asistencia lingüística. Ll[facility] [phone].    We comply with applicable federal civil rights laws and [place] laws. We do not discriminate on the basis of race, color, national origin, age, disability, sex, sexual orientation, or gender identity.            Thank you!     Thank you for choosing [facility]  for your care. Our goal is always to provide you with excellent care. Hearing back from our patients is one way we can continue to improve our services. Please take a few minutes to complete the written survey that you may receive in the mail after your visit with us. Thank you!             Your Updated Medication List - Protect others around you: Learn how to safely use, store and throw away your medicines at www.disposemymeds.org.          This list is accurate as of 7/25/18 11:23 PM.  Always use your most recent med list.                   Brand Name Dispense Instructions for use Diagnosis    * albuterol (2.5 MG/3ML) 0.083% neb solution     1 Box    Take 1 vial (2.5 mg) by nebulization every 4 hours as needed for shortness of breath / dyspnea    Moderate persistent asthma, uncomplicated       * albuterol 108 (90 Base) MCG/ACT Inhaler    PROAIR HFA/PROVENTIL HFA/VENTOLIN HFA    1 Inhaler    Inhale 2 puffs into the lungs every 4 hours as needed for shortness of breath / dyspnea    Moderate persistent asthma, uncomplicated       ALPRAZolam 0.5 MG tablet    XANAX    30 tablet    Take 1-2 tablets (0.5-1  mg) by mouth 3 times daily as needed for anxiety    PTSD (post-traumatic stress disorder)       aspirin 325 MG EC tablet     90 tablet    Take 1 tablet (325 mg) by mouth daily    Type 2 diabetes mellitus without complication, without long-term current use of insulin (H)       clarithromycin 250 MG tablet    BIAXIN    20 tablet    Take 1 tablet (250 mg) by mouth 2 times daily    Acute bronchitis, unspecified organism       diltiazem 240 MG 24 hr ER beaded capsule    TIAZAC    90 capsule    Take 1 capsule (240 mg) by mouth daily    Palpitations       DULoxetine 60 MG EC capsule    CYMBALTA    180 capsule    Take 1 capsule (60 mg) by mouth 2 times daily    PTSD (post-traumatic stress disorder), Depression with anxiety       fish oil-omega-3 fatty acids 1000 MG capsule      Take 2 g by mouth 3 times daily Reported on 2/14/2017        fluticasone 50 MCG/ACT spray    FLONASE    16 g    Spray 1-2 sprays into both nostrils daily    Moderate persistent asthma with acute exacerbation       fluticasone-salmeterol 500-50 MCG/DOSE diskus inhaler    ADVAIR    3 Inhaler    Inhale 1 puff into the lungs 2 times daily    Moderate persistent asthma with acute exacerbation       gabapentin 100 MG capsule    NEURONTIN    90 capsule    Take 1 capsule (100 mg) by mouth 3 times daily    Prurigo nodularis       lisinopril 5 MG tablet    PRINIVIL/ZESTRIL    90 tablet    Take 1 tablet (5 mg) by mouth daily    Type 2 diabetes mellitus without complication, without long-term current use of insulin (H)       metFORMIN 500 MG 24 hr tablet    GLUCOPHAGE-XR    180 tablet    Take 2 tablets (1,000 mg) by mouth daily (with dinner)    Type 2 diabetes mellitus without complication, without long-term current use of insulin (H)       montelukast 10 MG tablet    SINGULAIR    90 tablet    Take 1 tablet (10 mg) by mouth At Bedtime    Moderate persistent asthma with acute exacerbation       nitroGLYcerin 0.4 MG/HR 24 hr patch    NITRODUR     Place 1 patch  onto the skin daily Reported on 2/14/2017        * Notice:  This list has 2 medication(s) that are the same as other medications prescribed for you. Read the directions carefully, and ask your doctor or other care provider to review them with you.

## 2018-07-25 NOTE — LETTER
7/25/2018         RE: Lisette Owens  [address] [place] [address]        Dear Colleague,    Thank you for referring your patient, Lisette Owens, to the [facility]. Please see a copy of my visit note below.    NB-UVB treatment for prurigo nodularis  Treatment #15  No issues   photoproection on eyes, lips, nipples  598 mj 1 minute 31 seconds today  Mineral oil applied  Goal 3 times weekly x 12 weeks    Again, thank you for allowing me to participate in the care of your patient.        Sincerely,        Tawana Rodrigez PA-C

## 2018-07-26 NOTE — PROGRESS NOTES
NB-UVB treatment for prurigo nodularis  Treatment #15  No issues   photoproection on eyes, lips, nipples  598 mj 1 minute 31 seconds today  Mineral oil applied  Goal 3 times weekly x 12 weeks

## 2018-07-31 ENCOUNTER — OFFICE VISIT (OUTPATIENT)
Dept: DERMATOLOGY | Facility: CLINIC | Age: 53
End: 2018-07-31
Payer: COMMERCIAL

## 2018-07-31 DIAGNOSIS — L28.1 PRURIGO NODULARIS: Primary | ICD-10-CM

## 2018-07-31 PROCEDURE — 96910 PHOTCHMTX TAR&UVB/PTRLTM&UVB: CPT | Performed by: DERMATOLOGY

## 2018-07-31 NOTE — PROGRESS NOTES
NB-UVB treatment for prurigo nodularis  Treatment #16  No issues   photoproection on eyes, lips, nipples  640 mj 1 minute 45 seconds today  Mineral oil applied  Goal 3 times weekly x 12 weeks

## 2018-07-31 NOTE — MR AVS SNAPSHOT
After Visit Summary   7/31/2018    Lisette LINCOLN Ukaegbu    MRN: [id number]           Patient Information     Date Of Birth          1965        Visit Information        Provider Department      7/31/2018 1:30 PM Charlie Encarnacion MD [facility]        Today's Diagnoses     Prurigo nodularis    -  1       Follow-ups after your visit        Your next 10 appointments already scheduled     Aug 01, 2018  9:00 AM CDT   Return Visit with [facility] ROOM   [facility] ([facility])    [address] [place] [address] [phone]              Who to contact     If you have questions or need follow up information about today's clinic visit or your schedule please contact [facility] directly at [phone].  Normal or non-critical lab and imaging results will be communicated to you by MyChart, letter or phone within 4 business days after the clinic has received the results. If you do not hear from us within 7 days, please contact the clinic through MyChart or phone. If you have a critical or abnormal lab result, we will notify you by phone as soon as possible.  Submit refill requests through [employer] or call your pharmacy and they will forward the refill request to us. Please allow 3 business days for your refill to be completed.          Additional Information About Your Visit        MyChart Information     [employer] gives you secure access to your electronic health record. If you see a primary care provider, you can also send messages to your care team and make appointments. If you have questions, please call your primary care clinic.  If you do not have a primary care provider, please call [phone] and they will assist you.        Care EveryWhere ID     This is your Care EveryWhere ID. This could be used by other organizations to access your [place] medical records  [id number]        Your Vitals Were     Last Period                    05/15/2014 (Approximate)            Blood Pressure from Last 3 Encounters:   07/09/18 106/65   06/11/18 113/65   05/03/18 131/77    Weight from Last 3 Encounters:   05/03/18 84.8 kg (187 lb)   03/16/18 82.1 kg (181 lb)   03/12/18 82.4 kg (181 lb 9.6 oz)              We Performed the Following     CHARGE - PHOTOCHEMOTHERAPY WITH UV-B        Primary Care Provider Office Phone # Fax #    Koltongisel Yin PA-C [phone]       [address]  [place] [address]        Equal Access to Services     [facility]: Hadii aad ku hadasho Sofaisal, waaxda luqadaha, qaybta kaalmada adebernardinoyada, waxfrantz tuttle . So [facility] [phone].    ATENCIÓN: Si habla español, tiene a mosquera disposición servicios gratuitos de asistencia lingüística. Ll[facility] [phone].    We comply with applicable federal civil rights laws and [place] laws. We do not discriminate on the basis of race, color, national origin, age, disability, sex, sexual orientation, or gender identity.            Thank you!     Thank you for choosing [facility]  for your care. Our goal is always to provide you with excellent care. Hearing back from our patients is one way we can continue to improve our services. Please take a few minutes to complete the written survey that you may receive in the mail after your visit with us. Thank you!             Your Updated Medication List - Protect others around you: Learn how to safely use, store and throw away your medicines at www.disposemymeds.org.          This list is accurate as of 7/31/18  2:02 PM.  Always use your most recent med list.                   Brand Name Dispense Instructions for use Diagnosis    * albuterol (2.5 MG/3ML) 0.083% neb solution     1 Box    Take 1 vial (2.5 mg) by nebulization every 4 hours as needed for shortness of breath / dyspnea    Moderate persistent asthma, uncomplicated       * albuterol 108 (90 Base) MCG/ACT Inhaler    PROAIR  HFA/PROVENTIL HFA/VENTOLIN HFA    1 Inhaler    Inhale 2 puffs into the lungs every 4 hours as needed for shortness of breath / dyspnea    Moderate persistent asthma, uncomplicated       ALPRAZolam 0.5 MG tablet    XANAX    30 tablet    Take 1-2 tablets (0.5-1 mg) by mouth 3 times daily as needed for anxiety    PTSD (post-traumatic stress disorder)       aspirin 325 MG EC tablet     90 tablet    Take 1 tablet (325 mg) by mouth daily    Type 2 diabetes mellitus without complication, without long-term current use of insulin (H)       clarithromycin 250 MG tablet    BIAXIN    20 tablet    Take 1 tablet (250 mg) by mouth 2 times daily    Acute bronchitis, unspecified organism       diltiazem 240 MG 24 hr ER beaded capsule    TIAZAC    90 capsule    Take 1 capsule (240 mg) by mouth daily    Palpitations       DULoxetine 60 MG EC capsule    CYMBALTA    180 capsule    Take 1 capsule (60 mg) by mouth 2 times daily    PTSD (post-traumatic stress disorder), Depression with anxiety       fish oil-omega-3 fatty acids 1000 MG capsule      Take 2 g by mouth 3 times daily Reported on 2/14/2017        fluticasone 50 MCG/ACT spray    FLONASE    16 g    Spray 1-2 sprays into both nostrils daily    Moderate persistent asthma with acute exacerbation       fluticasone-salmeterol 500-50 MCG/DOSE diskus inhaler    ADVAIR    3 Inhaler    Inhale 1 puff into the lungs 2 times daily    Moderate persistent asthma with acute exacerbation       gabapentin 100 MG capsule    NEURONTIN    90 capsule    Take 1 capsule (100 mg) by mouth 3 times daily    Prurigo nodularis       lisinopril 5 MG tablet    PRINIVIL/ZESTRIL    90 tablet    Take 1 tablet (5 mg) by mouth daily    Type 2 diabetes mellitus without complication, without long-term current use of insulin (H)       metFORMIN 500 MG 24 hr tablet    GLUCOPHAGE-XR    180 tablet    Take 2 tablets (1,000 mg) by mouth daily (with dinner)    Type 2 diabetes mellitus without complication, without  long-term current use of insulin (H)       montelukast 10 MG tablet    SINGULAIR    90 tablet    Take 1 tablet (10 mg) by mouth At Bedtime    Moderate persistent asthma with acute exacerbation       nitroGLYcerin 0.4 MG/HR 24 hr patch    NITRODUR     Place 1 patch onto the skin daily Reported on 2/14/2017        * Notice:  This list has 2 medication(s) that are the same as other medications prescribed for you. Read the directions carefully, and ask your doctor or other care provider to review them with you.

## 2018-07-31 NOTE — LETTER
7/31/2018         RE: Lisette Owens  [address] [place] [address]        Dear Colleague,    Thank you for referring your patient, Lisette Owens, to the [facility]. Please see a copy of my visit note below.    NB-UVB treatment for prurigo nodularis  Treatment #16  No issues   photoproection on eyes, lips, nipples  640 mj 1 minute 45 seconds today  Mineral oil applied  Goal 3 times weekly x 12 weeks    Again, thank you for allowing me to participate in the care of your patient.        Sincerely,        Charlie Encarnacion MD

## 2018-08-01 ENCOUNTER — TELEPHONE (OUTPATIENT)
Dept: DERMATOLOGY | Facility: CLINIC | Age: 53
End: 2018-08-01

## 2018-08-01 NOTE — TELEPHONE ENCOUNTER
Reason for call:  Patient reporting a symptom    Symptom or request: rash    Duration (how long have symptoms been present): x 1 day     Have you been treated for this before? No    Additional comments: pt calling stating she did lightbox for 1 min 45 sec yesterday. She broke out in a rash on arms, front area and back. Itchy and little bumps. Took a benadryl and used a lotion. Rash is dissipating today and canceled lightbox appt for today. Pt states she does break out form being in the sun    Phone Number patient can be reached at:  Home number on file [phone] (home)    Best Time:  Any     Can we leave a detailed message on this number:  YES    Call taken on 8/1/2018 at 9:00 AM by Brenda Ambriz

## 2018-08-01 NOTE — TELEPHONE ENCOUNTER
Message left to return call.     Dr. Encarnacion consulted and that is fine, patient should be sure to tell Provider when she comes in M 8-6-18 for her next lightbox treatment so settings can be adjusted.     Meena Ryan RN

## 2018-08-03 NOTE — TELEPHONE ENCOUNTER
Spoke to patient and advised to let Provider know when she is seen on M 8-6-18. Patient verbalized understanding. Meena Ryan RN

## 2018-08-07 ENCOUNTER — OFFICE VISIT (OUTPATIENT)
Dept: DERMATOLOGY | Facility: CLINIC | Age: 53
End: 2018-08-07
Payer: COMMERCIAL

## 2018-08-07 DIAGNOSIS — L28.1 PRURIGO NODULARIS: Primary | ICD-10-CM

## 2018-08-07 PROCEDURE — 96910 PHOTCHMTX TAR&UVB/PTRLTM&UVB: CPT | Performed by: PHYSICIAN ASSISTANT

## 2018-08-07 NOTE — MR AVS SNAPSHOT
After Visit Summary   8/7/2018    Lisette LINCOLN Ukaegbu    MRN: [id number]           Patient Information     Date Of Birth          1965        Visit Information        Provider Department      8/7/2018 2:00 PM Tawana Ravi PA-C [facility]        Today's Diagnoses     Prurigo nodularis    -  1       Follow-ups after your visit        Who to contact     If you have questions or need follow up information about today's clinic visit or your schedule please contact [facility] directly at [phone].  Normal or non-critical lab and imaging results will be communicated to you by Comet Solutionshart, letter or phone within 4 business days after the clinic has received the results. If you do not hear from us within 7 days, please contact the clinic through Playmaticst or phone. If you have a critical or abnormal lab result, we will notify you by phone as soon as possible.  Submit refill requests through [facility] or call your pharmacy and they will forward the refill request to us. Please allow 3 business days for your refill to be completed.          Additional Information About Your Visit        MyChart Information     [facility] gives you secure access to your electronic health record. If you see a primary care provider, you can also send messages to your care team and make appointments. If you have questions, please call your primary care clinic.  If you do not have a primary care provider, please call [phone] and they will assist you.        Care EveryWhere ID     This is your Care EveryWhere ID. This could be used by other organizations to access your [place] medical records  [id number]        Your Vitals Were     Last Period                   05/15/2014 (Approximate)            Blood Pressure from Last 3 Encounters:   07/09/18 106/65   06/11/18 113/65   05/03/18 131/77    Weight from Last 3 Encounters:   05/03/18 84.8 kg (187 lb)   03/16/18 82.1 kg (181 lb)   03/12/18 82.4 kg  (181 lb 9.6 oz)              We Performed the Following     PHOTOCHEMOTHERAPY WITH UV-B        Primary Care Provider Office Phone # Fax #    Kolton Yin PA-C [phone] [address] [place] [id number]        Equal Access to Services     [facility]: Hadii aad ku hadasho Soomaali, waaxda luqadaha, qaybta kaalmada adeegyada, waxay idiin hayaan adeeg khacaciash la'aan . So [facility] [phone].    ATENCIÓN: Si habla español, tiene a mosquera disposición servicios gratuitos de asistencia lingüística. [facility] [phone].    We comply with applicable federal civil rights laws and [place] laws. We do not discriminate on the basis of race, color, national origin, age, disability, sex, sexual orientation, or gender identity.            Thank you!     Thank you for choosing [facility]  for your care. Our goal is always to provide you with excellent care. Hearing back from our patients is one way we can continue to improve our services. Please take a few minutes to complete the written survey that you may receive in the mail after your visit with us. Thank you!             Your Updated Medication List - Protect others around you: Learn how to safely use, store and throw away your medicines at www.disposemymeds.org.          This list is accurate as of 8/7/18 11:59 PM.  Always use your most recent med list.                   Brand Name Dispense Instructions for use Diagnosis    * albuterol (2.5 MG/3ML) 0.083% neb solution     1 Box    Take 1 vial (2.5 mg) by nebulization every 4 hours as needed for shortness of breath / dyspnea    Moderate persistent asthma, uncomplicated       * albuterol 108 (90 Base) MCG/ACT inhaler    PROAIR HFA/PROVENTIL HFA/VENTOLIN HFA    1 Inhaler    Inhale 2 puffs into the lungs every 4 hours as needed for shortness of breath / dyspnea    Moderate persistent asthma, uncomplicated       ALPRAZolam 0.5 MG tablet    XANAX    30 tablet    Take 1-2 tablets (0.5-1  mg) by mouth 3 times daily as needed for anxiety    PTSD (post-traumatic stress disorder)       aspirin 325 MG EC tablet     90 tablet    Take 1 tablet (325 mg) by mouth daily    Type 2 diabetes mellitus without complication, without long-term current use of insulin (H)       clarithromycin 250 MG tablet    BIAXIN    20 tablet    Take 1 tablet (250 mg) by mouth 2 times daily    Acute bronchitis, unspecified organism       diltiazem 240 MG 24 hr ER beaded capsule    TIAZAC    90 capsule    Take 1 capsule (240 mg) by mouth daily    Palpitations       DULoxetine 60 MG EC capsule    CYMBALTA    180 capsule    Take 1 capsule (60 mg) by mouth 2 times daily    PTSD (post-traumatic stress disorder), Depression with anxiety       fish oil-omega-3 fatty acids 1000 MG capsule      Take 2 g by mouth 3 times daily Reported on 2/14/2017        fluticasone 50 MCG/ACT spray    FLONASE    16 g    Spray 1-2 sprays into both nostrils daily    Moderate persistent asthma with acute exacerbation       fluticasone-salmeterol 500-50 MCG/DOSE diskus inhaler    ADVAIR    3 Inhaler    Inhale 1 puff into the lungs 2 times daily    Moderate persistent asthma with acute exacerbation       gabapentin 100 MG capsule    NEURONTIN    90 capsule    Take 1 capsule (100 mg) by mouth 3 times daily    Prurigo nodularis       lisinopril 5 MG tablet    PRINIVIL/ZESTRIL    90 tablet    Take 1 tablet (5 mg) by mouth daily    Type 2 diabetes mellitus without complication, without long-term current use of insulin (H)       metFORMIN 500 MG 24 hr tablet    GLUCOPHAGE-XR    180 tablet    Take 2 tablets (1,000 mg) by mouth daily (with dinner)    Type 2 diabetes mellitus without complication, without long-term current use of insulin (H)       montelukast 10 MG tablet    SINGULAIR    90 tablet    Take 1 tablet (10 mg) by mouth At Bedtime    Moderate persistent asthma with acute exacerbation       nitroGLYcerin 0.4 MG/HR 24 hr patch    NITRODUR     Place 1 patch  onto the skin daily Reported on 2/14/2017        * Notice:  This list has 2 medication(s) that are the same as other medications prescribed for you. Read the directions carefully, and ask your doctor or other care provider to review them with you.

## 2018-08-07 NOTE — LETTER
8/7/2018         RE: Lisette Owens  [address] [place] [address]        Dear Colleague,    Thank you for referring your patient, Lisette Owens, to the [facility]. Please see a copy of my visit note below.    NB-UVB treatment for prurigo nodularis  Treatment #17  No issues   photoproection on eyes, lips, nipples  595 mj 1 minute 38 seconds today  Mineral oil applied  Goal 3 times weekly x 12 weeks    Again, thank you for allowing me to participate in the care of your patient.        Sincerely,        Tawana Rodrigez PA-C

## 2018-08-08 ENCOUNTER — OFFICE VISIT (OUTPATIENT)
Dept: DERMATOLOGY | Facility: CLINIC | Age: 53
End: 2018-08-08
Payer: COMMERCIAL

## 2018-08-08 DIAGNOSIS — L28.1 PRURIGO NODULARIS: Primary | ICD-10-CM

## 2018-08-08 PROCEDURE — 96910 PHOTCHMTX TAR&UVB/PTRLTM&UVB: CPT | Performed by: DERMATOLOGY

## 2018-08-08 NOTE — PROGRESS NOTES
NB-UVB treatment for prurigo nodularis  Treatment #17  No issues   photoproection on eyes, lips, nipples  640 mj 1 minute 45 seconds today  Mineral oil applied  Goal 3 times weekly x 12 weeks

## 2018-08-08 NOTE — MR AVS SNAPSHOT
After Visit Summary   8/8/2018    Lisette LINCOLN Ukaegbu    MRN: [id number]           Patient Information     Date Of Birth          1965        Visit Information        Provider Department      8/8/2018 1:30 PM Charlie Encarnacion MD [facility]        Today's Diagnoses     Prurigo nodularis    -  1       Follow-ups after your visit        Who to contact     If you have questions or need follow up information about today's clinic visit or your schedule please contact [facility] directly at [phone].  Normal or non-critical lab and imaging results will be communicated to you by Safer Minicabshart, letter or phone within 4 business days after the clinic has received the results. If you do not hear from us within 7 days, please contact the clinic through ImpactRxt or phone. If you have a critical or abnormal lab result, we will notify you by phone as soon as possible.  Submit refill requests through ViaBill or call your pharmacy and they will forward the refill request to us. Please allow 3 business days for your refill to be completed.          Additional Information About Your Visit        MyChart Information     [employer] gives you secure access to your electronic health record. If you see a primary care provider, you can also send messages to your care team and make appointments. If you have questions, please call your primary care clinic.  If you do not have a primary care provider, please call [phone] and they will assist you.        Care EveryWhere ID     This is your Care EveryWhere ID. This could be used by other organizations to access your [place] medical records  [id number]        Your Vitals Were     Last Period                   05/15/2014 (Approximate)            Blood Pressure from Last 3 Encounters:   07/09/18 106/65   06/11/18 113/65   05/03/18 131/77    Weight from Last 3 Encounters:   05/03/18 84.8 kg (187 lb)   03/16/18 82.1 kg (181 lb)   03/12/18 82.4 kg  (181 lb 9.6 oz)              We Performed the Following     CHARGE - PHOTOCHEMOTHERAPY WITH UV-B        Primary Care Provider Office Phone # Fax #    Kolton Yin PA-C [phone] [address] [place] [address]        Equal Access to Services     [facility] : Hadii aad ku hadasho Soomaali, waaxda luqadaha, qaybta kaalmada adeegyada, waxay idiin hayaan adeeg janneth laRitaradamesn . So [facility] [phone].    ATENCIÓN: Si habla español, tiene a mosquera disposición servicios gratuitos de asistencia lingüística. [facility] [phone].    We comply with applicable federal civil rights laws and [place] laws. We do not discriminate on the basis of race, color, national origin, age, disability, sex, sexual orientation, or gender identity.            Thank you!     Thank you for choosing [facility]  for your care. Our goal is always to provide you with excellent care. Hearing back from our patients is one way we can continue to improve our services. Please take a few minutes to complete the written survey that you may receive in the mail after your visit with us. Thank you!             Your Updated Medication List - Protect others around you: Learn how to safely use, store and throw away your medicines at www.disposemymeds.org.          This list is accurate as of 8/8/18  1:56 PM.  Always use your most recent med list.                   Brand Name Dispense Instructions for use Diagnosis    * albuterol (2.5 MG/3ML) 0.083% neb solution     1 Box    Take 1 vial (2.5 mg) by nebulization every 4 hours as needed for shortness of breath / dyspnea    Moderate persistent asthma, uncomplicated       * albuterol 108 (90 Base) MCG/ACT Inhaler    PROAIR HFA/PROVENTIL HFA/VENTOLIN HFA    1 Inhaler    Inhale 2 puffs into the lungs every 4 hours as needed for shortness of breath / dyspnea    Moderate persistent asthma, uncomplicated       ALPRAZolam 0.5 MG tablet    XANAX    30 tablet    Take 1-2 tablets  (0.5-1 mg) by mouth 3 times daily as needed for anxiety    PTSD (post-traumatic stress disorder)       aspirin 325 MG EC tablet     90 tablet    Take 1 tablet (325 mg) by mouth daily    Type 2 diabetes mellitus without complication, without long-term current use of insulin (H)       clarithromycin 250 MG tablet    BIAXIN    20 tablet    Take 1 tablet (250 mg) by mouth 2 times daily    Acute bronchitis, unspecified organism       diltiazem 240 MG 24 hr ER beaded capsule    TIAZAC    90 capsule    Take 1 capsule (240 mg) by mouth daily    Palpitations       DULoxetine 60 MG EC capsule    CYMBALTA    180 capsule    Take 1 capsule (60 mg) by mouth 2 times daily    PTSD (post-traumatic stress disorder), Depression with anxiety       fish oil-omega-3 fatty acids 1000 MG capsule      Take 2 g by mouth 3 times daily Reported on 2/14/2017        fluticasone 50 MCG/ACT spray    FLONASE    16 g    Spray 1-2 sprays into both nostrils daily    Moderate persistent asthma with acute exacerbation       fluticasone-salmeterol 500-50 MCG/DOSE diskus inhaler    ADVAIR    3 Inhaler    Inhale 1 puff into the lungs 2 times daily    Moderate persistent asthma with acute exacerbation       gabapentin 100 MG capsule    NEURONTIN    90 capsule    Take 1 capsule (100 mg) by mouth 3 times daily    Prurigo nodularis       lisinopril 5 MG tablet    PRINIVIL/ZESTRIL    90 tablet    Take 1 tablet (5 mg) by mouth daily    Type 2 diabetes mellitus without complication, without long-term current use of insulin (H)       metFORMIN 500 MG 24 hr tablet    GLUCOPHAGE-XR    180 tablet    Take 2 tablets (1,000 mg) by mouth daily (with dinner)    Type 2 diabetes mellitus without complication, without long-term current use of insulin (H)       montelukast 10 MG tablet    SINGULAIR    90 tablet    Take 1 tablet (10 mg) by mouth At Bedtime    Moderate persistent asthma with acute exacerbation       nitroGLYcerin 0.4 MG/HR 24 hr patch    NITRODUR     Place 1  patch onto the skin daily Reported on 2/14/2017        * Notice:  This list has 2 medication(s) that are the same as other medications prescribed for you. Read the directions carefully, and ask your doctor or other care provider to review them with you.

## 2018-08-08 NOTE — LETTER
8/8/2018         RE: Lisette Owens  [address] [place] [address]        Dear Colleague,    Thank you for referring your patient, Lisette Owens, to the [facility]. Please see a copy of my visit note below.    NB-UVB treatment for prurigo nodularis  Treatment #17  No issues   photoproection on eyes, lips, nipples  640 mj 1 minute 45 seconds today  Mineral oil applied  Goal 3 times weekly x 12 weeks    Again, thank you for allowing me to participate in the care of your patient.        Sincerely,        Charlie Encarnacion MD

## 2018-08-13 NOTE — PROGRESS NOTES
NB-UVB treatment for prurigo nodularis  Treatment #17  No issues   photoproection on eyes, lips, nipples  595 mj 1 minute 38 seconds today  Mineral oil applied  Goal 3 times weekly x 12 weeks

## 2018-08-16 ENCOUNTER — OFFICE VISIT (OUTPATIENT)
Dept: DERMATOLOGY | Facility: CLINIC | Age: 53
End: 2018-08-16
Payer: COMMERCIAL

## 2018-08-16 DIAGNOSIS — L28.1 PRURIGO NODULARIS: Primary | ICD-10-CM

## 2018-08-16 PROCEDURE — 96910 PHOTCHMTX TAR&UVB/PTRLTM&UVB: CPT | Performed by: PHYSICIAN ASSISTANT

## 2018-08-16 NOTE — PROGRESS NOTES
NB-UVB treatment for prurigo nodularis  Treatment #18  No issues   photoproection on eyes, lips, nipples  550 mj 1 minute 29 seconds today  Mineral oil applied  Goal 3 times weekly x 12 weeks

## 2018-08-16 NOTE — MR AVS SNAPSHOT
After Visit Summary   8/16/2018    Lisette LINCOLN Ukaegbu    MRN: [id number]           Patient Information     Date Of Birth          1965        Visit Information        Provider Department      8/16/2018 10:30 AM Do Bradley PA-C [facility]        Care Instructions    Proper skin care from Dr. Encarnacion- [facility]     Eliminate harsh soaps, i.e. Dial, Zest, Chinese Spring;   Use mild soaps such as Cetaphil or Dove Sensitive Skin   Avoid hot or cold showers   After showering, lightly dry off.    Aggressive use of a moisturizer (including Vanicream, Cetaphil, Aquaphor or Cerave)   We recommend using a tub that needs to be scooped out, not a pump. This has more of an oil base. It will hold moisture in your skin much better than a water base moisturizer. The ones recommended are non- pore clogging.       If you have any questions call [phone] and follow the prompts to Dr. Encarnacion's office.             Follow-ups after your visit        Your next 10 appointments already scheduled     Aug 17, 2018  1:00 PM CDT   Return Visit with [place] [facility] PROC [facility]   [facility] ([facility])    [address] [facility] [address] [phone]              Who to contact     If you have questions or need follow up information about today's clinic visit or your schedule please contact [facility] directly at [phone].  Normal or non-critical lab and imaging results will be communicated to you by MyChart, letter or phone within 4 business days after the clinic has received the results. If you do not hear from us within 7 days, please contact the clinic through HitFixhart or phone. If you have a critical or abnormal lab result, we will notify you by phone as soon as possible.  Submit refill requests through Vennli or call your pharmacy and they will forward the refill request to us. Please allow 3 business days for your refill to be completed.           Additional Information About Your Visit        MyChart Information     [employer] gives you secure access to your electronic health record. If you see a primary care provider, you can also send messages to your care team and make appointments. If you have questions, please call your primary care clinic.  If you do not have a primary care provider, please call [phone] and they will assist you.        Care EveryWhere ID     This is your Care EveryWhere ID. This could be used by other organizations to access your [place] medical records  [id number]        Your Vitals Were     Last Period                   05/15/2014 (Approximate)            Blood Pressure from Last 3 Encounters:   07/09/18 106/65   06/11/18 113/65   05/03/18 131/77    Weight from Last 3 Encounters:   05/03/18 84.8 kg (187 lb)   03/16/18 82.1 kg (181 lb)   03/12/18 82.4 kg (181 lb 9.6 oz)              Today, you had the following     No orders found for display       Primary Care Provider Office Phone # Fax #    Kolton Yin PA-C [phone]       [address] [facility] [address]        Equal Access to Services     [facility]: Hadii aad ku hadasho Soomaali, waaxda luqadaha, qaybta kaalmada adeegyada, waxay jacielin hayaan adebernardino lagunas lanohemi ah. So [facility] [phone].    ATENCIÓN: Si habla español, tiene a mosquera disposición servicios gratuitos de asistencia lingüística. Ameena al [phone].    We comply with applicable federal civil rights laws and [place] laws. We do not discriminate on the basis of race, color, national origin, age, disability, sex, sexual orientation, or gender identity.            Thank you!     Thank you for choosing [facility]  for your care. Our goal is always to provide you with excellent care. Hearing back from our patients is one way we can continue to improve our services. Please take a few minutes to complete the written survey that you may receive in the mail after your visit  with us. Thank you!             Your Updated Medication List - Protect others around you: Learn how to safely use, store and throw away your medicines at www.disposemymeds.org.          This list is accurate as of 8/16/18 10:49 AM.  Always use your most recent med list.                   Brand Name Dispense Instructions for use Diagnosis    * albuterol (2.5 MG/3ML) 0.083% neb solution     1 Box    Take 1 vial (2.5 mg) by nebulization every 4 hours as needed for shortness of breath / dyspnea    Moderate persistent asthma, uncomplicated       * albuterol 108 (90 Base) MCG/ACT inhaler    PROAIR HFA/PROVENTIL HFA/VENTOLIN HFA    1 Inhaler    Inhale 2 puffs into the lungs every 4 hours as needed for shortness of breath / dyspnea    Moderate persistent asthma, uncomplicated       ALPRAZolam 0.5 MG tablet    XANAX    30 tablet    Take 1-2 tablets (0.5-1 mg) by mouth 3 times daily as needed for anxiety    PTSD (post-traumatic stress disorder)       aspirin 325 MG EC tablet     90 tablet    Take 1 tablet (325 mg) by mouth daily    Type 2 diabetes mellitus without complication, without long-term current use of insulin (H)       clarithromycin 250 MG tablet    BIAXIN    20 tablet    Take 1 tablet (250 mg) by mouth 2 times daily    Acute bronchitis, unspecified organism       diltiazem 240 MG 24 hr ER beaded capsule    TIAZAC    90 capsule    Take 1 capsule (240 mg) by mouth daily    Palpitations       DULoxetine 60 MG EC capsule    CYMBALTA    180 capsule    Take 1 capsule (60 mg) by mouth 2 times daily    PTSD (post-traumatic stress disorder), Depression with anxiety       fish oil-omega-3 fatty acids 1000 MG capsule      Take 2 g by mouth 3 times daily Reported on 2/14/2017        fluticasone 50 MCG/ACT spray    FLONASE    16 g    Spray 1-2 sprays into both nostrils daily    Moderate persistent asthma with acute exacerbation       fluticasone-salmeterol 500-50 MCG/DOSE diskus inhaler    ADVAIR    3 Inhaler    Inhale 1 puff  into the lungs 2 times daily    Moderate persistent asthma with acute exacerbation       gabapentin 100 MG capsule    NEURONTIN    90 capsule    Take 1 capsule (100 mg) by mouth 3 times daily    Prurigo nodularis       lisinopril 5 MG tablet    PRINIVIL/ZESTRIL    90 tablet    Take 1 tablet (5 mg) by mouth daily    Type 2 diabetes mellitus without complication, without long-term current use of insulin (H)       metFORMIN 500 MG 24 hr tablet    GLUCOPHAGE-XR    180 tablet    Take 2 tablets (1,000 mg) by mouth daily (with dinner)    Type 2 diabetes mellitus without complication, without long-term current use of insulin (H)       montelukast 10 MG tablet    SINGULAIR    90 tablet    Take 1 tablet (10 mg) by mouth At Bedtime    Moderate persistent asthma with acute exacerbation       nitroGLYcerin 0.4 MG/HR 24 hr patch    NITRODUR     Place 1 patch onto the skin daily Reported on 2/14/2017        * Notice:  This list has 2 medication(s) that are the same as other medications prescribed for you. Read the directions carefully, and ask your doctor or other care provider to review them with you.

## 2018-08-16 NOTE — LETTER
8/16/2018         RE: Lisette Owens  [address] [place] [address]        Dear Colleague,    Thank you for referring your patient, Lisette Owens, to the [facility]. Please see a copy of my visit note below.    NB-UVB treatment for prurigo nodularis  Treatment #18  No issues   photoproection on eyes, lips, nipples  550 mj 1 minute 29 seconds today  Mineral oil applied  Goal 3 times weekly x 12 weeks    Again, thank you for allowing me to participate in the care of your patient.        Sincerely,        Do Cisse PA-C

## 2018-08-16 NOTE — PATIENT INSTRUCTIONS
Proper skin care from Dr. Encarnacion- [facility]     Eliminate harsh soaps, i.e. Dial, Zest, Coni Spring;   Use mild soaps such as Cetaphil or Dove Sensitive Skin   Avoid hot or cold showers   After showering, lightly dry off.    Aggressive use of a moisturizer (including Vanicream, Cetaphil, Aquaphor or Cerave)   We recommend using a tub that needs to be scooped out, not a pump. This has more of an oil base. It will hold moisture in your skin much better than a water base moisturizer. The ones recommended are non- pore clogging.       If you have any questions call [phone] and follow the prompts to Dr. Encarnacion's office.

## 2018-08-31 ENCOUNTER — MYC MEDICAL ADVICE (OUTPATIENT)
Dept: DERMATOLOGY | Facility: CLINIC | Age: 53
End: 2018-08-31

## 2018-09-19 ENCOUNTER — RADIANT APPOINTMENT (OUTPATIENT)
Dept: GENERAL RADIOLOGY | Facility: CLINIC | Age: 53
End: 2018-09-19
Attending: PODIATRIST
Payer: COMMERCIAL

## 2018-09-19 ENCOUNTER — OFFICE VISIT (OUTPATIENT)
Dept: PODIATRY | Facility: CLINIC | Age: 53
End: 2018-09-19
Payer: COMMERCIAL

## 2018-09-19 VITALS
SYSTOLIC BLOOD PRESSURE: 120 MMHG | HEART RATE: 77 BPM | WEIGHT: 194 LBS | DIASTOLIC BLOOD PRESSURE: 70 MMHG | BODY MASS INDEX: 30.61 KG/M2

## 2018-09-19 DIAGNOSIS — M25.774 METATARSAL BOSS OF RIGHT FOOT: ICD-10-CM

## 2018-09-19 DIAGNOSIS — M25.774 METATARSAL BOSS OF RIGHT FOOT: Primary | ICD-10-CM

## 2018-09-19 PROCEDURE — 99244 OFF/OP CNSLTJ NEW/EST MOD 40: CPT | Performed by: PODIATRIST

## 2018-09-19 PROCEDURE — 73630 X-RAY EXAM OF FOOT: CPT | Mod: RT

## 2018-09-19 NOTE — PATIENT INSTRUCTIONS
We wish you continued good healing. If you have any questions or concerns, please do not hesitate to contact us at [phone]    Please remember to call and schedule a follow up appointment if one was recommended at your earliest convenience.   PODIATRY CLINIC HOURS  TELEPHONE NUMBER    Dr. Will Barraza D.P.[facility]    Clinics:  [facility]    [facility]   Tuesday 1PM-6PM  [place]  Wednesday 7AM-2PM  [facility]  Thursday 10AM-6PM  [place]  Friday 7AM-3PM  [place]  Specialty schedulers:   [phone] to make an appointment with any Specialty Provider.        Urgent Care locations:    [facility] Monday-Friday 5 pm - 9 pm. Saturday-Sunday 9 am -5pm    Monday-Friday 11 am - 9 pm Saturday 9 am - 5 pm     Monday-Sunday 12 noon-8PM [phone]     If you need a medication refill, please contact us you may need lab work and/or a follow up visit prior to your refill (i.e. Antifungal medications).    [employer] (secure e-mail communication and access to your chart) to send a message or to make an appointment.    If MRI needed please call [place] at [phone]        Weight management plan: Patient was referred to their PCP to discuss a diet and exercise plan.     Patient to follow up with Primary Care provider regarding elevated blood pressure.

## 2018-09-19 NOTE — LETTER
[facility] [place] [facility]  [id number] [place] [id number] [phone]          October 2, 2018    RE:  Lisette LINCOLN Roman                                                                                                                                                       [address] [place] [address]            To whom it may concern:    Lisette Arroyoyulisacathryn is under my professional care for Metatarsal boss of right foot removal done on 10/2/18.  No work 10/2/18-10/5/15      Sincerely,        Will Barraza DPM, FACFAS

## 2018-09-19 NOTE — MR AVS SNAPSHOT
After Visit Summary   9/19/2018    Lisette LINCOLN Ukaegbu    MRN: [id number]           Patient Information     Date Of Birth          1965        Visit Information        Provider Department      9/19/2018 9:00 AM Will Barraza DPM [facility]        Today's Diagnoses     Metatarsal boss of right foot    -  1      Care Instructions    We wish you continued good healing. If you have any questions or concerns, please do not hesitate to contact us at [phone]    Please remember to call and schedule a follow up appointment if one was recommended at your earliest convenience.   PODIATRY CLINIC HOURS  TELEPHONE NUMBER    Dr. Will MARKPMAGALI FAC FAS    Clinics:  [facility]    [facility]   Tuesday 1PM-6PM  [place]  Wednesday 7AM-2PM  [place]  Thursday 10AM-6PM  [place]  Friday 7AM-3PM  [place]  Specialty schedulers:   [phone] to make an appointment with any Specialty Provider.        Urgent Care locations:    [facility] Monday-Friday 5 pm - 9 pm. Saturday-Sunday 9 am -5pm    Monday-Friday 11 am - 9 pm Saturday 9 am - 5 pm     Monday-Sunday 12 noon-8PM [phone]     If you need a medication refill, please contact us you may need lab work and/or a follow up visit prior to your refill (i.e. Antifungal medications).    [employer] (secure e-mail communication and access to your chart) to send a message or to make an appointment.    If MRI needed please call [facility] at [phone]        Weight management plan: Patient was referred to their PCP to discuss a diet and exercise plan.     Patient to follow up with Primary Care provider regarding elevated blood pressure.              Follow-ups after your visit        Your next 10 appointments already scheduled     Sep 27, 2018  3:40 PM CDT   Pre-Op physical with Kolton Andrews  DOMINIC Yin   [facility] [place] ([facility])    [address]  [place] [address] [phone]            Oct 05, 2018 12:45 PM CDT   Return Visit with Will Barraza DPM   [facility] ([facility])    [address] [address] [phone]              Who to contact     If you have questions or need follow up information about today's clinic visit or your schedule please contact [facility] directly at [phone].  Normal or non-critical lab and imaging results will be communicated to you by Instacarthart, letter or phone within 4 business days after the clinic has received the results. If you do not hear from us within 7 days, please contact the clinic through Instacarthart or phone. If you have a critical or abnormal lab result, we will notify you by phone as soon as possible.  Submit refill requests through [employer]Up or call your pharmacy and they will forward the refill request to us. Please allow 3 business days for your refill to be completed.          Additional Information About Your Visit        Instacarthar[employer]Match Information     [employer] gives you secure access to your electronic health record. If you see a primary care provider, you can also send messages to your care team and make appointments. If you have questions, please call your primary care clinic.  If you do not have a primary care provider, please call [phone] and they will assist you.        Care EveryWhere ID     This is your Care EveryWhere ID. This could be used by other organizations to access your [facility] medical records  [id number]        Your Vitals Were     Pulse Last Period BMI (Body Mass Index)             77 05/15/2014 (Approximate) 30.61 kg/m2          Blood Pressure from Last 3 Encounters:   09/19/18 120/70   07/09/18 106/65   06/11/18 113/65    Weight from Last 3 Encounters:   09/19/18 194 lb (88 kg)   05/03/18 187  lb (84.8 kg)   03/16/18 181 lb (82.1 kg)              We Performed the Following     Amy-Operative Worksheet - Foot/Ankle        Primary Care Provider Office Phone # Fax #    Kolton Yin PA-C [phone]       [address] [place] [address]        Equal Access to Services     [facility]: Hadii aad ku hadasho Soomaali, waaxda luqadaha, qaybta kaalmada adeegyada, waxay idiin hayaan adeeg kharash la'aan . So [facility] [phone].    ATENCIÓN: Si habla español, tiene a mosquera disposición servicios gratuitos de asistencia lingüística. Llame al [phone].    We comply with applicable federal civil rights laws and [place] laws. We do not discriminate on the basis of race, color, national origin, age, disability, sex, sexual orientation, or gender identity.            Thank you!     Thank you for choosing [facility]  for your care. Our goal is always to provide you with excellent care. Hearing back from our patients is one way we can continue to improve our services. Please take a few minutes to complete the written survey that you may receive in the mail after your visit with us. Thank you!             Your Updated Medication List - Protect others around you: Learn how to safely use, store and throw away your medicines at www.disposemymeds.org.          This list is accurate as of 9/19/18 11:59 PM.  Always use your most recent med list.                   Brand Name Dispense Instructions for use Diagnosis    * albuterol (2.5 MG/3ML) 0.083% neb solution     1 Box    Take 1 vial (2.5 mg) by nebulization every 4 hours as needed for shortness of breath / dyspnea    Moderate persistent asthma, uncomplicated       * albuterol 108 (90 Base) MCG/ACT inhaler    PROAIR HFA/PROVENTIL HFA/VENTOLIN HFA    1 Inhaler    Inhale 2 puffs into the lungs every 4 hours as needed for shortness of breath / dyspnea    Moderate persistent asthma, uncomplicated       ALPRAZolam 0.5 MG tablet    XANAX    30  tablet    Take 1-2 tablets (0.5-1 mg) by mouth 3 times daily as needed for anxiety    PTSD (post-traumatic stress disorder)       aspirin 325 MG EC tablet     90 tablet    Take 1 tablet (325 mg) by mouth daily    Type 2 diabetes mellitus without complication, without long-term current use of insulin (H)       diltiazem 240 MG 24 hr ER beaded capsule    TIAZAC    90 capsule    Take 1 capsule (240 mg) by mouth daily    Palpitations       DULoxetine 60 MG EC capsule    CYMBALTA    180 capsule    Take 1 capsule (60 mg) by mouth 2 times daily    PTSD (post-traumatic stress disorder), Depression with anxiety       fish oil-omega-3 fatty acids 1000 MG capsule      Take 2 g by mouth 3 times daily Reported on 2/14/2017        fluticasone 50 MCG/ACT spray    FLONASE    16 g    Spray 1-2 sprays into both nostrils daily    Moderate persistent asthma with acute exacerbation       fluticasone-salmeterol 500-50 MCG/DOSE diskus inhaler    ADVAIR    3 Inhaler    Inhale 1 puff into the lungs 2 times daily    Moderate persistent asthma with acute exacerbation       lisinopril 5 MG tablet    PRINIVIL/ZESTRIL    90 tablet    Take 1 tablet (5 mg) by mouth daily    Type 2 diabetes mellitus without complication, without long-term current use of insulin (H)       metFORMIN 500 MG 24 hr tablet    GLUCOPHAGE-XR    180 tablet    Take 2 tablets (1,000 mg) by mouth daily (with dinner)    Type 2 diabetes mellitus without complication, without long-term current use of insulin (H)       montelukast 10 MG tablet    SINGULAIR    90 tablet    Take 1 tablet (10 mg) by mouth At Bedtime    Moderate persistent asthma with acute exacerbation       nitroGLYcerin 0.4 MG/HR 24 hr patch    NITRODUR     Place 1 patch onto the skin daily Reported on 2/14/2017        * Notice:  This list has 2 medication(s) that are the same as other medications prescribed for you. Read the directions carefully, and ask your doctor or other care provider to review them with you.

## 2018-09-19 NOTE — LETTER
9/19/2018         RE: Lisette Owens  [address] [place] [address]        Dear Colleague,    Thank you for referring your patient, Lisette Owens, to the [facility]. Please see a copy of my visit note below.    S:  Patient seen today in consult from Kolton Yin and complains of foot pain.  Points to dorsum of right first tarsometatarsal joint.  Has had this for 3 years.  Describes it as a burning pain.  Aggrevated by activity and relieved by rest.  No shoe gear pressing on this then no pain.  Patient works as [employer] .  She notices that she is always putting more pressure on her right foot.  Had small bump starting on the left foot in the same place and then resolved.  Patient has diabetes.  History of vitamin D deficiency.  Has never smoked.  Patient states that now almost impossible to wear shoes and would like to have this removed.  Both her mother and sister have diabetes.    ROS:  A 10-point review of systems was performed and is positive for that noted in the HPI and as seen below.  All other areas are negative.        Allergies   Allergen Reactions     Ampicillin Rash     Cipro [Quinolones] Anaphylaxis     Macrobid [Nitrofuran Derivatives] GI Disturbance     Nitrofurantoin      Other reaction(s): Vomiting     Ampicillin Rash     childhood reaction     Ciprofloxacin Itching and Rash       Current Outpatient Prescriptions   Medication Sig Dispense Refill     albuterol (2.5 MG/3ML) 0.083% neb solution Take 1 vial (2.5 mg) by nebulization every 4 hours as needed for shortness of breath / dyspnea 1 Box 1     albuterol (PROAIR HFA/PROVENTIL HFA/VENTOLIN HFA) 108 (90 Base) MCG/ACT Inhaler Inhale 2 puffs into the lungs every 4 hours as needed for shortness of breath / dyspnea 1 Inhaler 11     ALPRAZolam (XANAX) 0.5 MG tablet Take 1-2 tablets (0.5-1 mg) by mouth 3 times daily as needed for anxiety 30 tablet 1     aspirin  MG EC tablet Take 1 tablet (325 mg) by  mouth daily 90 tablet 3     diltiazem (TIAZAC) 240 MG 24 hr ER beaded capsule Take 1 capsule (240 mg) by mouth daily 90 capsule 1     DULoxetine (CYMBALTA) 60 MG EC capsule Take 1 capsule (60 mg) by mouth 2 times daily 180 capsule 3     fish oil-omega-3 fatty acids (FISH OIL) 1000 MG capsule Take 2 g by mouth 3 times daily Reported on 2/14/2017       fluticasone (FLONASE) 50 MCG/ACT spray Spray 1-2 sprays into both nostrils daily 16 g 5     fluticasone-salmeterol (ADVAIR) 500-50 MCG/DOSE diskus inhaler Inhale 1 puff into the lungs 2 times daily 3 Inhaler 11     lisinopril (PRINIVIL/ZESTRIL) 5 MG tablet Take 1 tablet (5 mg) by mouth daily 90 tablet 3     metFORMIN (GLUCOPHAGE-XR) 500 MG 24 hr tablet Take 2 tablets (1,000 mg) by mouth daily (with dinner) 180 tablet 1     montelukast (SINGULAIR) 10 MG tablet Take 1 tablet (10 mg) by mouth At Bedtime 90 tablet 3     nitroglycerin (NITRODUR) 0.4 MG/HR Place 1 patch onto the skin daily Reported on 2/14/2017         Patient Active Problem List   Diagnosis     Dry eye syndrome     CARDIOVASCULAR SCREENING; LDL GOAL LESS THAN 160     Seasonal allergic rhinitis     Allergic rhinitis due to animal dander     Rhinitis, allergic to other allergen     House dust mite allergy     Moderate persistent asthma     PTSD (post-traumatic stress disorder)     Vitamin D deficiency     Hypersomnia     Esophageal reflux (GERD)     Depression with anxiety     Acute cystitis with hematuria     Type 2 diabetes mellitus without complication, without long-term current use of insulin (H)       Past Medical History:   Diagnosis Date     Allergic rhinitis due to animal dander      Amblyopia     LT     Arthritis      Colon polyp      Endometriosis      House dust mite allergy      Moderate persistent asthma      Rhinitis, allergic to other allergen      Seasonal allergic rhinitis 7/25/05 skin tests pos. for: cat/dog/horse/DM/M/T/G/RW per Dr. Granado    pt. did IT from 7/06 to 11/5/07 to:  cat/dog/DM/M/T/G/W dc'd per self.      Type 2 diabetes mellitus without complication, without long-term current use of insulin (H) 3/13/2017       Past Surgical History:   Procedure Laterality Date     HYSTEROSCOPY         Family History   Problem Relation Age of Onset     Hypertension Mother      Alzheimer Disease Mother      Diabetes Mother      Hypertension Father      Skin Cancer Father      Alzheimer Disease Paternal Grandmother      Psychotic Disorder Paternal Grandmother      bipolar     HEART DISEASE Paternal Grandfather      Breast Cancer Maternal Grandmother      Thyroid Disease Brother      Thyroid Disease Sister      Diabetes Sister      Skin Cancer Sister      Cerebrovascular Disease No family hx of      Glaucoma No family hx of      Macular Degeneration No family hx of        Social History   Substance Use Topics     Smoking status: Never Smoker     Smokeless tobacco: Never Used     Alcohol use No         O: /70 (BP Location: Right arm)  Pulse 77  Wt 194 lb (88 kg)  LMP 05/15/2014 (Approximate)  BMI 30.61 kg/m2.      Constitutional/ general:  Pt is in no apparent distress, appears well-nourished.  Cooperative with history and physical exam.     Psych:  The patient answered questions appropriately.  Normal affect.  Seems to have reasonable expectations, in terms of treatment.     Eyes:  Visual scanning/ tracking without deficit.     Ears:  Response to auditory stimuli is normal.  negative hearing aid devices.  Auricles in proper alignment.     Lymphatic:  Popliteal lymph nodes not enlarged.     Lungs:  Non labored breathing, non labored speech. No cough.  No audible wheezing. Even, quiet breathing.       Vascular:  positive pedal pulses bilaterally for both the DP and PT arteries.  CFT < 3 sec.  negative ankle edema.  positive pedal hair growth.    Neuro:  Alert and oriented x 3. Coordinated gait.  Light touch sensation is intact to the L4, L5, S1 distributions. No obvious deficits.  No  evidence of neurological-based weakness, spasticity, or contracture in the lower extremities.     Derm: Normal texture and turgor.  No erythema, ecchymosis, or cyanosis.      Musculoskeletal: Cavus arch with weightbearing.  No forefoot or rear foot deformities noted.  MS 5/5 all compartments.  Normal ROM all fore foot and rearfoot joints.  No pain with range of motion.  Prominence noted on the dorsum of the right first tarsometatarsal joint.  No pain with range of motion of the right first tarsometatarsal joint.  No pain with palpation dorsally.  No signs of soft tissue mass.  With palpation no immediate numbness noted.  No pain with stressing any muscle compartments.  No erythema edema or ecchymosis or masses noted.      X-rays-signs consistent with a cavus foot.  Dorsal bossing noted at the first tarsal metatarsal joint but no obvious underlying arthritis.    Hemoglobin A1C 6.0 (H) 0 - 5.6 % Final 05/03/2018  8:02 AM BE         A: Right first tarsometatarsal joint dorsal bossing    P:  X-rays taken today.  Reviewed recent hemoglobin A1c results.  Explained to patient repetitive loading of first metatarsal head right foot has caused bossing on the dorsum of the first tarsometatarsal joint.  So painful and large discussed resection of dorsal bossing.  Same-day surgery under MAC anesthesia.  We will schedule this for [facility]/2/2018 at [facility].  Will need history and physical within 1 week of surgery explained to patient that it is possible she could have some underlying arthritis in this joint.  If her arthritis bothers her in the future than joint fusion would be the next step.  Discussed with patient after surgery she should wear a stiff shoe at all times after this heals to help offload the first metatarsal head.  Also try to distribute weight more evenly putting some pressure more on the left foot than the right once his heels.  Other complications such as numbness infection and continued pain  possibilities.  We will put an order in the computer and they will call and set this up.  Thank you for allowing me to participate in the care of this patient.  RETURN TO CLINIC PRN.    Will Barraza DPM, ALFIEFAS         Again, thank you for allowing me to participate in the care of your patient.        Sincerely,        Will Barraza DPM

## 2018-09-20 ENCOUNTER — TELEPHONE (OUTPATIENT)
Dept: PODIATRY | Facility: CLINIC | Age: 53
End: 2018-09-20

## 2018-09-20 NOTE — PROGRESS NOTES
S:  Patient seen today in consult from Kolton Yin and complains of foot pain.  Points to dorsum of right first tarsometatarsal joint.  Has had this for 3 years.  Describes it as a burning pain.  Aggrevated by activity and relieved by rest.  No shoe gear pressing on this then no pain.  Patient works as [employer] .  She notices that she is always putting more pressure on her right foot.  Had small bump starting on the left foot in the same place and then resolved.  Patient has diabetes.  History of vitamin D deficiency.  Has never smoked.  Patient states that now almost impossible to wear shoes and would like to have this removed.  Both her mother and sister have diabetes.    ROS:  A 10-point review of systems was performed and is positive for that noted in the HPI and as seen below.  All other areas are negative.        Allergies   Allergen Reactions     Ampicillin Rash     Cipro [Quinolones] Anaphylaxis     Macrobid [Nitrofuran Derivatives] GI Disturbance     Nitrofurantoin      Other reaction(s): Vomiting     Ampicillin Rash     childhood reaction     Ciprofloxacin Itching and Rash       Current Outpatient Prescriptions   Medication Sig Dispense Refill     albuterol (2.5 MG/3ML) 0.083% neb solution Take 1 vial (2.5 mg) by nebulization every 4 hours as needed for shortness of breath / dyspnea 1 Box 1     albuterol (PROAIR HFA/PROVENTIL HFA/VENTOLIN HFA) 108 (90 Base) MCG/ACT Inhaler Inhale 2 puffs into the lungs every 4 hours as needed for shortness of breath / dyspnea 1 Inhaler 11     ALPRAZolam (XANAX) 0.5 MG tablet Take 1-2 tablets (0.5-1 mg) by mouth 3 times daily as needed for anxiety 30 tablet 1     aspirin  MG EC tablet Take 1 tablet (325 mg) by mouth daily 90 tablet 3     diltiazem (TIAZAC) 240 MG 24 hr ER beaded capsule Take 1 capsule (240 mg) by mouth daily 90 capsule 1     DULoxetine (CYMBALTA) 60 MG EC capsule Take 1 capsule (60 mg) by mouth 2 times daily 180 capsule 3     fish  oil-omega-3 fatty acids (FISH OIL) 1000 MG capsule Take 2 g by mouth 3 times daily Reported on 2/14/2017       fluticasone (FLONASE) 50 MCG/ACT spray Spray 1-2 sprays into both nostrils daily 16 g 5     fluticasone-salmeterol (ADVAIR) 500-50 MCG/DOSE diskus inhaler Inhale 1 puff into the lungs 2 times daily 3 Inhaler 11     lisinopril (PRINIVIL/ZESTRIL) 5 MG tablet Take 1 tablet (5 mg) by mouth daily 90 tablet 3     metFORMIN (GLUCOPHAGE-XR) 500 MG 24 hr tablet Take 2 tablets (1,000 mg) by mouth daily (with dinner) 180 tablet 1     montelukast (SINGULAIR) 10 MG tablet Take 1 tablet (10 mg) by mouth At Bedtime 90 tablet 3     nitroglycerin (NITRODUR) 0.4 MG/HR Place 1 patch onto the skin daily Reported on 2/14/2017         Patient Active Problem List   Diagnosis     Dry eye syndrome     CARDIOVASCULAR SCREENING; LDL GOAL LESS THAN 160     Seasonal allergic rhinitis     Allergic rhinitis due to animal dander     Rhinitis, allergic to other allergen     House dust mite allergy     Moderate persistent asthma     PTSD (post-traumatic stress disorder)     Vitamin D deficiency     Hypersomnia     Esophageal reflux (GERD)     Depression with anxiety     Acute cystitis with hematuria     Type 2 diabetes mellitus without complication, without long-term current use of insulin (H)       Past Medical History:   Diagnosis Date     Allergic rhinitis due to animal dander      Amblyopia     LT     Arthritis      Colon polyp      Endometriosis      House dust mite allergy      Moderate persistent asthma      Rhinitis, allergic to other allergen      Seasonal allergic rhinitis 7/25/05 skin tests pos. for: cat/dog/horse/DM/M/T/G/RW per Dr. Granado    pt. did IT from 7/06 to 11/5/07 to: cat/dog/DM/M/T/G/W dc'd per self.      Type 2 diabetes mellitus without complication, without long-term current use of insulin (H) 3/13/2017       Past Surgical History:   Procedure Laterality Date     HYSTEROSCOPY         Family History   Problem  Relation Age of Onset     Hypertension Mother      Alzheimer Disease Mother      Diabetes Mother      Hypertension Father      Skin Cancer Father      Alzheimer Disease Paternal Grandmother      Psychotic Disorder Paternal Grandmother      bipolar     HEART DISEASE Paternal Grandfather      Breast Cancer Maternal Grandmother      Thyroid Disease Brother      Thyroid Disease Sister      Diabetes Sister      Skin Cancer Sister      Cerebrovascular Disease No family hx of      Glaucoma No family hx of      Macular Degeneration No family hx of        Social History   Substance Use Topics     Smoking status: Never Smoker     Smokeless tobacco: Never Used     Alcohol use No         O: /70 (BP Location: Right arm)  Pulse 77  Wt 194 lb (88 kg)  LMP 05/15/2014 (Approximate)  BMI 30.61 kg/m2.      Constitutional/ general:  Pt is in no apparent distress, appears well-nourished.  Cooperative with history and physical exam.     Psych:  The patient answered questions appropriately.  Normal affect.  Seems to have reasonable expectations, in terms of treatment.     Eyes:  Visual scanning/ tracking without deficit.     Ears:  Response to auditory stimuli is normal.  negative hearing aid devices.  Auricles in proper alignment.     Lymphatic:  Popliteal lymph nodes not enlarged.     Lungs:  Non labored breathing, non labored speech. No cough.  No audible wheezing. Even, quiet breathing.       Vascular:  positive pedal pulses bilaterally for both the DP and PT arteries.  CFT < 3 sec.  negative ankle edema.  positive pedal hair growth.    Neuro:  Alert and oriented x 3. Coordinated gait.  Light touch sensation is intact to the L4, L5, S1 distributions. No obvious deficits.  No evidence of neurological-based weakness, spasticity, or contracture in the lower extremities.     Derm: Normal texture and turgor.  No erythema, ecchymosis, or cyanosis.      Musculoskeletal: Cavus arch with weightbearing.  No forefoot or rear foot  deformities noted.  MS 5/5 all compartments.  Normal ROM all fore foot and rearfoot joints.  No pain with range of motion.  Prominence noted on the dorsum of the right first tarsometatarsal joint.  No pain with range of motion of the right first tarsometatarsal joint.  No pain with palpation dorsally.  No signs of soft tissue mass.  With palpation no immediate numbness noted.  No pain with stressing any muscle compartments.  No erythema edema or ecchymosis or masses noted.      X-rays-signs consistent with a cavus foot.  Dorsal bossing noted at the first tarsal metatarsal joint but no obvious underlying arthritis.    Hemoglobin A1C 6.0 (H) 0 - 5.6 % Final 05/03/2018  8:02 AM BE         A: Right first tarsometatarsal joint dorsal bossing    P:  X-rays taken today.  Reviewed recent hemoglobin A1c results.  Explained to patient repetitive loading of first metatarsal head right foot has caused bossing on the dorsum of the first tarsometatarsal joint.  So painful and large discussed resection of dorsal bossing.  Same-day surgery under MAC anesthesia.  We will schedule [facility] for [facility]/2/2018 [facility].  Will need history and physical within 1 week of surgery explained to patient that it is possible she could have some underlying arthritis in this joint.  If her arthritis bothers her in the future than joint fusion would be the next step.  Discussed with patient after surgery she should wear a stiff shoe at all times after this heals to help offload the first metatarsal head.  Also try to distribute weight more evenly putting some pressure more on the left foot than the right once his heels.  Other complications such as numbness infection and continued pain possibilities.  We will put an order in the computer and they will call and set this up.  Thank you for allowing me to participate in the care of this patient.  RETURN TO CLINIC PRN.    Will Barraza DPM, FACFAS

## 2018-09-20 NOTE — TELEPHONE ENCOUNTER
Type of surgery: right foot removal of first tarsometatarsal joint dorsal bossing  12491  Metatarsal boss of right foot [M25.774]  - Primary   Location of surgery: [facility]  Date and time of surgery: 10/02/2018 / donald  Surgeon: LISET Barraza  Pre-Op Appt Date: 09/27/2018  Post-Op Appt Date: 10/05/2018   Packet sent out: Yes  Pre-cert/Authorization completed:  No prior auth required per [employer] list.   Date: 09/20/2018    Insurance valid

## 2018-09-21 NOTE — TELEPHONE ENCOUNTER
Pt calling back with #s from back of insurance card:   [employer] Providers:  Ph: [phone]  Customer Service:  Ph: [phone]    She says there are no fax #s listed on her card.     Denise Behrendt Specialty CSS

## 2018-09-21 NOTE — TELEPHONE ENCOUNTER
Called insurance company and received fax # [phone] for claims dept and appeals.  Copy of appeal letter sent.  Rhiannon URIOSTEGUI RN BSN PHN  Specialty Clinics

## 2018-09-27 ENCOUNTER — OFFICE VISIT (OUTPATIENT)
Dept: FAMILY MEDICINE | Facility: CLINIC | Age: 53
End: 2018-09-27
Payer: COMMERCIAL

## 2018-09-27 VITALS
TEMPERATURE: 97.8 F | DIASTOLIC BLOOD PRESSURE: 62 MMHG | BODY MASS INDEX: 31.18 KG/M2 | RESPIRATION RATE: 18 BRPM | HEART RATE: 68 BPM | OXYGEN SATURATION: 99 % | HEIGHT: 66 IN | WEIGHT: 194 LBS | SYSTOLIC BLOOD PRESSURE: 101 MMHG

## 2018-09-27 DIAGNOSIS — E55.9 VITAMIN D DEFICIENCY: ICD-10-CM

## 2018-09-27 DIAGNOSIS — E11.9 TYPE 2 DIABETES MELLITUS WITHOUT COMPLICATION, WITHOUT LONG-TERM CURRENT USE OF INSULIN (H): ICD-10-CM

## 2018-09-27 DIAGNOSIS — Z01.818 PREOP GENERAL PHYSICAL EXAM: Primary | ICD-10-CM

## 2018-09-27 LAB
ANION GAP SERPL CALCULATED.3IONS-SCNC: 11 MMOL/L (ref 3–14)
BASOPHILS # BLD AUTO: 0.1 10E9/L (ref 0–0.2)
BASOPHILS NFR BLD AUTO: 1.3 %
BUN SERPL-MCNC: 23 MG/DL (ref 7–30)
CALCIUM SERPL-MCNC: 9.4 MG/DL (ref 8.5–10.1)
CHLORIDE SERPL-SCNC: 104 MMOL/L (ref 94–109)
CO2 SERPL-SCNC: 25 MMOL/L (ref 20–32)
CREAT SERPL-MCNC: 0.81 MG/DL (ref 0.52–1.04)
DIFFERENTIAL METHOD BLD: NORMAL
EOSINOPHIL # BLD AUTO: 0.6 10E9/L (ref 0–0.7)
EOSINOPHIL NFR BLD AUTO: 9.4 %
ERYTHROCYTE [DISTWIDTH] IN BLOOD BY AUTOMATED COUNT: 12.5 % (ref 10–15)
GFR SERPL CREATININE-BSD FRML MDRD: 74 ML/MIN/1.7M2
GLUCOSE SERPL-MCNC: 168 MG/DL (ref 70–99)
HBA1C MFR BLD: 6.3 % (ref 0–5.6)
HCT VFR BLD AUTO: 40.4 % (ref 35–47)
HGB BLD-MCNC: 13.4 G/DL (ref 11.7–15.7)
LYMPHOCYTES # BLD AUTO: 2.5 10E9/L (ref 0.8–5.3)
LYMPHOCYTES NFR BLD AUTO: 36.5 %
MCH RBC QN AUTO: 31.1 PG (ref 26.5–33)
MCHC RBC AUTO-ENTMCNC: 33.2 G/DL (ref 31.5–36.5)
MCV RBC AUTO: 94 FL (ref 78–100)
MONOCYTES # BLD AUTO: 0.5 10E9/L (ref 0–1.3)
MONOCYTES NFR BLD AUTO: 8 %
NEUTROPHILS # BLD AUTO: 3 10E9/L (ref 1.6–8.3)
NEUTROPHILS NFR BLD AUTO: 44.8 %
PLATELET # BLD AUTO: 275 10E9/L (ref 150–450)
POTASSIUM SERPL-SCNC: 4.2 MMOL/L (ref 3.4–5.3)
RBC # BLD AUTO: 4.31 10E12/L (ref 3.8–5.2)
SODIUM SERPL-SCNC: 140 MMOL/L (ref 133–144)
WBC # BLD AUTO: 6.7 10E9/L (ref 4–11)

## 2018-09-27 PROCEDURE — 80048 BASIC METABOLIC PNL TOTAL CA: CPT | Performed by: PHYSICIAN ASSISTANT

## 2018-09-27 PROCEDURE — 36415 COLL VENOUS BLD VENIPUNCTURE: CPT | Performed by: PHYSICIAN ASSISTANT

## 2018-09-27 PROCEDURE — 82306 VITAMIN D 25 HYDROXY: CPT | Performed by: PHYSICIAN ASSISTANT

## 2018-09-27 PROCEDURE — 99215 OFFICE O/P EST HI 40 MIN: CPT | Performed by: PHYSICIAN ASSISTANT

## 2018-09-27 PROCEDURE — 93000 ELECTROCARDIOGRAM COMPLETE: CPT | Performed by: PHYSICIAN ASSISTANT

## 2018-09-27 PROCEDURE — 85025 COMPLETE CBC W/AUTO DIFF WBC: CPT | Performed by: PHYSICIAN ASSISTANT

## 2018-09-27 PROCEDURE — 83036 HEMOGLOBIN GLYCOSYLATED A1C: CPT | Performed by: PHYSICIAN ASSISTANT

## 2018-09-27 NOTE — MR AVS SNAPSHOT
After Visit Summary   9/27/2018    Lisette LINCOLN Ukaegbu    MRN: [id number]           Patient Information     Date Of Birth          1965        Visit Information        Provider Department      9/27/2018 3:40 PM Kolton Yin PA-C [facility]        Today's Diagnoses     Preop general physical exam    -  1    Type 2 diabetes mellitus without complication, without long-term current use of insulin (H)          Care Instructions      Before Your Surgery      Call your surgeon if there is any change in your health. This includes signs of a cold or flu (such as a sore throat, runny nose, cough, rash or fever).    Do not smoke, drink alcohol or take over the counter medicine (unless your surgeon or primary care doctor tells you to) for the 24 hours before and after surgery.    If you take prescribed drugs: Follow your doctor s orders about which medicines to take and which to stop until after surgery.    Eating and drinking prior to surgery: follow the instructions from your surgeon    Take a shower or bath the night before surgery. Use the soap your surgeon gave you to gently clean your skin. If you do not have soap from your surgeon, use your regular soap. Do not shave or scrub the surgery site.  Wear clean pajamas and have clean sheets on your bed.           Follow-ups after your visit        Your next 10 appointments already scheduled     Oct 02, 2018   Procedure with Will Barraza DPM   [facility] (--)    [address] [place] [id number] [phone]            Oct 02, 2018  8:15 AM CDT   XR SURGERY JORGE FLUORO L/T 5 MIN with [id number]   [facility]    [address] [place] [phone]           How do I prepare for my exam? (Food and drink instructions) No Food and Drink Restrictions.  How do I prepare for my exam? (Other instructions) You do not need to do anything special for  this exam.  What should I wear: Wear comfortable clothes.  How long does the exam take: Most scans take less than 5 minutes.  What should I bring: Bring a list of your medicines, including vitamins, minerals and over-the-counter drugs. It is safest to leave personal items at home.  Do I need a :  No  is needed.  What do I need to tell my doctor: Tell your doctor if there s any chance you are pregnant.  What should I do after the exam: No restrictions, You may resume normal activities.  What is this test: An image of a specific body part shown in shades of black and white.  Who should I call with questions: If you have any questions, please call the Imaging Department where you will have your exam. Directions, parking instructions, and other information is available on our website, [email or web address].            Oct 05, 2018 12:45 PM CDT   [facility] D[facility]   [facility]    [address] [place] [id number] [phone]              Who to contact     Normal or non-critical lab and imaging results will be communicated to you by [employer] SLhart, letter or phone within 4 business days after the clinic has received the results. If you do not hear from us within 7 days, please contact the clinic through [employer] SLhart or phone. If you have a critical or abnormal lab result, we will notify you by phone as soon as possible.  Submit refill requests through [employer]y or call your pharmacy and they will forward the refill request to us. Please allow 3 business days for your refill to be completed.          If you need to speak with a  for additional information , please call: [phone]             Additional Information About Your Visit        Simmery Information     [employer]y gives you secure access to your electronic health record. If you see a primary care provider, you can also send messages to your  "care team and make appointments. If you have questions, please call your primary care clinic.  If you do not have a primary care provider, please call [phone] and they will assist you.        Care EveryWhere ID     This is your Care EveryWhere ID. This could be used by other organizations to access your [facility] medical records  [id number]        Your Vitals Were     Pulse Temperature Respirations Height Last Period Pulse Oximetry    68 97.8  F (36.6  C) (Tympanic) 18 5' 6\" (1.676 m) 05/15/2014 (Approximate) 99%    BMI (Body Mass Index)                   31.31 kg/m2            Blood Pressure from Last 3 Encounters:   09/27/18 101/62   09/19/18 120/70   07/09/18 106/65    Weight from Last 3 Encounters:   09/27/18 194 lb (88 kg)   09/27/18 194 lb 0.1 oz (88 kg)   09/19/18 194 lb (88 kg)              We Performed the Following     Basic metabolic panel  (Ca, Cl, CO2, Creat, Gluc, K, Na, BUN)     CBC with platelets differential     EKG 12-lead complete w/read - Clinics        Primary Care Provider Office Phone # Fax #    Kolton Yin PA-C [phone]       [address]  [place] [address]        Equal Access to Services     [facility] BRICE : Hadii aad ku hadasho Soomaali, waaxda luqadaha, qaybta kaalmada adeegyada, waxay idiin hayaan jeri tuttle . So [facility] [phone].    ATENCIÓN: Si habla español, tiene a mosquera disposición servicios gratuitos de asistencia lingüística. Llame al [phone].    We comply with applicable federal civil rights laws and [place] laws. We do not discriminate on the basis of race, color, national origin, age, disability, sex, sexual orientation, or gender identity.            Thank you!     Thank you for choosing [facility] (formerly [facility]3]  for your care. Our goal is always to provide you with excellent care. Hearing back from our patients is one way we can continue to improve our services. Please take a few minutes to complete the written survey that you " may receive in the mail after your visit with us. Thank you!             Your Updated Medication List - Protect others around you: Learn how to safely use, store and throw away your medicines at www.disposemymeds.org.          This list is accurate as of 9/27/18  4:01 PM.  Always use your most recent med list.                   Brand Name Dispense Instructions for use Diagnosis    * albuterol (2.5 MG/3ML) 0.083% neb solution     1 Box    Take 1 vial (2.5 mg) by nebulization every 4 hours as needed for shortness of breath / dyspnea    Moderate persistent asthma, uncomplicated       * albuterol 108 (90 Base) MCG/ACT inhaler    PROAIR HFA/PROVENTIL HFA/VENTOLIN HFA    1 Inhaler    Inhale 2 puffs into the lungs every 4 hours as needed for shortness of breath / dyspnea    Moderate persistent asthma, uncomplicated       ALPRAZolam 0.5 MG tablet    XANAX    30 tablet    Take 1-2 tablets (0.5-1 mg) by mouth 3 times daily as needed for anxiety    PTSD (post-traumatic stress disorder)       aspirin 325 MG EC tablet     90 tablet    Take 1 tablet (325 mg) by mouth daily    Type 2 diabetes mellitus without complication, without long-term current use of insulin (H)       diltiazem 240 MG 24 hr ER beaded capsule    TIAZAC    90 capsule    Take 1 capsule (240 mg) by mouth daily    Palpitations       DULoxetine 60 MG EC capsule    CYMBALTA    180 capsule    Take 1 capsule (60 mg) by mouth 2 times daily    PTSD (post-traumatic stress disorder), Depression with anxiety       fish oil-omega-3 fatty acids 1000 MG capsule      Take 2 g by mouth 3 times daily Reported on 2/14/2017        fluticasone 50 MCG/ACT spray    FLONASE    16 g    Spray 1-2 sprays into both nostrils daily    Moderate persistent asthma with acute exacerbation       fluticasone-salmeterol 500-50 MCG/DOSE diskus inhaler    ADVAIR    3 Inhaler    Inhale 1 puff into the lungs 2 times daily    Moderate persistent asthma with acute exacerbation       lisinopril 5 MG  tablet    PRINIVIL/ZESTRIL    90 tablet    Take 1 tablet (5 mg) by mouth daily    Type 2 diabetes mellitus without complication, without long-term current use of insulin (H)       metFORMIN 500 MG 24 hr tablet    GLUCOPHAGE-XR    180 tablet    Take 2 tablets (1,000 mg) by mouth daily (with dinner)    Type 2 diabetes mellitus without complication, without long-term current use of insulin (H)       montelukast 10 MG tablet    SINGULAIR    90 tablet    Take 1 tablet (10 mg) by mouth At Bedtime    Moderate persistent asthma with acute exacerbation       nitroGLYcerin 0.4 MG/HR 24 hr patch    NITRODUR     Place 1 patch onto the skin daily Reported on 2/14/2017        * Notice:  This list has 2 medication(s) that are the same as other medications prescribed for you. Read the directions carefully, and ask your doctor or other care provider to review them with you.

## 2018-09-28 LAB — DEPRECATED CALCIDIOL+CALCIFEROL SERPL-MC: 29 UG/L (ref 20–75)

## 2018-10-02 ENCOUNTER — SURGERY (OUTPATIENT)
Age: 53
End: 2018-10-02

## 2018-10-02 ENCOUNTER — HOSPITAL ENCOUNTER (OUTPATIENT)
Facility: AMBULATORY SURGERY CENTER | Age: 53
Discharge: HOME | End: 2018-10-02
Attending: PODIATRIST | Admitting: PODIATRIST
Payer: COMMERCIAL

## 2018-10-02 ENCOUNTER — RADIANT APPOINTMENT (OUTPATIENT)
Dept: GENERAL RADIOLOGY | Facility: CLINIC | Age: 53
End: 2018-10-02
Attending: PODIATRIST
Payer: COMMERCIAL

## 2018-10-02 VITALS
OXYGEN SATURATION: 96 % | WEIGHT: 194 LBS | TEMPERATURE: 97.5 F | SYSTOLIC BLOOD PRESSURE: 139 MMHG | RESPIRATION RATE: 16 BRPM | DIASTOLIC BLOOD PRESSURE: 66 MMHG | HEIGHT: 66 IN | BODY MASS INDEX: 31.18 KG/M2

## 2018-10-02 DIAGNOSIS — R52 PAIN: ICD-10-CM

## 2018-10-02 DIAGNOSIS — M25.774 METATARSAL BOSS OF RIGHT FOOT: Primary | ICD-10-CM

## 2018-10-02 LAB — GLUCOSE BLDC GLUCOMTR-MCNC: 129 MG/DL (ref 70–99)

## 2018-10-02 PROCEDURE — 82962 GLUCOSE BLOOD TEST: CPT | Performed by: INTERNAL MEDICINE

## 2018-10-02 PROCEDURE — 28104 REMOVAL OF FOOT LESION: CPT | Mod: RT

## 2018-10-02 PROCEDURE — G8916 PT W IV AB GIVEN ON TIME: HCPCS

## 2018-10-02 PROCEDURE — 88311 DECALCIFY TISSUE: CPT | Performed by: PODIATRIST

## 2018-10-02 PROCEDURE — 88305 TISSUE EXAM BY PATHOLOGIST: CPT | Performed by: PODIATRIST

## 2018-10-02 PROCEDURE — 36415 COLL VENOUS BLD VENIPUNCTURE: CPT | Performed by: INTERNAL MEDICINE

## 2018-10-02 PROCEDURE — G8907 PT DOC NO EVENTS ON DISCHARG: HCPCS

## 2018-10-02 PROCEDURE — 28104 REMOVAL OF FOOT LESION: CPT | Mod: RT | Performed by: PODIATRIST

## 2018-10-02 RX ORDER — ALBUTEROL SULFATE 0.83 MG/ML
2.5 SOLUTION RESPIRATORY (INHALATION) EVERY 4 HOURS PRN
Status: DISCONTINUED | OUTPATIENT
Start: 2018-10-02 | End: 2018-10-03 | Stop reason: HOSPADM

## 2018-10-02 RX ORDER — GABAPENTIN 300 MG/1
300 CAPSULE ORAL ONCE
Status: COMPLETED | OUTPATIENT
Start: 2018-10-02 | End: 2018-10-02

## 2018-10-02 RX ORDER — BUPIVACAINE HYDROCHLORIDE 2.5 MG/ML
INJECTION, SOLUTION INFILTRATION; PERINEURAL PRN
Status: DISCONTINUED | OUTPATIENT
Start: 2018-10-02 | End: 2018-10-02 | Stop reason: HOSPADM

## 2018-10-02 RX ORDER — CLINDAMYCIN PHOSPHATE 900 MG/50ML
900 INJECTION, SOLUTION INTRAVENOUS SEE ADMIN INSTRUCTIONS
Status: DISCONTINUED | OUTPATIENT
Start: 2018-10-02 | End: 2018-10-03 | Stop reason: HOSPADM

## 2018-10-02 RX ORDER — CLINDAMYCIN PHOSPHATE 900 MG/50ML
900 INJECTION, SOLUTION INTRAVENOUS
Status: COMPLETED | OUTPATIENT
Start: 2018-10-02 | End: 2018-10-02

## 2018-10-02 RX ORDER — FENTANYL CITRATE 50 UG/ML
25-50 INJECTION, SOLUTION INTRAMUSCULAR; INTRAVENOUS
Status: DISCONTINUED | OUTPATIENT
Start: 2018-10-02 | End: 2018-10-03 | Stop reason: HOSPADM

## 2018-10-02 RX ORDER — ONDANSETRON 4 MG/1
4 TABLET, ORALLY DISINTEGRATING ORAL EVERY 30 MIN PRN
Status: DISCONTINUED | OUTPATIENT
Start: 2018-10-02 | End: 2018-10-03 | Stop reason: HOSPADM

## 2018-10-02 RX ORDER — LIDOCAINE 40 MG/G
CREAM TOPICAL
Status: DISCONTINUED | OUTPATIENT
Start: 2018-10-02 | End: 2018-10-03 | Stop reason: HOSPADM

## 2018-10-02 RX ORDER — MEPERIDINE HYDROCHLORIDE 25 MG/ML
12.5 INJECTION INTRAMUSCULAR; INTRAVENOUS; SUBCUTANEOUS
Status: DISCONTINUED | OUTPATIENT
Start: 2018-10-02 | End: 2018-10-03 | Stop reason: HOSPADM

## 2018-10-02 RX ORDER — LIDOCAINE HYDROCHLORIDE 10 MG/ML
INJECTION, SOLUTION EPIDURAL; INFILTRATION; INTRACAUDAL; PERINEURAL PRN
Status: DISCONTINUED | OUTPATIENT
Start: 2018-10-02 | End: 2018-10-02 | Stop reason: HOSPADM

## 2018-10-02 RX ORDER — SODIUM CHLORIDE, SODIUM LACTATE, POTASSIUM CHLORIDE, CALCIUM CHLORIDE 600; 310; 30; 20 MG/100ML; MG/100ML; MG/100ML; MG/100ML
INJECTION, SOLUTION INTRAVENOUS CONTINUOUS
Status: DISCONTINUED | OUTPATIENT
Start: 2018-10-02 | End: 2018-10-03 | Stop reason: HOSPADM

## 2018-10-02 RX ORDER — DEXAMETHASONE SODIUM PHOSPHATE 4 MG/ML
4 INJECTION, SOLUTION INTRA-ARTICULAR; INTRALESIONAL; INTRAMUSCULAR; INTRAVENOUS; SOFT TISSUE EVERY 10 MIN PRN
Status: DISCONTINUED | OUTPATIENT
Start: 2018-10-02 | End: 2018-10-03 | Stop reason: HOSPADM

## 2018-10-02 RX ORDER — METOPROLOL TARTRATE 1 MG/ML
1-2 INJECTION, SOLUTION INTRAVENOUS EVERY 5 MIN PRN
Status: DISCONTINUED | OUTPATIENT
Start: 2018-10-02 | End: 2018-10-03 | Stop reason: HOSPADM

## 2018-10-02 RX ORDER — ONDANSETRON 2 MG/ML
4 INJECTION INTRAMUSCULAR; INTRAVENOUS EVERY 30 MIN PRN
Status: DISCONTINUED | OUTPATIENT
Start: 2018-10-02 | End: 2018-10-03 | Stop reason: HOSPADM

## 2018-10-02 RX ORDER — ACETAMINOPHEN 325 MG/1
975 TABLET ORAL ONCE
Status: COMPLETED | OUTPATIENT
Start: 2018-10-02 | End: 2018-10-02

## 2018-10-02 RX ORDER — NALOXONE HYDROCHLORIDE 0.4 MG/ML
.1-.4 INJECTION, SOLUTION INTRAMUSCULAR; INTRAVENOUS; SUBCUTANEOUS
Status: DISCONTINUED | OUTPATIENT
Start: 2018-10-02 | End: 2018-10-03 | Stop reason: HOSPADM

## 2018-10-02 RX ORDER — PHYSOSTIGMINE SALICYLATE 1 MG/ML
1.2 INJECTION INTRAVENOUS
Status: DISCONTINUED | OUTPATIENT
Start: 2018-10-02 | End: 2018-10-03 | Stop reason: HOSPADM

## 2018-10-02 RX ORDER — OXYCODONE HYDROCHLORIDE 5 MG/1
10 TABLET ORAL EVERY 4 HOURS PRN
Status: DISCONTINUED | OUTPATIENT
Start: 2018-10-02 | End: 2018-10-03 | Stop reason: HOSPADM

## 2018-10-02 RX ORDER — HYDROMORPHONE HYDROCHLORIDE 1 MG/ML
.3-.5 INJECTION, SOLUTION INTRAMUSCULAR; INTRAVENOUS; SUBCUTANEOUS EVERY 10 MIN PRN
Status: DISCONTINUED | OUTPATIENT
Start: 2018-10-02 | End: 2018-10-03 | Stop reason: HOSPADM

## 2018-10-02 RX ORDER — HYDRALAZINE HYDROCHLORIDE 20 MG/ML
2.5-5 INJECTION INTRAMUSCULAR; INTRAVENOUS EVERY 10 MIN PRN
Status: DISCONTINUED | OUTPATIENT
Start: 2018-10-02 | End: 2018-10-03 | Stop reason: HOSPADM

## 2018-10-02 RX ORDER — HYDROCODONE BITARTRATE AND ACETAMINOPHEN 5; 325 MG/1; MG/1
1-2 TABLET ORAL EVERY 4 HOURS PRN
Qty: 20 TABLET | Refills: 0 | Status: SHIPPED | OUTPATIENT
Start: 2018-10-02 | End: 2020-07-23

## 2018-10-02 RX ADMIN — SODIUM CHLORIDE, SODIUM LACTATE, POTASSIUM CHLORIDE, CALCIUM CHLORIDE: 600; 310; 30; 20 INJECTION, SOLUTION INTRAVENOUS at 08:04

## 2018-10-02 RX ADMIN — LIDOCAINE HYDROCHLORIDE 8 ML: 10 INJECTION, SOLUTION EPIDURAL; INFILTRATION; INTRACAUDAL; PERINEURAL at 08:29

## 2018-10-02 RX ADMIN — GABAPENTIN 300 MG: 300 CAPSULE ORAL at 08:04

## 2018-10-02 RX ADMIN — BUPIVACAINE HYDROCHLORIDE 8 ML: 2.5 INJECTION, SOLUTION INFILTRATION; PERINEURAL at 08:29

## 2018-10-02 RX ADMIN — CLINDAMYCIN PHOSPHATE 900 MG: 900 INJECTION, SOLUTION INTRAVENOUS at 08:20

## 2018-10-02 RX ADMIN — ACETAMINOPHEN 975 MG: 325 TABLET ORAL at 08:04

## 2018-10-02 NOTE — IP AVS SNAPSHOT
[facility]    [address] [place] [address]    Phone:  [phone]                                       After Visit Summary   10/2/2018    Lisette Owens    MRN: [id number]           After Visit Summary Signature Page     I have received my discharge instructions, and my questions have been answered. I have discussed any challenges I see with this plan with the nurse or doctor.    ..........................................................................................................................................  Patient/Patient Representative Signature      ..........................................................................................................................................  Patient Representative Print Name and Relationship to Patient    ..................................................               ................................................  Date                                   Time    ..........................................................................................................................................  Reviewed by Signature/Title    ...................................................              ..............................................  Date                                               Time          22EPIC Rev 08/18

## 2018-10-02 NOTE — IP AVS SNAPSHOT
MRN:[id number]                      After Visit Summary   10/2/2018    Lisette LINCOLN Ukaegbu    MRN: [id number]           Thank you!     Thank you for choosing [facility] for your care. Our goal is always to provide you with excellent care. Hearing back from our patients is one way we can continue to improve our services. Please take a few minutes to complete the written survey that you may receive in the mail after you visit with us. Thank you!        Patient Information     Date Of Birth          1965        About your hospital stay     You were admitted on:  October 2, 2018 You last received care in the:  [facility]    You were discharged on:  October 2, 2018       Who to Call     For medical emergencies, please call 911.  For non-urgent questions about your medical care, please call your primary care provider or clinic, [phone]  For questions related to your surgery, please call your surgery clinic        Attending Provider     Provider Specialty    Will Barraza DPM Podiatry       Primary Care Provider Office Phone # Fax #    Kolton Yin PA-C [phone]      After Care Instructions     Discharge Instructions       Review discharge instructions as directed by Provider.            Elevate affected extremity           Ice to affected area       Ice pack to affected area PRN (as needed).            No dressing change       until follow up clinic appointment.            Weight bearing status - Foot flat                 Your next 10 appointments already scheduled     Oct 05, 2018 12:45 PM CDT   [facility] Will [facility]   [facility]    [address] [place] [id number] [phone]              Pending Results     No orders found from 9/30/2018 to 10/3/2018.            Admission Information     Date & Time Provider Department Dept. Phone    10/2/2018  "Will Barraza DPM [facility] [phone]      Your Vitals Were     Blood Pressure Temperature Respirations Height Weight Last Period    139/66 97.5  F (36.4  C) (Temporal) 16 1.685 m (5' 6.34\") 88 kg (194 lb 0.1 oz) 05/15/2014 (Approximate)    Pulse Oximetry BMI (Body Mass Index)                96% 30.99 kg/m2          MyChart Information     [facility] gives you secure access to your electronic health record. If you see a primary care provider, you can also send messages to your care team and make appointments. If you have questions, please call your primary care clinic.  If you do not have a primary care provider, please call [phone] and they will assist you.        Care EveryWhere ID     This is your Care EveryWhere ID. This could be used by other organizations to access your [facility] medical records  [id number]        Equal Access to Services     VERA NARVAEZ : Matteo Ricci, washania patel, qasaman kaalmapatricia morales, brice stone. So [facility] [phone].    ATENCIÓN: Si habla español, tiene a mosquera disposición servicios gratuitos de asistencia lingüística. Llame al [phone].    We comply with applicable federal civil rights laws and [place] laws. We do not discriminate on the basis of race, color, national origin, age, disability, sex, sexual orientation, or gender identity.               Review of your medicines      UNREVIEWED medicines. Ask your doctor about these medicines        Dose / Directions    * albuterol (2.5 MG/3ML) 0.083% neb solution   Used for:  Moderate persistent asthma, uncomplicated        Dose:  1 vial   Take 1 vial (2.5 mg) by nebulization every 4 hours as needed for shortness of breath / dyspnea   Quantity:  1 Box   Refills:  1       * albuterol 108 (90 Base) MCG/ACT inhaler   Commonly known as:  PROAIR HFA/PROVENTIL HFA/VENTOLIN HFA   Used for:  Moderate persistent asthma, uncomplicated        Dose:  2 puff "   Inhale 2 puffs into the lungs every 4 hours as needed for shortness of breath / dyspnea   Quantity:  1 Inhaler   Refills:  11       ALPRAZolam 0.5 MG tablet   Commonly known as:  XANAX   Used for:  PTSD (post-traumatic stress disorder)        Dose:  0.5-1 mg   Take 1-2 tablets (0.5-1 mg) by mouth 3 times daily as needed for anxiety   Quantity:  30 tablet   Refills:  1       aspirin 325 MG EC tablet   Used for:  Type 2 diabetes mellitus without complication, without long-term current use of insulin (H)        Dose:  325 mg   Take 1 tablet (325 mg) by mouth daily   Quantity:  90 tablet   Refills:  3       diltiazem 240 MG 24 hr ER beaded capsule   Commonly known as:  TIAZAC   Used for:  Palpitations        Dose:  240 mg   Take 1 capsule (240 mg) by mouth daily   Quantity:  90 capsule   Refills:  1       DULoxetine 60 MG EC capsule   Commonly known as:  CYMBALTA   Used for:  PTSD (post-traumatic stress disorder), Depression with anxiety        Dose:  60 mg   Take 1 capsule (60 mg) by mouth 2 times daily   Quantity:  180 capsule   Refills:  3       fish oil-omega-3 fatty acids 1000 MG capsule        Dose:  2 g   Take 2 g by mouth 3 times daily Reported on 2/14/2017   Refills:  0       fluticasone 50 MCG/ACT spray   Commonly known as:  FLONASE   Used for:  Moderate persistent asthma with acute exacerbation        Dose:  1-2 spray   Spray 1-2 sprays into both nostrils daily   Quantity:  16 g   Refills:  5       fluticasone-salmeterol 500-50 MCG/DOSE diskus inhaler   Commonly known as:  ADVAIR   Used for:  Moderate persistent asthma with acute exacerbation        Dose:  1 puff   Inhale 1 puff into the lungs 2 times daily   Quantity:  3 Inhaler   Refills:  11       lisinopril 5 MG tablet   Commonly known as:  PRINIVIL/ZESTRIL   Used for:  Type 2 diabetes mellitus without complication, without long-term current use of insulin (H)        Dose:  5 mg   Take 1 tablet (5 mg) by mouth daily   Quantity:  90 tablet   Refills:  3        metFORMIN 500 MG 24 hr tablet   Commonly known as:  GLUCOPHAGE-XR   Used for:  Type 2 diabetes mellitus without complication, without long-term current use of insulin (H)        Dose:  1000 mg   Take 2 tablets (1,000 mg) by mouth daily (with dinner)   Quantity:  180 tablet   Refills:  1       montelukast 10 MG tablet   Commonly known as:  SINGULAIR   Used for:  Moderate persistent asthma with acute exacerbation        Dose:  10 mg   Take 1 tablet (10 mg) by mouth At Bedtime   Quantity:  90 tablet   Refills:  3       nitroGLYcerin 0.4 MG/HR 24 hr patch   Commonly known as:  NITRODUR        Dose:  1 patch   Place 1 patch onto the skin daily Reported on 2/14/2017   Refills:  0       * Notice:  This list has 2 medication(s) that are the same as other medications prescribed for you. Read the directions carefully, and ask your doctor or other care provider to review them with you.      START taking        Dose / Directions    HYDROcodone-acetaminophen 5-325 MG per tablet   Commonly known as:  NORCO   Used for:  Metatarsal boss of right foot        Dose:  1-2 tablet   Take 1-2 tablets by mouth every 4 hours as needed (Moderate to Severe Pain)   Quantity:  20 tablet   Refills:  0            Where to get your medicines      Some of these will need a paper prescription and others can be bought over the counter. Ask your nurse if you have questions.     Bring a paper prescription for each of these medications     HYDROcodone-acetaminophen 5-325 MG per tablet                Protect others around you: Learn how to safely use, store and throw away your medicines at www.disposemymeds.org.        Information about OPIOIDS     PRESCRIPTION OPIOIDS: WHAT YOU NEED TO KNOW   We gave you an opioid (narcotic) pain medicine. It is important to manage your pain, but opioids are not always the best choice. You should first try all the other options your care team gave you. Take this medicine for as short a time (and as few doses) as  possible.    Some activities can increase your pain, such as bandage changes or therapy sessions. It may help to take your pain medicine 30 to 60 minutes before these activities. Reduce your stress by getting enough sleep, working on hobbies you enjoy and practicing relaxation or meditation. Talk to your care team about ways to manage your pain beyond prescription opioids.    These medicines have risks:    DO NOT drive when on new or higher doses of pain medicine. These medicines can affect your alertness and reaction times, and you could be arrested for driving under the influence (DUI). If you need to use opioids long-term, talk to your care team about driving.    DO NOT operate heavy machinery    DO NOT do any other dangerous activities while taking these medicines.    DO NOT drink any alcohol while taking these medicines.     If the opioid prescribed includes acetaminophen, DO NOT take with any other medicines that contain acetaminophen. Read all labels carefully. Look for the word  acetaminophen  or  Tylenol.  Ask your pharmacist if you have questions or are unsure.    You can get addicted to pain medicines, especially if you have a history of addiction (chemical, alcohol or substance dependence). Talk to your care team about ways to reduce this risk.    All opioids tend to cause constipation. Drink plenty of water and eat foods that have a lot of fiber, such as fruits, vegetables, prune juice, apple juice and high-fiber cereal. Take a laxative (Miralax, milk of magnesia, Colace, Senna) if you don t move your bowels at least every other day. Other side effects include upset stomach, sleepiness, dizziness, throwing up, tolerance (needing more of the medicine to have the same effect), physical dependence and slowed breathing.    Store your pills in a secure place, locked if possible. We will not replace any lost or stolen medicine. If you don t finish your medicine, please throw away (dispose) as directed by your  pharmacist. The [place] Pollution Control Agency has more information about safe disposal: https://www.pca.[facility].mn.us/living-green/managing-unwanted-medications             Medication List: This is a list of all your medications and when to take them. Check marks below indicate your daily home schedule. Keep this list as a reference.      Medications           Morning Afternoon Evening Bedtime As Needed    * albuterol (2.5 MG/3ML) 0.083% neb solution   Take 1 vial (2.5 mg) by nebulization every 4 hours as needed for shortness of breath / dyspnea                                * albuterol 108 (90 Base) MCG/ACT inhaler   Commonly known as:  PROAIR HFA/PROVENTIL HFA/VENTOLIN HFA   Inhale 2 puffs into the lungs every 4 hours as needed for shortness of breath / dyspnea                                ALPRAZolam 0.5 MG tablet   Commonly known as:  XANAX   Take 1-2 tablets (0.5-1 mg) by mouth 3 times daily as needed for anxiety                                aspirin 325 MG EC tablet   Take 1 tablet (325 mg) by mouth daily                                diltiazem 240 MG 24 hr ER beaded capsule   Commonly known as:  TIAZAC   Take 1 capsule (240 mg) by mouth daily                                DULoxetine 60 MG EC capsule   Commonly known as:  CYMBALTA   Take 1 capsule (60 mg) by mouth 2 times daily                                fish oil-omega-3 fatty acids 1000 MG capsule   Take 2 g by mouth 3 times daily Reported on 2/14/2017                                fluticasone 50 MCG/ACT spray   Commonly known as:  FLONASE   Spray 1-2 sprays into both nostrils daily                                fluticasone-salmeterol 500-50 MCG/DOSE diskus inhaler   Commonly known as:  ADVAIR   Inhale 1 puff into the lungs 2 times daily                                HYDROcodone-acetaminophen 5-325 MG per tablet   Commonly known as:  NORCO   Take 1-2 tablets by mouth every 4 hours as needed (Moderate to Severe Pain)                                 lisinopril 5 MG tablet   Commonly known as:  PRINIVIL/ZESTRIL   Take 1 tablet (5 mg) by mouth daily                                metFORMIN 500 MG 24 hr tablet   Commonly known as:  GLUCOPHAGE-XR   Take 2 tablets (1,000 mg) by mouth daily (with dinner)                                montelukast 10 MG tablet   Commonly known as:  SINGULAIR   Take 1 tablet (10 mg) by mouth At Bedtime                                nitroGLYcerin 0.4 MG/HR 24 hr patch   Commonly known as:  NITRODUR   Place 1 patch onto the skin daily Reported on 2/14/2017                                * Notice:  This list has 2 medication(s) that are the same as other medications prescribed for you. Read the directions carefully, and ask your doctor or other care provider to review them with you.

## 2018-10-02 NOTE — OP NOTE
Procedure Date: 10/02/2018      PREOPERATIVE DIAGNOSES:  Right first tarsometatarsal joint dorsal bossing.      POSTOPERATIVE DIAGNOSES:  Right first tarsometatarsal joint dorsal bossing with underlying arthritis.      PROCEDURE:  Removal of right first tarsometatarsal joint bossing.      ANESTHESIA:  MAC.      HEMOSTASIS:  Pneumatic ankle tourniquet at 250 mmHg.      INDICATIONS:  This patient has pain on the dorsum of this joint.  There is no pain when she is barefoot or not wearing tight shoes.  She elects to have removal of this mass.  She understands the possible complications or continued pain from underlying arthritis and return of bossing.      DESCRIPTION OF PROCEDURE:  The patient was brought to the operating room table and laid in the supine position.  We then blocked her foot proximal to the mass on the dorsum of the first metatarsal locally.  The foot was then prepped and draped in the normal sterile manner.  Pneumatic ankle tourniquet was placed around the ankle with copious amounts of Webril padding.  It was then elevated for complete exsanguination.  The tourniquet was inflated.  The foot was brought on the operating table and the following procedure was performed.        At this time, an incision was made over the bony prominence on the dorsum of the right first tarsometatarsal joint.  This was brought down to deep fascia utilizing blunt and sharp dissection techniques.  All neurovascular structures that were encountered were either bovied, tied or retracted to the side.  We incised the periosteum the entire length of the incision and reflected off the bone prominence.  We immediately noted bossing at the dorsum of the first tarsometatarsal joint.  We remove this with a rongeur.  We sent some of this bone in formalin to pathology to be evaluated.  All prominent bony prominences were rasped smooth.  We then inspected the articular cartilage.  On the dorsal 1/2-1/3 of the right first TMTJ it was  somewhat fibrillated and 50% of cartilage was gone form dorsal 1/2 of this joint.  We then used the fluoroscan to ensure no more bony prominences were seen, and none were noted.  We flushed the wound.  Standard layered closure was done at this time.  We dressed this with Adaptic, 4 x 4's, Dane, Kerlix and Coban.  The tourniquet was deflated.  All digits are noted to show  preoperative levels of perfusion.      COMPLICATIONS:  None.      ESTIMATED BLOOD LOSS:  1 mL.      The patient tolerated the procedure and anesthesia well.  She was then wheeled from the operating room to the recovery room with vital signs stable and an intact sterile dressing.         DOLORES MCKEON DPM             D: 10/02/2018   T: 10/02/2018   MT: JAMAAL      Name:     BRAD NORRIS   MRN:      [id number]        Account:        [id number]   :      1965           Procedure Date: 10/02/2018      Document: [id number]

## 2018-10-02 NOTE — OP NOTE
Surgeon:  Dr. Will Barraza    Date of Surgery: 10/2/18    Preopp diagnosis: right first TMTJ dorsal bossing    Postopp diagnosis: same    Procedure: removal of right first TMTJ bossing    Anesthesia: local    Specimens: bone sent to pathology    Findings: arthritis dorsal first TMTJ    EBL: 1cc    Will Barraza DPM, FACFAS

## 2018-10-05 ENCOUNTER — OFFICE VISIT (OUTPATIENT)
Dept: PODIATRY | Facility: CLINIC | Age: 53
End: 2018-10-05
Payer: COMMERCIAL

## 2018-10-05 VITALS — HEART RATE: 78 BPM | DIASTOLIC BLOOD PRESSURE: 68 MMHG | SYSTOLIC BLOOD PRESSURE: 138 MMHG | OXYGEN SATURATION: 98 %

## 2018-10-05 DIAGNOSIS — M25.774 METATARSAL BOSS OF RIGHT FOOT: Primary | ICD-10-CM

## 2018-10-05 PROCEDURE — 99024 POSTOP FOLLOW-UP VISIT: CPT | Performed by: PODIATRIST

## 2018-10-05 NOTE — PATIENT INSTRUCTIONS
We wish you continued good healing. If you have any questions or concerns, please do not hesitate to contact us at [phone]    Please remember to call and schedule a follow up appointment if one was recommended at your earliest convenience.   PODIATRY CLINIC HOURS  TELEPHONE NUMBER    Dr. Will Barraza D.P.M [facility]    Clinics:  [facility]    [facility]   Tuesday 1PM-6PM  [place]  Wednesday 7AM-2PM  [facility]  Thursday 10AM-6PM  [place]  Friday 7AM-3PM  [facility]  Specialty schedulers:   [phone] to make an appointment with any Specialty Provider.        Urgent Care locations:    [facility] Monday-Friday 5 pm - 9 pm. Saturday-Sunday 9 am -5pm    Monday-Friday 11 am - 9 pm Saturday 9 am - 5 pm     Monday-Sunday 12 noon-8PM [phone]     If you need a medication refill, please contact us you may need lab work and/or a follow up visit prior to your refill (i.e. Antifungal medications).    [facility] (secure e-mail communication and access to your chart) to send a message or to make an appointment.    If MRI needed please call [facility] at [phone]        Weight management plan: Patient was referred to their PCP to discuss a diet and exercise plan.     Patient to follow up with Primary Care provider regarding elevated blood pressure.

## 2018-10-05 NOTE — LETTER
10/5/2018         RE: Lisette Owens  [address]   [place] [address]        Dear Colleague,    Thank you for referring your patient, Lisette Owens, to the [facility]. Please see a copy of my visit note below.    Subjective:    Patient is 3 days post op right first tarsal metatarsal joint dorsal bossing removal done on 10/2/2018.  Patient is doing well, admits compliance, denies fevers, chills, nausea or vomiting.  Pain slowly decreasing and has not taken pain pills since Wednesday morning..    Objective:    Dressing is dry, upon removal wound is well coapted, sutures are intact. Pulses are palpable, sensation to light touch is intact.  Normal postopp edema.  No erythema, or ecchymosis noted.    Assessment: postopp day 3 dorsal bossing removal.    Plan:  Sterile redress and instructions to keep dressing on and dry.  Limit activities with minimal weightbearing.  Patient was given option of cam walker but she is getting around good and postop shoe.  Discussed stiff shoes and that she has some arthritis in the dorsum of her first tarsometatarsal joint and this will help keep this offloaded in the future.  Call with questions or concerns and follow-up in 11 days.    Again, thank you for allowing me to participate in the care of your patient.        Sincerely,        Will Barraza DPM

## 2018-10-05 NOTE — PROGRESS NOTES
Subjective:    Patient is 3 days post op right first tarsal metatarsal joint dorsal bossing removal done on 10/2/2018.  Patient is doing well, admits compliance, denies fevers, chills, nausea or vomiting.  Pain slowly decreasing and has not taken pain pills since Wednesday morning..    Objective:    Dressing is dry, upon removal wound is well coapted, sutures are intact. Pulses are palpable, sensation to light touch is intact.  Normal postopp edema.  No erythema, or ecchymosis noted.    Assessment: postopp day 3 dorsal bossing removal.    Plan:  Sterile redress and instructions to keep dressing on and dry.  Limit activities with minimal weightbearing.  Patient was given option of cam walker but she is getting around good and postop shoe.  Discussed stiff shoes and that she has some arthritis in the dorsum of her first tarsometatarsal joint and this will help keep this offloaded in the future.  Call with questions or concerns and follow-up in 11 days.

## 2018-10-05 NOTE — MR AVS SNAPSHOT
After Visit Summary   10/5/2018    Lisette LINCOLN Ukaegbu    MRN: [id number]           Patient Information     Date Of Birth          1965        Visit Information        Provider Department      10/5/2018 12:45 PM Will Barraza, GREY [facility]        Today's Diagnoses     Metatarsal boss of right foot    -  1      Care Instructions    We wish you continued good healing. If you have any questions or concerns, please do not hesitate to contact us at [phone]    Please remember to call and schedule a follow up appointment if one was recommended at your earliest convenience.   PODIATRY CLINIC HOURS  TELEPHONE NUMBER    Dr. Will MARKPMAGALI FAC FAS    Clinics:  [facility]    [facility]   Tuesday 1PM-6PM  [place]  Wednesday 7AM-2PM  [place]  Thursday 10AM-6PM  [place] Friday 7AM-3PM  [place]  Specialty schedulers:   [phone] to make an appointment with any Specialty Provider.        Urgent Care locations:    [facility] Monday-Friday 5 pm - 9 pm. Saturday-Sunday 9 am -5pm    Monday-Friday 11 am - 9 pm Saturday 9 am - 5 pm     Monday-Sunday 12 noon-8PM [phone]     If you need a medication refill, please contact us you may need lab work and/or a follow up visit prior to your refill (i.e. Antifungal medications).    [facility] (secure e-mail communication and access to your chart) to send a message or to make an appointment.    If MRI needed please call [place] [facility] at [phone]        Weight management plan: Patient was referred to their PCP to discuss a diet and exercise plan.     Patient to follow up with Primary Care provider regarding elevated blood pressure.            Follow-ups after your visit        Your next 10 appointments already scheduled     Oct 05, 2018 12:45 PM CDT   Return Visit with Will ISBELL  GREY Barraza   [facility]    [address] [facility] [address] [id number] [phone]            Oct 16, 2018  4:00 PM CDT   Return Visit with Will Barraza DPM   [facility] [place] ([facility] [place])    [address]  [place] [id number] [phone]              Who to contact     If you have questions or need follow up information about today's clinic visit or your schedule please contact [facility] directly at [phone].  Normal or non-critical lab and imaging results will be communicated to you by TownSquaredhart, letter or phone within 4 business days after the clinic has received the results. If you do not hear from us within 7 days, please contact the clinic through TownSquaredhart or phone. If you have a critical or abnormal lab result, we will notify you by phone as soon as possible.  Submit refill requests through Lifecrowd or call your pharmacy and they will forward the refill request to us. Please allow 3 business days for your refill to be completed.          Additional Information About Your Visit        MyChart Information     [employer]d gives you secure access to your electronic health record. If you see a primary care provider, you can also send messages to your care team and make appointments. If you have questions, please call your primary care clinic.  If you do not have a primary care provider, please call [phone] and they will assist you.        Care EveryWhere ID     This is your Care EveryWhere ID. This could be used by other organizations to access your [facility] medical records  [id number]        Your Vitals Were     Pulse Last Period Pulse Oximetry             78 05/15/2014 (Approximate) 98%          Blood Pressure from Last 3 Encounters:   10/05/18 138/68   10/02/18 139/66   09/27/18 101/62    Weight from Last 3 Encounters:   09/27/18  194 lb 0.1 oz (88 kg)   09/27/18 194 lb (88 kg)   09/19/18 194 lb (88 kg)              Today, you had the following     No orders found for display       Primary Care Provider Office Phone # Fax #    Kolton Yin PA-C [phone] [address] [place] [address]        Equal Access to Services     [facility]: Hadii aad ku hadasho Soomaali, waaxda luqadaha, qaybta kaalmada adeegyada, waxay idiin hayaan adeeg kharash la'aan ah. So [facility] [phone].    ATENCIÓN: Si habla espelia, tiene a mosquera disposición servicios gratuitos de asistencia lingüística. Llame al [phone].    We comply with applicable federal civil rights laws and [place] laws. We do not discriminate on the basis of race, color, national origin, age, disability, sex, sexual orientation, or gender identity.            Thank you!     Thank you for choosing [facility]  for your care. Our goal is always to provide you with excellent care. Hearing back from our patients is one way we can continue to improve our services. Please take a few minutes to complete the written survey that you may receive in the mail after your visit with us. Thank you!             Your Updated Medication List - Protect others around you: Learn how to safely use, store and throw away your medicines at www.disposemymeds.org.          This list is accurate as of 10/5/18 12:44 PM.  Always use your most recent med list.                   Brand Name Dispense Instructions for use Diagnosis    * albuterol (2.5 MG/3ML) 0.083% neb solution     1 Box    Take 1 vial (2.5 mg) by nebulization every 4 hours as needed for shortness of breath / dyspnea    Moderate persistent asthma, uncomplicated       * albuterol 108 (90 Base) MCG/ACT inhaler    PROAIR HFA/PROVENTIL HFA/VENTOLIN HFA    1 Inhaler    Inhale 2 puffs into the lungs every 4 hours as needed for shortness of breath / dyspnea    Moderate persistent asthma, uncomplicated        ALPRAZolam 0.5 MG tablet    XANAX    30 tablet    Take 1-2 tablets (0.5-1 mg) by mouth 3 times daily as needed for anxiety    PTSD (post-traumatic stress disorder)       aspirin 325 MG EC tablet     90 tablet    Take 1 tablet (325 mg) by mouth daily    Type 2 diabetes mellitus without complication, without long-term current use of insulin (H)       diltiazem 240 MG 24 hr ER beaded capsule    TIAZAC    90 capsule    Take 1 capsule (240 mg) by mouth daily    Palpitations       DULoxetine 60 MG EC capsule    CYMBALTA    180 capsule    Take 1 capsule (60 mg) by mouth 2 times daily    PTSD (post-traumatic stress disorder), Depression with anxiety       fish oil-omega-3 fatty acids 1000 MG capsule      Take 2 g by mouth 3 times daily Reported on 2/14/2017        fluticasone 50 MCG/ACT spray    FLONASE    16 g    Spray 1-2 sprays into both nostrils daily    Moderate persistent asthma with acute exacerbation       fluticasone-salmeterol 500-50 MCG/DOSE diskus inhaler    ADVAIR    3 Inhaler    Inhale 1 puff into the lungs 2 times daily    Moderate persistent asthma with acute exacerbation       HYDROcodone-acetaminophen 5-325 MG per tablet    NORCO    20 tablet    Take 1-2 tablets by mouth every 4 hours as needed (Moderate to Severe Pain)    Metatarsal boss of right foot       lisinopril 5 MG tablet    PRINIVIL/ZESTRIL    90 tablet    Take 1 tablet (5 mg) by mouth daily    Type 2 diabetes mellitus without complication, without long-term current use of insulin (H)       metFORMIN 500 MG 24 hr tablet    GLUCOPHAGE-XR    180 tablet    Take 2 tablets (1,000 mg) by mouth daily (with dinner)    Type 2 diabetes mellitus without complication, without long-term current use of insulin (H)       montelukast 10 MG tablet    SINGULAIR    90 tablet    Take 1 tablet (10 mg) by mouth At Bedtime    Moderate persistent asthma with acute exacerbation       nitroGLYcerin 0.4 MG/HR 24 hr patch    NITRODUR     Place 1 patch onto the skin daily  Reported on 2/14/2017        * Notice:  This list has 2 medication(s) that are the same as other medications prescribed for you. Read the directions carefully, and ask your doctor or other care provider to review them with you.

## 2018-10-08 LAB — COPATH REPORT: NORMAL

## 2018-10-16 ENCOUNTER — OFFICE VISIT (OUTPATIENT)
Dept: PODIATRY | Facility: CLINIC | Age: 53
End: 2018-10-16
Payer: COMMERCIAL

## 2018-10-16 VITALS — HEART RATE: 80 BPM | SYSTOLIC BLOOD PRESSURE: 134 MMHG | DIASTOLIC BLOOD PRESSURE: 72 MMHG

## 2018-10-16 DIAGNOSIS — M25.774 METATARSAL BOSS OF RIGHT FOOT: Primary | ICD-10-CM

## 2018-10-16 PROCEDURE — 99024 POSTOP FOLLOW-UP VISIT: CPT | Performed by: PODIATRIST

## 2018-10-16 NOTE — MR AVS SNAPSHOT
After Visit Summary   10/16/2018    Lisette LINCOLN Ukaegbu    MRN: [id number]           Patient Information     Date Of Birth          1965        Visit Information        Provider Department      10/16/2018 4:00 PM Will Barraza DPM [facility] [place]        Today's Diagnoses     Metatarsal boss of right foot    -  1      Care Instructions    Weight management plan: Patient was referred to their PCP to discuss a diet and exercise plan.            Follow-ups after your visit        Who to contact     If you have questions or need follow up information about today's clinic visit or your schedule please contact [facility] [place] directly at [phone].  Normal or non-critical lab and imaging results will be communicated to you by Naverahart, letter or phone within 4 business days after the clinic has received the results. If you do not hear from us within 7 days, please contact the clinic through Naverahart or phone. If you have a critical or abnormal lab result, we will notify you by phone as soon as possible.  Submit refill requests through [email or web address] or call your pharmacy and they will forward the refill request to us. Please allow 3 business days for your refill to be completed.          Additional Information About Your Visit        MyChart Information     [email or web address] gives you secure access to your electronic health record. If you see a primary care provider, you can also send messages to your care team and make appointments. If you have questions, please call your primary care clinic.  If you do not have a primary care provider, please call [phone] and they will assist you.        Care EveryWhere ID     This is your Care EveryWhere ID. This could be used by other organizations to access your [facility] medical records  [id number]        Your Vitals Were     Pulse Last Period                80 05/15/2014 (Approximate)           Blood Pressure from Last 3  Encounters:   10/16/18 134/72   10/05/18 138/68   10/02/18 139/66    Weight from Last 3 Encounters:   09/27/18 194 lb 0.1 oz (88 kg)   09/27/18 194 lb (88 kg)   09/19/18 194 lb (88 kg)              Today, you had the following     No orders found for display       Primary Care Provider Office Phone # Fax #    Koltongisel Yin PA-C [phone]       [address] [place] [address]        Equal Access to Services     [facility]: Hadii aad ku hadasho Soomaali, waaxda luqadaha, qaybta kaalmada adeegyada, waxay jacielin hayradamesn jeri tuttle . So [facility] [phone].    ATENCIÓN: Si habla español, tiene a mosquera disposición servicios gratuitos de asistencia lingüística. [facility] [phone].    We comply with applicable federal civil rights laws and [place] laws. We do not discriminate on the basis of race, color, national origin, age, disability, sex, sexual orientation, or gender identity.            Thank you!     Thank you for choosing [facility] [place]  for your care. Our goal is always to provide you with excellent care. Hearing back from our patients is one way we can continue to improve our services. Please take a few minutes to complete the written survey that you may receive in the mail after your visit with us. Thank you!             Your Updated Medication List - Protect others around you: Learn how to safely use, store and throw away your medicines at www.disposemymeds.org.          This list is accurate as of 10/16/18  4:18 PM.  Always use your most recent med list.                   Brand Name Dispense Instructions for use Diagnosis    * albuterol (2.5 MG/3ML) 0.083% neb solution     1 Box    Take 1 vial (2.5 mg) by nebulization every 4 hours as needed for shortness of breath / dyspnea    Moderate persistent asthma, uncomplicated       * albuterol 108 (90 Base) MCG/ACT inhaler    PROAIR HFA/PROVENTIL HFA/VENTOLIN HFA    1 Inhaler    Inhale 2 puffs into the  lungs every 4 hours as needed for shortness of breath / dyspnea    Moderate persistent asthma, uncomplicated       ALPRAZolam 0.5 MG tablet    XANAX    30 tablet    Take 1-2 tablets (0.5-1 mg) by mouth 3 times daily as needed for anxiety    PTSD (post-traumatic stress disorder)       aspirin 325 MG EC tablet     90 tablet    Take 1 tablet (325 mg) by mouth daily    Type 2 diabetes mellitus without complication, without long-term current use of insulin (H)       diltiazem 240 MG 24 hr ER beaded capsule    TIAZAC    90 capsule    Take 1 capsule (240 mg) by mouth daily    Palpitations       DULoxetine 60 MG EC capsule    CYMBALTA    180 capsule    Take 1 capsule (60 mg) by mouth 2 times daily    PTSD (post-traumatic stress disorder), Depression with anxiety       fish oil-omega-3 fatty acids 1000 MG capsule      Take 2 g by mouth 3 times daily Reported on 2/14/2017        fluticasone 50 MCG/ACT spray    FLONASE    16 g    Spray 1-2 sprays into both nostrils daily    Moderate persistent asthma with acute exacerbation       fluticasone-salmeterol 500-50 MCG/DOSE diskus inhaler    ADVAIR    3 Inhaler    Inhale 1 puff into the lungs 2 times daily    Moderate persistent asthma with acute exacerbation       HYDROcodone-acetaminophen 5-325 MG per tablet    NORCO    20 tablet    Take 1-2 tablets by mouth every 4 hours as needed (Moderate to Severe Pain)    Metatarsal boss of right foot       lisinopril 5 MG tablet    PRINIVIL/ZESTRIL    90 tablet    Take 1 tablet (5 mg) by mouth daily    Type 2 diabetes mellitus without complication, without long-term current use of insulin (H)       metFORMIN 500 MG 24 hr tablet    GLUCOPHAGE-XR    180 tablet    Take 2 tablets (1,000 mg) by mouth daily (with dinner)    Type 2 diabetes mellitus without complication, without long-term current use of insulin (H)       montelukast 10 MG tablet    SINGULAIR    90 tablet    Take 1 tablet (10 mg) by mouth At Bedtime    Moderate persistent asthma  with acute exacerbation       nitroGLYcerin 0.4 MG/HR 24 hr patch    NITRODUR     Place 1 patch onto the skin daily Reported on 2/14/2017        * Notice:  This list has 2 medication(s) that are the same as other medications prescribed for you. Read the directions carefully, and ask your doctor or other care provider to review them with you.

## 2018-10-16 NOTE — PROGRESS NOTES
Subjective:    Patient is 14 days post op right first tarsal metatarsal joint dorsal bossing removal done on 10/2/2018.  Patient is doing well, admits compliance, denies fevers, chills, nausea or vomiting.  Pain slowly decreasing.  Has some numbness and this is getting better.  Denies erythema, ecchymosis.      Objective:    Dressing is dry.  Wound is well coapted, dry, with no signs of dehiscence with suture removal. Pulses are palpable, sensation to light touch is intact.  Normal postopp edema.  No erythema, or ecchymosis noted.  Pathology- enchondroma    Assessment: postopp day 14 dorsal bossing removal.    Plan:  Suture removed.  Will protect with band aids and ACE for another week.  Will slowly increase activities.  Will keep pressure off of operative site and give time to heal.  Return to clinic prn.

## 2018-10-16 NOTE — LETTER
10/16/2018         RE: Lisette Owens  [address] [place] [address]        Dear Colleague,    Thank you for referring your patient, Lisette Owens, to the [facility]. Please see a copy of my visit note below.    Subjective:    Patient is 14 days post op right first tarsal metatarsal joint dorsal bossing removal done on 10/2/2018.  Patient is doing well, admits compliance, denies fevers, chills, nausea or vomiting.  Pain slowly decreasing.  Has some numbness and this is getting better.  Denies erythema, ecchymosis.      Objective:    Dressing is dry.  Wound is well coapted, dry, with no signs of dehiscence with suture removal. Pulses are palpable, sensation to light touch is intact.  Normal postopp edema.  No erythema, or ecchymosis noted.  Pathology- enchondroma    Assessment: postopp day 14 dorsal bossing removal.    Plan:  Suture removed.  Will protect with band aids and ACE for another week.  Will slowly increase activities.  Will keep pressure off of operative site and give time to heal.  Return to clinic prn.      Again, thank you for allowing me to participate in the care of your patient.        Sincerely,        Will Barraza DPM

## 2018-11-03 DIAGNOSIS — R00.2 PALPITATIONS: ICD-10-CM

## 2018-11-05 RX ORDER — DILTIAZEM HYDROCHLORIDE 240 MG/1
CAPSULE, EXTENDED RELEASE ORAL
Qty: 90 CAPSULE | Refills: 0 | Status: SHIPPED | OUTPATIENT
Start: 2018-11-05 | End: 2019-02-09

## 2018-11-05 NOTE — TELEPHONE ENCOUNTER
Routing refill request to provider for review/approval because:  Labs not current:  ALT last checked 4/15/16.    Last OV with [place]: 9/27/18 for preop.    Chapis Morales, RN, BSN

## 2018-11-05 NOTE — TELEPHONE ENCOUNTER
"Requested Prescriptions   Pending Prescriptions Disp Refills     diltiazem (TIAZAC) 240 MG 24 hr ER beaded capsule [Pharmacy Med Name: DilTIAZem HCl ER Beads Oral Capsule Extended Release 24 Hour 240 MG] 90 capsule 0    Last Written Prescription Date:  7-28-18  Last Fill Quantity: 90,  # refills: 0   Last office visit: 9/27/2018 with prescribing provider:  9-27-18   Future Office Visit:   Sig: TAKE 1 CAPSULE BY MOUTH ONCE DAILY    Calcium Channel Blockers Protocol  Failed    11/3/2018  2:22 PM       Failed - Normal ALT in past 12 months    Recent Labs   Lab Test  04/15/16   1605   ALT  48            Passed - Blood pressure under 140/90 in past 12 months    BP Readings from Last 3 Encounters:   10/16/18 134/72   10/05/18 138/68   10/02/18 139/66                Passed - Recent (12 mo) or future (30 days) visit within the authorizing provider's specialty    Patient had office visit in the last 12 months or has a visit in the next 30 days with authorizing provider or within the authorizing provider's specialty.  See \"Patient Info\" tab in inbasket, or \"Choose Columns\" in Meds & Orders section of the refill encounter.             Passed - Patient is age 18 or older       Passed - No active pregnancy on record       Passed - Normal serum creatinine on file in past 12 months    Recent Labs   Lab Test  09/27/18   1611   CR  0.81            Passed - No positive pregnancy test in past 12 months        "

## 2018-11-26 DIAGNOSIS — E11.9 TYPE 2 DIABETES MELLITUS WITHOUT COMPLICATION, WITHOUT LONG-TERM CURRENT USE OF INSULIN (H): ICD-10-CM

## 2018-11-26 NOTE — TELEPHONE ENCOUNTER
"Requested Prescriptions   Pending Prescriptions Disp Refills     metFORMIN (GLUCOPHAGE-XR) 500 MG 24 hr tablet [Pharmacy Med Name: MetFORMIN HCl ER Oral Tablet Extended Release 24 Hour 500 MG] 60 tablet 0    Last Written Prescription Date:  10/23/18  Last Fill Quantity: 60,  # refills: 0   Last office visit: 9/27/2018 with prescribing provider:  EMILEE Yin Future Office Visit:     Sig: TAKE 2 TABLETS BY MOUTH ONCE DAILY with dinner    Biguanide Agents Passed    11/26/2018 11:37 AM       Passed - Blood pressure less than 140/90 in past 6 months    BP Readings from Last 3 Encounters:   10/16/18 134/72   10/05/18 138/68   10/02/18 139/66                Passed - Patient has documented LDL within the past 12 mos.    Recent Labs   Lab Test  05/03/18   0802   LDL  95            Passed - Patient has had a Microalbumin in the past 15 mos.    Recent Labs   Lab Test  05/03/18   0827   MICROL  6   UMALCR  9.82            Passed - Patient is age 10 or older       Passed - Patient has documented A1c within the specified period of time.    If HgbA1C is 8 or greater, it needs to be on file within the past 3 months.  If less than 8, must be on file within the past 6 months.     Recent Labs   Lab Test  09/27/18   1611   A1C  6.3*            Passed - Patient's CR is NOT>1.4 OR Patient's EGFR is NOT<45 within past 12 mos.    Recent Labs   Lab Test  09/27/18   1611   GFRESTIMATED  74   GFRESTBLACK  >90       Recent Labs   Lab Test  09/27/18   1611   CR  0.81            Passed - Patient does NOT have a diagnosis of CHF.       Passed - Patient is not pregnant       Passed - Patient has not had a positive pregnancy test within the past 12 mos.        Passed - Recent (6 mo) or future (30 days) visit within the authorizing provider's specialty    Patient had office visit in the last 6 months or has a visit in the next 30 days with authorizing provider or within the authorizing provider's specialty.  See \"Patient Info\" tab in inbasket, or " "\"Choose Columns\" in Meds & Orders section of the refill encounter.              "

## 2018-11-27 RX ORDER — METFORMIN HCL 500 MG
TABLET, EXTENDED RELEASE 24 HR ORAL
Qty: 180 TABLET | Refills: 0 | Status: SHIPPED | OUTPATIENT
Start: 2018-11-27 | End: 2019-02-09

## 2019-02-09 DIAGNOSIS — F43.10 PTSD (POST-TRAUMATIC STRESS DISORDER): ICD-10-CM

## 2019-02-09 DIAGNOSIS — E11.9 TYPE 2 DIABETES MELLITUS WITHOUT COMPLICATION, WITHOUT LONG-TERM CURRENT USE OF INSULIN (H): ICD-10-CM

## 2019-02-09 DIAGNOSIS — F41.8 DEPRESSION WITH ANXIETY: ICD-10-CM

## 2019-02-09 DIAGNOSIS — R00.2 PALPITATIONS: ICD-10-CM

## 2019-02-11 NOTE — TELEPHONE ENCOUNTER
"Requested Prescriptions   Pending Prescriptions Disp Refills     metFORMIN (GLUCOPHAGE-XR) 500 MG 24 hr tablet [Pharmacy Med Name: MetFORMIN HCl ER Oral Tablet Extended Release 24 Hour 500 MG] 180 tablet 0    Last Written Prescription Date:  11-27-18  Last Fill Quantity: 180,  # refills: 0   Last office visit: 9/27/2018 with prescribing provider:  9-27-18   Future Office Visit:   Sig: TAKE 2 TABLETS BY MOUTH ONCE DAILY WITH DINNER    Biguanide Agents Passed - 2/9/2019  5:19 PM       Passed - Blood pressure less than 140/90 in past 6 months    BP Readings from Last 3 Encounters:   10/16/18 134/72   10/05/18 138/68   10/02/18 139/66                Passed - Patient has documented LDL within the past 12 mos.    Recent Labs   Lab Test 05/03/18  0802   LDL 95            Passed - Patient has had a Microalbumin in the past 15 mos.    Recent Labs   Lab Test 05/03/18  0827   MICROL 6   UMALCR 9.82            Passed - Patient is age 10 or older       Passed - Patient has documented A1c within the specified period of time.    If HgbA1C is 8 or greater, it needs to be on file within the past 3 months.  If less than 8, must be on file within the past 6 months.     Recent Labs   Lab Test 09/27/18  1611   A1C 6.3*            Passed - Patient's CR is NOT>1.4 OR Patient's EGFR is NOT<45 within past 12 mos.    Recent Labs   Lab Test 09/27/18  1611   GFRESTIMATED 74   GFRESTBLACK >90       Recent Labs   Lab Test 09/27/18  1611   CR 0.81            Passed - Patient does NOT have a diagnosis of CHF.       Passed - Medication is active on med list       Passed - Patient is not pregnant       Passed - Patient has not had a positive pregnancy test within the past 12 mos.        Passed - Recent (6 mo) or future (30 days) visit within the authorizing provider's specialty    Patient had office visit in the last 6 months or has a visit in the next 30 days with authorizing provider or within the authorizing provider's specialty.  See \"Patient " "Info\" tab in inWP [facility], or \"Choose Columns\" in Meds & Orders section of the refill encounter.            diltiazem ER (TIAZAC) 240 MG 24 hr ER beaded capsule [Pharmacy Med Name: DilTIAZem HCl ER Beads Oral Capsule Extended Release 24 Hour 240 MG] 90 capsule 0    Last Written Prescription Date:  11-5-18  Last Fill Quantity: 90,  # refills: 0   Last office visit: 9/27/2018 with prescribing provider:  9-27-18   Future Office Visit:   Sig: TAKE 1 CAPSULE BY MOUTH ONCE DAILY    Calcium Channel Blockers Protocol  Failed - 2/9/2019  5:19 PM       Failed - Normal ALT in past 12 months    Recent Labs   Lab Test 04/15/16  1605   ALT 48            Passed - Blood pressure under 140/90 in past 12 months    BP Readings from Last 3 Encounters:   10/16/18 134/72   10/05/18 138/68   10/02/18 139/66                Passed - Recent (12 mo) or future (30 days) visit within the authorizing provider's specialty    Patient had office visit in the last 12 months or has a visit in the next 30 days with authorizing provider or within the authorizing provider's specialty.  See \"Patient Info\" tab in inbasket, or \"Choose Columns\" in Meds & Orders section of the refill encounter.             Passed - Medication is active on med list       Passed - Patient is age 18 or older       Passed - No active pregnancy on record       Passed - Normal serum creatinine on file in past 12 months    Recent Labs   Lab Test 09/27/18  1611   CR 0.81            Passed - No positive pregnancy test in past 12 months        DULoxetine (CYMBALTA) 60 MG capsule [Pharmacy Med Name: DULoxetine HCl Oral Capsule Delayed Release Particles 60 MG] 180 capsule 2    Last Written Prescription Date:  7-6-18  Last Fill Quantity: 180,  # refills: 2   Last office visit: 9/27/2018 with prescribing provider:  9-27-18   Future Office Visit:   Sig: TAKE 1 CAPSULE BY MOUTH TWICE DAILY    Serotonin-Norepinephrine Reuptake Inhibitors  Passed - 2/9/2019  5:19 PM       Passed - Blood pressure " "under 140/90 in past 12 months    BP Readings from Last 3 Encounters:   10/16/18 134/72   10/05/18 138/68   10/02/18 139/66                Passed - Recent (12 mo) or future (30 days) visit within the authorizing provider's specialty    Patient had office visit in the last 12 months or has a visit in the next 30 days with authorizing provider or within the authorizing provider's specialty.  See \"Patient Info\" tab in inbasket, or \"Choose Columns\" in Meds & Orders section of the refill encounter.             Passed - Medication is active on med list       Passed - Patient is age 18 or older       Passed - No active pregnancy on record       Passed - No positive pregnancy test in past 12 months        "

## 2019-02-11 NOTE — TELEPHONE ENCOUNTER
Routing refill request to provider for review/approval because:  Labs not current:  ALT    Last OV with [place]: 9/27/18 for pre-op physical    Chapis Morales, RN, BSN

## 2019-02-13 RX ORDER — DILTIAZEM HYDROCHLORIDE 240 MG/1
CAPSULE, EXTENDED RELEASE ORAL
Qty: 90 CAPSULE | Refills: 0 | Status: SHIPPED | OUTPATIENT
Start: 2019-02-13 | End: 2019-05-11

## 2019-02-13 RX ORDER — METFORMIN HCL 500 MG
TABLET, EXTENDED RELEASE 24 HR ORAL
Qty: 180 TABLET | Refills: 0 | Status: SHIPPED | OUTPATIENT
Start: 2019-02-13 | End: 2019-05-11

## 2019-02-13 RX ORDER — DULOXETIN HYDROCHLORIDE 60 MG/1
CAPSULE, DELAYED RELEASE ORAL
Qty: 180 CAPSULE | Refills: 2 | Status: SHIPPED | OUTPATIENT
Start: 2019-02-13 | End: 2020-06-10

## 2019-02-27 ENCOUNTER — OFFICE VISIT (OUTPATIENT)
Dept: INTERNAL MEDICINE | Facility: CLINIC | Age: 54
End: 2019-02-27
Payer: COMMERCIAL

## 2019-02-27 VITALS
WEIGHT: 191 LBS | RESPIRATION RATE: 16 BRPM | HEART RATE: 76 BPM | TEMPERATURE: 98.2 F | SYSTOLIC BLOOD PRESSURE: 124 MMHG | BODY MASS INDEX: 30.83 KG/M2 | DIASTOLIC BLOOD PRESSURE: 79 MMHG

## 2019-02-27 DIAGNOSIS — R11.2 NON-INTRACTABLE VOMITING WITH NAUSEA, UNSPECIFIED VOMITING TYPE: ICD-10-CM

## 2019-02-27 DIAGNOSIS — J45.40 MODERATE PERSISTENT ASTHMA WITHOUT COMPLICATION: Primary | ICD-10-CM

## 2019-02-27 PROCEDURE — 99214 OFFICE O/P EST MOD 30 MIN: CPT | Performed by: INTERNAL MEDICINE

## 2019-02-27 RX ORDER — ONDANSETRON 4 MG/1
4 TABLET, FILM COATED ORAL EVERY 6 HOURS PRN
Qty: 18 TABLET | Refills: 1 | Status: SHIPPED | OUTPATIENT
Start: 2019-02-27 | End: 2019-11-05

## 2019-02-27 NOTE — PROGRESS NOTES
SUBJECTIVE:   Lisette Owens is a 54 year old female who presents to clinic today for the following health issues:      flu      Duration: 3 days    Description (location/character/radiation):nausea, vomiting, fever, chills,bodyaches    Non-bloody, non-bilious emesis of stomach contents then dry heaves    Held down some water this morning.  Going to broth today.  Some incontinence with emesis.    History (similar episodes/previous evaluation): kids were ill    Precipitating or alleviating factors: None    Therapies tried and outcome: pepto bismol,acid reducer, Tylenol     1. Moderate persistent asthma      Breathing well - last albuterol use over 10 days ago.  Using Advair and albuterol PRN.  Has emergency supply of prednisone for upcoming travels.      PMH: Updated and/or reviewed in chart.    ROS:  Constitutional, neuro, EMT, endocrine, pulmonary, cardiac, gastrointestinal, genitourinary, musculoskeletal, integument and psychiatric systems are otherwise negative.    OBJECTIVE:                                                    /79   Pulse 76   Temp 98.2  F (36.8  C) (Tympanic)   Resp 16   Wt 86.6 kg (191 lb)   LMP 05/15/2014 (Approximate)   BMI 30.83 kg/m      GEN: No acute distress  EYES: No conjunctival injection or icterus, EOMI grossly intact  RESP: Unlabored, regular, clear to auscultation bilaterally  COR: regular rate and rhythm no murmurs, rubs, or gallops appreciated  ABDO: overall soft, non-tender, non-distended, no hepatosplenomegaly or masses  BACK: no deformity or CVA tenderness  NEURO: Normal gait, MAEx4, light touch sensation grossly intact  PSYCH: Normal mood and affect      ASSESSMENT/PLAN:                                                    Non-intractable vomiting with nausea, unspecified vomiting type  Most of likely viral syndrome with gastritis -- OK to treat with antiemetic.  Note provided.    Moderate persistent asthma  On Advair - doing well with PATRICK PRN  - Asthma Action Plan  (AAP)    History coronary spasm +/- Takatsubo CM  Indication for diltiazem as well as aspirin, lisinopril       Reji Chavez MD

## 2019-02-27 NOTE — LETTER
February 27, 2019      Lisette Owens  [address] [place] [address]        To Whom It May Concern:    Lisette Owens  was seen on 2/27/2019.  Please excuse her from 2/25 until 2/27 due to illness.        Sincerely,        Reji Chavez MD

## 2019-02-27 NOTE — LETTER
February 27, 2019      Lisette Owens  [address] [place] [address]        To Whom It May Concern:    Lisette Owens was seen in our clinic today due to recent illness.        Sincerely,        Reji Chavez MD

## 2019-02-27 NOTE — LETTER
My Asthma Action Plan  Name: Lisette LINCOLN Ukaegbu   YOB: 1965  Date: 2/27/2019   My doctor: Reji Chavez MD   My clinic: [facility]        My Control Medicine: Advair Diskus 500/50  My Rescue Medicine: Albuterol (Proair/Ventolin/Proventil) inhaler 108   Albuterol nebulizer solution 2.5/3 ml  My Oral Steroid Medicine: Prednisone My Asthma Severity: moderate persistent  Avoid your asthma triggers: upper respiratory infections  upper respiratory infections            GREEN ZONE   Good Control    I feel good    No cough or wheeze    Can work, sleep and play without asthma symptoms       Take your asthma control medicine every day.     1. If exercise triggers your asthma, take your rescue medication    15 minutes before exercise or sports, and    During exercise if you have asthma symptoms  2. Spacer to use with inhaler: If you have a spacer, make sure to use it with your inhaler             YELLOW ZONE Getting Worse  I have ANY of these:    I do not feel good    Cough or wheeze    Chest feels tight    Wake up at night   1. Keep taking your Green Zone medications  2. Start taking your rescue medicine:    every 20 minutes for up to 1 hour. Then every 4 hours for 24-48 hours.  3. If you stay in the Yellow Zone for more than 12-24 hours, contact your doctor.  4. If you do not return to the Green Zone in 12-24 hours or you get worse, start taking your oral steroid medicine if prescribed by your provider.           RED ZONE Medical Alert - Get Help  I have ANY of these:    I feel awful    Medicine is not helping    Breathing getting harder    Trouble walking or talking    Nose opens wide to breathe       1. Take your rescue medicine NOW  2. If your provider has prescribed an oral steroid medicine, start taking it NOW  3. Call your doctor NOW  4. If you are still in the Red Zone after 20 minutes and you have not reached your doctor:    Take your rescue medicine again and    Call 911 or go to the emergency  room right away    See your regular doctor within 2 weeks of an Emergency Room or Urgent Care visit for follow-up treatment.          Annual Reminders:  Meet with Asthma Educator,  Flu Shot in the Fall, consider Pneumonia Vaccination for patients with asthma (aged 19 and older).    Pharmacy:    [id number] - [facility] [address] AT [facility] OF [address] [place], [place] [id number] - [facility] [address] AT [id number] [place] - [address] [facility]                      Asthma Triggers  How To Control Things That Make Your Asthma Worse    Triggers are things that make your asthma worse.  Look at the list below to help you find your triggers and what you can do about them.  You can help prevent asthma flare-ups by staying away from your triggers.      Trigger                                                          What you can do   Cigarette Smoke  Tobacco smoke can make asthma worse. Do not allow smoking in your home, car or around you.  Be sure no one smokes at a child s day care or school.  If you smoke, ask your health care provider for ways to help you quit.  Ask family members to quit too.  Ask your health care provider for a referral to Quit Plan to help you quit smoking, or call 7-443-842-PLAN.     Colds, Flu, Bronchitis  These are common triggers of asthma. Wash your hands often.  Don t touch your eyes, nose or mouth.  Get a flu shot every year.     Dust Mites  These are tiny bugs that live in cloth or carpet. They are too small to see. Wash sheets and blankets in hot water every week.   Encase pillows and mattress in dust mite proof covers.  Avoid having carpet if you can. If you have carpet, vacuum weekly.   Use a dust mask and HEPA vacuum.   Pollen and Outdoor Mold  Some people are allergic to trees, grass, or weed pollen, or molds. Try to keep your windows closed.  Limit time out doors when pollen count is  high.   Ask you health care provider about taking medicine during allergy season.     Animal Dander  Some people are allergic to skin flakes, urine or saliva from pets with fur or feathers. Keep pets with fur or feathers out of your home.    If you can t keep the pet outdoors, then keep the pet out of your bedroom.  Keep the bedroom door closed.  Keep pets off cloth furniture and away from stuffed toys.     Mice, Rats, and Cockroaches  Some people are allergic to the waste from these pests.   Cover food and garbage.  Clean up spills and food crumbs.  Store grease in the refrigerator.   Keep food out of the bedroom.   Indoor Mold  This can be a trigger if your home has high moisture. Fix leaking faucets, pipes, or other sources of water.   Clean moldy surfaces.  Dehumidify basement if it is damp and smelly.   Smoke, Strong Odors, and Sprays  These can reduce air quality. Stay away from strong odors and sprays, such as perfume, powder, hair spray, paints, smoke incense, paint, cleaning products, candles and new carpet.   Exercise or Sports  Some people with asthma have this trigger. Be active!  Ask your doctor about taking medicine before sports or exercise to prevent symptoms.    Warm up for 5-10 minutes before and after sports or exercise.     Other Triggers of Asthma  Cold air:  Cover your nose and mouth with a scarf.  Sometimes laughing or crying can be a trigger.  Some medicines and food can trigger asthma.

## 2019-05-11 DIAGNOSIS — J45.41 MODERATE PERSISTENT ASTHMA WITH ACUTE EXACERBATION: ICD-10-CM

## 2019-05-11 DIAGNOSIS — E11.9 TYPE 2 DIABETES MELLITUS WITHOUT COMPLICATION, WITHOUT LONG-TERM CURRENT USE OF INSULIN (H): ICD-10-CM

## 2019-05-11 DIAGNOSIS — R00.2 PALPITATIONS: ICD-10-CM

## 2019-05-11 DIAGNOSIS — J45.40 MODERATE PERSISTENT ASTHMA, UNCOMPLICATED: ICD-10-CM

## 2019-05-13 NOTE — TELEPHONE ENCOUNTER
"Requested Prescriptions   Pending Prescriptions Disp Refills     PROAIR  (90 Base) MCG/ACT inhaler [Pharmacy Med Name: ProAir HFA Inhalation Aerosol Solution 108 (90 Base) MCG/ACT]  Last Written Prescription Date:  2/09/19  Last Fill Quantity: 8.5,  # refills: 10   Last office visit: 02/27/19 with prescribing provider:  AYUSH Chavez   Future Office Visit:     8.5 g 10     Sig: INHALE TWO PUFFS BY MOUTH EVERY FOUR HOURS AS NEEDED FOR SHORTNESS OF BREATH/ DIFFICULTY BREATHING       Asthma Maintenance Inhalers - Anticholinergics Failed - 5/11/2019 10:43 AM        Failed - Asthma control assessment score within normal limits in last 6 months     Please review ACT score.           Failed - Recent (6 mo) or future (30 days) visit within the authorizing provider's specialty     Patient had office visit in the last 6 months or has a visit in the next 30 days with authorizing provider or within the authorizing provider's specialty.  See \"Patient Info\" tab in inbasket, or \"Choose Columns\" in Meds & Orders section of the refill encounter.            Passed - Patient is age 12 years or older        Passed - Medication is active on med list        lisinopril (PRINIVIL/ZESTRIL) 5 MG tablet [Pharmacy Med Name: Lisinopril Oral Tablet 5 MG]  Last Written Prescription Date:  01/26/19  Last Fill Quantity: 90,  # refills: 0   Last office visit: 02/27/19 with prescribing provider:  AYUSH Chavez   Future Office Visit:     90 tablet 2     Sig: TAKE 1 TABLET BY MOUTH ONCE DAILY       ACE Inhibitors (Including Combos) Protocol Passed - 5/11/2019 10:43 AM        Passed - Blood pressure under 140/90 in past 12 months     BP Readings from Last 3 Encounters:   02/27/19 124/79   10/16/18 134/72   10/05/18 138/68                 Passed - Recent (12 mo) or future (30 days) visit within the authorizing provider's specialty     Patient had office visit in the last 12 months or has a visit in the next 30 days with authorizing provider or within the " "authorizing provider's specialty.  See \"Patient Info\" tab in inbasket, or \"Choose Columns\" in Meds & Orders section of the refill encounter.              Passed - Medication is active on med list        Passed - Patient is age 18 or older        Passed - No active pregnancy on record        Passed - Normal serum creatinine on file in past 12 months     Recent Labs   Lab Test 09/27/18  1611   CR 0.81             Passed - Normal serum potassium on file in past 12 months     Recent Labs   Lab Test 09/27/18  1611   POTASSIUM 4.2             Passed - No positive pregnancy test within past 12 months        montelukast (SINGULAIR) 10 MG tablet [Pharmacy Med Name: Montelukast Sodium Oral Tablet 10 MG]  Last Written Prescription Date:  01/26/19  Last Fill Quantity: 90,  # refills: 2   Last office visit: 2/27/2019 with prescribing provider:  AYUSH Diana Office Visit:     90 tablet 2     Sig: TAKE 1 TABLET BY MOUTH AT BEDTIME       Leukotriene Inhibitors Protocol Failed - 5/11/2019 10:43 AM        Failed - Asthma control assessment score within normal limits in last 6 months     Please review ACT score.           Failed - Recent (6 mo) or future (30 days) visit within the authorizing provider's specialty     Patient had office visit in the last 6 months or has a visit in the next 30 days with authorizing provider or within the authorizing provider's specialty.  See \"Patient Info\" tab in inbasket, or \"Choose Columns\" in Meds & Orders section of the refill encounter.            Passed - Patient is age 12 or older     If patient is under 16, ok to refill using age based dosing.           Passed - Medication is active on med list        metFORMIN (GLUCOPHAGE-XR) 500 MG 24 hr tablet [Pharmacy Med Name: metFORMIN HCl ER Oral Tablet Extended Release 24 Hour 500 MG]  Last Written Prescription Date:  02/13/19  Last Fill Quantity: 180,  # refills: 0   Last office visit: 2/27/2019 with prescribing provider:  AYUSH Diana Office " "Visit:     180 tablet 0     Sig: TAKE 2 TABLETS BY MOUTH ONCE DAILY WITH DINNER       Biguanide Agents Failed - 5/11/2019 10:43 AM        Failed - Patient has documented LDL within the past 12 mos.     Recent Labs   Lab Test 05/03/18  0802   LDL 95             Failed - Patient has documented A1c within the specified period of time.     If HgbA1C is 8 or greater, it needs to be on file within the past 3 months.  If less than 8, must be on file within the past 6 months.     Recent Labs   Lab Test 09/27/18  1611   A1C 6.3*             Failed - Recent (6 mo) or future (30 days) visit within the authorizing provider's specialty     Patient had office visit in the last 6 months or has a visit in the next 30 days with authorizing provider or within the authorizing provider's specialty.  See \"Patient Info\" tab in inbasket, or \"Choose Columns\" in Meds & Orders section of the refill encounter.            Passed - Blood pressure less than 140/90 in past 6 months     BP Readings from Last 3 Encounters:   02/27/19 124/79   10/16/18 134/72   10/05/18 138/68                 Passed - Patient has had a Microalbumin in the past 15 mos.     Recent Labs   Lab Test 05/03/18  0827   MICROL 6   UMALCR 9.82             Passed - Patient is age 10 or older        Passed - Patient's CR is NOT>1.4 OR Patient's EGFR is NOT<45 within past 12 mos.     Recent Labs   Lab Test 09/27/18  1611   GFRESTIMATED 74   GFRESTBLACK >90       Recent Labs   Lab Test 09/27/18  1611   CR 0.81             Passed - Patient does NOT have a diagnosis of CHF.        Passed - Medication is active on med list        Passed - Patient is not pregnant        Passed - Patient has not had a positive pregnancy test within the past 12 mos.         diltiazem ER (TIAZAC) 240 MG 24 hr ER beaded capsule [Pharmacy Med Name: dilTIAZem HCl ER Beads Oral Capsule Extended Release 24 Hour 240 MG]  Last Written Prescription Date:  02/13/19  Last Fill Quantity: 90,  # refills: 0   Last " "office visit: 2/27/2019 with prescribing provider:  AYUSH Chavez   Future Office Visit:     90 capsule 0     Sig: TAKE 1 CAPSULE BY MOUTH ONCE DAILY       Calcium Channel Blockers Protocol  Failed - 5/11/2019 10:43 AM        Failed - Normal ALT in past 12 months     Recent Labs   Lab Test 04/15/16  1605   ALT 48             Passed - Blood pressure under 140/90 in past 12 months     BP Readings from Last 3 Encounters:   02/27/19 124/79   10/16/18 134/72   10/05/18 138/68                 Passed - Recent (12 mo) or future (30 days) visit within the authorizing provider's specialty     Patient had office visit in the last 12 months or has a visit in the next 30 days with authorizing provider or within the authorizing provider's specialty.  See \"Patient Info\" tab in inbasket, or \"Choose Columns\" in Meds & Orders section of the refill encounter.              Passed - Medication is active on med list        Passed - Patient is age 18 or older        Passed - No active pregnancy on record        Passed - Normal serum creatinine on file in past 12 months     Recent Labs   Lab Test 09/27/18  1611   CR 0.81             Passed - No positive pregnancy test in past 12 months        albuterol (PROVENTIL) (2.5 MG/3ML) 0.083% neb solution [Pharmacy Med Name: Albuterol Sulfate Inhalation Nebulization Solution (2.5 MG/3ML) 0.083%]  Last Written Prescription Date:  03/16/19  Last Fill Quantity: 90,  # refills: 0   Last office visit: 02/27/19 with prescribing provider:  AYUSH Chavez   Future Office Visit:     90 mL 0     Sig: INHALE 1 VIAL VIA NEBULIZER EVERY 4 HOURS AS NEEDED FOR SHORTNESS OF BREATH/DYSPNEA       Asthma Maintenance Inhalers - Anticholinergics Failed - 5/11/2019 10:43 AM        Failed - Asthma control assessment score within normal limits in last 6 months     Please review ACT score.           Failed - Recent (6 mo) or future (30 days) visit within the authorizing provider's specialty     Patient had office visit in the last 6 " "months or has a visit in the next 30 days with authorizing provider or within the authorizing provider's specialty.  See \"Patient Info\" tab in inbasket, or \"Choose Columns\" in Meds & Orders section of the refill encounter.            Passed - Patient is age 12 years or older        Passed - Medication is active on med list        ADVAIR DISKUS 500-50 MCG/DOSE inhaler [Pharmacy Med Name: Advair Diskus Inhalation Aerosol Powder Breath Activated 500-50 MCG/DOSE]  Last Written Prescription Date:  03/28/19  Last Fill Quantity: 60,  # refills: 10   Last office visit: 02/27/19 with prescribing provider:  AYUSH Chavez   Future Office Visit:      10     Sig: INHALE 1 PUFF BY MOUTH TWICE DAILY       Inhaled Steroids Protocol Failed - 5/11/2019 10:43 AM        Failed - Asthma control assessment score within normal limits in last 6 months     Please review ACT score.           Failed - Recent (6 mo) or future (30 days) visit within the authorizing provider's specialty     Patient had office visit in the last 6 months or has a visit in the next 30 days with authorizing provider or within the authorizing provider's specialty.  See \"Patient Info\" tab in inbasket, or \"Choose Columns\" in Meds & Orders section of the refill encounter.            Passed - Patient is age 12 or older        Passed - Medication is active on med list          "

## 2019-05-13 NOTE — TELEPHONE ENCOUNTER
Routing refill request to provider for review/approval because:  ACT, LDL, A1C, BP  ACT Total Scores 12/15/2016 8/18/2017 3/12/2018   ACT TOTAL SCORE - - -   ASTHMA ER VISITS - - -   ASTHMA HOSPITALIZATIONS - - -   ACT TOTAL SCORE (Goal Greater than or Equal to 20) 21 22 15   In the past 12 months, how many times did you visit the emergency room for your asthma without being admitted to the hospital? 0 0 0   In the past 12 months, how many times were you hospitalized overnight because of your asthma? 0 0 0     LDL Cholesterol Calculated   Date Value Ref Range Status   05/03/2018 95 <100 mg/dL Final     Comment:     Desirable:       <100 mg/dl     Lab Results   Component Value Date    A1C 6.3 09/27/2018    A1C 6.0 05/03/2018    A1C 5.6 08/18/2017    A1C 8.0 03/13/2017     Arabella Suarez, RN, BSN, PHN

## 2019-05-14 ENCOUNTER — E-VISIT (OUTPATIENT)
Dept: FAMILY MEDICINE | Facility: CLINIC | Age: 54
End: 2019-05-14
Payer: COMMERCIAL

## 2019-05-14 DIAGNOSIS — J01.00 ACUTE NON-RECURRENT MAXILLARY SINUSITIS: Primary | ICD-10-CM

## 2019-05-14 PROCEDURE — 99444 ZZC PHYSICIAN ONLINE EVALUATION & MANAGEMENT SERVICE: CPT | Performed by: PHYSICIAN ASSISTANT

## 2019-05-14 RX ORDER — ALBUTEROL SULFATE 0.83 MG/ML
SOLUTION RESPIRATORY (INHALATION)
Qty: 90 ML | Refills: 0 | Status: SHIPPED | OUTPATIENT
Start: 2019-05-14 | End: 2019-12-13

## 2019-05-14 RX ORDER — LISINOPRIL 5 MG/1
TABLET ORAL
Qty: 30 TABLET | Refills: 0 | Status: SHIPPED | OUTPATIENT
Start: 2019-05-14 | End: 2019-06-22

## 2019-05-14 RX ORDER — MONTELUKAST SODIUM 10 MG/1
TABLET ORAL
Qty: 90 TABLET | Refills: 2 | Status: SHIPPED | OUTPATIENT
Start: 2019-05-14 | End: 2020-04-01

## 2019-05-14 RX ORDER — METFORMIN HCL 500 MG
TABLET, EXTENDED RELEASE 24 HR ORAL
Qty: 60 TABLET | Refills: 0 | Status: SHIPPED | OUTPATIENT
Start: 2019-05-14 | End: 2019-06-22

## 2019-05-14 RX ORDER — DILTIAZEM HYDROCHLORIDE 240 MG/1
CAPSULE, EXTENDED RELEASE ORAL
Qty: 30 CAPSULE | Refills: 0 | Status: SHIPPED | OUTPATIENT
Start: 2019-05-14 | End: 2019-07-30

## 2019-05-14 RX ORDER — ALBUTEROL SULFATE 90 UG/1
AEROSOL, METERED RESPIRATORY (INHALATION)
Qty: 8.5 G | Refills: 10 | Status: SHIPPED | OUTPATIENT
Start: 2019-05-14 | End: 2020-07-28

## 2019-05-15 RX ORDER — AZITHROMYCIN 250 MG/1
TABLET, FILM COATED ORAL
Qty: 6 TABLET | Refills: 0 | Status: SHIPPED | OUTPATIENT
Start: 2019-05-15 | End: 2019-05-20

## 2019-05-15 RX ORDER — PREDNISONE 10 MG/1
TABLET ORAL
Qty: 42 TABLET | Refills: 0 | Status: SHIPPED | OUTPATIENT
Start: 2019-05-15 | End: 2019-11-05

## 2019-05-15 NOTE — PROGRESS NOTES
SUBJECTIVE:   Lisette Owens is a 54 year old female who presents to clinic today for the following   health issues:      Diabetes Follow-up    Patient is checking blood sugars: once daily.  Results are as follows:         am - under 140    Diabetic concerns: other - concerned about weight gain     Symptoms of hypoglycemia (low blood sugar): none     Paresthesias (numbness or burning in feet) or sores: No     Date of last diabetic eye exam: 2018    BP Readings from Last 2 Encounters:   05/20/19 113/57   02/27/19 124/79     Hemoglobin A1C (%)   Date Value   05/20/2019 6.9 (H)   09/27/2018 6.3 (H)     LDL Cholesterol Calculated (mg/dL)   Date Value   05/03/2018 95   04/11/2014 94     No profound polyuria/dipsia. Less active.   Diabetes Management Resources  Hypertension Follow-up      Outpatient blood pressures are not being checked.    Low Salt Diet: no added salt    Depression and Anxiety Follow-Up    Status since last visit: No change    Other associated symptoms:None    Complicating factors:     Significant life event: No     Current substance abuse: None    PHQ 10/9/2015 11/29/2016   PHQ-9 Total Score 15 19   Q9: Thoughts of better off dead/self-harm past 2 weeks Not at all Not at all     SRAVAN-7 SCORE 6/11/2015 10/9/2015 11/29/2016   Total Score 9 - -   Total Score - 9 13       PHQ-9  English  PHQ-9   Any Language  SRAVAN-7  Suicide Assessment Five-step Evaluation and Treatment (SAFE-T)  Asthma Follow-Up    Was ACT completed today?    Yes    ACT Total Scores 3/12/2018   ACT TOTAL SCORE -   ASTHMA ER VISITS -   ASTHMA HOSPITALIZATIONS -   ACT TOTAL SCORE (Goal Greater than or Equal to 20) 15   In the past 12 months, how many times did you visit the emergency room for your asthma without being admitted to the hospital? 0   In the past 12 months, how many times were you hospitalized overnight because of your asthma? 0       How many days per week do you miss taking your asthma controller medication?  0    Please  describe any recent triggers for your asthma: smoke, upper respiratory infections, pollens, animal dander, mold and cold air    Have you had any Emergency Room Visits, Urgent Care Visits, or Hospital Admissions since your last office visit?  No        Amount of exercise or physical activity: None    Problems taking medications regularly: No    Medication side effects: none    Diet: regular (no restrictions)            Additional history: as documented    Reviewed  and updated as needed this visit by clinical staff  Tobacco  Allergies  Meds         Reviewed and updated as needed this visit by Provider         Patient Active Problem List   Diagnosis     Dry eye syndrome     CARDIOVASCULAR SCREENING; LDL GOAL LESS THAN 160     Seasonal allergic rhinitis     Allergic rhinitis due to animal dander     Rhinitis, allergic to other allergen     House dust mite allergy     Moderate persistent asthma     PTSD (post-traumatic stress disorder)     Vitamin D deficiency     Hypersomnia     Esophageal reflux (GERD)     Depression with anxiety     Acute cystitis with hematuria     Type 2 diabetes mellitus without complication, without long-term current use of insulin (H)     Anemia     Dyspareunia (CODE)     Insomnia, unspecified     Asthma     Past Surgical History:   Procedure Laterality Date     EXCISE EXOSTOSIS FOOT Right 10/2/2018    Procedure: EXCISE EXOSTOSIS FOOT;  Right foot removal of first tarsometatarsal joint dorsal bossing;  Surgeon: Will Barraza DPM;  Location: [facility]     HYSTEROSCOPY         Social History     Tobacco Use     Smoking status: Never Smoker     Smokeless tobacco: Never Used   Substance Use Topics     Alcohol use: No     Family History   Problem Relation Age of Onset     Hypertension Mother      Alzheimer Disease Mother      Diabetes Mother      Hypertension Father      Skin Cancer Father      Alzheimer Disease Paternal Grandmother      Psychotic Disorder Paternal Grandmother         bipolar      Heart Disease Paternal Grandfather      Breast Cancer Maternal Grandmother      Thyroid Disease Brother      Thyroid Disease Sister      Diabetes Sister      Skin Cancer Sister      Cerebrovascular Disease No family hx of      Glaucoma No family hx of      Macular Degeneration No family hx of          Current Outpatient Medications   Medication Sig Dispense Refill     ADVAIR DISKUS 500-50 MCG/DOSE inhaler INHALE 1 PUFF BY MOUTH TWICE DAILY  1 Inhaler 10     albuterol (PROVENTIL) (2.5 MG/3ML) 0.083% neb solution INHALE 1 VIAL VIA NEBULIZER EVERY 4 HOURS AS NEEDED FOR SHORTNESS OF BREATH/DYSPNEA 90 mL 0     aspirin  MG EC tablet Take 1 tablet (325 mg) by mouth daily 90 tablet 3     diltiazem ER (TIAZAC) 240 MG 24 hr ER beaded capsule TAKE 1 CAPSULE BY MOUTH ONCE DAILY 30 capsule 0     DULoxetine (CYMBALTA) 60 MG capsule TAKE 1 CAPSULE BY MOUTH TWICE DAILY  180 capsule 2     fluticasone (FLONASE) 50 MCG/ACT spray Spray 1-2 sprays into both nostrils daily 16 g 5     lisinopril (PRINIVIL/ZESTRIL) 5 MG tablet TAKE 1 TABLET BY MOUTH ONCE DAILY 30 tablet 0     metFORMIN (GLUCOPHAGE-XR) 500 MG 24 hr tablet TAKE 2 TABLETS BY MOUTH ONCE DAILY WITH DINNER 60 tablet 0     montelukast (SINGULAIR) 10 MG tablet TAKE 1 TABLET BY MOUTH AT BEDTIME 90 tablet 2     predniSONE (DELTASONE) 10 MG tablet Take 60 mg days 1 and 2, 50 mg days 3 and 4, 40 mg days 5 and 6, 30 mg days 7 and 8, 20 mg days 9 and 10, 10 mg days 11 and 12. 42 tablet 0     PROAIR  (90 Base) MCG/ACT inhaler INHALE TWO PUFFS BY MOUTH EVERY FOUR HOURS AS NEEDED FOR SHORTNESS OF BREATH/ DIFFICULTY BREATHING 8.5 g 10     ALPRAZolam (XANAX) 0.5 MG tablet Take 1-2 tablets (0.5-1 mg) by mouth 3 times daily as needed for anxiety (Patient not taking: Reported on 2/27/2019) 30 tablet 1     fish oil-omega-3 fatty acids (FISH OIL) 1000 MG capsule Take 2 g by mouth 3 times daily Reported on 2/14/2017       HYDROcodone-acetaminophen (NORCO) 5-325 MG per tablet Take 1-2  tablets by mouth every 4 hours as needed (Moderate to Severe Pain) (Patient not taking: Reported on 10/5/2018) 20 tablet 0     nitroglycerin (NITRODUR) 0.4 MG/HR Place 1 patch onto the skin daily Reported on 2/14/2017       ondansetron (ZOFRAN) 4 MG tablet Take 1 tablet (4 mg) by mouth every 6 hours as needed for nausea (Patient not taking: Reported on 5/20/2019) 18 tablet 1     predniSONE (DELTASONE) 20 MG tablet Take 20 mg by mouth 2 times daily. (Patient not taking: Reported on 2/27/2019.) 14 tablet 0     Allergies   Allergen Reactions     Ampicillin Rash     Cipro [Quinolones] Anaphylaxis     Macrobid [Nitrofuran Derivatives] GI Disturbance     Ampicillin Rash     childhood reaction     Ciprofloxacin Itching and Rash     Recent Labs   Lab Test 05/20/19  1221 09/27/18  1611 05/03/18  0802 08/18/17  1500  11/29/16  1711 04/15/16  1605 03/30/16 04/11/14  0934   A1C 6.9* 6.3* 6.0* 5.6   < >  --   --   --   --    LDL  --   --  95  --   --   --   --   --  94   HDL  --   --  53  --   --   --   --   --  46*   TRIG  --   --  191*  --   --   --   --   --  251*   ALT  --   --   --   --   --   --  48  --   --    CR  --  0.81  --  0.92  --   --  0.81  --  0.86   GFRESTIMATED  --  74  --  64  --   --  75  --  70   GFRESTBLACK  --  >90  --  78  --   --  >90  African American GFR Calc    --  85   POTASSIUM  --  4.2  --  4.3  --  3.6 3.7  --  4.1   TSH  --   --   --   --   --  0.65  --  2.35  --     < > = values in this interval not displayed.      BP Readings from Last 3 Encounters:   05/20/19 113/57   02/27/19 124/79   10/16/18 134/72    Wt Readings from Last 3 Encounters:   05/20/19 89.4 kg (197 lb)   02/27/19 86.6 kg (191 lb)   09/27/18 88 kg (194 lb 0.1 oz)                    All other systems negative except as outline above  OBJECTIVE:  Eye exam - right eye normal lid, conjunctiva, cornea, pupil and fundus, left eye normal lid, conjunctiva, cornea, pupil and fundus.  Thyroid not palpable, not enlarged, no nodules  detected.  CHEST:chest clear to IPPA, no tachypnea, retractions or cyanosis and S1, S2 normal, no murmur, no gallop, rate regular.  ENT exam reveals - bilateral TM normal without fluid or infection, neck without nodes, throat normal without erythema or exudate, sinuses nontender, post nasal drip noted, nasal mucosa congested and nasal mucosa pale and congested.    Lisette was seen today for asthma, diabetes, hypertension and depression.    Diagnoses and all orders for this visit:    Type 2 diabetes mellitus without complication, without long-term current use of insulin (H)  -     Glucose, whole blood    Moderate persistent asthma  -     Hemoglobin A1c  -     Basic metabolic panel  (Ca, Cl, CO2, Creat, Gluc, K, Na, BUN)  -     TSH  -     JUST IN CASE      .work on lifestyle modification  Recheck in 6 mos

## 2019-05-20 ENCOUNTER — OFFICE VISIT (OUTPATIENT)
Dept: FAMILY MEDICINE | Facility: CLINIC | Age: 54
End: 2019-05-20
Payer: COMMERCIAL

## 2019-05-20 VITALS
SYSTOLIC BLOOD PRESSURE: 113 MMHG | OXYGEN SATURATION: 95 % | RESPIRATION RATE: 16 BRPM | TEMPERATURE: 98.2 F | HEART RATE: 80 BPM | WEIGHT: 197 LBS | BODY MASS INDEX: 31.66 KG/M2 | DIASTOLIC BLOOD PRESSURE: 57 MMHG | HEIGHT: 66 IN

## 2019-05-20 DIAGNOSIS — R79.89 LOW TSH LEVEL: ICD-10-CM

## 2019-05-20 DIAGNOSIS — J45.41 MODERATE PERSISTENT ASTHMA WITH ACUTE EXACERBATION: ICD-10-CM

## 2019-05-20 DIAGNOSIS — E11.9 TYPE 2 DIABETES MELLITUS WITHOUT COMPLICATION, WITHOUT LONG-TERM CURRENT USE OF INSULIN (H): Primary | ICD-10-CM

## 2019-05-20 LAB
ANION GAP SERPL CALCULATED.3IONS-SCNC: 10 MMOL/L (ref 3–14)
BUN SERPL-MCNC: 24 MG/DL (ref 7–30)
CALCIUM SERPL-MCNC: 9.4 MG/DL (ref 8.5–10.1)
CHLORIDE SERPL-SCNC: 104 MMOL/L (ref 94–109)
CO2 SERPL-SCNC: 23 MMOL/L (ref 20–32)
CREAT SERPL-MCNC: 0.74 MG/DL (ref 0.52–1.04)
GFR SERPL CREATININE-BSD FRML MDRD: >90 ML/MIN/{1.73_M2}
GLUCOSE BLD-MCNC: 215 MG/DL (ref 70–99)
GLUCOSE SERPL-MCNC: 222 MG/DL (ref 70–99)
HBA1C MFR BLD: 6.9 % (ref 0–5.6)
POTASSIUM SERPL-SCNC: 4.2 MMOL/L (ref 3.4–5.3)
SODIUM SERPL-SCNC: 137 MMOL/L (ref 133–144)
TSH SERPL DL<=0.005 MIU/L-ACNC: 0.38 MU/L (ref 0.4–4)

## 2019-05-20 PROCEDURE — 80048 BASIC METABOLIC PNL TOTAL CA: CPT | Performed by: PHYSICIAN ASSISTANT

## 2019-05-20 PROCEDURE — 82947 ASSAY GLUCOSE BLOOD QUANT: CPT | Performed by: PHYSICIAN ASSISTANT

## 2019-05-20 PROCEDURE — 84443 ASSAY THYROID STIM HORMONE: CPT | Performed by: PHYSICIAN ASSISTANT

## 2019-05-20 PROCEDURE — 83036 HEMOGLOBIN GLYCOSYLATED A1C: CPT | Performed by: PHYSICIAN ASSISTANT

## 2019-05-20 PROCEDURE — 36415 COLL VENOUS BLD VENIPUNCTURE: CPT | Performed by: PHYSICIAN ASSISTANT

## 2019-05-20 PROCEDURE — 99214 OFFICE O/P EST MOD 30 MIN: CPT | Performed by: PHYSICIAN ASSISTANT

## 2019-05-20 ASSESSMENT — ANXIETY QUESTIONNAIRES
7. FEELING AFRAID AS IF SOMETHING AWFUL MIGHT HAPPEN: NEARLY EVERY DAY
3. WORRYING TOO MUCH ABOUT DIFFERENT THINGS: SEVERAL DAYS
1. FEELING NERVOUS, ANXIOUS, OR ON EDGE: SEVERAL DAYS
IF YOU CHECKED OFF ANY PROBLEMS ON THIS QUESTIONNAIRE, HOW DIFFICULT HAVE THESE PROBLEMS MADE IT FOR YOU TO DO YOUR WORK, TAKE CARE OF THINGS AT HOME, OR GET ALONG WITH OTHER PEOPLE: SOMEWHAT DIFFICULT
GAD7 TOTAL SCORE: 9
2. NOT BEING ABLE TO STOP OR CONTROL WORRYING: SEVERAL DAYS
6. BECOMING EASILY ANNOYED OR IRRITABLE: MORE THAN HALF THE DAYS
5. BEING SO RESTLESS THAT IT IS HARD TO SIT STILL: SEVERAL DAYS

## 2019-05-20 ASSESSMENT — ASTHMA QUESTIONNAIRES
ACT_TOTALSCORE: 9
QUESTION_1 LAST FOUR WEEKS HOW MUCH OF THE TIME DID YOUR ASTHMA KEEP YOU FROM GETTING AS MUCH DONE AT WORK, SCHOOL OR AT HOME: MOST OF THE TIME
QUESTION_4 LAST FOUR WEEKS HOW OFTEN HAVE YOU USED YOUR RESCUE INHALER OR NEBULIZER MEDICATION (SUCH AS ALBUTEROL): TWO OR THREE TIMES PER WEEK
QUESTION_3 LAST FOUR WEEKS HOW OFTEN DID YOUR ASTHMA SYMPTOMS (WHEEZING, COUGHING, SHORTNESS OF BREATH, CHEST TIGHTNESS OR PAIN) WAKE YOU UP AT NIGHT OR EARLIER THAN USUAL IN THE MORNING: FOUR OR MORE NIGHTS A WEEK
ACUTE_EXACERBATION_TODAY: NO
QUESTION_2 LAST FOUR WEEKS HOW OFTEN HAVE YOU HAD SHORTNESS OF BREATH: MORE THAN ONCE A DAY
QUESTION_5 LAST FOUR WEEKS HOW WOULD YOU RATE YOUR ASTHMA CONTROL: POORLY CONTROLLED

## 2019-05-20 ASSESSMENT — PATIENT HEALTH QUESTIONNAIRE - PHQ9
SUM OF ALL RESPONSES TO PHQ QUESTIONS 1-9: 18
5. POOR APPETITE OR OVEREATING: NOT AT ALL

## 2019-05-20 ASSESSMENT — MIFFLIN-ST. JEOR: SCORE: 1515.34

## 2019-05-21 ASSESSMENT — ANXIETY QUESTIONNAIRES: GAD7 TOTAL SCORE: 9

## 2019-05-21 ASSESSMENT — ASTHMA QUESTIONNAIRES: ACT_TOTALSCORE: 9

## 2019-06-22 DIAGNOSIS — E11.9 TYPE 2 DIABETES MELLITUS WITHOUT COMPLICATION, WITHOUT LONG-TERM CURRENT USE OF INSULIN (H): ICD-10-CM

## 2019-06-24 RX ORDER — LISINOPRIL 5 MG/1
TABLET ORAL
Qty: 90 TABLET | Refills: 1 | Status: SHIPPED | OUTPATIENT
Start: 2019-06-24 | End: 2020-01-03

## 2019-06-24 NOTE — TELEPHONE ENCOUNTER
"Requested Prescriptions   Pending Prescriptions Disp Refills     lisinopril (PRINIVIL/ZESTRIL) 5 MG tablet [Pharmacy Med Name: Lisinopril Oral Tablet 5 MG]  Last Written Prescription Date:  05/14/19  Last Fill Quantity: 30,  # refills: 0   Last office visit: 5/20/2019 with prescribing provider:  EMILEE Yin   Future Office Visit:     30 tablet 0     Sig: TAKE 1 TABLET BY MOUTH ONCE DAILY       ACE Inhibitors (Including Combos) Protocol Passed - 6/22/2019  9:08 AM        Passed - Blood pressure under 140/90 in past 12 months     BP Readings from Last 3 Encounters:   05/20/19 113/57   02/27/19 124/79   10/16/18 134/72                 Passed - Recent (12 mo) or future (30 days) visit within the authorizing provider's specialty     Patient had office visit in the last 12 months or has a visit in the next 30 days with authorizing provider or within the authorizing provider's specialty.  See \"Patient Info\" tab in inbasket, or \"Choose Columns\" in Meds & Orders section of the refill encounter.              Passed - Medication is active on med list        Passed - Patient is age 18 or older        Passed - No active pregnancy on record        Passed - Normal serum creatinine on file in past 12 months     Recent Labs   Lab Test 05/20/19  1221   CR 0.74             Passed - Normal serum potassium on file in past 12 months     Recent Labs   Lab Test 05/20/19  1221   POTASSIUM 4.2             Passed - No positive pregnancy test within past 12 months        metFORMIN (GLUCOPHAGE-XR) 500 MG 24 hr tablet [Pharmacy Med Name: metFORMIN HCl ER Oral Tablet Extended Release 24 Hour 500 MG]  Last Written Prescription Date:  05/14/19  Last Fill Quantity: 60,  # refills: 0   Last office visit: 5/20/2019 with prescribing provider:  EMILEE Yin   Future Office Visit:     60 tablet 0     Sig: TAKE 2 TABLETS BY MOUTH ONCE DAILY with dinner       Biguanide Agents Failed - 6/22/2019  9:08 AM        Failed - Patient has documented LDL within the past " "12 mos.     Recent Labs   Lab Test 05/03/18  0802   LDL 95             Passed - Blood pressure less than 140/90 in past 6 months     BP Readings from Last 3 Encounters:   05/20/19 113/57   02/27/19 124/79   10/16/18 134/72                 Passed - Patient has had a Microalbumin in the past 15 mos.     Recent Labs   Lab Test 05/03/18  0827   MICROL 6   UMALCR 9.82             Passed - Patient is age 10 or older        Passed - Patient has documented A1c within the specified period of time.     If HgbA1C is 8 or greater, it needs to be on file within the past 3 months.  If less than 8, must be on file within the past 6 months.     Recent Labs   Lab Test 05/20/19  1221   A1C 6.9*             Passed - Patient's CR is NOT>1.4 OR Patient's EGFR is NOT<45 within past 12 mos.     Recent Labs   Lab Test 05/20/19  1221   GFRESTIMATED >90   GFRESTBLACK >90       Recent Labs   Lab Test 05/20/19  1221   CR 0.74             Passed - Patient does NOT have a diagnosis of CHF.        Passed - Medication is active on med list        Passed - Patient is not pregnant        Passed - Patient has not had a positive pregnancy test within the past 12 mos.         Passed - Recent (6 mo) or future (30 days) visit within the authorizing provider's specialty     Patient had office visit in the last 6 months or has a visit in the next 30 days with authorizing provider or within the authorizing provider's specialty.  See \"Patient Info\" tab in inbasket, or \"Choose Columns\" in Meds & Orders section of the refill encounter.              "

## 2019-06-24 NOTE — TELEPHONE ENCOUNTER
Prescription approved per [facility] Refill Protocol.  Routing refill request to provider for review/approval because:  Labs not current:  LDL  LDL Cholesterol Calculated   Date Value Ref Range Status   05/03/2018 95 <100 mg/dL Final     Comment:     Desirable:       <100 mg/dl     Arabella Suarez RN, BSN, PHN

## 2019-06-25 RX ORDER — METFORMIN HCL 500 MG
TABLET, EXTENDED RELEASE 24 HR ORAL
Qty: 180 TABLET | Refills: 1 | Status: SHIPPED | OUTPATIENT
Start: 2019-06-25 | End: 2020-01-03

## 2019-06-26 DIAGNOSIS — R79.89 LOW TSH LEVEL: ICD-10-CM

## 2019-06-26 LAB — TSH SERPL DL<=0.005 MIU/L-ACNC: 1.32 MU/L (ref 0.4–4)

## 2019-06-26 PROCEDURE — 84445 ASSAY OF TSI GLOBULIN: CPT | Mod: 90 | Performed by: PHYSICIAN ASSISTANT

## 2019-06-26 PROCEDURE — 86376 MICROSOMAL ANTIBODY EACH: CPT | Performed by: PHYSICIAN ASSISTANT

## 2019-06-26 PROCEDURE — 99000 SPECIMEN HANDLING OFFICE-LAB: CPT | Performed by: PHYSICIAN ASSISTANT

## 2019-06-26 PROCEDURE — 84443 ASSAY THYROID STIM HORMONE: CPT | Performed by: PHYSICIAN ASSISTANT

## 2019-06-26 PROCEDURE — 36415 COLL VENOUS BLD VENIPUNCTURE: CPT | Performed by: PHYSICIAN ASSISTANT

## 2019-06-27 LAB — THYROPEROXIDASE AB SERPL-ACNC: <10 IU/ML

## 2019-07-02 LAB — TSI SER-ACNC: <1 TSI INDEX

## 2019-11-04 ENCOUNTER — MYC MEDICAL ADVICE (OUTPATIENT)
Dept: FAMILY MEDICINE | Facility: CLINIC | Age: 54
End: 2019-11-04

## 2019-11-05 ENCOUNTER — OFFICE VISIT (OUTPATIENT)
Dept: FAMILY MEDICINE | Facility: CLINIC | Age: 54
End: 2019-11-05
Payer: COMMERCIAL

## 2019-11-05 VITALS
DIASTOLIC BLOOD PRESSURE: 84 MMHG | OXYGEN SATURATION: 94 % | TEMPERATURE: 98.4 F | BODY MASS INDEX: 32.8 KG/M2 | RESPIRATION RATE: 16 BRPM | HEART RATE: 92 BPM | WEIGHT: 203.2 LBS | SYSTOLIC BLOOD PRESSURE: 138 MMHG

## 2019-11-05 DIAGNOSIS — J01.00 ACUTE NON-RECURRENT MAXILLARY SINUSITIS: ICD-10-CM

## 2019-11-05 DIAGNOSIS — J20.9 ACUTE BRONCHITIS, UNSPECIFIED ORGANISM: Primary | ICD-10-CM

## 2019-11-05 PROCEDURE — 99213 OFFICE O/P EST LOW 20 MIN: CPT | Performed by: PHYSICIAN ASSISTANT

## 2019-11-05 RX ORDER — PREDNISONE 20 MG/1
20 TABLET ORAL 2 TIMES DAILY
Qty: 14 TABLET | Refills: 0 | Status: SHIPPED | OUTPATIENT
Start: 2019-11-05 | End: 2019-11-12

## 2019-11-05 RX ORDER — DOXYCYCLINE HYCLATE 100 MG
100 TABLET ORAL 2 TIMES DAILY
Qty: 28 TABLET | Refills: 0 | Status: SHIPPED | OUTPATIENT
Start: 2019-11-05 | End: 2019-11-19

## 2019-11-05 ASSESSMENT — PATIENT HEALTH QUESTIONNAIRE - PHQ9
SUM OF ALL RESPONSES TO PHQ QUESTIONS 1-9: 10
5. POOR APPETITE OR OVEREATING: NOT AT ALL

## 2019-11-05 ASSESSMENT — ANXIETY QUESTIONNAIRES
GAD7 TOTAL SCORE: 4
5. BEING SO RESTLESS THAT IT IS HARD TO SIT STILL: SEVERAL DAYS
6. BECOMING EASILY ANNOYED OR IRRITABLE: SEVERAL DAYS
3. WORRYING TOO MUCH ABOUT DIFFERENT THINGS: NOT AT ALL
2. NOT BEING ABLE TO STOP OR CONTROL WORRYING: NOT AT ALL
IF YOU CHECKED OFF ANY PROBLEMS ON THIS QUESTIONNAIRE, HOW DIFFICULT HAVE THESE PROBLEMS MADE IT FOR YOU TO DO YOUR WORK, TAKE CARE OF THINGS AT HOME, OR GET ALONG WITH OTHER PEOPLE: SOMEWHAT DIFFICULT
1. FEELING NERVOUS, ANXIOUS, OR ON EDGE: SEVERAL DAYS
7. FEELING AFRAID AS IF SOMETHING AWFUL MIGHT HAPPEN: SEVERAL DAYS

## 2019-11-05 NOTE — PROGRESS NOTES
Subjective     Lisette LINCOLN Ukaegbu is a 54 year old female who presents to clinic today for the following health issues:    HPI   Sandy  Followup:    Facility:  [facility]  Date of visit: 4 weeks ago  Reason for visit: cough, no voice, fever, shortness of breath   Current Status: finished prednisone and antibiotics           Reviewed and updated as needed this visit by Provider         Review of Systems   ROS COMP: Constitutional, HEENT, cardiovascular, pulmonary, GI, , musculoskeletal, neuro, skin, endocrine and psych systems are negative, except as otherwise noted.      Objective    /84   Pulse 92   Temp 98.4  F (36.9  C) (Oral)   Resp 16   Wt 92.2 kg (203 lb 3.2 oz)   LMP 05/15/2014 (Approximate)   SpO2 94%   BMI 32.80 kg/m    Body mass index is 32.8 kg/m .  Physical Exam     ENT exam reveals - bilateral TM fluid noted, neck without nodes, throat normal without erythema or exudate, maxillary sinus tender, post nasal drip noted, nasal mucosa congested and nasal mucosa pale and congested.  CHEST:no tachypnea, retractions or cyanosis, mild inspiratory wheezing heard diffusely throughout both lungs, mild expiratory wheezing heard diffusely throughout both lungs and S1, S2 normal, no murmur, no gallop, rate regular.  Lisette was seen today for asthma and ent problem.    Diagnoses and all orders for this visit:    Acute bronchitis, unspecified organism  -     predniSONE (DELTASONE) 20 MG tablet; Take 1 tablet (20 mg) by mouth 2 times daily for 7 days  -     doxycycline hyclate (VIBRA-TABS) 100 MG tablet; Take 1 tablet (100 mg) by mouth 2 times daily for 14 days    Acute non-recurrent maxillary sinusitis  -     predniSONE (DELTASONE) 20 MG tablet; Take 1 tablet (20 mg) by mouth 2 times daily for 7 days  -     doxycycline hyclate (VIBRA-TABS) 100 MG tablet; Take 1 tablet (100 mg) by mouth 2 times daily for 14 days      Advised supportive and symptomatic treatment.  Follow up with Provider - if condition  persists or worsens.

## 2019-11-06 ASSESSMENT — ANXIETY QUESTIONNAIRES: GAD7 TOTAL SCORE: 4

## 2019-11-14 DIAGNOSIS — R00.2 PALPITATIONS: ICD-10-CM

## 2019-11-14 NOTE — TELEPHONE ENCOUNTER
"dilTIAZem HCl ER Beads Oral Capsule Extended Release 24 Hour 240 MG  Last Written Prescription Date:  7/31/2019  Last Fill Quantity: 90,  # refills: 0   Last office visit: 11/5/2019 with prescribing provider:  MARGIE   Future Office Visit:    Requested Prescriptions   Pending Prescriptions Disp Refills     diltiazem ER (TIAZAC) 240 MG 24 hr ER beaded capsule [Pharmacy Med Name: dilTIAZem HCl ER Beads Oral Capsule Extended Release 24 Hour 240 MG] 90 capsule 0     Sig: TAKE 1 CAPSULE BY MOUTH ONE TIME DAILY       Calcium Channel Blockers Protocol  Failed - 11/14/2019  3:26 PM        Failed - Normal ALT in past 12 months     Recent Labs   Lab Test 04/15/16  1605   ALT 48             Passed - Blood pressure under 140/90 in past 12 months     BP Readings from Last 3 Encounters:   11/05/19 138/84   05/20/19 113/57   02/27/19 124/79                 Passed - Recent (12 mo) or future (30 days) visit within the authorizing provider's specialty     Patient has had an office visit with the authorizing provider or a provider within the authorizing providers department within the previous 12 mos or has a future within next 30 days. See \"Patient Info\" tab in inbasket, or \"Choose Columns\" in Meds & Orders section of the refill encounter.              Passed - Medication is active on med list        Passed - Patient is age 18 or older        Passed - No active pregnancy on record        Passed - Normal serum creatinine on file in past 12 months     Recent Labs   Lab Test 05/20/19  1221   CR 0.74             Passed - No positive pregnancy test in past 12 months          "

## 2019-11-15 NOTE — TELEPHONE ENCOUNTER
Routing refill request to provider for review/approval because:  Labs out of range:  ALT  Lab Results   Component Value Date    ALT 48 04/15/2016     Arabella Lemus, RN, BSN, PHN

## 2019-11-16 RX ORDER — DILTIAZEM HYDROCHLORIDE 240 MG/1
CAPSULE, EXTENDED RELEASE ORAL
Qty: 90 CAPSULE | Refills: 0 | Status: SHIPPED | OUTPATIENT
Start: 2019-11-16 | End: 2020-03-23

## 2019-12-13 DIAGNOSIS — J45.40 MODERATE PERSISTENT ASTHMA, UNCOMPLICATED: ICD-10-CM

## 2019-12-13 NOTE — TELEPHONE ENCOUNTER
Routing refill request to provider for review/approval because:  Labs out of range:  ACT    ACT Total Scores 8/18/2017 3/12/2018 5/20/2019   ACT TOTAL SCORE - - -   ASTHMA ER VISITS - - -   ASTHMA HOSPITALIZATIONS - - -   ACT TOTAL SCORE (Goal Greater than or Equal to 20) 22 15 9   In the past 12 months, how many times did you visit the emergency room for your asthma without being admitted to the hospital? 0 0 0   In the past 12 months, how many times were you hospitalized overnight because of your asthma? 0 0 0     Arabella Lemus, RN, BSN, PHN

## 2019-12-13 NOTE — TELEPHONE ENCOUNTER
"Requested Prescriptions   Pending Prescriptions Disp Refills     albuterol (PROVENTIL) (2.5 MG/3ML) 0.083% neb solution [Pharmacy Med Name: Albuterol Sulfate Inhalation Nebulization Solution (2.5 MG/3ML) 0.083%] 90 mL 0     Sig: Inhale the contents of 1 vial via nebulizer every 4 hours as needed for shortness of breath  Last Written Prescription Date:  5/14/19  Last Fill Quantity: 90 ml,  # refills: 0   Last office visit: 11/5/2019 with prescribing provider:  JUAN Yin   Future Office Visit:         Asthma Maintenance Inhalers - Anticholinergics Failed - 12/13/2019  4:31 PM        Failed - Asthma control assessment score within normal limits in last 6 months     Please review ACT score.           Passed - Patient is age 12 years or older        Passed - Medication is active on med list        Passed - Recent (6 mo) or future (30 days) visit within the authorizing provider's specialty     Patient had office visit in the last 6 months or has a visit in the next 30 days with authorizing provider or within the authorizing provider's specialty.  See \"Patient Info\" tab in inbasket, or \"Choose Columns\" in Meds & Orders section of the refill encounter.            "

## 2019-12-14 RX ORDER — ALBUTEROL SULFATE 0.83 MG/ML
SOLUTION RESPIRATORY (INHALATION)
Qty: 90 ML | Refills: 0 | Status: SHIPPED | OUTPATIENT
Start: 2019-12-14 | End: 2020-07-28

## 2019-12-30 DIAGNOSIS — E11.9 TYPE 2 DIABETES MELLITUS WITHOUT COMPLICATION, WITHOUT LONG-TERM CURRENT USE OF INSULIN (H): ICD-10-CM

## 2019-12-30 NOTE — TELEPHONE ENCOUNTER
"Requested Prescriptions   Pending Prescriptions Disp Refills     lisinopril (PRINIVIL/ZESTRIL) 5 MG tablet [Pharmacy Med Name: Lisinopril Oral Tablet 5 MG] 90 tablet 0     Sig: TAKE 1 TABLET BY MOUTH ONCE DAILY   Last Written Prescription Date:  12-30-19  Last Fill Quantity: 90,  # refills: 0   Last office visit: 11/5/2019 with prescribing provider:  11-5-19   Future Office Visit:      ACE Inhibitors (Including Combos) Protocol Passed - 12/30/2019  1:21 PM        Passed - Blood pressure under 140/90 in past 12 months     BP Readings from Last 3 Encounters:   11/05/19 138/84   05/20/19 113/57   02/27/19 124/79                 Passed - Recent (12 mo) or future (30 days) visit within the authorizing provider's specialty     Patient has had an office visit with the authorizing provider or a provider within the authorizing providers department within the previous 12 mos or has a future within next 30 days. See \"Patient Info\" tab in inbasket, or \"Choose Columns\" in Meds & Orders section of the refill encounter.              Passed - Medication is active on med list        Passed - Patient is age 18 or older        Passed - No active pregnancy on record        Passed - Normal serum creatinine on file in past 12 months     Recent Labs   Lab Test 05/20/19  1221   CR 0.74             Passed - Normal serum potassium on file in past 12 months     Recent Labs   Lab Test 05/20/19  1221   POTASSIUM 4.2             Passed - No positive pregnancy test within past 12 months        metFORMIN (GLUCOPHAGE-XR) 500 MG 24 hr tablet [Pharmacy Med Name: metFORMIN HCl ER Oral Tablet Extended Release 24 Hour 500 MG] 180 tablet 0     Sig: TAKE 2 TABLETS BY MOUTH ONCE DAILY WITH DINNER   Last Written Prescription Date:  12-30-19  Last Fill Quantity: 180,  # refills: 0   Last office visit: 11/5/2019 with prescribing provider:  11-5-19   Future Office Visit:      Biguanide Agents Failed - 12/30/2019  1:21 PM        Failed - Patient has documented " "LDL within the past 12 mos.     Recent Labs   Lab Test 05/03/18  0802   LDL 95             Failed - Patient has had a Microalbumin in the past 15 mos.     Recent Labs   Lab Test 05/03/18  0827   MICROL 6   UMALCR 9.82             Failed - Patient has documented A1c within the specified period of time.     If HgbA1C is 8 or greater, it needs to be on file within the past 3 months.  If less than 8, must be on file within the past 6 months.     Recent Labs   Lab Test 05/20/19  1221   A1C 6.9*             Passed - Blood pressure less than 140/90 in past 6 months     BP Readings from Last 3 Encounters:   11/05/19 138/84   05/20/19 113/57   02/27/19 124/79                 Passed - Patient is age 10 or older        Passed - Patient's CR is NOT>1.4 OR Patient's EGFR is NOT<45 within past 12 mos.     Recent Labs   Lab Test 05/20/19  1221   GFRESTIMATED >90   GFRESTBLACK >90       Recent Labs   Lab Test 05/20/19  1221   CR 0.74             Passed - Patient does NOT have a diagnosis of CHF.        Passed - Medication is active on med list        Passed - Patient is not pregnant        Passed - Patient has not had a positive pregnancy test within the past 12 mos.         Passed - Recent (6 mo) or future (30 days) visit within the authorizing provider's specialty     Patient had office visit in the last 6 months or has a visit in the next 30 days with authorizing provider or within the authorizing provider's specialty.  See \"Patient Info\" tab in inbasket, or \"Choose Columns\" in Meds & Orders section of the refill encounter.            "

## 2020-01-03 ENCOUNTER — TELEPHONE (OUTPATIENT)
Dept: FAMILY MEDICINE | Facility: CLINIC | Age: 55
End: 2020-01-03

## 2020-01-03 NOTE — TELEPHONE ENCOUNTER
Reason for Call:  Medication or medication refill:    Do you use a [facility]?  Name of the pharmacy and phone number for the current request:  [id number] - [place], [place] - [address] [facility]  [phone]    Name of the medication requested: lisinopril (PRINIVIL/ZESTRIL) 5 MG tablet, and metFORMIN (GLUCOPHAGE-XR) 500 MG 24 hr tablet    Other request:     Can we leave a detailed message on this number? YES    Phone number patient can be reached at: Home number on file [phone] (home)    Best Time:     Call taken on 1/3/2020 at 11:03 AM by Jesica Goff

## 2020-01-03 NOTE — TELEPHONE ENCOUNTER
Routing refill request to provider for review/approval because:  Labs not current:  LDL, Microalbumin, A1c not within past 6 months.    Last OV with [place]:   11/5/19 with advised F/U in 3 months    Last OV for Diabetes 5/20/19 with advised F/U in 6 months.    Med maikel'd up for #30 with reminder due for appt and Diabetes F/U with labs.    Need to call patient to notify so please route back to RN pool after signing off on med. Thanks.    Chapis Morales, RN, BSN

## 2020-01-05 RX ORDER — LISINOPRIL 5 MG/1
5 TABLET ORAL DAILY
Qty: 30 TABLET | Refills: 0 | Status: SHIPPED | OUTPATIENT
Start: 2020-01-05 | End: 2020-01-06

## 2020-01-05 RX ORDER — METFORMIN HCL 500 MG
1000 TABLET, EXTENDED RELEASE 24 HR ORAL
Qty: 60 TABLET | Refills: 0 | Status: SHIPPED | OUTPATIENT
Start: 2020-01-05 | End: 2020-01-06

## 2020-01-06 ENCOUNTER — OFFICE VISIT (OUTPATIENT)
Dept: FAMILY MEDICINE | Facility: CLINIC | Age: 55
End: 2020-01-06
Payer: COMMERCIAL

## 2020-01-06 VITALS
DIASTOLIC BLOOD PRESSURE: 86 MMHG | TEMPERATURE: 97.7 F | HEART RATE: 85 BPM | WEIGHT: 199.6 LBS | RESPIRATION RATE: 20 BRPM | SYSTOLIC BLOOD PRESSURE: 124 MMHG | BODY MASS INDEX: 32.08 KG/M2 | HEIGHT: 66 IN | OXYGEN SATURATION: 95 %

## 2020-01-06 DIAGNOSIS — J01.00 ACUTE NON-RECURRENT MAXILLARY SINUSITIS: ICD-10-CM

## 2020-01-06 DIAGNOSIS — J20.9 ACUTE BRONCHITIS, UNSPECIFIED ORGANISM: ICD-10-CM

## 2020-01-06 DIAGNOSIS — E11.9 TYPE 2 DIABETES MELLITUS WITHOUT COMPLICATION, WITHOUT LONG-TERM CURRENT USE OF INSULIN (H): ICD-10-CM

## 2020-01-06 DIAGNOSIS — J45.41 MODERATE PERSISTENT ASTHMA WITH ACUTE EXACERBATION: Primary | ICD-10-CM

## 2020-01-06 LAB — HBA1C MFR BLD: 7.4 % (ref 0–5.6)

## 2020-01-06 PROCEDURE — 99214 OFFICE O/P EST MOD 30 MIN: CPT | Performed by: PHYSICIAN ASSISTANT

## 2020-01-06 PROCEDURE — 83036 HEMOGLOBIN GLYCOSYLATED A1C: CPT | Performed by: PHYSICIAN ASSISTANT

## 2020-01-06 PROCEDURE — 36415 COLL VENOUS BLD VENIPUNCTURE: CPT | Performed by: PHYSICIAN ASSISTANT

## 2020-01-06 RX ORDER — CLARITHROMYCIN 250 MG/1
250 TABLET, FILM COATED ORAL 2 TIMES DAILY
Qty: 10 TABLET | Refills: 0 | Status: SHIPPED | OUTPATIENT
Start: 2020-01-06 | End: 2020-03-23

## 2020-01-06 RX ORDER — METFORMIN HCL 500 MG
1000 TABLET, EXTENDED RELEASE 24 HR ORAL 2 TIMES DAILY WITH MEALS
Qty: 360 TABLET | Refills: 0 | Status: SHIPPED | OUTPATIENT
Start: 2020-01-06 | End: 2020-04-01

## 2020-01-06 RX ORDER — PREDNISONE 10 MG/1
TABLET ORAL
Qty: 42 TABLET | Refills: 0 | Status: SHIPPED | OUTPATIENT
Start: 2020-01-06 | End: 2020-07-23

## 2020-01-06 RX ORDER — LISINOPRIL 5 MG/1
5 TABLET ORAL DAILY
Qty: 30 TABLET | Refills: 0 | Status: SHIPPED | OUTPATIENT
Start: 2020-01-06 | End: 2020-03-09

## 2020-01-06 ASSESSMENT — MIFFLIN-ST. JEOR: SCORE: 1531.1

## 2020-01-07 ENCOUNTER — TELEPHONE (OUTPATIENT)
Dept: FAMILY MEDICINE | Facility: CLINIC | Age: 55
End: 2020-01-07

## 2020-01-07 ASSESSMENT — ASTHMA QUESTIONNAIRES: ACT_TOTALSCORE: 7

## 2020-01-07 NOTE — TELEPHONE ENCOUNTER
RN spoke with pharmacy and confirmed patient would hold advair until completion of biaxin.    Pharmacy now asking to OK interaction with Diltiazam and clarithromycin.    Please advise.   Arabella Lemus RN, BSN, PHN

## 2020-01-07 NOTE — PROGRESS NOTES
Subjective     Lisette Arroyogbcathryn is a 54 year old female who presents to clinic today for the following health issues:    HPI   Diabetes Follow-up    How often are you checking your blood sugar? A few times a month  What time of day are you checking your blood sugars (select all that apply)?  Before meals and in the morning  Have you had any blood sugars above 200?  No  Have you had any blood sugars below 70?  No    What symptoms do you notice when your blood sugar is low?  Dizzy    What concerns do you have today about your diabetes? None     Do you have any of these symptoms? (Select all that apply)  No numbness or tingling in feet.  No redness, sores or blisters on feet.  No complaints of excessive thirst.  No reports of blurry vision.  No significant changes to weight.     Have you had a diabetic eye exam in the last 12 months? Yes- Date of last eye exam: 08/2019    BP Readings from Last 2 Encounters:   01/06/20 124/86   11/05/19 138/84     Hemoglobin A1C (%)   Date Value   01/06/2020 7.4 (H)   05/20/2019 6.9 (H)     LDL Cholesterol Calculated (mg/dL)   Date Value   05/03/2018 95   04/11/2014 94       Diabetes Management Resources    Hypertension Follow-up      Do you check your blood pressure regularly outside of the clinic? No     Are you following a low salt diet? Yes    Are your blood pressures ever more than 140 on the top number (systolic) OR more   than 90 on the bottom number (diastolic), for example 140/90? Yes      How many servings of fruits and vegetables do you eat daily?  2-3    On average, how many sweetened beverages do you drink each day (Examples: soda, juice, sweet tea, etc.  Do NOT count diet or artificially sweetened beverages)?   0    How many days per week do you miss taking your medication? 0    Recheck Cough      Duration: several months - since October 2019    History (similar episodes/previous evaluation): paco Hi 11/5/2019    Therapies tried and outcome: Doxycycline       Patient Active  Problem List   Diagnosis     Dry eye syndrome     CARDIOVASCULAR SCREENING; LDL GOAL LESS THAN 160     Seasonal allergic rhinitis     Allergic rhinitis due to animal dander     Rhinitis, allergic to other allergen     House dust mite allergy     Moderate persistent asthma     PTSD (post-traumatic stress disorder)     Vitamin D deficiency     Hypersomnia     Esophageal reflux (GERD)     Depression with anxiety     Acute cystitis with hematuria     Type 2 diabetes mellitus without complication, without long-term current use of insulin (H)     Anemia     Dyspareunia (CODE)     Insomnia, unspecified     Asthma     Past Surgical History:   Procedure Laterality Date     EXCISE EXOSTOSIS FOOT Right 10/2/2018    Procedure: EXCISE EXOSTOSIS FOOT;  Right foot removal of first tarsometatarsal joint dorsal bossing;  Surgeon: Will Barraza DPM;  Location: [facility] OR     HYSTEROSCOPY         Social History     Tobacco Use     Smoking status: Never Smoker     Smokeless tobacco: Never Used   Substance Use Topics     Alcohol use: No     Family History   Problem Relation Age of Onset     Hypertension Mother      Alzheimer Disease Mother      Diabetes Mother      Hypertension Father      Skin Cancer Father      Alzheimer Disease Paternal Grandmother      Psychotic Disorder Paternal Grandmother         bipolar     Heart Disease Paternal Grandfather      Breast Cancer Maternal Grandmother      Thyroid Disease Brother      Thyroid Disease Sister      Diabetes Sister      Skin Cancer Sister      Cerebrovascular Disease No family hx of      Glaucoma No family hx of      Macular Degeneration No family hx of          Current Outpatient Medications   Medication Sig Dispense Refill     ADVAIR DISKUS 500-50 MCG/DOSE inhaler INHALE 1 PUFF BY MOUTH TWICE DAILY  1 Inhaler 10     albuterol (PROVENTIL) (2.5 MG/3ML) 0.083% neb solution Inhale the contents of 1 vial via nebulizer every 4 hours as needed for shortness of breath  90 mL 0     ALPRAZolam  (XANAX) 0.5 MG tablet Take 1-2 tablets (0.5-1 mg) by mouth 3 times daily as needed for anxiety 30 tablet 1     clarithromycin (BIAXIN) 250 MG tablet Take 1 tablet (250 mg) by mouth 2 times daily 10 tablet 0     diltiazem ER (TIAZAC) 240 MG 24 hr ER beaded capsule TAKE 1 CAPSULE BY MOUTH ONE TIME DAILY 90 capsule 0     DULoxetine (CYMBALTA) 60 MG capsule TAKE 1 CAPSULE BY MOUTH TWICE DAILY  180 capsule 2     lisinopril (PRINIVIL/ZESTRIL) 5 MG tablet Take 1 tablet (5 mg) by mouth daily 30 tablet 0     metFORMIN (GLUCOPHAGE-XR) 500 MG 24 hr tablet Take 2 tablets (1,000 mg) by mouth 2 times daily (with meals) 360 tablet 0     montelukast (SINGULAIR) 10 MG tablet TAKE 1 TABLET BY MOUTH AT BEDTIME 90 tablet 2     predniSONE (DELTASONE) 10 MG tablet Take 60 mg days 1 and 2, 50 mg days 3 and 4, 40 mg days 5 and 6, 30 mg days 7 and 8, 20 mg days 9 and 10, 10 mg days 11 and 12 42 tablet 0     PROAIR  (90 Base) MCG/ACT inhaler INHALE TWO PUFFS BY MOUTH EVERY FOUR HOURS AS NEEDED FOR SHORTNESS OF BREATH/ DIFFICULTY BREATHING 8.5 g 10     aspirin  MG EC tablet Take 1 tablet (325 mg) by mouth daily (Patient not taking: Reported on 1/6/2020) 90 tablet 3     fish oil-omega-3 fatty acids (FISH OIL) 1000 MG capsule Take 2 g by mouth 3 times daily Reported on 2/14/2017       HYDROcodone-acetaminophen (NORCO) 5-325 MG per tablet Take 1-2 tablets by mouth every 4 hours as needed (Moderate to Severe Pain) (Patient not taking: Reported on 1/6/2020) 20 tablet 0     nitroglycerin (NITRODUR) 0.4 MG/HR Place 1 patch onto the skin daily Reported on 2/14/2017       Allergies   Allergen Reactions     Ampicillin Rash     Cipro [Quinolones] Anaphylaxis     Macrobid [Nitrofuran Derivatives] GI Disturbance     Ampicillin Rash     childhood reaction     Ciprofloxacin Itching and Rash     Recent Labs   Lab Test 01/06/20  1819 06/26/19  0945 05/20/19  1221 09/27/18  1611 05/03/18  0802  04/15/16  1605  04/11/14  0934   A1C 7.4*  --  6.9*  "6.3* 6.0*   < >  --   --   --    LDL  --   --   --   --  95  --   --   --  94   HDL  --   --   --   --  53  --   --   --  46*   TRIG  --   --   --   --  191*  --   --   --  251*   ALT  --   --   --   --   --   --  48  --   --    CR  --   --  0.74 0.81  --    < > 0.81  --  0.86   GFRESTIMATED  --   --  >90 74  --    < > 75  --  70   GFRESTBLACK  --   --  >90 >90  --    < > >90   GFR Calc    --  85   POTASSIUM  --   --  4.2 4.2  --    < > 3.7  --  4.1   TSH  --  1.32 0.38*  --   --    < >  --    < >  --     < > = values in this interval not displayed.      BP Readings from Last 3 Encounters:   01/06/20 124/86   11/05/19 138/84   05/20/19 113/57    Wt Readings from Last 3 Encounters:   01/06/20 90.5 kg (199 lb 9.6 oz)   11/05/19 92.2 kg (203 lb 3.2 oz)   05/20/19 89.4 kg (197 lb)                      Reviewed and updated as needed this visit by Provider         Review of Systems   ROS COMP: Constitutional, HEENT, cardiovascular, pulmonary, GI, , musculoskeletal, neuro, skin, endocrine and psych systems are negative, except as otherwise noted.      Objective    /86   Pulse 85   Temp 97.7  F (36.5  C) (Tympanic)   Resp 20   Ht 1.683 m (5' 6.25\")   Wt 90.5 kg (199 lb 9.6 oz)   LMP 05/15/2014 (Approximate)   SpO2 95%   Breastfeeding No   BMI 31.97 kg/m    Body mass index is 31.97 kg/m .  Physical Exam   Eye exam - right eye normal lid, conjunctiva, cornea, pupil and fundus, left eye normal lid, conjunctiva, cornea, pupil and fundus.  ENT exam reveals - bilateral TM fluid noted, neck without nodes, throat normal without erythema or exudate, maxillary sinus tender, post nasal drip noted, nasal mucosa congested and nasal mucosa pale and congested.  Thyroid not palpable, not enlarged, no nodules detected.  CHEST:no tachypnea, retractions or cyanosis, mild inspiratory wheezing heard diffusely throughout both lungs, mild expiratory wheezing heard diffusely throughout both lungs and S1, S2 normal, " no murmur, no gallop, rate regular.    Lisette was seen today for cough.    Diagnoses and all orders for this visit:    Moderate persistent asthma with acute exacerbation    Type 2 diabetes mellitus without complication, without long-term current use of insulin (H)  -     lisinopril (PRINIVIL/ZESTRIL) 5 MG tablet; Take 1 tablet (5 mg) by mouth daily  -     metFORMIN (GLUCOPHAGE-XR) 500 MG 24 hr tablet; Take 2 tablets (1,000 mg) by mouth 2 times daily (with meals)  -     Hemoglobin A1c    Acute bronchitis, unspecified organism  -     predniSONE (DELTASONE) 10 MG tablet; Take 60 mg days 1 and 2, 50 mg days 3 and 4, 40 mg days 5 and 6, 30 mg days 7 and 8, 20 mg days 9 and 10, 10 mg days 11 and 12  -     clarithromycin (BIAXIN) 250 MG tablet; Take 1 tablet (250 mg) by mouth 2 times daily    Acute non-recurrent maxillary sinusitis  -     predniSONE (DELTASONE) 10 MG tablet; Take 60 mg days 1 and 2, 50 mg days 3 and 4, 40 mg days 5 and 6, 30 mg days 7 and 8, 20 mg days 9 and 10, 10 mg days 11 and 12  -     clarithromycin (BIAXIN) 250 MG tablet; Take 1 tablet (250 mg) by mouth 2 times daily      She is to hold her advair while on clarithromycin.     Advised supportive and symptomatic treatment.  Follow up with Provider - if condition persists or worsens.   work on lifestyle modification  Increase metformin dose and recheck in 3 mos

## 2020-01-07 NOTE — TELEPHONE ENCOUNTER
Reason for Call:  Other     Detailed comments: pharmacy is calling to inform pcp that the medication clarithromycin has an interaction with other medication patient is taking. Pharmacy would like a call back from clinic to let them know if pcp is going to change this medication to something else  Thank you     Phone Number Patient can be reached at: Home number on file [phone] (home)    Best Time:     Can we leave a detailed message on this number? YES    Call taken on 1/7/2020 at 8:45 AM by Eda Pretty

## 2020-01-24 ENCOUNTER — TELEPHONE (OUTPATIENT)
Dept: SCHEDULING | Facility: CLINIC | Age: 55
End: 2020-01-24

## 2020-01-24 NOTE — TELEPHONE ENCOUNTER
1/24/2020    Call Regarding Preventive Health Screening Colonoscopy, Mammogram and LDL and ReattributionPhysical       Attempt 1    Message on voicemail    Comments:       Outreach   GB

## 2020-02-05 NOTE — TELEPHONE ENCOUNTER
2/5/2020    Call Regarding Preventive Health Screening Colonoscopy, Mammogram and physical    Attempt 2    Message on voicemail    Comments:       Outreach   Lisa Mathews

## 2020-02-10 ENCOUNTER — HEALTH MAINTENANCE LETTER (OUTPATIENT)
Age: 55
End: 2020-02-10

## 2020-03-07 DIAGNOSIS — E11.9 TYPE 2 DIABETES MELLITUS WITHOUT COMPLICATION, WITHOUT LONG-TERM CURRENT USE OF INSULIN (H): ICD-10-CM

## 2020-03-09 RX ORDER — LISINOPRIL 5 MG/1
TABLET ORAL
Qty: 90 TABLET | Refills: 0 | Status: SHIPPED | OUTPATIENT
Start: 2020-03-09 | End: 2020-06-10

## 2020-03-09 NOTE — TELEPHONE ENCOUNTER
"Requested Prescriptions   Pending Prescriptions Disp Refills     lisinopril (ZESTRIL) 5 MG tablet [Pharmacy Med Name: Lisinopril Oral Tablet 5 MG] 30 tablet 0     Sig: TAKE 1 TABLET BY MOUTH ONCE DAILY   Last Written Prescription Date:  3-7-20  Last Fill Quantity: 30,  # refills: 0   Last office visit: 1/6/2020 with prescribing provider:  1-6-20   Future Office Visit:      ACE Inhibitors (Including Combos) Protocol Passed - 3/7/2020  4:01 PM        Passed - Blood pressure under 140/90 in past 12 months     BP Readings from Last 3 Encounters:   01/06/20 124/86   11/05/19 138/84   05/20/19 113/57                 Passed - Recent (12 mo) or future (30 days) visit within the authorizing provider's specialty     Patient has had an office visit with the authorizing provider or a provider within the authorizing providers department within the previous 12 mos or has a future within next 30 days. See \"Patient Info\" tab in inbasket, or \"Choose Columns\" in Meds & Orders section of the refill encounter.              Passed - Medication is active on med list        Passed - Patient is age 18 or older        Passed - No active pregnancy on record        Passed - Normal serum creatinine on file in past 12 months     Recent Labs   Lab Test 05/20/19  1221   CR 0.74             Passed - Normal serum potassium on file in past 12 months     Recent Labs   Lab Test 05/20/19  1221   POTASSIUM 4.2             Passed - No positive pregnancy test within past 12 months           "

## 2020-03-10 NOTE — TELEPHONE ENCOUNTER
Prescription approved per [facility] Refill Protocol.  Due for follow up 4/6/20

## 2020-03-18 ENCOUNTER — MYC MEDICAL ADVICE (OUTPATIENT)
Dept: FAMILY MEDICINE | Facility: CLINIC | Age: 55
End: 2020-03-18

## 2020-03-22 DIAGNOSIS — R00.2 PALPITATIONS: ICD-10-CM

## 2020-03-23 DIAGNOSIS — R00.2 PALPITATIONS: Primary | ICD-10-CM

## 2020-03-23 RX ORDER — DILTIAZEM HYDROCHLORIDE 240 MG/1
CAPSULE, EXTENDED RELEASE ORAL
Qty: 90 CAPSULE | Refills: 0 | Status: SHIPPED | OUTPATIENT
Start: 2020-03-23 | End: 2020-04-01

## 2020-03-23 NOTE — PROGRESS NOTES
One-time refill of diltiazem sent. Told to schedule a lab visit for a check of ALT before we can give further refills. Labs ordered.    LUCÍA Marquez

## 2020-03-23 NOTE — TELEPHONE ENCOUNTER
"Requested Prescriptions   Pending Prescriptions Disp Refills     diltiazem ER (TIAZAC) 240 MG 24 hr ER beaded capsule [Pharmacy Med Name: dilTIAZem HCl ER Beads Oral Capsule Extended Release 24 Hour 240 MG] 90 capsule 0     Sig: TAKE 1 CAPSULE BY MOUTH ONCE DAILY   Last Written Prescription Date:  3-22-20  Last Fill Quantity: 90,  # refills: 0   Last office visit: 1/6/2020 with prescribing provider:  1-6-20   Future Office Visit:      Calcium Channel Blockers Protocol  Failed - 3/22/2020 11:15 AM        Failed - Normal ALT in past 12 months     Recent Labs   Lab Test 04/15/16  1605   ALT 48             Passed - Blood pressure under 140/90 in past 12 months     BP Readings from Last 3 Encounters:   01/06/20 124/86   11/05/19 138/84   05/20/19 113/57                 Passed - Recent (12 mo) or future (30 days) visit within the authorizing provider's specialty     Patient has had an office visit with the authorizing provider or a provider within the authorizing providers department within the previous 12 mos or has a future within next 30 days. See \"Patient Info\" tab in inbasket, or \"Choose Columns\" in Meds & Orders section of the refill encounter.              Passed - Medication is active on med list        Passed - Patient is age 18 or older        Passed - No active pregnancy on record        Passed - Normal serum creatinine on file in past 12 months     Recent Labs   Lab Test 05/20/19  1221   CR 0.74       Ok to refill medication if creatinine is low          Passed - No positive pregnancy test in past 12 months           "

## 2020-03-30 DIAGNOSIS — E11.9 TYPE 2 DIABETES MELLITUS WITHOUT COMPLICATION, WITHOUT LONG-TERM CURRENT USE OF INSULIN (H): ICD-10-CM

## 2020-03-30 DIAGNOSIS — R00.2 PALPITATIONS: ICD-10-CM

## 2020-03-30 DIAGNOSIS — J45.41 MODERATE PERSISTENT ASTHMA WITH ACUTE EXACERBATION: ICD-10-CM

## 2020-03-31 NOTE — TELEPHONE ENCOUNTER
Routing refill request to provider for review/approval because:  Labs not current:  ACT and ALT    Last OV with [place]:  1/6/2020 with advised F/U in 3 months.    Chapis Morales, RN, BSN

## 2020-04-01 RX ORDER — METFORMIN HCL 500 MG
TABLET, EXTENDED RELEASE 24 HR ORAL
Qty: 360 TABLET | Refills: 0 | Status: SHIPPED | OUTPATIENT
Start: 2020-04-01 | End: 2020-06-22

## 2020-04-01 RX ORDER — MONTELUKAST SODIUM 10 MG/1
TABLET ORAL
Qty: 90 TABLET | Refills: 1 | Status: SHIPPED | OUTPATIENT
Start: 2020-04-01 | End: 2020-07-28

## 2020-04-01 RX ORDER — DILTIAZEM HYDROCHLORIDE 240 MG/1
CAPSULE, EXTENDED RELEASE ORAL
Qty: 90 CAPSULE | Refills: 0 | Status: SHIPPED | OUTPATIENT
Start: 2020-04-01 | End: 2020-07-28

## 2020-04-01 NOTE — TELEPHONE ENCOUNTER
Due for her 3 mos recheck of her diabetes. Set her up for a phone visit to see me in the next week or two.

## 2020-04-02 DIAGNOSIS — E11.9 TYPE 2 DIABETES MELLITUS WITHOUT COMPLICATION, WITHOUT LONG-TERM CURRENT USE OF INSULIN (H): ICD-10-CM

## 2020-04-02 RX ORDER — METFORMIN HCL 500 MG
TABLET, EXTENDED RELEASE 24 HR ORAL
Qty: 360 TABLET | Refills: 0 | OUTPATIENT
Start: 2020-04-02

## 2020-04-02 NOTE — TELEPHONE ENCOUNTER
"Requested Prescriptions   Pending Prescriptions Disp Refills     metFORMIN (GLUCOPHAGE-XR) 500 MG 24 hr tablet [Pharmacy Med Name: metFORMIN HCl ER Oral Tablet Extended Release 24 Hour 500 MG] 360 tablet 0     Sig: TAKE 2 TABLETS BY MOUTH TWICE DAILY WITH MEALS  Last Written Prescription Date:  4/2/20  Last Fill Quantity: 360,  # refills: 0   Last office visit: 1/6/2020 with prescribing provider:  JUAN Yin  Future Office Visit:         Biguanide Agents Passed - 4/2/2020  8:36 AM        Passed - Patient is age 10 or older        Passed - Patient has documented A1c within the specified period of time.     If HgbA1C is 8 or greater, it needs to be on file within the past 3 months.  If less than 8, must be on file within the past 6 months.     Recent Labs   Lab Test 01/06/20  1819   A1C 7.4*             Passed - Patient's CR is NOT>1.4 OR Patient's EGFR is NOT<45 within past 12 mos.     Recent Labs   Lab Test 05/20/19  1221   GFRESTIMATED >90   GFRESTBLACK >90       Recent Labs   Lab Test 05/20/19  1221   CR 0.74             Passed - Patient does NOT have a diagnosis of CHF.        Passed - Medication is active on med list        Passed - Patient is not pregnant        Passed - Patient has not had a positive pregnancy test within the past 12 mos.         Passed - Recent (6 mo) or future (30 days) visit within the authorizing provider's specialty     Patient had office visit in the last 6 months or has a visit in the next 30 days with authorizing provider or within the authorizing provider's specialty.  See \"Patient Info\" tab in inbasket, or \"Choose Columns\" in Meds & Orders section of the refill encounter.               "

## 2020-04-02 NOTE — TELEPHONE ENCOUNTER
Left message for patient to call back pod [employer] hotline([phone]). Also sent Spoke. Aliyah Snyder MA

## 2020-06-08 DIAGNOSIS — F43.10 PTSD (POST-TRAUMATIC STRESS DISORDER): ICD-10-CM

## 2020-06-08 DIAGNOSIS — F41.8 DEPRESSION WITH ANXIETY: ICD-10-CM

## 2020-06-08 DIAGNOSIS — E11.9 TYPE 2 DIABETES MELLITUS WITHOUT COMPLICATION, WITHOUT LONG-TERM CURRENT USE OF INSULIN (H): ICD-10-CM

## 2020-06-11 RX ORDER — DULOXETIN HYDROCHLORIDE 60 MG/1
CAPSULE, DELAYED RELEASE ORAL
Qty: 60 CAPSULE | Refills: 0 | Status: SHIPPED | OUTPATIENT
Start: 2020-06-11 | End: 2020-07-23

## 2020-06-11 RX ORDER — LISINOPRIL 5 MG/1
TABLET ORAL
Qty: 30 TABLET | Refills: 0 | Status: SHIPPED | OUTPATIENT
Start: 2020-06-11 | End: 2020-07-20

## 2020-06-11 NOTE — TELEPHONE ENCOUNTER
marylin is due for a visit with me for a med check. Schedule her for a virtual (video or phone based on patient preference. Always promote a video visit first) visit with me.

## 2020-06-11 NOTE — TELEPHONE ENCOUNTER
"Routing refill request to provider for review/approval because:  Labs not current:  K+, Cr, PHQ9  Patient needs to be seen because:  Was due for follow up 4/6/20    Medication pended for approval, 30 day supply with reminder.                 Requested Prescriptions   Pending Prescriptions Disp Refills     DULoxetine (CYMBALTA) 60 MG capsule [Pharmacy Med Name: DULoxetine HCl Oral Capsule Delayed Release Particles 60 MG] 60 capsule 0     Sig: TAKE 1 CAPSULE BY MOUTH TWICE DAILY       Serotonin-Norepinephrine Reuptake Inhibitors  Failed - 6/8/2020 12:12 PM        Failed - PHQ-9 score of less than 5 in past 6 months     Please review last PHQ-9 score.           Passed - Blood pressure under 140/90 in past 12 months     BP Readings from Last 3 Encounters:   01/06/20 124/86   11/05/19 138/84   05/20/19 113/57                 Passed - Medication is active on med list        Passed - Patient is age 18 or older        Passed - No active pregnancy on record        Passed - No positive pregnancy test in past 12 months        Passed - Recent (6 mo) or future (30 days) visit within the authorizing provider's specialty     Patient had office visit in the last 6 months or has a visit in the next 30 days with authorizing provider or within the authorizing provider's specialty.  See \"Patient Info\" tab in inbasket, or \"Choose Columns\" in Meds & Orders section of the refill encounter.               lisinopril (ZESTRIL) 5 MG tablet [Pharmacy Med Name: Lisinopril Oral Tablet 5 MG] 30 tablet 0     Sig: TAKE 1 TABLET BY MOUTH ONCE DAILY       ACE Inhibitors (Including Combos) Protocol Failed - 6/8/2020 12:12 PM        Failed - Normal serum creatinine on file in past 12 months     Recent Labs   Lab Test 05/20/19  1221   CR 0.74       Ok to refill medication if creatinine is low          Failed - Normal serum potassium on file in past 12 months     Recent Labs   Lab Test 05/20/19  1221   POTASSIUM 4.2             Passed - Blood pressure " "under 140/90 in past 12 months     BP Readings from Last 3 Encounters:   01/06/20 124/86   11/05/19 138/84   05/20/19 113/57                 Passed - Recent (12 mo) or future (30 days) visit within the authorizing provider's specialty     Patient has had an office visit with the authorizing provider or a provider within the authorizing providers department within the previous 12 mos or has a future within next 30 days. See \"Patient Info\" tab in inbasket, or \"Choose Columns\" in Meds & Orders section of the refill encounter.              Passed - Medication is active on med list        Passed - Patient is age 18 or older        Passed - No active pregnancy on record        Passed - No positive pregnancy test within past 12 months             "

## 2020-06-17 ENCOUNTER — VIRTUAL VISIT (OUTPATIENT)
Dept: FAMILY MEDICINE | Facility: CLINIC | Age: 55
End: 2020-06-17
Payer: COMMERCIAL

## 2020-06-17 DIAGNOSIS — E11.9 TYPE 2 DIABETES MELLITUS WITHOUT COMPLICATION, WITHOUT LONG-TERM CURRENT USE OF INSULIN (H): ICD-10-CM

## 2020-06-17 DIAGNOSIS — F41.8 DEPRESSION WITH ANXIETY: Primary | ICD-10-CM

## 2020-06-17 PROCEDURE — 99214 OFFICE O/P EST MOD 30 MIN: CPT | Mod: GT | Performed by: PHYSICIAN ASSISTANT

## 2020-06-17 RX ORDER — BUPROPION HYDROCHLORIDE 150 MG/1
150 TABLET ORAL EVERY MORNING
Qty: 30 TABLET | Refills: 1 | Status: SHIPPED | OUTPATIENT
Start: 2020-06-17 | End: 2020-07-28

## 2020-06-17 ASSESSMENT — ANXIETY QUESTIONNAIRES
7. FEELING AFRAID AS IF SOMETHING AWFUL MIGHT HAPPEN: NEARLY EVERY DAY
5. BEING SO RESTLESS THAT IT IS HARD TO SIT STILL: MORE THAN HALF THE DAYS
GAD7 TOTAL SCORE: 17
6. BECOMING EASILY ANNOYED OR IRRITABLE: NEARLY EVERY DAY
IF YOU CHECKED OFF ANY PROBLEMS ON THIS QUESTIONNAIRE, HOW DIFFICULT HAVE THESE PROBLEMS MADE IT FOR YOU TO DO YOUR WORK, TAKE CARE OF THINGS AT HOME, OR GET ALONG WITH OTHER PEOPLE: EXTREMELY DIFFICULT
2. NOT BEING ABLE TO STOP OR CONTROL WORRYING: MORE THAN HALF THE DAYS
3. WORRYING TOO MUCH ABOUT DIFFERENT THINGS: MORE THAN HALF THE DAYS
1. FEELING NERVOUS, ANXIOUS, OR ON EDGE: NEARLY EVERY DAY

## 2020-06-17 ASSESSMENT — PATIENT HEALTH QUESTIONNAIRE - PHQ9
5. POOR APPETITE OR OVEREATING: MORE THAN HALF THE DAYS
SUM OF ALL RESPONSES TO PHQ QUESTIONS 1-9: 21

## 2020-06-17 NOTE — PROGRESS NOTES
"Lisette Owens is a 55 year old female who is being evaluated via a billable video visit.      The patient has been notified of following:     \"This video visit will be conducted via a call between you and your physician/provider. We have found that certain health care needs can be provided without the need for an in-person physical exam.  This service lets us provide the care you need with a video conversation.  If a prescription is necessary we can send it directly to your pharmacy.  If lab work is needed we can place an order for that and you can then stop by our lab to have the test done at a later time.    Video visits are billed at different rates depending on your insurance coverage.  Please reach out to your insurance provider with any questions.    If during the course of the call the physician/provider feels a video visit is not appropriate, you will not be charged for this service.\"    Patient has given verbal consent for Video visit? Yes    Will anyone else be joining your video visit? No    Subjective     Lisette Owens is a 55 year old female who presents today via video visit for the following health issues:    HPI   Depression and Anxiety Follow-Up    How are you doing with your depression since your last visit? Worsened patient would like to discuss a change in medication     How are you doing with your anxiety since your last visit?  Worsened     Are you having other symptoms that might be associated with depression or anxiety? Yes:  feeling emotional, fatigue, no motivation, not finding mari in anything she does.    Have you had a significant life event? Grief or Loss     Do you have any concerns with your use of alcohol or other drugs? No  Very emotional . Lability. Memories of the loss of her daughter. Long standing history of anhedonia.  is the month her daughter was born and also, .   Some irritability. Not suicidal. Noting being more flushed. Sugars have been elevated.  Continues to " attend sessions with several counselors.  Social History     Tobacco Use     Smoking status: Never Smoker     Smokeless tobacco: Never Used   Substance Use Topics     Alcohol use: No     Drug use: No     PHQ 5/20/2019 11/5/2019 6/17/2020   PHQ-9 Total Score 18 10 21   Q9: Thoughts of better off dead/self-harm past 2 weeks Not at all Not at all Not at all     SRAVAN-7 SCORE 5/20/2019 11/5/2019 6/17/2020   Total Score - - -   Total Score 9 4 17     Last PHQ-9 6/17/2020   1.  Little interest or pleasure in doing things 3   2.  Feeling down, depressed, or hopeless 3   3.  Trouble falling or staying asleep, or sleeping too much 2   4.  Feeling tired or having little energy 3   5.  Poor appetite or overeating 1   6.  Feeling bad about yourself 3   7.  Trouble concentrating 3   8.  Moving slowly or restless 3   Q9: Thoughts of better off dead/self-harm past 2 weeks 0   PHQ-9 Total Score 21   Difficulty at work, home, or with people Extremely dIfficult     SRAVAN-7  6/17/2020   1. Feeling nervous, anxious, or on edge 3   2. Not being able to stop or control worrying 2   3. Worrying too much about different things 2   4. Trouble relaxing 2   5. Being so restless that it is hard to sit still 2   6. Becoming easily annoyed or irritable 3   7. Feeling afraid, as if something awful might happen 3   SRAVAN-7 Total Score 17   If you checked any problems, how difficult have they made it for you to do your work, take care of things at home, or get along with other people? Extremely difficult         Suicide Assessment Five-step Evaluation and Treatment (SAFE-T)      How many servings of fruits and vegetables do you eat daily?  2-3    On average, how many sweetened beverages do you drink each day (Examples: soda, juice, sweet tea, etc.  Do NOT count diet or artificially sweetened beverages)?   0    How many days per week do you exercise enough to make your heart beat faster? 3 or less    How many minutes a day do you exercise enough to make  your heart beat faster? 10 - 19    How many days per week do you miss taking your medication? 0         Diabetes Follow-up    How often are you checking your blood sugar? One time daily  What time of day are you checking your blood sugars (select all that apply)?  Before meals  Have you had any blood sugars above 200?  No  Have you had any blood sugars below 70?  No    What symptoms do you notice when your blood sugar is low?  None    What concerns do you have today about your diabetes? None     Do you have any of these symptoms? (Select all that apply)  No numbness or tingling in feet.  No redness, sores or blisters on feet.  No complaints of excessive thirst.  No reports of blurry vision.  No significant changes to weight.    Have you had a diabetic eye exam in the last 12 months? No          Hyperlipidemia Follow-Up      Are you regularly taking any medication or supplement to lower your cholesterol?   No    Are you having muscle aches or other side effects that you think could be caused by your cholesterol lowering medication?  No    Hypertension Follow-up      Do you check your blood pressure regularly outside of the clinic? Yes     Are you following a low salt diet? Yes    Are your blood pressures ever more than 140 on the top number (systolic) OR more   than 90 on the bottom number (diastolic), for example 140/90? No    BP Readings from Last 2 Encounters:   01/06/20 124/86   11/05/19 138/84     Hemoglobin A1C (%)   Date Value   01/06/2020 7.4 (H)   05/20/2019 6.9 (H)     LDL Cholesterol Calculated (mg/dL)   Date Value   05/03/2018 95   04/11/2014 94             Video Start Time: 3:19 PM        Patient Active Problem List   Diagnosis     Dry eye syndrome     CARDIOVASCULAR SCREENING; LDL GOAL LESS THAN 160     Seasonal allergic rhinitis     Allergic rhinitis due to animal dander     Rhinitis, allergic to other allergen     House dust mite allergy     Moderate persistent asthma     PTSD (post-traumatic stress  disorder)     Vitamin D deficiency     Hypersomnia     Esophageal reflux (GERD)     Depression with anxiety     Acute cystitis with hematuria     Type 2 diabetes mellitus without complication, without long-term current use of insulin (H)     Anemia     Dyspareunia (CODE)     Insomnia, unspecified     Asthma     Past Surgical History:   Procedure Laterality Date     EXCISE EXOSTOSIS FOOT Right 10/2/2018    Procedure: EXCISE EXOSTOSIS FOOT;  Right foot removal of first tarsometatarsal joint dorsal bossing;  Surgeon: Will Barraza DPM;  Location: [facility]     HYSTEROSCOPY         Social History     Tobacco Use     Smoking status: Never Smoker     Smokeless tobacco: Never Used   Substance Use Topics     Alcohol use: No     Family History   Problem Relation Age of Onset     Hypertension Mother      Alzheimer Disease Mother      Diabetes Mother      Hypertension Father      Skin Cancer Father      Alzheimer Disease Paternal Grandmother      Psychotic Disorder Paternal Grandmother         bipolar     Heart Disease Paternal Grandfather      Breast Cancer Maternal Grandmother      Thyroid Disease Brother      Thyroid Disease Sister      Diabetes Sister      Skin Cancer Sister      Cerebrovascular Disease No family hx of      Glaucoma No family hx of      Macular Degeneration No family hx of          Current Outpatient Medications   Medication Sig Dispense Refill     ADVAIR DISKUS 500-50 MCG/DOSE inhaler INHALE 1 PUFF BY MOUTH TWICE DAILY  1 Inhaler 10     albuterol (PROVENTIL) (2.5 MG/3ML) 0.083% neb solution Inhale the contents of 1 vial via nebulizer every 4 hours as needed for shortness of breath  90 mL 0     ALPRAZolam (XANAX) 0.5 MG tablet Take 1-2 tablets (0.5-1 mg) by mouth 3 times daily as needed for anxiety 30 tablet 1     aspirin  MG EC tablet Take 1 tablet (325 mg) by mouth daily 90 tablet 3     buPROPion (WELLBUTRIN XL) 150 MG 24 hr tablet Take 1 tablet (150 mg) by mouth every morning 30 tablet 1      diltiazem ER (TIAZAC) 240 MG 24 hr ER beaded capsule TAKE 1 CAPSULE BY MOUTH ONCE DAILY 90 capsule 0     DULoxetine (CYMBALTA) 60 MG capsule TAKE 1 CAPSULE BY MOUTH TWICE DAILY  60 capsule 0     lisinopril (ZESTRIL) 5 MG tablet TAKE 1 TABLET BY MOUTH ONCE DAILY 30 tablet 0     metFORMIN (GLUCOPHAGE-XR) 500 MG 24 hr tablet TAKE 2 TABLETS BY MOUTH TWICE DAILY WITH MEALS 360 tablet 0     montelukast (SINGULAIR) 10 MG tablet TAKE 1 TABLET BY MOUTH AT BEDTIME 90 tablet 1     PROAIR  (90 Base) MCG/ACT inhaler INHALE TWO PUFFS BY MOUTH EVERY FOUR HOURS AS NEEDED FOR SHORTNESS OF BREATH/ DIFFICULTY BREATHING 8.5 g 10     fish oil-omega-3 fatty acids (FISH OIL) 1000 MG capsule Take 2 g by mouth 3 times daily Reported on 2/14/2017       HYDROcodone-acetaminophen (NORCO) 5-325 MG per tablet Take 1-2 tablets by mouth every 4 hours as needed (Moderate to Severe Pain) (Patient not taking: Reported on 1/6/2020) 20 tablet 0     nitroglycerin (NITRODUR) 0.4 MG/HR Place 1 patch onto the skin daily Reported on 2/14/2017       predniSONE (DELTASONE) 10 MG tablet Take 60 mg days 1 and 2, 50 mg days 3 and 4, 40 mg days 5 and 6, 30 mg days 7 and 8, 20 mg days 9 and 10, 10 mg days 11 and 12 (Patient not taking: Reported on 6/17/2020) 42 tablet 0     Allergies   Allergen Reactions     Ampicillin Rash     Cipro [Quinolones] Anaphylaxis     Macrobid [Nitrofuran Derivatives] GI Disturbance     Ampicillin Rash     childhood reaction     Ciprofloxacin Itching and Rash     Recent Labs   Lab Test 01/06/20  1819 06/26/19  0945 05/20/19  1221 09/27/18  1611 05/03/18  0802  04/15/16  1605  04/11/14  0934   A1C 7.4*  --  6.9* 6.3* 6.0*   < >  --   --   --    LDL  --   --   --   --  95  --   --   --  94   HDL  --   --   --   --  53  --   --   --  46*   TRIG  --   --   --   --  191*  --   --   --  251*   ALT  --   --   --   --   --   --  48  --   --    CR  --   --  0.74 0.81  --    < > 0.81  --  0.86   GFRESTIMATED  --   --  >90 74  --    < > 75   "--  70   GFRESTBLACK  --   --  >90 >90  --    < > >90   GFR Calc    --  85   POTASSIUM  --   --  4.2 4.2  --    < > 3.7  --  4.1   TSH  --  1.32 0.38*  --   --    < >  --    < >  --     < > = values in this interval not displayed.      BP Readings from Last 3 Encounters:   01/06/20 124/86   11/05/19 138/84   05/20/19 113/57    Wt Readings from Last 3 Encounters:   01/06/20 90.5 kg (199 lb 9.6 oz)   11/05/19 92.2 kg (203 lb 3.2 oz)   05/20/19 89.4 kg (197 lb)                    Reviewed and updated as needed this visit by Provider         Review of Systems   Constitutional, HEENT, cardiovascular, pulmonary, GI, , musculoskeletal, neuro, skin, endocrine and psych systems are negative, except as otherwise noted.      Objective    LMP 05/15/2014 (Approximate)   Estimated body mass index is 31.97 kg/m  as calculated from the following:    Height as of 1/6/20: 1.683 m (5' 6.25\").    Weight as of 1/6/20: 90.5 kg (199 lb 9.6 oz).  Physical Exam     GENERAL: Healthy, alert and no distress  EYES: Eyes grossly normal to inspection.  No discharge or erythema, or obvious scleral/conjunctival abnormalities.  RESP: No audible wheeze, cough, or visible cyanosis.  No visible retractions or increased work of breathing.    SKIN: Visible skin clear. No significant rash, abnormal pigmentation or lesions.  NEURO: Cranial nerves grossly intact.  Mentation and speech appropriate for age.  PSYCH: Mentation appears normal, affect normal/bright, judgement and insight intact, normal speech and appearance well-groomed.      Diagnostic Test Results:  Labs reviewed in [facility]        Lisette was seen today for diabetes.    Diagnoses and all orders for this visit:    Depression with anxiety  -     buPROPion (WELLBUTRIN XL) 150 MG 24 hr tablet; Take 1 tablet (150 mg) by mouth every morning    Type 2 diabetes mellitus without complication, without long-term current use of insulin (H)      Start wellbutrin.   Advised more exercise. "   Recheck in 4 wks.   TOTAL TIME SPENT:  25 minutes with the patient, greater than 50% spent in counseling and coordinating care.        Video-Visit Details    Type of service:  Video Visit    Video End Time:3:51 PM    Originating Location (pt. Location): Home    Distant Location (provider location):  [facility]     Platform used for Video Visit: AmWell    Return in about 4 weeks (around 7/15/2020) for diabetes recheck, depression and/or anxiety.       Kolton Yin PA-C

## 2020-06-18 ASSESSMENT — ANXIETY QUESTIONNAIRES: GAD7 TOTAL SCORE: 17

## 2020-06-20 DIAGNOSIS — E11.9 TYPE 2 DIABETES MELLITUS WITHOUT COMPLICATION, WITHOUT LONG-TERM CURRENT USE OF INSULIN (H): ICD-10-CM

## 2020-06-23 RX ORDER — METFORMIN HCL 500 MG
TABLET, EXTENDED RELEASE 24 HR ORAL
Qty: 120 TABLET | Refills: 0 | Status: SHIPPED | OUTPATIENT
Start: 2020-06-23 | End: 2020-07-26

## 2020-06-23 NOTE — TELEPHONE ENCOUNTER
"Routing refill request to provider for review/approval because:  Labs not current:  cr  Patient needs to be seen because:  Pt due for diabetes check 7/15/20    Medication pended for approval, 30 day supply with reminder.                 Requested Prescriptions   Pending Prescriptions Disp Refills     metFORMIN (GLUCOPHAGE-XR) 500 MG 24 hr tablet [Pharmacy Med Name: metFORMIN HCl ER Oral Tablet Extended Release 24 Hour 500 MG] 360 tablet 0     Sig: TAKE 2 TABLETS BY MOUTH TWICE DAILY WITH MEALS       Biguanide Agents Failed - 6/22/2020  9:22 AM        Failed - Patient's CR is NOT>1.4 OR Patient's EGFR is NOT<45 within past 12 mos.     Recent Labs   Lab Test 05/20/19  1221   GFRESTIMATED >90   GFRESTBLACK >90       Recent Labs   Lab Test 05/20/19  1221   CR 0.74             Passed - Patient is age 10 or older        Passed - Patient has documented A1c within the specified period of time.     If HgbA1C is 8 or greater, it needs to be on file within the past 3 months.  If less than 8, must be on file within the past 6 months.     Recent Labs   Lab Test 01/06/20  1819   A1C 7.4*             Passed - Patient does NOT have a diagnosis of CHF.        Passed - Medication is active on med list        Passed - Patient is not pregnant        Passed - Patient has not had a positive pregnancy test within the past 12 mos.         Passed - Recent (6 mo) or future (30 days) visit within the authorizing provider's specialty     Patient had office visit in the last 6 months or has a visit in the next 30 days with authorizing provider or within the authorizing provider's specialty.  See \"Patient Info\" tab in inbasket, or \"Choose Columns\" in Meds & Orders section of the refill encounter.                 "

## 2020-07-17 DIAGNOSIS — E11.9 TYPE 2 DIABETES MELLITUS WITHOUT COMPLICATION, WITHOUT LONG-TERM CURRENT USE OF INSULIN (H): ICD-10-CM

## 2020-07-20 NOTE — TELEPHONE ENCOUNTER
Routing refill request to provider for review/approval because:  Labs not current:  Creatinine, K+  Patient needs to be seen because:  Due for appointment    Creatinine   Date Value Ref Range Status   05/20/2019 0.74 0.52 - 1.04 mg/dL Final     Potassium   Date Value Ref Range Status   05/20/2019 4.2 3.4 - 5.3 mmol/L Final     Last Written Prescription Date:  6/11/20  Last Fill Quantity: 30,  # refills: 0     Last Virtual Visit 6/17/20 with prescribing provider:  Kolton Yin PA-C with the following instructions:      Return in about 4 weeks (around 7/15/2020) for diabetes recheck, depression and/or anxiety    Future Office Visit:  None    Rachel Levi RN BSN

## 2020-07-21 RX ORDER — LISINOPRIL 5 MG/1
TABLET ORAL
Qty: 30 TABLET | Refills: 0 | Status: SHIPPED | OUTPATIENT
Start: 2020-07-21 | End: 2020-07-28

## 2020-07-21 NOTE — TELEPHONE ENCOUNTER
marylin is due for a recheck. Have her make an appt to see me.for a face to face visit in the next couple of weeks

## 2020-07-22 ENCOUNTER — MYC MEDICAL ADVICE (OUTPATIENT)
Dept: FAMILY MEDICINE | Facility: CLINIC | Age: 55
End: 2020-07-22

## 2020-07-23 ENCOUNTER — VIRTUAL VISIT (OUTPATIENT)
Dept: FAMILY MEDICINE | Facility: CLINIC | Age: 55
End: 2020-07-23
Payer: COMMERCIAL

## 2020-07-23 DIAGNOSIS — R05.3 CHRONIC COUGH: ICD-10-CM

## 2020-07-23 DIAGNOSIS — E11.9 TYPE 2 DIABETES MELLITUS WITHOUT COMPLICATION, WITHOUT LONG-TERM CURRENT USE OF INSULIN (H): ICD-10-CM

## 2020-07-23 DIAGNOSIS — J45.40 MODERATE PERSISTENT ASTHMA WITHOUT COMPLICATION: ICD-10-CM

## 2020-07-23 DIAGNOSIS — J01.90 ACUTE NON-RECURRENT SINUSITIS, UNSPECIFIED LOCATION: Primary | ICD-10-CM

## 2020-07-23 PROCEDURE — 99214 OFFICE O/P EST MOD 30 MIN: CPT | Mod: TEL | Performed by: NURSE PRACTITIONER

## 2020-07-23 RX ORDER — DOXYCYCLINE 100 MG/1
100 CAPSULE ORAL 2 TIMES DAILY
Qty: 20 CAPSULE | Refills: 0 | Status: SHIPPED | OUTPATIENT
Start: 2020-07-23 | End: 2020-08-17

## 2020-07-23 NOTE — PATIENT INSTRUCTIONS
Patient Education     Sinusitis (Antibiotic Treatment)    The sinuses are air-filled spaces within the bones of the face. They connect to the inside of the nose. Sinusitis is an inflammation of the tissue that lines the sinuses. Sinusitis can occur during a cold. It can also happen due to allergies to pollens and other particles in the air. Sinusitis can cause symptoms of sinus congestion and a feeling of fullness. A sinus infection causes fever, headache, and facial pain. There is often green or yellow fluid draining from the nose or into the back of the throat (post-nasal drip). You have been given antibiotics to treat this condition.  Home care    Take the full course of antibiotics as instructed. Do not stop taking them, even when you feel better.    Drink plenty of water, hot tea, and other liquids. This may help thin nasal mucus. It also may help your sinuses drain fluids.    Heat may help soothe painful areas of your face. Use a towel soaked in hot water. Or,  the shower and direct the warm spray onto your face. Using a vaporizer along with a menthol rub at night may also help soothe symptoms.     An expectorant with guaifenesin may help thin nasal mucus and help your sinuses drain fluids.    You can use an over-the-counter decongestant, unless a similar medicine was prescribed to you. Nasal sprays work the fastest. Use one that contains phenylephrine or oxymetazoline. First blow your nose gently. Then use the spray. Do not use these medicines more often than directed on the label. If you do, your symptoms may get worse. You may also take pills that contain pseudoephedrine. Don t use products that combine multiple medicines. This is because side effects may be increased. Read labels. You can also ask the pharmacist for help. (People with high blood pressure should not use decongestants. They can raise blood pressure.)    Over-the-counter antihistamines may help if allergies contributed to your  sinusitis.      Do not use nasal rinses or irrigation during an acute sinus infection, unless your healthcare provider tells you to. Rinsing may spread the infection to other areas in your sinuses.    Use acetaminophen or ibuprofen to control pain, unless another pain medicine was prescribed to you. If you have chronic liver or kidney disease or ever had a stomach ulcer, talk with your healthcare provider before using these medicines. (Aspirin should never be taken by anyone under age 18 who is ill with a fever. It may cause severe liver damage.)    Don't smoke. This can make symptoms worse.  Follow-up care  Follow up with your healthcare provider or our staff if you are not better in 1 week.  When to seek medical advice  Call your healthcare provider if any of these occur:    Facial pain or headache that gets worse    Stiff neck    Unusual drowsiness or confusion    Swelling of your forehead or eyelids    Vision problems, such as blurred or double vision    Fever of 100.4 F (38 C) or higher, or as directed by your healthcare provider    Seizure    Breathing problems    Symptoms don't go away in 10 days  Prevention  Here are steps you can take to help prevent an infection:    Keep good hand washing habits.    Don t have close contact with people who have sore throats, colds, or other upper respiratory infections.    Don t smoke, and stay away from secondhand smoke.    Stay up to date with of your vaccines.  Date Last Reviewed: 11/1/2017 2000-2019 [employer]. [address] [place] [address]. All rights reserved. This information is not intended as a substitute for professional medical care. Always follow your healthcare professional's instructions.           Patient Education     Preventing Sinusitis    Colds, flu, and allergies make it more likely for you to get sinusitis. Do your best to prevent sinusitis by preventing these problems. Do what you can to avoid getting colds and other  infections. Stay away from things that cause allergies (allergens). Keep your sinuses as moist as you can.  Tips for air travel  When traveling on an airplane, use saline nasal spray to keep your sinuses moist. Drink plenty of fluids. You may also want to take a decongestant before you get on the plane.   Prevent colds  Do what you can to avoid being exposed to colds and flu. When possible, take more time to rest when you feel something  coming on.     Wash your hands often. This is especially important during cold and flu season. Try not to touch your face.    As much as possible, stay away from infected people.    Follow these standbys for staying healthy: Eat balanced meals, exercise regularly, and get plenty of sleep.  Stay away from allergens  First find out what things you re allergic to. Then take steps to stay away from allergens or irritants in the air such as dust, pollution, and pollen.    Wear a mask when you clean. Or consider hiring a  to help you stay away from dust.    Sit in the nonsmoking sections of restaurants.    Don't go outdoors during peak pollution hours such as rush hour.    Keep an air conditioner on during allergy season. Clean its filter regularly.    Ask your healthcare provider about a referral to have an allergy evaluation. Or ask for a referral to see an allergy specialist.  Boost moisture  Keeping your sinuses moist makes your mucus thinner. This allows your sinuses to drain better. And this helps prevent infection. Ask your doctor about these suggestions:    Use a humidifier. Clean it often to remove any mold or mildew.    Drink several glasses of water a day.    Stay away from drying beverages such as alcohol and coffee.    Stay away from all types of smoke, which dries out sinus linings. This includes tobacco smoke and chemical smoke in workplace settings.    Use saltwater rinses.  Date Last Reviewed: 10/1/2016    6821-5821 [employer]. [address], [place] [address]. All rights reserved. This information is not intended as a substitute for professional medical care. Always follow your healthcare professional's instructions.           Patient Education     Self-Care for Sinusitis     Drinking plenty of water can help sinuses drain.   Sinusitis can often be managed with self-care. Self-care can keep sinuses moist and make you feel more comfortable. Remember to follow your doctor's instructions closely. This can make a big difference in getting your sinus problem under control.  Drink fluids  Drinking extra fluids helps thin your mucus. This lets it drain from your sinuses more easily. Have a glass of water every hour or two. A humidifier helps in much the same way. Fluids can also offset the drying effects of certain medicines. If you use a humidifier, follow the product maker's instructions on how to use it. Clean it on a regular schedule.  Use saltwater rinses  Rinses help keep your sinuses and nose moist. Mix a teaspoon of salt in 8 ounces of fresh, warm water. Use a bulb syringe to gently squirt the water into your nose a few times a day. You can also buy ready-made saline nasal sprays.  Apply hot or cold packs  Applying heat to the area surrounding your sinuses may make you feel more comfortable. Use a hot water bottle or a hand towel dipped in hot water. Some people also find ice packs effective for relieving pain.  Medicines  Your doctor may prescribe medications to help treat your sinusitis. If you have an infection, antibiotics can help clear it up. If you are prescribed antibiotics, take all pills on schedule until they are gone, even if you feel better. Decongestants help relieve swelling. Use decongestant sprays for short periods only under the direction of your doctor. If you have allergies, your doctor may prescribe medications to help relieve them.   Date Last Reviewed: 10/1/2016    3423-5206 [employer]. [address], [place] [address]. All rights reserved. This information is not intended as a substitute for professional medical care. Always follow your healthcare professional's instructions.

## 2020-07-23 NOTE — PROGRESS NOTES
"Lisette Owens is a 55 year old female who is being evaluated via a billable telephone visit.      The patient has been notified of following:     \"This telephone visit will be conducted via a call between you and your physician/provider. We have found that certain health care needs can be provided without the need for a physical exam.  This service lets us provide the care you need with a short phone conversation.  If a prescription is necessary we can send it directly to your pharmacy.  If lab work is needed we can place an order for that and you can then stop by our lab to have the test done at a later time.    Telephone visits are billed at different rates depending on your insurance coverage. During this emergency period, for some insurers they may be billed the same as an in-person visit.  Please reach out to your insurance provider with any questions.    If during the course of the call the physician/provider feels a telephone visit is not appropriate, you will not be charged for this service.\"    Patient has given verbal consent for Telephone visit?  Yes    What phone number would you like to be contacted at? [phone]    How would you like to obtain your AVS? Sophiahart    Subjective     Lisette Owens is a 55 year old female who presents via phone visit today for the following health issues:    HPI    ENT Symptoms             Symptoms: Present Comment   Fever/Chills     Fatigue x    Muscle Aches     Eye Irritation     Sneezing     Nasal Ray/Drg x Greenish discharge    Sinus Pressure/Pain x Around nose and in nose-mainly right side   Loss of smell x    Dental pain x both   Sore Throat     Swollen Glands     Ear Pain/Fullness     Cough     Wheeze x Has asthma   Chest Pain     Shortness of breath     Rash     Other x headache     Symptom duration:  1.5 weeks   Symptom severity:  moderate    Treatments tried:  nasal spray, claritin d, zyrtec, sudafed, mucinex    Contacts:  none     Long history of allergies " and sinus infections; similar to previous; doxycycline or extended course of zithromax for tx. Has asthma.  Chronic cough.       Patient Active Problem List   Diagnosis     Dry eye syndrome     CARDIOVASCULAR SCREENING; LDL GOAL LESS THAN 160     Seasonal allergic rhinitis     Allergic rhinitis due to animal dander     Rhinitis, allergic to other allergen     House dust mite allergy     Moderate persistent asthma     PTSD (post-traumatic stress disorder)     Vitamin D deficiency     Hypersomnia     Esophageal reflux (GERD)     Depression with anxiety     Acute cystitis with hematuria     Type 2 diabetes mellitus without complication, without long-term current use of insulin (H)     Anemia     Dyspareunia (CODE)     Insomnia, unspecified     Past Surgical History:   Procedure Laterality Date     EXCISE EXOSTOSIS FOOT Right 10/2/2018    Procedure: EXCISE EXOSTOSIS FOOT;  Right foot removal of first tarsometatarsal joint dorsal bossing;  Surgeon: Will Barraza DPM;  Location: [facility]     HYSTEROSCOPY         Social History     Tobacco Use     Smoking status: Never Smoker     Smokeless tobacco: Never Used   Substance Use Topics     Alcohol use: No     Family History   Problem Relation Age of Onset     Hypertension Mother      Alzheimer Disease Mother      Diabetes Mother      Hypertension Father      Skin Cancer Father      Alzheimer Disease Paternal Grandmother      Psychotic Disorder Paternal Grandmother         bipolar     Heart Disease Paternal Grandfather      Breast Cancer Maternal Grandmother      Thyroid Disease Brother      Thyroid Disease Sister      Diabetes Sister      Skin Cancer Sister      Cerebrovascular Disease No family hx of      Glaucoma No family hx of      Macular Degeneration No family hx of          Current Outpatient Medications   Medication Sig Dispense Refill     ADVAIR DISKUS 500-50 MCG/DOSE inhaler INHALE 1 PUFF BY MOUTH TWICE DAILY  1 Inhaler 10     albuterol (PROVENTIL) (2.5 MG/3ML)  0.083% neb solution Inhale the contents of 1 vial via nebulizer every 4 hours as needed for shortness of breath  90 mL 0     aspirin  MG EC tablet Take 1 tablet (325 mg) by mouth daily 90 tablet 3     buPROPion (WELLBUTRIN XL) 150 MG 24 hr tablet Take 1 tablet (150 mg) by mouth every morning 30 tablet 1     diltiazem ER (TIAZAC) 240 MG 24 hr ER beaded capsule TAKE 1 CAPSULE BY MOUTH ONCE DAILY 90 capsule 0     doxycycline monohydrate (MONODOX) 100 MG capsule Take 1 capsule (100 mg) by mouth 2 times daily for 10 days 20 capsule 0     fish oil-omega-3 fatty acids (FISH OIL) 1000 MG capsule Take 2 g by mouth 3 times daily Reported on 2/14/2017       lisinopril (ZESTRIL) 5 MG tablet TAKE 1 TABLET BY MOUTH ONCE DAILY 30 tablet 0     metFORMIN (GLUCOPHAGE-XR) 500 MG 24 hr tablet TAKE 2 TABLETS BY MOUTH TWICE DAILY WITH MEALS 120 tablet 0     montelukast (SINGULAIR) 10 MG tablet TAKE 1 TABLET BY MOUTH AT BEDTIME 90 tablet 1     nitroglycerin (NITRODUR) 0.4 MG/HR Place 1 patch onto the skin daily Reported on 2/14/2017       PROAIR  (90 Base) MCG/ACT inhaler INHALE TWO PUFFS BY MOUTH EVERY FOUR HOURS AS NEEDED FOR SHORTNESS OF BREATH/ DIFFICULTY BREATHING 8.5 g 10     ALPRAZolam (XANAX) 0.5 MG tablet Take 1-2 tablets (0.5-1 mg) by mouth 3 times daily as needed for anxiety (Patient not taking: Reported on 7/23/2020) 30 tablet 1     Allergies   Allergen Reactions     Ampicillin Rash     Cipro [Quinolones] Anaphylaxis     Macrobid [Nitrofuran Derivatives] GI Disturbance     Recent Labs   Lab Test 01/06/20  1819 06/26/19  0945 05/20/19  1221 09/27/18  1611 05/03/18  0802  04/15/16  1605  04/11/14  0934   A1C 7.4*  --  6.9* 6.3* 6.0*   < >  --   --   --    LDL  --   --   --   --  95  --   --   --  94   HDL  --   --   --   --  53  --   --   --  46*   TRIG  --   --   --   --  191*  --   --   --  251*   ALT  --   --   --   --   --   --  48  --   --    CR  --   --  0.74 0.81  --    < > 0.81  --  0.86   GFRESTIMATED  --    --  >90 74  --    < > 75  --  70   GFRESTBLACK  --   --  >90 >90  --    < > >90   GFR Calc    --  85   POTASSIUM  --   --  4.2 4.2  --    < > 3.7  --  4.1   TSH  --  1.32 0.38*  --   --    < >  --    < >  --     < > = values in this interval not displayed.      BP Readings from Last 3 Encounters:   01/06/20 124/86   11/05/19 138/84   05/20/19 113/57    Wt Readings from Last 3 Encounters:   01/06/20 90.5 kg (199 lb 9.6 oz)   11/05/19 92.2 kg (203 lb 3.2 oz)   05/20/19 89.4 kg (197 lb)                    Reviewed and updated as needed this visit by Provider  Tobacco  Allergies  Meds  Problems  Med Hx  Surg Hx  Fam Hx         Review of Systems   Constitutional, HEENT, cardiovascular, pulmonary, GI, , musculoskeletal, neuro, skin, endocrine and psych systems are negative, except as otherwise noted.       Objective   Reported vitals:  LMP 05/15/2014 (Approximate)    healthy, alert and no distress  PSYCH: Alert and oriented times 3; coherent speech, normal   rate and volume, able to articulate logical thoughts, able   to abstract reason, no tangential thoughts, no hallucinations   or delusions  Her affect is normal  RESP: No cough, no audible wheezing, able to talk in full sentences  Remainder of exam unable to be completed due to telephone visits    Diagnostic Test Results:  Labs reviewed in Epic        Assessment/Plan:    1. Acute non-recurrent sinusitis, unspecified location      2. Moderate persistent asthma without complication      3. Chronic cough        Return in about 4 weeks (around 8/20/2020), or if symptoms worsen or fail to improve.      Phone call duration:  12 minutes    Prerna Garcia NP

## 2020-07-26 ENCOUNTER — VIRTUAL VISIT (OUTPATIENT)
Dept: FAMILY MEDICINE | Facility: OTHER | Age: 55
End: 2020-07-26
Payer: COMMERCIAL

## 2020-07-26 PROCEDURE — 99421 OL DIG E/M SVC 5-10 MIN: CPT | Performed by: PHYSICIAN ASSISTANT

## 2020-07-26 NOTE — PROGRESS NOTES
"Date: 2020 02:45:09  Clinician: Rocio Tinoco  Clinician NPI: [id number]  Patient: Lisette Ukdarwinu  Patient : 1965  Patient Address: [address] [place], [place] [address]  Patient Phone: [phone]  Visit Protocol: URI  Patient Summary:  Lisette is a 55 year old ( : 1965 ) female who initiated a Visit for cold, sinus infection, or influenza. When asked the question \"Please sign me up to receive news, health information and promotions from [employer].\", Lisette responded \"No\".    Lisette states her symptoms started gradually 2-3 weeks ago.   Her symptoms consist of wheezing, facial pain or pressure, a cough, nasal congestion, and a headache. She is experiencing mild difficulty breathing with activities but can speak normally in full sentences.   Symptom details     Nasal secretions: The color of her mucus is blood-tinged and yellow.    Cough: Lisette coughs a few times an hour and her cough is more bothersome at night. Phlegm comes into her throat when she coughs. She believes her cough is caused by post-nasal drip. The color of the phlegm is yellow.     Wheezing: Lisette has been diagnosed with asthma. Additional wheezing details as reported by the patient (free text): I was seen by a doctor at [place] (virtual visit) for sinus infection. I was given an antibiotic (Doxycycline) for this and was asked if my asthma was in need of Prednisone. At the time I hoped it was under control, but for the past three days I have needed nebulizer treatments every four hours and this is not stopping the wheezing (high pitched sounds as I exhale at resting position). I have a long history with allergies, sinusitis and asthma. I am definitely in need of Prednisone for asthma.       Facial pain or pressure: The facial pain or pressure does not feel worse when bending or leaning forward.     Headache: She states the headache is moderate (4-6 on a 10 point pain scale).      Lisette denies having nausea, teeth pain, ageusia, " diarrhea, myalgias, sore throat, anosmia, fever, vomiting, rhinitis, malaise, ear pain, chills, and enlarged lymph nodes. She also denies having recent facial or sinus surgery in the past 60 days and double sickening (worsening symptoms after initial improvement).   Precipitating events  She has not recently been exposed to someone with influenza. Lisette has not been in close contact with any high risk individuals.   Pertinent COVID-19 (Coronavirus) information  In the past 14 days, Lisette has not worked in a congregate living setting.   She does not work or volunteer as healthcare worker or a  and does not work or volunteer in a healthcare facility.   Lisette also has not lived in a congregate living setting in the past 14 days. She does not live with a healthcare worker.   Lisette has not had a close contact with a laboratory-confirmed COVID-19 patient within 14 days of symptom onset.   Pertinent medical history  Lisette had 3 sinus infections within the past year.   Lisette has taken an antibiotic medication in the past month. Antibiotic details as reported by the patient (free text): Doxycycline presently for sinus infection. I'm in need of Prednisone for asthma related to drainage. All of this was brought on by paint fumes.   Lisette does not get yeast infections when she takes antibiotics.   Lisette does not need a return to work/school note.   Weight: 200 lbs   Lisette does not smoke or use smokeless tobacco.   Additional information as reported by the patient (free text): I was seen by a doctor at [facility] (virtual visit) for sinus infection. I was given Doxycycline and was asked if my asthma was in need of Prednisone. At the time I hoped it was under control, but for the past three days I have needed nebulizer treatments every four hours and this is not stopping the wheezing (high pitched sounds as I exhale at resting position). I have a long history with allergies, sinusitis and asthma. I am in need of Prednisone  for asthma; due to paint fumes/allergy triggers.   Weight: 200 lbs    MEDICATIONS: Zyrtec oral, Claritin-D 24 Hour oral, albuterol sulfate inhalation, Advair Diskus inhalation, diltiazem oral, bupropion HCl oral, metformin oral, doxycycline oral, ALLERGIES: Cipro, ampicillin, Macrobid  Clinician Response:  Dear Lisette,   Steroid burst attached. Continue with regular asthma treatments. &nbsp;If this is not improving seek treatment. &nbsp; If worsening go to ER. &nbsp;  There is a potential interaction between steroids and bupropion. &nbsp;I overrode this as you have taken the steroids in the past but please discuss this with the pharmacy when you  the meds. &nbsp;If you experience any problems go to ER.&nbsp;    Diagnosis: Mild persistent asthma with (acute) exacerbation  Diagnosis ICD: J45.31  Prescription: prednisone 50 mg oral tablet 5 tablet, 5 days supply. Take one tablet per day. Refills: 0, Refill as needed: no, Allow substitutions: yes  Pharmacy: [facility] #[id number] - [phone] - [address] [place] [address]

## 2020-07-27 ENCOUNTER — MYC REFILL (OUTPATIENT)
Dept: FAMILY MEDICINE | Facility: CLINIC | Age: 55
End: 2020-07-27

## 2020-07-27 DIAGNOSIS — J45.41 MODERATE PERSISTENT ASTHMA WITH ACUTE EXACERBATION: ICD-10-CM

## 2020-07-27 DIAGNOSIS — J45.40 MODERATE PERSISTENT ASTHMA, UNCOMPLICATED: ICD-10-CM

## 2020-07-27 DIAGNOSIS — E11.9 TYPE 2 DIABETES MELLITUS WITHOUT COMPLICATION, WITHOUT LONG-TERM CURRENT USE OF INSULIN (H): ICD-10-CM

## 2020-07-27 RX ORDER — METFORMIN HCL 500 MG
TABLET, EXTENDED RELEASE 24 HR ORAL
Qty: 120 TABLET | Refills: 0 | Status: CANCELLED | OUTPATIENT
Start: 2020-07-27

## 2020-07-27 RX ORDER — ALBUTEROL SULFATE 0.83 MG/ML
SOLUTION RESPIRATORY (INHALATION)
Qty: 90 ML | Refills: 0 | Status: CANCELLED | OUTPATIENT
Start: 2020-07-27

## 2020-07-27 RX ORDER — ALBUTEROL SULFATE 90 UG/1
AEROSOL, METERED RESPIRATORY (INHALATION)
Qty: 8.5 G | Refills: 10 | Status: CANCELLED | OUTPATIENT
Start: 2020-07-27

## 2020-07-27 RX ORDER — MONTELUKAST SODIUM 10 MG/1
1 TABLET ORAL AT BEDTIME
Qty: 90 TABLET | Refills: 1 | Status: CANCELLED | OUTPATIENT
Start: 2020-07-27

## 2020-07-27 NOTE — TELEPHONE ENCOUNTER
"Routing refill request to provider for review/approval because:  Labs not current:  A1c, cr  Patient needs to be seen because:  Pt due for follow up 7/15/20    Medication pended for approval, 30 day supply with reminder.                 Requested Prescriptions   Pending Prescriptions Disp Refills     metFORMIN (GLUCOPHAGE-XR) 500 MG 24 hr tablet [Pharmacy Med Name: metFORMIN HCl ER Oral Tablet Extended Release 24 Hour 500 MG] 120 tablet 0     Sig: TAKE 2 TABLETS BY MOUTH TWICE DAILY with meals (due for appointment for further refills)       Biguanide Agents Failed - 7/23/2020 11:28 AM        Failed - Patient has documented A1c within the specified period of time.     If HgbA1C is 8 or greater, it needs to be on file within the past 3 months.  If less than 8, must be on file within the past 6 months.     Recent Labs   Lab Test 01/06/20  1819   A1C 7.4*             Failed - Patient's CR is NOT>1.4 OR Patient's EGFR is NOT<45 within past 12 mos.     Recent Labs   Lab Test 05/20/19  1221   GFRESTIMATED >90   GFRESTBLACK >90       Recent Labs   Lab Test 05/20/19  1221   CR 0.74             Passed - Patient is age 10 or older        Passed - Patient does NOT have a diagnosis of CHF.        Passed - Medication is active on med list        Passed - Patient is not pregnant        Passed - Patient has not had a positive pregnancy test within the past 12 mos.         Passed - Recent (6 mo) or future (30 days) visit within the authorizing provider's specialty     Patient had office visit in the last 6 months or has a visit in the next 30 days with authorizing provider or within the authorizing provider's specialty.  See \"Patient Info\" tab in inbasket, or \"Choose Columns\" in Meds & Orders section of the refill encounter.                 "

## 2020-07-28 ENCOUNTER — VIRTUAL VISIT (OUTPATIENT)
Dept: FAMILY MEDICINE | Facility: CLINIC | Age: 55
End: 2020-07-28
Payer: COMMERCIAL

## 2020-07-28 DIAGNOSIS — J45.41 MODERATE PERSISTENT ASTHMA WITH ACUTE EXACERBATION: ICD-10-CM

## 2020-07-28 DIAGNOSIS — F41.8 DEPRESSION WITH ANXIETY: ICD-10-CM

## 2020-07-28 DIAGNOSIS — J45.40 MODERATE PERSISTENT ASTHMA, UNCOMPLICATED: Primary | ICD-10-CM

## 2020-07-28 DIAGNOSIS — R00.2 PALPITATIONS: ICD-10-CM

## 2020-07-28 DIAGNOSIS — E11.9 TYPE 2 DIABETES MELLITUS WITHOUT COMPLICATION, WITHOUT LONG-TERM CURRENT USE OF INSULIN (H): ICD-10-CM

## 2020-07-28 PROCEDURE — 99214 OFFICE O/P EST MOD 30 MIN: CPT | Mod: TEL | Performed by: PHYSICIAN ASSISTANT

## 2020-07-28 RX ORDER — MONTELUKAST SODIUM 10 MG/1
1 TABLET ORAL AT BEDTIME
Qty: 90 TABLET | Refills: 1 | Status: SHIPPED | OUTPATIENT
Start: 2020-07-28 | End: 2021-03-08

## 2020-07-28 RX ORDER — METFORMIN HCL 500 MG
TABLET, EXTENDED RELEASE 24 HR ORAL
Qty: 120 TABLET | Refills: 0 | Status: SHIPPED | OUTPATIENT
Start: 2020-07-28 | End: 2020-07-28

## 2020-07-28 RX ORDER — LOSARTAN POTASSIUM 25 MG/1
25 TABLET ORAL DAILY
Qty: 30 TABLET | Refills: 1 | Status: SHIPPED | OUTPATIENT
Start: 2020-07-28 | End: 2020-08-17

## 2020-07-28 RX ORDER — ALBUTEROL SULFATE 0.83 MG/ML
SOLUTION RESPIRATORY (INHALATION)
Qty: 90 ML | Refills: 0 | Status: SHIPPED | OUTPATIENT
Start: 2020-07-28 | End: 2022-06-08

## 2020-07-28 RX ORDER — METFORMIN HCL 500 MG
TABLET, EXTENDED RELEASE 24 HR ORAL
Qty: 120 TABLET | Refills: 0 | Status: SHIPPED | OUTPATIENT
Start: 2020-07-28 | End: 2020-08-17

## 2020-07-28 RX ORDER — DILTIAZEM HYDROCHLORIDE 240 MG/1
CAPSULE, EXTENDED RELEASE ORAL
Qty: 90 CAPSULE | Refills: 0 | Status: SHIPPED | OUTPATIENT
Start: 2020-07-28 | End: 2020-09-30

## 2020-07-28 RX ORDER — ALBUTEROL SULFATE 90 UG/1
AEROSOL, METERED RESPIRATORY (INHALATION)
Qty: 8.5 G | Refills: 10 | Status: SHIPPED | OUTPATIENT
Start: 2020-07-28 | End: 2022-08-30

## 2020-07-28 RX ORDER — BUPROPION HYDROCHLORIDE 150 MG/1
150 TABLET ORAL EVERY MORNING
Qty: 30 TABLET | Refills: 1 | Status: SHIPPED | OUTPATIENT
Start: 2020-07-28 | End: 2020-10-08

## 2020-07-28 RX ORDER — PREDNISONE 10 MG/1
TABLET ORAL
Qty: 42 TABLET | Refills: 0 | Status: SHIPPED | OUTPATIENT
Start: 2020-07-28 | End: 2020-08-17

## 2020-07-28 NOTE — TELEPHONE ENCOUNTER
marylin is due for a recheck. Have her make an appt to see me. For a face to face recheck of her diabetes in the next few weeks.

## 2020-07-28 NOTE — PROGRESS NOTES
"Lisette Owens is a 55 year old female who is being evaluated via a billable telephone visit.      The patient has been notified of following:     \"This telephone visit will be conducted via a call between you and your physician/provider. We have found that certain health care needs can be provided without the need for a physical exam.  This service lets us provide the care you need with a short phone conversation.  If a prescription is necessary we can send it directly to your pharmacy.  If lab work is needed we can place an order for that and you can then stop by our lab to have the test done at a later time.    Telephone visits are billed at different rates depending on your insurance coverage. During this emergency period, for some insurers they may be billed the same as an in-person visit.  Please reach out to your insurance provider with any questions.    If during the course of the call the physician/provider feels a telephone visit is not appropriate, you will not be charged for this service.\"    Patient has given verbal consent for Telephone visit?  Yes    What phone number would you like to be contacted at? [phone]    How would you like to obtain your AVS? Leonardt    Subjective     Lisette Owens is a 55 year old female who presents via phone visit today for the following health issues:    HPI    Not taking Lisinopril - having cough.  Stopped 5 days.     2 days into antibiotic the asthma flared up. Trouble breathing.   Virtual visit 7/23/20 and 7/26/20  Still breathless doing chores at time  Coughing.   Doing neb treatments,   Ran out of advair and singulair  Kristie Tilley, CMA    Starting to feel a little better upon taking prednisone.   Some nasal congestion.  ENT Symptoms             Symptoms: cc Present Absent Comment   Fever/Chills   x    Fatigue  x     Muscle Aches   x    Eye Irritation   x    Sneezing  x     Nasal Ray/Drg  x     Sinus Pressure/Pain  x     Loss of smell  x  mild   Dental pain  "  x    Sore Throat   x    Swollen Glands   x    Ear Pain/Fullness  x  plugged   Cough x      Wheeze  x     Chest Pain  x     Shortness of breath  x     Rash       Other               Patient Active Problem List   Diagnosis     Dry eye syndrome     CARDIOVASCULAR SCREENING; LDL GOAL LESS THAN 160     Seasonal allergic rhinitis     Allergic rhinitis due to animal dander     Rhinitis, allergic to other allergen     House dust mite allergy     Moderate persistent asthma     PTSD (post-traumatic stress disorder)     Vitamin D deficiency     Hypersomnia     Esophageal reflux (GERD)     Depression with anxiety     Acute cystitis with hematuria     Type 2 diabetes mellitus without complication, without long-term current use of insulin (H)     Anemia     Dyspareunia (CODE)     Insomnia, unspecified     Past Surgical History:   Procedure Laterality Date     EXCISE EXOSTOSIS FOOT Right 10/2/2018    Procedure: EXCISE EXOSTOSIS FOOT;  Right foot removal of first tarsometatarsal joint dorsal bossing;  Surgeon: Will Barraza DPM;  Location: [facility]     HYSTEROSCOPY         Social History     Tobacco Use     Smoking status: Never Smoker     Smokeless tobacco: Never Used   Substance Use Topics     Alcohol use: No     Family History   Problem Relation Age of Onset     Hypertension Mother      Alzheimer Disease Mother      Diabetes Mother      Hypertension Father      Skin Cancer Father      Alzheimer Disease Paternal Grandmother      Psychotic Disorder Paternal Grandmother         bipolar     Heart Disease Paternal Grandfather      Breast Cancer Maternal Grandmother      Thyroid Disease Brother      Thyroid Disease Sister      Diabetes Sister      Skin Cancer Sister      Cerebrovascular Disease No family hx of      Glaucoma No family hx of      Macular Degeneration No family hx of          Current Outpatient Medications   Medication Sig Dispense Refill     ADVAIR DISKUS 500-50 MCG/DOSE inhaler INHALE 1 PUFF BY MOUTH TWICE DAILY  1  Inhaler 10     albuterol (PROVENTIL) (2.5 MG/3ML) 0.083% neb solution Inhale the contents of 1 vial via nebulizer every 4 hours as needed for shortness of breath  90 mL 0     ALPRAZolam (XANAX) 0.5 MG tablet Take 1-2 tablets (0.5-1 mg) by mouth 3 times daily as needed for anxiety 30 tablet 1     aspirin  MG EC tablet Take 1 tablet (325 mg) by mouth daily 90 tablet 3     buPROPion (WELLBUTRIN XL) 150 MG 24 hr tablet Take 1 tablet (150 mg) by mouth every morning 30 tablet 1     diltiazem ER (TIAZAC) 240 MG 24 hr ER beaded capsule TAKE 1 CAPSULE BY MOUTH ONCE DAILY 90 capsule 0     doxycycline monohydrate (MONODOX) 100 MG capsule Take 1 capsule (100 mg) by mouth 2 times daily for 10 days 20 capsule 0     metFORMIN (GLUCOPHAGE-XR) 500 MG 24 hr tablet TAKE 2 TABLETS BY MOUTH TWICE DAILY with meals (due for appointment for further refills) 120 tablet 0     montelukast (SINGULAIR) 10 MG tablet TAKE 1 TABLET BY MOUTH AT BEDTIME 90 tablet 1     PROAIR  (90 Base) MCG/ACT inhaler INHALE TWO PUFFS BY MOUTH EVERY FOUR HOURS AS NEEDED FOR SHORTNESS OF BREATH/ DIFFICULTY BREATHING 8.5 g 10     lisinopril (ZESTRIL) 5 MG tablet TAKE 1 TABLET BY MOUTH ONCE DAILY 30 tablet 0     nitroglycerin (NITRODUR) 0.4 MG/HR Place 1 patch onto the skin daily Reported on 2/14/2017       Allergies   Allergen Reactions     Ampicillin Rash     Cipro [Quinolones] Anaphylaxis     Macrobid [Nitrofuran Derivatives] GI Disturbance     Recent Labs   Lab Test 01/06/20  1819 06/26/19  0945 05/20/19  1221 09/27/18  1611 05/03/18  0802  04/15/16  1605  04/11/14  0934   A1C 7.4*  --  6.9* 6.3* 6.0*   < >  --   --   --    LDL  --   --   --   --  95  --   --   --  94   HDL  --   --   --   --  53  --   --   --  46*   TRIG  --   --   --   --  191*  --   --   --  251*   ALT  --   --   --   --   --   --  48  --   --    CR  --   --  0.74 0.81  --    < > 0.81  --  0.86   GFRESTIMATED  --   --  >90 74  --    < > 75  --  70   GFRESTBLACK  --   --  >90 >90   --    < > >90   GFR Calc    --  85   POTASSIUM  --   --  4.2 4.2  --    < > 3.7  --  4.1   TSH  --  1.32 0.38*  --   --    < >  --    < >  --     < > = values in this interval not displayed.      BP Readings from Last 3 Encounters:   01/06/20 124/86   11/05/19 138/84   05/20/19 113/57    Wt Readings from Last 3 Encounters:   01/06/20 90.5 kg (199 lb 9.6 oz)   11/05/19 92.2 kg (203 lb 3.2 oz)   05/20/19 89.4 kg (197 lb)                    Reviewed and updated as needed this visit by Provider         Review of Systems   Constitutional, HEENT, cardiovascular, pulmonary, GI, , musculoskeletal, neuro, skin, endocrine and psych systems are negative, except as otherwise noted.       Objective   Reported vitals:  LMP 05/15/2014 (Approximate)    healthy, alert and no distress  PSYCH: Alert and oriented times 3; coherent speech, normal   rate and volume, able to articulate logical thoughts, able   to abstract reason, no tangential thoughts, no hallucinations   or delusions  Her affect is normal  RESP: No cough, no audible wheezing, able to talk in full sentences  Remainder of exam unable to be completed due to telephone visits    Diagnostic Test Results:  Labs reviewed in Epic        Assessment/Plan:    1. Depression with anxiety  Doing better on wellbutrin  - buPROPion (WELLBUTRIN XL) 150 MG 24 hr tablet; Take 1 tablet (150 mg) by mouth every morning  Dispense: 30 tablet; Refill: 1    2. Palpitations  stable  - diltiazem ER (TIAZAC) 240 MG 24 hr ER beaded capsule; TAKE 1 CAPSULE BY MOUTH ONCE DAILY  Dispense: 90 capsule; Refill: 0    3. Moderate persistent asthma with acute exacerbation    - montelukast (SINGULAIR) 10 MG tablet; Take 1 tablet (10 mg) by mouth At Bedtime  Dispense: 90 tablet; Refill: 1  - PULMONARY MEDICINE REFERRAL  - predniSONE (DELTASONE) 10 MG tablet; Take 60 mg days 1 and 2, 50 mg days 3 and 4, 40 mg days 5 and 6, 30 mg days 7 and 8, 20 mg days 9 and 10, 10 mg days 11 and 12  Dispense:  42 tablet; Refill: 0    4. Moderate persistent asthma, uncomplicated  Not well controlled  - PULMONARY MEDICINE REFERRAL    5. Type 2 diabetes mellitus without complication, without long-term current use of insulin (H)    - losartan (COZAAR) 25 MG tablet; Take 1 tablet (25 mg) by mouth daily  Dispense: 30 tablet; Refill: 1    Advised supportive and symptomatic treatment.  Follow up with Provider - if condition persists or worsens.       Phone call duration:  16 minutes    Kolton Yin PA-C

## 2020-07-30 DIAGNOSIS — J45.909 ASTHMA: Primary | ICD-10-CM

## 2020-07-30 NOTE — TELEPHONE ENCOUNTER
RECORDS RECEIVED FROM: internal    DATE RECEIVED: 9.4.20    NOTES STATUS DETAILS   OFFICE NOTE from referring provider Internal  susan Yin    OFFICE NOTE from other specialist Internal  2.27.20 lynn HUTCHINS      DISCHARGE SUMMARY from hospital na    DISCHARGE REPORT from the ER na    MEDICATION LIST Internal     IMAGING  (NEED IMAGES AND REPORTS)     CT SCAN na    CHEST XRAY (CXR) na    TESTS     PULMONARY FUNCTION TESTING (PFT) na        Action 7.30.20 sv    Action Taken Message sent to nurse pool to place CXR and PFT order

## 2020-08-17 ENCOUNTER — OFFICE VISIT (OUTPATIENT)
Dept: FAMILY MEDICINE | Facility: CLINIC | Age: 55
End: 2020-08-17
Payer: COMMERCIAL

## 2020-08-17 VITALS
OXYGEN SATURATION: 97 % | TEMPERATURE: 97.4 F | WEIGHT: 190.6 LBS | DIASTOLIC BLOOD PRESSURE: 63 MMHG | HEART RATE: 75 BPM | BODY MASS INDEX: 29.91 KG/M2 | RESPIRATION RATE: 20 BRPM | HEIGHT: 67 IN | SYSTOLIC BLOOD PRESSURE: 112 MMHG

## 2020-08-17 DIAGNOSIS — Z12.11 COLON CANCER SCREENING: ICD-10-CM

## 2020-08-17 DIAGNOSIS — E11.9 TYPE 2 DIABETES MELLITUS WITHOUT COMPLICATION, WITHOUT LONG-TERM CURRENT USE OF INSULIN (H): Primary | ICD-10-CM

## 2020-08-17 DIAGNOSIS — F41.8 DEPRESSION WITH ANXIETY: ICD-10-CM

## 2020-08-17 DIAGNOSIS — J45.41 MODERATE PERSISTENT ASTHMA WITH ACUTE EXACERBATION: ICD-10-CM

## 2020-08-17 DIAGNOSIS — F43.10 PTSD (POST-TRAUMATIC STRESS DISORDER): ICD-10-CM

## 2020-08-17 DIAGNOSIS — Z12.31 ENCOUNTER FOR SCREENING MAMMOGRAM FOR BREAST CANCER: ICD-10-CM

## 2020-08-17 LAB
ANION GAP SERPL CALCULATED.3IONS-SCNC: 6 MMOL/L (ref 3–14)
BUN SERPL-MCNC: 16 MG/DL (ref 7–30)
CALCIUM SERPL-MCNC: 8.7 MG/DL (ref 8.5–10.1)
CHLORIDE SERPL-SCNC: 109 MMOL/L (ref 94–109)
CO2 SERPL-SCNC: 26 MMOL/L (ref 20–32)
CREAT SERPL-MCNC: 0.76 MG/DL (ref 0.52–1.04)
CREAT UR-MCNC: 225 MG/DL
GFR SERPL CREATININE-BSD FRML MDRD: 89 ML/MIN/{1.73_M2}
GLUCOSE SERPL-MCNC: 121 MG/DL (ref 70–99)
HBA1C MFR BLD: 6.8 % (ref 0–5.6)
MICROALBUMIN UR-MCNC: 13 MG/L
MICROALBUMIN/CREAT UR: 5.82 MG/G CR (ref 0–25)
POTASSIUM SERPL-SCNC: 4 MMOL/L (ref 3.4–5.3)
SODIUM SERPL-SCNC: 141 MMOL/L (ref 133–144)

## 2020-08-17 PROCEDURE — 83036 HEMOGLOBIN GLYCOSYLATED A1C: CPT | Performed by: PHYSICIAN ASSISTANT

## 2020-08-17 PROCEDURE — 80048 BASIC METABOLIC PNL TOTAL CA: CPT | Performed by: PHYSICIAN ASSISTANT

## 2020-08-17 PROCEDURE — 82043 UR ALBUMIN QUANTITATIVE: CPT | Performed by: PHYSICIAN ASSISTANT

## 2020-08-17 PROCEDURE — 99214 OFFICE O/P EST MOD 30 MIN: CPT | Mod: 25 | Performed by: PHYSICIAN ASSISTANT

## 2020-08-17 PROCEDURE — 99207 C FOOT EXAM  NO CHARGE: CPT | Performed by: PHYSICIAN ASSISTANT

## 2020-08-17 PROCEDURE — 90750 HZV VACC RECOMBINANT IM: CPT | Performed by: PHYSICIAN ASSISTANT

## 2020-08-17 PROCEDURE — 36415 COLL VENOUS BLD VENIPUNCTURE: CPT | Performed by: PHYSICIAN ASSISTANT

## 2020-08-17 PROCEDURE — 90471 IMMUNIZATION ADMIN: CPT | Performed by: PHYSICIAN ASSISTANT

## 2020-08-17 PROCEDURE — 90670 PCV13 VACCINE IM: CPT | Performed by: PHYSICIAN ASSISTANT

## 2020-08-17 PROCEDURE — 90472 IMMUNIZATION ADMIN EACH ADD: CPT | Performed by: PHYSICIAN ASSISTANT

## 2020-08-17 PROCEDURE — 80061 LIPID PANEL: CPT | Performed by: PHYSICIAN ASSISTANT

## 2020-08-17 RX ORDER — METFORMIN HCL 500 MG
TABLET, EXTENDED RELEASE 24 HR ORAL
Qty: 360 TABLET | Refills: 1 | Status: SHIPPED | OUTPATIENT
Start: 2020-08-17 | End: 2020-10-08

## 2020-08-17 RX ORDER — LOSARTAN POTASSIUM 25 MG/1
25 TABLET ORAL DAILY
Qty: 90 TABLET | Refills: 1 | Status: SHIPPED | OUTPATIENT
Start: 2020-08-17 | End: 2020-10-08

## 2020-08-17 ASSESSMENT — MIFFLIN-ST. JEOR: SCORE: 1502.31

## 2020-08-17 NOTE — PROGRESS NOTES
Subjective     Lisette LINCOLN Ukaegbu is a 55 year old female who presents to clinic today for the following health issues:    HPI       Diabetes Follow-up    How often are you checking your blood sugar? Two times daily  What time of day are you checking your blood sugars (select all that apply)?  in the morning and sometimes in the evening  Have you had any blood sugars above 200?  No  Have you had any blood sugars below 70?  No    What symptoms do you notice when your blood sugar is low?  None and Not applicable    What concerns do you have today about your diabetes? None     Do you have any of these symptoms? (Select all that apply)  No numbness or tingling in feet.  No redness, sores or blisters on feet.  No complaints of excessive thirst.  No reports of blurry vision.  No significant changes to weight.    Have you had a diabetic eye exam in the last 12 months? No - will schedule    Denies chest pain/sob/palps/ha's /dizziness. No vision changes. No neuropathy symptoms.     Recheck of her anxiety and depression . Less emotional lability and maybe more anxiety since starting her bupropion. Will not make a change in her meds currently.       BP Readings from Last 2 Encounters:   08/17/20 112/63   01/06/20 124/86     Hemoglobin A1C (%)   Date Value   08/17/2020 6.8 (H)   01/06/2020 7.4 (H)     LDL Cholesterol Calculated (mg/dL)   Date Value   05/03/2018 95   04/11/2014 94           How many servings of fruits and vegetables do you eat daily?  0-1    On average, how many sweetened beverages do you drink each day (Examples: soda, juice, sweet tea, etc.  Do NOT count diet or artificially sweetened beverages)?   0    How many days per week do you exercise enough to make your heart beat faster? 4    How many minutes a day do you exercise enough to make your heart beat faster? 30 - 60    How many days per week do you miss taking your medication? 0        Patient Active Problem List   Diagnosis     Dry eye syndrome      CARDIOVASCULAR SCREENING; LDL GOAL LESS THAN 160     Seasonal allergic rhinitis     Allergic rhinitis due to animal dander     Rhinitis, allergic to other allergen     House dust mite allergy     Moderate persistent asthma     PTSD (post-traumatic stress disorder)     Vitamin D deficiency     Hypersomnia     Esophageal reflux (GERD)     Depression with anxiety     Acute cystitis with hematuria     Type 2 diabetes mellitus without complication, without long-term current use of insulin (H)     Anemia     Dyspareunia (CODE)     Insomnia, unspecified     Past Surgical History:   Procedure Laterality Date     EXCISE EXOSTOSIS FOOT Right 10/2/2018    Procedure: EXCISE EXOSTOSIS FOOT;  Right foot removal of first tarsometatarsal joint dorsal bossing;  Surgeon: Will Barraza DPM;  Location: [facility]     HYSTEROSCOPY         Social History     Tobacco Use     Smoking status: Never Smoker     Smokeless tobacco: Never Used   Substance Use Topics     Alcohol use: No     Family History   Problem Relation Age of Onset     Hypertension Mother      Alzheimer Disease Mother      Diabetes Mother      Hypertension Father      Skin Cancer Father      Alzheimer Disease Paternal Grandmother      Psychotic Disorder Paternal Grandmother         bipolar     Heart Disease Paternal Grandfather      Breast Cancer Maternal Grandmother      Thyroid Disease Brother      Thyroid Disease Sister      Diabetes Sister      Skin Cancer Sister      Cerebrovascular Disease No family hx of      Glaucoma No family hx of      Macular Degeneration No family hx of          Current Outpatient Medications   Medication Sig Dispense Refill     albuterol (PROAIR HFA) 108 (90 Base) MCG/ACT inhaler INHALE TWO PUFFS BY MOUTH EVERY FOUR HOURS AS NEEDED FOR SHORTNESS OF BREATH/ DIFFICULTY BREATHING 8.5 g 10     albuterol (PROVENTIL) (2.5 MG/3ML) 0.083% neb solution Inhale the contents of 1 vial via nebulizer every 4 hours as needed for shortness of breath 90 mL 0      ALPRAZolam (XANAX) 0.5 MG tablet Take 1-2 tablets (0.5-1 mg) by mouth 3 times daily as needed for anxiety 30 tablet 1     aspirin  MG EC tablet Take 1 tablet (325 mg) by mouth daily 90 tablet 3     buPROPion (WELLBUTRIN XL) 150 MG 24 hr tablet Take 1 tablet (150 mg) by mouth every morning 30 tablet 1     diltiazem ER (TIAZAC) 240 MG 24 hr ER beaded capsule TAKE 1 CAPSULE BY MOUTH ONCE DAILY 90 capsule 0     fluticasone-salmeterol (ADVAIR DISKUS) 500-50 MCG/DOSE inhaler INHALE 1 PUFF BY MOUTH TWICE DAILY 1 Inhaler 10     losartan (COZAAR) 25 MG tablet Take 1 tablet (25 mg) by mouth daily 30 tablet 1     metFORMIN (GLUCOPHAGE-XR) 500 MG 24 hr tablet TAKE 2 TABLETS BY MOUTH TWICE DAILY with meals (due for appointment for further refills) 120 tablet 0     montelukast (SINGULAIR) 10 MG tablet Take 1 tablet (10 mg) by mouth At Bedtime 90 tablet 1     nitroglycerin (NITRODUR) 0.4 MG/HR Place 1 patch onto the skin daily Reported on 2/14/2017       Allergies   Allergen Reactions     Ampicillin Rash     Cipro [Quinolones] Anaphylaxis     Macrobid [Nitrofuran Derivatives] GI Disturbance     Recent Labs   Lab Test 08/17/20  1011 01/06/20  1819 06/26/19  0945 05/20/19  1221 09/27/18  1611 05/03/18  0802  04/15/16  1605  04/11/14  0934   A1C 6.8* 7.4*  --  6.9* 6.3* 6.0*   < >  --   --   --    LDL  --   --   --   --   --  95  --   --   --  94   HDL  --   --   --   --   --  53  --   --   --  46*   TRIG  --   --   --   --   --  191*  --   --   --  251*   ALT  --   --   --   --   --   --   --  48  --   --    CR  --   --   --  0.74 0.81  --    < > 0.81  --  0.86   GFRESTIMATED  --   --   --  >90 74  --    < > 75  --  70   GFRESTBLACK  --   --   --  >90 >90  --    < > >90   GFR Calc    --  85   POTASSIUM  --   --   --  4.2 4.2  --    < > 3.7  --  4.1   TSH  --   --  1.32 0.38*  --   --    < >  --    < >  --     < > = values in this interval not displayed.      BP Readings from Last 3 Encounters:   08/17/20 112/63  "  01/06/20 124/86   11/05/19 138/84    Wt Readings from Last 3 Encounters:   08/17/20 86.5 kg (190 lb 9.6 oz)   01/06/20 90.5 kg (199 lb 9.6 oz)   11/05/19 92.2 kg (203 lb 3.2 oz)                    Reviewed and updated as needed this visit by Provider         Review of Systems   Constitutional, HEENT, cardiovascular, pulmonary, GI, , musculoskeletal, neuro, skin, endocrine and psych systems are negative, except as otherwise noted.      Objective    /63   Pulse 75   Temp 97.4  F (36.3  C) (Tympanic)   Resp 20   Ht 1.71 m (5' 7.32\")   Wt 86.5 kg (190 lb 9.6 oz)   LMP 05/15/2014 (Approximate)   SpO2 97%   Breastfeeding No   BMI 29.57 kg/m    Body mass index is 29.57 kg/m .  Physical Exam   Eye exam - right eye normal lid, conjunctiva, cornea, pupil and fundus, left eye normal lid, conjunctiva, cornea, pupil and fundus.  Thyroid not palpable, not enlarged, no nodules detected.  CHEST:chest clear to IPPA, no tachypnea, retractions or cyanosis and S1, S2 normal, no murmur, no gallop, rate regular.  Foot exam - both sides normal; no swelling, tenderness or skin or vascular lesions. Color and temperature is normal. Sensation is intact. Peripheral pulses are palpable. Toenails are normal.      Lisette was seen today for diabetes.    Diagnoses and all orders for this visit:    Type 2 diabetes mellitus without complication, without long-term current use of insulin (H)  -     Albumin Random Urine Quantitative with Creat Ratio  -     BASIC METABOLIC PANEL  -     HEMOGLOBIN A1C  -     OPTOMETRY REFERRAL  -     FOOT EXAM  -     JUST IN CASE  -     Lipid panel reflex to direct LDL Fasting  -     metFORMIN (GLUCOPHAGE-XR) 500 MG 24 hr tablet; TAKE 2 TABLETS BY MOUTH TWICE DAILY with meals (due for appointment for further refills)  -     losartan (COZAAR) 25 MG tablet; Take 1 tablet (25 mg) by mouth daily    Moderate persistent asthma with acute exacerbation    PTSD (post-traumatic stress disorder)    Depression with " anxiety    Encounter for screening mammogram for breast cancer  -     MA SCREENING DIGITAL BILAT - Future  (s+30); Future    Colon cancer screening  -     GASTROENTEROLOGY ADULT REF PROCEDURE ONLY; Future    Other orders  -     PCV13, IM (6+ WK) - [id number]  -     ZOSTER VACCINE RECOMBINANT ADJUVANTED IM NJX  -     ADMIN 1st VACCINE  -     EA ADD'L VACCINE      work on lifestyle modification  Recheck in 6 mos

## 2020-08-17 NOTE — NURSING NOTE
Prior to immunization administration, verified patients identity using patient s name and date of birth. Please see Immunization Activity for additional information.     Screening Questionnaire for Adult Immunization    Are you sick today?   No   Do you have allergies to medications, food, a vaccine component or latex?   No   Have you ever had a serious reaction after receiving a vaccination?   No   Do you have a long-term health problem with heart, lung, kidney, or metabolic disease (e.g., diabetes), asthma, a blood disorder, no spleen, complement component deficiency, a cochlear implant, or a spinal fluid leak?  Are you on long-term aspirin therapy?   No   Do you have cancer, leukemia, HIV/AIDS, or any other immune system problem?   No   Do you have a parent, brother, or sister with an immune system problem?   No   In the past 3 months, have you taken medications that affect  your immune system, such as prednisone, other steroids, or anticancer drugs; drugs for the treatment of rheumatoid arthritis, Crohn s disease, or psoriasis; or have you had radiation treatments?   No   Have you had a seizure, or a brain or other nervous system problem?   No   During the past year, have you received a transfusion of blood or blood    products, or been given immune (gamma) globulin or antiviral drug?   No   For women: Are you pregnant or is there a chance you could become       pregnant during the next month?   No   Have you received any vaccinations in the past 4 weeks?   No     Immunization questionnaire answers were all negative.        Per orders of Kolton Yin, injection of Tdap, Shingrix given by Keri Camargo. Patient instructed to remain in clinic for 15 minutes afterwards, and to report any adverse reaction to me immediately.       Screening performed by Keri Camargo on 8/17/2020 at 12:50 PM.

## 2020-08-17 NOTE — LETTER
My Asthma Action Plan    Name: Lisette LINCOLN Ukaegbu   YOB: 1965  Date: 8/17/2020   My doctor: Kolton Yin PA-C   My clinic: [facility]        My Control Medicine:   My Rescue Medicine:    My Asthma Severity:   Moderate Persistent  Know your asthma triggers:   smoke  upper respiratory infections  pollens  animal dander  mold  cold air            GREEN ZONE   Good Control    I feel good    No cough or wheeze    Can work, sleep and play without asthma symptoms       Take your asthma control medicine every day.     1. If exercise triggers your asthma, take your rescue medication    15 minutes before exercise or sports, and    During exercise if you have asthma symptoms  2. Spacer to use with inhaler: If you have a spacer, make sure to use it with your inhaler             YELLOW ZONE Getting Worse  I have ANY of these:    I do not feel good    Cough or wheeze    Chest feels tight    Wake up at night   1. Keep taking your Green Zone medications  2. Start taking your rescue medicine:    every 20 minutes for up to 1 hour. Then every 4 hours for 24-48 hours.  3. If you stay in the Yellow Zone for more than 12-24 hours, contact your doctor.  4. If you do not return to the Green Zone in 12-24 hours or you get worse, start taking your oral steroid medicine if prescribed by your provider.           RED ZONE Medical Alert - Get Help  I have ANY of these:    I feel awful    Medicine is not helping    Breathing getting harder    Trouble walking or talking    Nose opens wide to breathe       1. Take your rescue medicine NOW  2. If your provider has prescribed an oral steroid medicine, start taking it NOW  3. Call your doctor NOW  4. If you are still in the Red Zone after 20 minutes and you have not reached your doctor:    Take your rescue medicine again and    Call 911 or go to the emergency room right away    See your regular doctor within 2 weeks of an Emergency Room or Urgent Care visit for follow-up  treatment.          Annual Reminders:  Meet with Asthma Educator,  Flu Shot in the Fall, consider Pneumonia Vaccination for patients with asthma (aged 19 and older).    Pharmacy:    "[facility] # [id number] - [place], [place] - [address] [id number] - [place] - [address] AT SEC OF [facility] [address]    Electronically signed by Kolton Yin PA-C   Date: 08/17/20                      Asthma Triggers  How To Control Things That Make Your Asthma Worse    Triggers are things that make your asthma worse.  Look at the list below to help you find your triggers and what you can do about them.  You can help prevent asthma flare-ups by staying away from your triggers.      Trigger                                                          What you can do   Cigarette Smoke  Tobacco smoke can make asthma worse. Do not allow smoking in your home, car or around you.  Be sure no one smokes at a child s day care or school.  If you smoke, ask your health care provider for ways to help you quit.  Ask family members to quit too.  Ask your health care provider for a referral to Quit Plan to help you quit smoking, or call 3-292-689-PLAN.     Colds, Flu, Bronchitis  These are common triggers of asthma. Wash your hands often.  Don t touch your eyes, nose or mouth.  Get a flu shot every year.     Dust Mites  These are tiny bugs that live in cloth or carpet. They are too small to see. Wash sheets and blankets in hot water every week.   Encase pillows and mattress in dust mite proof covers.  Avoid having carpet if you can. If you have carpet, vacuum weekly.   Use a dust mask and HEPA vacuum.   Pollen and Outdoor Mold  Some people are allergic to trees, grass, or weed pollen, or molds. Try to keep your windows closed.  Limit time out doors when pollen count is high.   Ask you health care provider about taking medicine during allergy season.     Animal Dander  Some people are allergic to skin  flakes, urine or saliva from pets with fur or feathers. Keep pets with fur or feathers out of your home.    If you can t keep the pet outdoors, then keep the pet out of your bedroom.  Keep the bedroom door closed.  Keep pets off cloth furniture and away from stuffed toys.     Mice, Rats, and Cockroaches   Some people are allergic to the waste from these pests.   Cover food and garbage.  Clean up spills and food crumbs.  Store grease in the refrigerator.   Keep food out of the bedroom.   Indoor Mold  This can be a trigger if your home has high moisture. Fix leaking faucets, pipes, or other sources of water.   Clean moldy surfaces.  Dehumidify basement if it is damp and smelly.   Smoke, Strong Odors, and Sprays  These can reduce air quality. Stay away from strong odors and sprays, such as perfume, powder, hair spray, paints, smoke incense, paint, cleaning products, candles and new carpet.   Exercise or Sports  Some people with asthma have this trigger. Be active!  Ask your doctor about taking medicine before sports or exercise to prevent symptoms.    Warm up for 5-10 minutes before and after sports or exercise.     Other Triggers of Asthma  Cold air:  Cover your nose and mouth with a scarf.  Sometimes laughing or crying can be a trigger.  Some medicines and food can trigger asthma.

## 2020-08-18 LAB
CHOLEST SERPL-MCNC: 195 MG/DL
HDLC SERPL-MCNC: 55 MG/DL
LDLC SERPL CALC-MCNC: 101 MG/DL
NONHDLC SERPL-MCNC: 140 MG/DL
TRIGL SERPL-MCNC: 196 MG/DL

## 2020-08-18 ASSESSMENT — ASTHMA QUESTIONNAIRES: ACT_TOTALSCORE: 20

## 2020-08-24 ENCOUNTER — ANCILLARY PROCEDURE (OUTPATIENT)
Dept: GENERAL RADIOLOGY | Facility: CLINIC | Age: 55
End: 2020-08-24
Attending: INTERNAL MEDICINE
Payer: COMMERCIAL

## 2020-08-24 ENCOUNTER — DOCUMENTATION ONLY (OUTPATIENT)
Dept: CARE COORDINATION | Facility: CLINIC | Age: 55
End: 2020-08-24

## 2020-08-24 DIAGNOSIS — J45.909 ASTHMA: ICD-10-CM

## 2020-08-24 DIAGNOSIS — J45.909 ASTHMA: Primary | ICD-10-CM

## 2020-08-25 LAB
DLCOUNC-%PRED-PRE: 116 %
DLCOUNC-PRE: 26.48 ML/MIN/MMHG
DLCOUNC-PRED: 22.7 ML/MIN/MMHG
ERV-%PRED-PRE: 82 %
ERV-PRE: 0.65 L
ERV-PRED: 0.78 L
EXPTIME-PRE: 7.91 SEC
FEF2575-%PRED-POST: 54 %
FEF2575-%PRED-PRE: 35 %
FEF2575-POST: 1.47 L/SEC
FEF2575-PRE: 0.95 L/SEC
FEF2575-PRED: 2.7 L/SEC
FEFMAX-%PRED-PRE: 70 %
FEFMAX-PRE: 4.93 L/SEC
FEFMAX-PRED: 7.02 L/SEC
FEV1-%PRED-PRE: 65 %
FEV1-PRE: 1.91 L
FEV1FEV6-PRE: 59 %
FEV1FEV6-PRED: 82 %
FEV1FVC-PRE: 59 %
FEV1FVC-PRED: 80 %
FEV1SVC-PRE: 55 %
FEV1SVC-PRED: 78 %
FIFMAX-PRE: 4.36 L/SEC
FRCPLETH-%PRED-PRE: 114 %
FRCPLETH-PRE: 3.28 L
FRCPLETH-PRED: 2.85 L
FVC-%PRED-PRE: 87 %
FVC-PRE: 3.23 L
FVC-PRED: 3.68 L
IC-%PRED-PRE: 94 %
IC-PRE: 2.79 L
IC-PRED: 2.96 L
RVPLETH-%PRED-PRE: 134 %
RVPLETH-PRE: 2.6 L
RVPLETH-PRED: 1.93 L
TLCPLETH-%PRED-PRE: 112 %
TLCPLETH-PRE: 6.07 L
TLCPLETH-PRED: 5.4 L
VA-%PRED-PRE: 97 %
VA-PRE: 5.31 L
VC-%PRED-PRE: 92 %
VC-PRE: 3.47 L
VC-PRED: 3.75 L

## 2020-09-03 DIAGNOSIS — Z11.59 ENCOUNTER FOR SCREENING FOR OTHER VIRAL DISEASES: Primary | ICD-10-CM

## 2020-09-04 ENCOUNTER — PRE VISIT (OUTPATIENT)
Dept: PULMONOLOGY | Facility: CLINIC | Age: 55
End: 2020-09-04

## 2020-09-04 ENCOUNTER — VIRTUAL VISIT (OUTPATIENT)
Dept: PULMONOLOGY | Facility: CLINIC | Age: 55
End: 2020-09-04
Attending: PHYSICIAN ASSISTANT
Payer: COMMERCIAL

## 2020-09-04 DIAGNOSIS — J45.40 MODERATE PERSISTENT ASTHMA WITHOUT COMPLICATION: Primary | ICD-10-CM

## 2020-09-04 RX ORDER — ALBUTEROL SULFATE 90 UG/1
2 AEROSOL, METERED RESPIRATORY (INHALATION) EVERY 6 HOURS
Qty: 1 INHALER | Refills: 11 | Status: SHIPPED | OUTPATIENT
Start: 2020-09-04 | End: 2021-05-18

## 2020-09-04 RX ORDER — ESOMEPRAZOLE MAGNESIUM 40 MG/1
40 CAPSULE, DELAYED RELEASE ORAL
Qty: 30 CAPSULE | Refills: 11 | Status: SHIPPED | OUTPATIENT
Start: 2020-09-04 | End: 2021-10-18

## 2020-09-04 NOTE — PATIENT INSTRUCTIONS
Plan    1.  2--3 weeks of Nexium once daily and Flonase OTC added regularly  2.   In AM use albuterol 5-10 minutes before Advair or other controller medication  3.  Trial of switching Advair to Breo 200/25 once daily (with mouth rinsing)  4.  IFF Breo is too expensive out of pocket, then add Incruse Ellipta to Advair  5.  New prescription written for ProAir Do Not Substitute  6.  Prescriptions given for flareups: Prednisone and Doxycycline  7.  Flu Shot this fall  8.  Follow up visit with me in ~ 6 weeks  9.  For problems or issues, call Fady Hays RN or other staff ([phone])

## 2020-09-04 NOTE — PROGRESS NOTES
"Lisette Owens is a 55 year old female who is being evaluated via a billable video visit.      The patient has been notified of following:     \"This video visit will be conducted via a call between you and your physician/provider. We have found that certain health care needs can be provided without the need for an in-person physical exam.  This service lets us provide the care you need with a video conversation.  If a prescription is necessary we can send it directly to your pharmacy.  If lab work is needed we can place an order for that and you can then stop by our lab to have the test done at a later time.    Video visits are billed at different rates depending on your insurance coverage.  Please reach out to your insurance provider with any questions.    If during the course of the call the physician/provider feels a video visit is not appropriate, you will not be charged for this service.\"    Patient has given verbal consent for Video visit? Yes  How would you like to obtain your AVS? Teikonhart  If you are dropped from the video visit, the video invite should be resent to: Other e-mail: [email or web address]  Will anyone else be joining your video visit? No        Video-Visit Details    Type of service:  Video Visit    Video Start Time: 0657  Video End Time: 8:06 AM    Originating Location (pt. Location): Home    Distant Location (provider location):  [facility]     Platform used for Video Visit: Clinithink    Reason for Visit  Lisette Owens is a 55 year old year old female who is being seen for Consult For (Moderate persistent asthma )    Pulmonary HPI    The patient was seen and examined by Eyad Ley MD     Astj,a & allergies since 9th grade    She has had more difficult control in last year with more coughing and need for prednisone.  Usually it is worse in fall with bronchitis/pneumonia occurring that requires antibiotics and fall.  Last fall the symptoms persisted in spite of 3 rounds " "of steroids and antibiotics.  In a typical year she takes prednisone twice but not rare to be 3 or more times.    This summer had a 5 day and then 14 day course of prednisone that ended approx 3 weeks ago.  She believes this was triggered by paint fumes (from painting kitchen). This started before this but she did run out of Advair for 2 days.    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    Now is sleeping fine. Can walk 2+ miles at a moderate pace. Winded at 3 flights of stairs at a quick pace.    Not on any meds for GERD at present.  Intermittently takes medication but also not prescribed for many years. Doesn't remember what she took (? Prilosec)    Seasonal allergies.  Got allergy shots for many years as a child and redo in her 30's.  Takes generic claritin-D at present.  Main symptoms are nasal and itchy throat.  Has sinus drainage 'all the time'    Has some skin disorder along the nerve line for which she got light box treatments.  Derm MD also treated her with claritin-D and zyrtec together    Other treatments tried in the more distant past for asthma but Advair worked best and had to appeal to continue Advair.    Triggers:  Cold air;  Chemicals/fumes;  Exercise;  Cats; mold      PMH (from primary MD also)  Seasonal allertgic rhinitis  Dust Mite Allergy  GERD  Hypersomnia  Depression with anxiety  PTSD  Vitamin D Deficiency  Type 2 DM  Insomnia/Hypersomnia  Asthma - Moderate persistent  Dry eyes    Meds: see below    Allergies  Lisinopril - cough  Cipro - \"red-lined\" itching  Macrobid - vomits w/in 15 minutes   Ampicillin - child ???  " Rash    FH:  Father 86 healthy  Mother  with Alzheimers & Parkinsons; had allergies and mild asthma  1B/3S - 1 sister with mild allergies and w/ t2DM. She had lung surgery for ?? Problem with calcifications; 1S with thyroid CA  Has 1 biologic son with unexplained breathing issues as a child - now largely outgrown (? Ectodermal dysplasia per Dr. KRISTIE Stokes) and 3 adopted children    SH   and brass player    Never smoker;    ROS:  ? Asbestos (school ceilings/floors)  Snoring positive and wears mouth guard  Remote negative PSG ~15-20 years ago  Had parvovirus a long time ago with many aftereffects including hypersomnia  Weight runs about 190  Daughter killed 4 years ago which triggered worsening of blood glucose / asthma / etc        Current Outpatient Medications   Medication     albuterol (PROAIR HFA) 108 (90 Base) MCG/ACT inhaler     albuterol (PROVENTIL) (2.5 MG/3ML) 0.083% neb solution     ALPRAZolam (XANAX) 0.5 MG tablet     aspirin  MG EC tablet     buPROPion (WELLBUTRIN XL) 150 MG 24 hr tablet     diltiazem ER (TIAZAC) 240 MG 24 hr ER beaded capsule     fluticasone-salmeterol (ADVAIR DISKUS) 500-50 MCG/DOSE inhaler     losartan (COZAAR) 25 MG tablet     metFORMIN (GLUCOPHAGE-XR) 500 MG 24 hr tablet     montelukast (SINGULAIR) 10 MG tablet     nitroglycerin (NITRODUR) 0.4 MG/HR     No current facility-administered medications for this visit.      Allergies   Allergen Reactions     Ampicillin Rash     Cipro [Quinolones] Anaphylaxis     Macrobid [Nitrofuran Derivatives] GI Disturbance     Past Medical History:   Diagnosis Date     Allergic rhinitis due to animal dander      Amblyopia     LT     Arthritis      Colon polyp      Endometriosis      House dust mite allergy      Moderate persistent asthma      Rhinitis, allergic to other allergen      Seasonal allergic rhinitis 05 skin tests pos. for: cat/dog/horse/DM/M/T/G/RW per Dr. Granado    pt. did IT from  to 07 to:  cat/dog/DM/M/T/G/W dc'd per self.      Type 2 diabetes mellitus without complication, without long-term current use of insulin (H) 3/13/2017       Past Surgical History:   Procedure Laterality Date     EXCISE EXOSTOSIS FOOT Right 10/2/2018    Procedure: EXCISE EXOSTOSIS FOOT;  Right foot removal of first tarsometatarsal joint dorsal bossing;  Surgeon: Will Barraza DPM;  Location: [facility] OR     HYSTEROSCOPY         Social History     Socioeconomic History     Marital status:      Spouse name: Not on file     Number of children: Not on file     Years of education: Not on file     Highest education level: Not on file   Occupational History     Not on file   Social Needs     Financial resource strain: Not on file     Food insecurity     Worry: Not on file     Inability: Not on file     Transportation needs     Medical: Not on file     Non-medical: Not on file   Tobacco Use     Smoking status: Never Smoker     Smokeless tobacco: Never Used   Substance and Sexual Activity     Alcohol use: No     Drug use: No     Sexual activity: Yes     Partners: Male   Lifestyle     Physical activity     Days per week: Not on file     Minutes per session: Not on file     Stress: Not on file   Relationships     Social connections     Talks on phone: Not on file     Gets together: Not on file     Attends Orthodoxy service: Not on file     Active member of club or organization: Not on file     Attends meetings of clubs or organizations: Not on file     Relationship status: Not on file     Intimate partner violence     Fear of current or ex partner: Not on file     Emotionally abused: Not on file     Physically abused: Not on file     Forced sexual activity: Not on file   Other Topics Concern     Parent/sibling w/ CABG, MI or angioplasty before 65F 55M? Not Asked   Social History Narrative     Not on file       ROS Pulmonary  A complete ROS was otherwise negative except as noted in the HPI.  LMP 05/15/2014 (Approximate)   Exam:    GENERAL APPEARANCE: Well developed, well nourished, alert, and in no apparent distress.  No cough, tachypnea, stridor, wheeze  EYES: PERRL, EOMI  HENT: Normocephalic  NEURO: Mentation intact, speech normal,  PSYCH: mentation appears normal. and affect normal/bright  Results:  No results found for this or any previous visit (from the past 168 hour(s)).     FEV1/FVC 1.9/3.2 = 65/87%  WIth BDs FEV1 + 14%  Nl RV & TLC;  Nl DLCO    Assessment and plan:   MODERATE PERSISTENT ASTHMA W/ FREQUENT EXACERBATIONS  Has had very frequent flares and unclear degree of contribution silent GERD and sinus/postnasal drip  Plan:  1.  Treat potential silent GERD with prilosec routinely  2.  Add flonase (back) in addition to antihistamine  3.  Trials of switching Advair to Breo 200 IF not too much out of pocket cost  4.  Consider adding anticholinergic;  Will give a prescription for spiriva to start in 3 weeks if not switching to Breo  5.  Hopefully wont need to use monoclonal Ab Rx  6.  ProAir form of albuterol without substituion  7.  For flareups - increase use of albuterol via nebulizer  8.  For flareups:, start Prednisone 60 and 10 day taper with antibiotic doxycycline  9.  Flu Shot  10.  RTC 6 weeks  11.  Consider allergy consult re benefit of shots    Eyad Ley MD

## 2020-09-04 NOTE — LETTER
"    9/4/2020         RE: Lisette Owens  [address] [place] [address]        Dear Colleague,    Thank you for referring your patient, Lisette Owens, to the [facility]. Please see a copy of my visit note below.    Lisette Owens is a 55 year old female who is being evaluated via a billable video visit.      The patient has been notified of following:     \"This video visit will be conducted via a call between you and your physician/provider. We have found that certain health care needs can be provided without the need for an in-person physical exam.  This service lets us provide the care you need with a video conversation.  If a prescription is necessary we can send it directly to your pharmacy.  If lab work is needed we can place an order for that and you can then stop by our lab to have the test done at a later time.    Video visits are billed at different rates depending on your insurance coverage.  Please reach out to your insurance provider with any questions.    If during the course of the call the physician/provider feels a video visit is not appropriate, you will not be charged for this service.\"    Patient has given verbal consent for Video visit? Yes  How would you like to obtain your AVS? MyChart  If you are dropped from the video visit, the video invite should be resent to: Other e-mail: [email or web address]  Will anyone else be joining your video visit? No        Video-Visit Details    Type of service:  Video Visit    Video Start Time: 0657  Video End Time: 8:06 AM    Originating Location (pt. Location): Home    Distant Location (provider location):  [facility]     Platform used for Video Visit: [employer] Software    Reason for Visit  Lisette Owens is a 55 year old year old female who is being seen for Consult For (Moderate persistent asthma )    Pulmonary HPI    The patient was seen and examined by Eyad Ley MD     Astj,a & allergies " since 9th grade    She has had more difficult control in last year with more coughing and need for prednisone.  Usually it is worse in fall with bronchitis/pneumonia occurring that requires antibiotics and fall.  Last fall the symptoms persisted in spite of 3 rounds of steroids and antibiotics.  In a typical year she takes prednisone twice but not rare to be 3 or more times.    This summer had a 5 day and then 14 day course of prednisone that ended approx 3 weeks ago.  She believes this was triggered by paint fumes (from painting kitchen). This started before this but she did run out of Advair for 2 days.    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    Now is sleeping fine. Can walk 2+ miles at a moderate pace. Winded at 3 flights of stairs at a quick pace.    Not on any meds for GERD at present.  Intermittently takes medication but also not prescribed for many years. Doesn't remember what she took (? Prilosec)    Seasonal allergies.  Got allergy shots for many years as a child and redo in her 30's.  Takes generic claritin-D at present.  Main symptoms are nasal and itchy throat.  Has sinus drainage 'all the time'    Has some skin disorder along the nerve line for which she got light box treatments.  Derm MD also treated her with claritin-D and zyrtec together    Other treatments tried in the more distant past for asthma but Advair worked best and had to appeal to continue Advair.    Triggers:  Cold air;  Chemicals/fumes;  Exercise;  Cats; mold      PMH (from primary MD also)  Seasonal allertgic rhinitis  Dust Mite  "Allergy  GERD  Hypersomnia  Depression with anxiety  PTSD  Vitamin D Deficiency  Type 2 DM  Insomnia/Hypersomnia  Asthma - Moderate persistent  Dry eyes    Meds: see below    Allergies  Lisinopril - cough  Cipro - \"red-lined\" itching  Macrobid - vomits w/in 15 minutes   Ampicillin - child ???  Rash    FH:  Father 86 healthy  Mother  with Alzheimers & Parkinsons; had allergies and mild asthma  1B/3S - 1 sister with mild allergies and w/ t2DM. She had lung surgery for ?? Problem with calcifications; 1S with thyroid CA  Has 1 biologic son with unexplained breathing issues as a child - now largely outgrown (? Ectodermal dysplasia per Dr. KRISTIE Stokes) and 3 adopted children    SH   and brass player    Never smoker;    ROS:  ? Asbestos (school ceilings/floors)  Snoring positive and wears mouth guard  Remote negative PSG ~15-20 years ago  Had parvovirus a long time ago with many aftereffects including hypersomnia  Weight runs about 190  Daughter killed 4 years ago which triggered worsening of blood glucose / asthma / etc        Current Outpatient Medications   Medication     albuterol (PROAIR HFA) 108 (90 Base) MCG/ACT inhaler     albuterol (PROVENTIL) (2.5 MG/3ML) 0.083% neb solution     ALPRAZolam (XANAX) 0.5 MG tablet     aspirin  MG EC tablet     buPROPion (WELLBUTRIN XL) 150 MG 24 hr tablet     diltiazem ER (TIAZAC) 240 MG 24 hr ER beaded capsule     fluticasone-salmeterol (ADVAIR DISKUS) 500-50 MCG/DOSE inhaler     losartan (COZAAR) 25 MG tablet     metFORMIN (GLUCOPHAGE-XR) 500 MG 24 hr tablet     montelukast (SINGULAIR) 10 MG tablet     nitroglycerin (NITRODUR) 0.4 MG/HR     No current facility-administered medications for this visit.      Allergies   Allergen Reactions     Ampicillin Rash     Cipro [Quinolones] Anaphylaxis     Macrobid [Nitrofuran Derivatives] GI Disturbance     Past Medical History:   Diagnosis Date     Allergic rhinitis due to animal dander      Amblyopia     LT     " Arthritis      Colon polyp      Endometriosis      House dust mite allergy      Moderate persistent asthma      Rhinitis, allergic to other allergen      Seasonal allergic rhinitis 7/25/05 skin tests pos. for: cat/dog/horse/DM/M/T/G/RW per Dr. Granado    pt. did IT from 7/06 to 11/5/07 to: cat/dog/DM/M/T/G/W dc'd per self.      Type 2 diabetes mellitus without complication, without long-term current use of insulin (H) 3/13/2017       Past Surgical History:   Procedure Laterality Date     EXCISE EXOSTOSIS FOOT Right 10/2/2018    Procedure: EXCISE EXOSTOSIS FOOT;  Right foot removal of first tarsometatarsal joint dorsal bossing;  Surgeon: Will Barraza DPM;  Location: [facility] OR     HYSTEROSCOPY         Social History     Socioeconomic History     Marital status:      Spouse name: Not on file     Number of children: Not on file     Years of education: Not on file     Highest education level: Not on file   Occupational History     Not on file   Social Needs     Financial resource strain: Not on file     Food insecurity     Worry: Not on file     Inability: Not on file     Transportation needs     Medical: Not on file     Non-medical: Not on file   Tobacco Use     Smoking status: Never Smoker     Smokeless tobacco: Never Used   Substance and Sexual Activity     Alcohol use: No     Drug use: No     Sexual activity: Yes     Partners: Male   Lifestyle     Physical activity     Days per week: Not on file     Minutes per session: Not on file     Stress: Not on file   Relationships     Social connections     Talks on phone: Not on file     Gets together: Not on file     Attends Buddhist service: Not on file     Active member of club or organization: Not on file     Attends meetings of clubs or organizations: Not on file     Relationship status: Not on file     Intimate partner violence     Fear of current or ex partner: Not on file     Emotionally abused: Not on file     Physically abused: Not on file     Forced sexual  activity: Not on file   Other Topics Concern     Parent/sibling w/ CABG, MI or angioplasty before 65F 55M? Not Asked   Social History Narrative     Not on file       ROS Pulmonary  A complete ROS was otherwise negative except as noted in the HPI.  LMP 05/15/2014 (Approximate)   Exam:   GENERAL APPEARANCE: Well developed, well nourished, alert, and in no apparent distress.  No cough, tachypnea, stridor, wheeze  EYES: PERRL, EOMI  HENT: Normocephalic  NEURO: Mentation intact, speech normal,  PSYCH: mentation appears normal. and affect normal/bright  Results:  No results found for this or any previous visit (from the past 168 hour(s)).     FEV1/FVC 1.9/3.2 = 65/87%  WIth BDs FEV1 + 14%  Nl RV & TLC;  Nl DLCO    Assessment and plan:   MODERATE PERSISTENT ASTHMA W/ FREQUENT EXACERBATIONS  Has had very frequent flares and unclear degree of contribution silent GERD and sinus/postnasal drip  Plan:  1.  Treat potential silent GERD with prilosec routinely  2.  Add flonase (back) in addition to antihistamine  3.  Trials of switching Advair to Breo 200 IF not too much out of pocket cost  4.  Consider adding anticholinergic;  Will give a prescription for spiriva to start in 3 weeks if not switching to Breo  5.  Hopefully wont need to use monoclonal Ab Rx  6.  ProAir form of albuterol without substituion  7.  For flareups - increase use of albuterol via nebulizer  8.  For flareups:, start Prednisone 60 and 10 day taper with antibiotic doxycycline  9.  Flu Shot  10.  RTC 6 weeks  11.  Consider allergy consult re benefit of shots    Eyad Ley MD

## 2020-10-05 ENCOUNTER — MYC MEDICAL ADVICE (OUTPATIENT)
Dept: FAMILY MEDICINE | Facility: CLINIC | Age: 55
End: 2020-10-05

## 2020-10-07 ASSESSMENT — ANXIETY QUESTIONNAIRES
IF YOU CHECKED OFF ANY PROBLEMS ON THIS QUESTIONNAIRE, HOW DIFFICULT HAVE THESE PROBLEMS MADE IT FOR YOU TO DO YOUR WORK, TAKE CARE OF THINGS AT HOME, OR GET ALONG WITH OTHER PEOPLE: EXTREMELY DIFFICULT
2. NOT BEING ABLE TO STOP OR CONTROL WORRYING: NEARLY EVERY DAY
6. BECOMING EASILY ANNOYED OR IRRITABLE: NEARLY EVERY DAY
GAD7 TOTAL SCORE: 21
7. FEELING AFRAID AS IF SOMETHING AWFUL MIGHT HAPPEN: NEARLY EVERY DAY
5. BEING SO RESTLESS THAT IT IS HARD TO SIT STILL: NEARLY EVERY DAY
1. FEELING NERVOUS, ANXIOUS, OR ON EDGE: NEARLY EVERY DAY
3. WORRYING TOO MUCH ABOUT DIFFERENT THINGS: NEARLY EVERY DAY

## 2020-10-07 ASSESSMENT — PATIENT HEALTH QUESTIONNAIRE - PHQ9
SUM OF ALL RESPONSES TO PHQ QUESTIONS 1-9: 21
5. POOR APPETITE OR OVEREATING: NEARLY EVERY DAY

## 2020-10-07 NOTE — PROGRESS NOTES
"Lisette Owens is a 55 year old female who is being evaluated via a billable telephone visit.      The patient has been notified of following:     \"This telephone visit will be conducted via a call between you and your physician/provider. We have found that certain health care needs can be provided without the need for a physical exam.  This service lets us provide the care you need with a short phone conversation.  If a prescription is necessary we can send it directly to your pharmacy.  If lab work is needed we can place an order for that and you can then stop by our lab to have the test done at a later time.    Telephone visits are billed at different rates depending on your insurance coverage. During this emergency period, for some insurers they may be billed the same as an in-person visit.  Please reach out to your insurance provider with any questions.    If during the course of the call the physician/provider feels a telephone visit is not appropriate, you will not be charged for this service.\"    Patient has given verbal consent for Telephone visit?  Yes    What phone number would you like to be contacted at? [phone]    How would you like to obtain your AVS? Sophiahart    Subjective     Lisette Owens is a 55 year old female who presents via phone visit today for the following health issues:    HPI     Diabetes Follow-up    How often are you checking your blood sugar? One time daily  What time of day are you checking your blood sugars (select all that apply)?  Before meals  Have you had any blood sugars above 200?  No  Have you had any blood sugars below 70?  No    What symptoms do you notice when your blood sugar is low?  None    What concerns do you have today about your diabetes? None     Do you have any of these symptoms? (Select all that apply)  No numbness or tingling in feet.  No redness, sores or blisters on feet.  No complaints of excessive thirst.  No reports of blurry vision.  No significant " changes to weight.    Have you had a diabetic eye exam in the last 12 months? No      No chest pain/sob/palps. No ha's or dizziness.    Hyperlipidemia Follow-Up      Are you regularly taking any medication or supplement to lower your cholesterol?   Yes- .    Are you having muscle aches or other side effects that you think could be caused by your cholesterol lowering medication?  No    Hypertension Follow-up      Do you check your blood pressure regularly outside of the clinic? No     Are you following a low salt diet? No    Are your blood pressures ever more than 140 on the top number (systolic) OR more   than 90 on the bottom number (diastolic), for example 140/90? Yes    BP Readings from Last 2 Encounters:   08/17/20 112/63   01/06/20 124/86     Hemoglobin A1C (%)   Date Value   08/17/2020 6.8 (H)   01/06/2020 7.4 (H)     LDL Cholesterol Calculated (mg/dL)   Date Value   08/17/2020 101 (H)   05/03/2018 95       Depression and Anxiety Follow-Up    How are you doing with your depression since your last visit? Worsened     How are you doing with your anxiety since your last visit?  Worsened     Are you having other symptoms that might be associated with depression or anxiety? No    Have you had a significant life event? OTHER: COVID 19 and loss     Do you have any concerns with your use of alcohol or other drugs? No  Noting less depression. Still episodes of anxiety/irritability.   Social History     Tobacco Use     Smoking status: Never Smoker     Smokeless tobacco: Never Used   Substance Use Topics     Alcohol use: No     Drug use: No     PHQ 11/5/2019 6/17/2020 10/7/2020   PHQ-9 Total Score 10 21 21   Q9: Thoughts of better off dead/self-harm past 2 weeks Not at all Not at all Not at all     SRAVAN-7 SCORE 11/5/2019 6/17/2020 10/7/2020   Total Score - - -   Total Score 4 17 21     Last PHQ-9 10/7/2020   1.  Little interest or pleasure in doing things 3   2.  Feeling down, depressed, or hopeless 3   3.  Trouble  falling or staying asleep, or sleeping too much 1   4.  Feeling tired or having little energy 3   5.  Poor appetite or overeating 2   6.  Feeling bad about yourself 3   7.  Trouble concentrating 3   8.  Moving slowly or restless 3   Q9: Thoughts of better off dead/self-harm past 2 weeks 0   PHQ-9 Total Score 21   Difficulty at work, home, or with people Extremely dIfficult     SRAVAN-7  10/7/2020   1. Feeling nervous, anxious, or on edge 3   2. Not being able to stop or control worrying 3   3. Worrying too much about different things 3   4. Trouble relaxing 3   5. Being so restless that it is hard to sit still 3   6. Becoming easily annoyed or irritable 3   7. Feeling afraid, as if something awful might happen 3   SRAVAN-7 Total Score 21   If you checked any problems, how difficult have they made it for you to do your work, take care of things at home, or get along with other people? Extremely difficult       Suicide Assessment Five-step Evaluation and Treatment (SAFE-T)    Asthma Follow-Up    Was ACT completed today?  No      Do you have a cough?  YES    Are you experiencing any wheezing in your chest?  No    Do you have any shortness of breath?  No     How often are you using a short-acting (rescue) inhaler or nebulizer, such as Albuterol?  Never    How many days per week do you miss taking your asthma controller medication?  I do not have an asthma controller medication    Please describe any recent triggers for your asthma: cold air and cats    Have you had any Emergency Room Visits, Urgent Care Visits, or Hospital Admissions since your last office visit?  No      How many servings of fruits and vegetables do you eat daily?  2-3    On average, how many sweetened beverages do you drink each day (Examples: soda, juice, sweet tea, etc.  Do NOT count diet or artificially sweetened beverages)?   0    How many days per week do you exercise enough to make your heart beat faster? 3 or less    How many minutes a day do you  exercise enough to make your heart beat faster? 9 or less    How many days per week do you miss taking your medication? 0        Review of Systems   Constitutional, HEENT, cardiovascular, pulmonary, GI, , musculoskeletal, neuro, skin, endocrine and psych systems are negative, except as otherwise noted.       Objective          Vitals:  No vitals were obtained today due to virtual visit.    healthy, alert and no distress  PSYCH: Alert and oriented times 3; coherent speech, normal   rate and volume, able to articulate logical thoughts, able   to abstract reason, no tangential thoughts, no hallucinations   or delusions  Her affect is normal  RESP: No cough, no audible wheezing, able to talk in full sentences  Remainder of exam unable to be completed due to telephone visits            Assessment/Plan:    Lisette was seen today for recheck medication.    Diagnoses and all orders for this visit:    Depression with anxiety  -     buPROPion (WELLBUTRIN XL) 300 MG 24 hr tablet; Take 1 tablet (300 mg) by mouth every morning  -     busPIRone (BUSPAR) 5 MG tablet; Take 1 tablet (5 mg) by mouth 2 times daily    Type 2 diabetes mellitus without complication, without long-term current use of insulin (H)  -     metFORMIN (GLUCOPHAGE-XR) 500 MG 24 hr tablet; TAKE 2 TABLETS BY MOUTH TWICE DAILY with meals (due for appointment for further refills)  -     losartan (COZAAR) 25 MG tablet; Take 1 tablet (25 mg) by mouth daily      Recheck in 6wks.  work on lifestyle modification  Recent labs reviewed     Phone call duration:  22 minutes

## 2020-10-08 ENCOUNTER — VIRTUAL VISIT (OUTPATIENT)
Dept: FAMILY MEDICINE | Facility: CLINIC | Age: 55
End: 2020-10-08
Payer: COMMERCIAL

## 2020-10-08 DIAGNOSIS — F41.8 DEPRESSION WITH ANXIETY: ICD-10-CM

## 2020-10-08 DIAGNOSIS — E11.9 TYPE 2 DIABETES MELLITUS WITHOUT COMPLICATION, WITHOUT LONG-TERM CURRENT USE OF INSULIN (H): ICD-10-CM

## 2020-10-08 DIAGNOSIS — R00.2 PALPITATIONS: ICD-10-CM

## 2020-10-08 PROCEDURE — 99214 OFFICE O/P EST MOD 30 MIN: CPT | Mod: TEL | Performed by: PHYSICIAN ASSISTANT

## 2020-10-08 RX ORDER — LOSARTAN POTASSIUM 25 MG/1
25 TABLET ORAL DAILY
Qty: 90 TABLET | Refills: 1 | Status: SHIPPED | OUTPATIENT
Start: 2020-10-08 | End: 2021-10-05

## 2020-10-08 RX ORDER — SODIUM, POTASSIUM,MAG SULFATES 17.5-3.13G
1 SOLUTION, RECONSTITUTED, ORAL ORAL SEE ADMIN INSTRUCTIONS
Qty: 2 BOTTLE | Refills: 0 | Status: SHIPPED | OUTPATIENT
Start: 2020-10-08 | End: 2021-03-19

## 2020-10-08 RX ORDER — METFORMIN HCL 500 MG
TABLET, EXTENDED RELEASE 24 HR ORAL
Qty: 360 TABLET | Refills: 1 | Status: SHIPPED | OUTPATIENT
Start: 2020-10-08 | End: 2021-10-05

## 2020-10-08 RX ORDER — BUPROPION HYDROCHLORIDE 300 MG/1
300 TABLET ORAL EVERY MORNING
Qty: 90 TABLET | Refills: 0 | Status: SHIPPED | OUTPATIENT
Start: 2020-10-08 | End: 2020-12-03

## 2020-10-08 RX ORDER — BUSPIRONE HYDROCHLORIDE 5 MG/1
5 TABLET ORAL 2 TIMES DAILY
Qty: 60 TABLET | Refills: 2 | Status: SHIPPED | OUTPATIENT
Start: 2020-10-08 | End: 2020-12-03

## 2020-10-08 RX ORDER — DILTIAZEM HYDROCHLORIDE 240 MG/1
CAPSULE, EXTENDED RELEASE ORAL
Qty: 90 CAPSULE | Refills: 0 | Status: SHIPPED | OUTPATIENT
Start: 2020-10-08 | End: 2021-04-09

## 2020-10-08 RX ORDER — BISACODYL 5 MG
5 TABLET, DELAYED RELEASE (ENTERIC COATED) ORAL SEE ADMIN INSTRUCTIONS
Qty: 1 TABLET | Refills: 0 | Status: SHIPPED | OUTPATIENT
Start: 2020-10-08 | End: 2021-03-19

## 2020-10-08 ASSESSMENT — ANXIETY QUESTIONNAIRES: GAD7 TOTAL SCORE: 21

## 2020-10-08 NOTE — TELEPHONE ENCOUNTER
Routing refill request to provider for review/approval because:  Labs not current:  ALT    Lab Results   Component Value Date    ALT 48 04/15/2016     Citlali JARAMILLON, RN

## 2020-10-12 DIAGNOSIS — Z11.59 ENCOUNTER FOR SCREENING FOR OTHER VIRAL DISEASES: ICD-10-CM

## 2020-10-12 PROCEDURE — U0003 INFECTIOUS AGENT DETECTION BY NUCLEIC ACID (DNA OR RNA); SEVERE ACUTE RESPIRATORY SYNDROME CORONAVIRUS 2 (SARS-COV-2) (CORONAVIRUS DISEASE [COVID-19]), AMPLIFIED PROBE TECHNIQUE, MAKING USE OF HIGH THROUGHPUT TECHNOLOGIES AS DESCRIBED BY CMS-2020-01-R: HCPCS | Performed by: SURGERY

## 2020-10-12 RX ORDER — LANOLIN ALCOHOL/MO/W.PET/CERES
1000 CREAM (GRAM) TOPICAL DAILY
COMMUNITY
End: 2021-03-19

## 2020-10-12 ASSESSMENT — MIFFLIN-ST. JEOR: SCORE: 1494.46

## 2020-10-13 RX ORDER — NALOXONE HYDROCHLORIDE 0.4 MG/ML
.1-.4 INJECTION, SOLUTION INTRAMUSCULAR; INTRAVENOUS; SUBCUTANEOUS
Status: CANCELLED | OUTPATIENT
Start: 2020-10-13 | End: 2020-10-14

## 2020-10-13 RX ORDER — ONDANSETRON 2 MG/ML
4 INJECTION INTRAMUSCULAR; INTRAVENOUS EVERY 6 HOURS PRN
Status: CANCELLED | OUTPATIENT
Start: 2020-10-13

## 2020-10-13 RX ORDER — PROCHLORPERAZINE MALEATE 10 MG
10 TABLET ORAL EVERY 6 HOURS PRN
Status: CANCELLED | OUTPATIENT
Start: 2020-10-13

## 2020-10-13 RX ORDER — ONDANSETRON 4 MG/1
4 TABLET, ORALLY DISINTEGRATING ORAL EVERY 6 HOURS PRN
Status: CANCELLED | OUTPATIENT
Start: 2020-10-13

## 2020-10-13 RX ORDER — FLUMAZENIL 0.1 MG/ML
0.2 INJECTION, SOLUTION INTRAVENOUS
Status: CANCELLED | OUTPATIENT
Start: 2020-10-13 | End: 2020-10-13

## 2020-10-14 LAB
SARS-COV-2 RNA SPEC QL NAA+PROBE: NOT DETECTED
SPECIMEN SOURCE: NORMAL

## 2020-10-15 ENCOUNTER — HOSPITAL ENCOUNTER (OUTPATIENT)
Facility: AMBULATORY SURGERY CENTER | Age: 55
End: 2020-10-15
Attending: SURGERY | Admitting: SURGERY
Payer: COMMERCIAL

## 2020-10-15 VITALS
TEMPERATURE: 97.2 F | HEIGHT: 67 IN | WEIGHT: 190 LBS | DIASTOLIC BLOOD PRESSURE: 81 MMHG | OXYGEN SATURATION: 97 % | RESPIRATION RATE: 16 BRPM | HEART RATE: 77 BPM | SYSTOLIC BLOOD PRESSURE: 131 MMHG | BODY MASS INDEX: 29.82 KG/M2

## 2020-10-15 DIAGNOSIS — Z12.11 SCREEN FOR COLON CANCER: Primary | ICD-10-CM

## 2020-10-15 LAB
COLONOSCOPY: NORMAL
GLUCOSE BLDC GLUCOMTR-MCNC: 117 MG/DL (ref 70–99)

## 2020-10-15 PROCEDURE — 999N001017 HC STATISTIC GLUCOSE BY METER IP

## 2020-10-15 PROCEDURE — 88305 TISSUE EXAM BY PATHOLOGIST: CPT | Performed by: PATHOLOGY

## 2020-10-15 PROCEDURE — 45385 COLONOSCOPY W/LESION REMOVAL: CPT | Mod: PT | Performed by: SURGERY

## 2020-10-15 PROCEDURE — 99152 MOD SED SAME PHYS/QHP 5/>YRS: CPT | Mod: 59 | Performed by: SURGERY

## 2020-10-15 RX ORDER — FENTANYL CITRATE 50 UG/ML
INJECTION, SOLUTION INTRAMUSCULAR; INTRAVENOUS PRN
Status: DISCONTINUED | OUTPATIENT
Start: 2020-10-15 | End: 2020-10-15 | Stop reason: HOSPADM

## 2020-10-15 RX ORDER — LIDOCAINE 40 MG/G
CREAM TOPICAL
Status: DISCONTINUED | OUTPATIENT
Start: 2020-10-15 | End: 2020-10-16 | Stop reason: HOSPADM

## 2020-10-15 RX ORDER — SODIUM CHLORIDE, SODIUM LACTATE, POTASSIUM CHLORIDE, CALCIUM CHLORIDE 600; 310; 30; 20 MG/100ML; MG/100ML; MG/100ML; MG/100ML
INJECTION, SOLUTION INTRAVENOUS CONTINUOUS
Status: DISCONTINUED | OUTPATIENT
Start: 2020-10-15 | End: 2020-10-16 | Stop reason: HOSPADM

## 2020-10-15 NOTE — DISCHARGE INSTRUCTIONS
You will benefit from MAC.  Make sure your doctor orders it with MAC.  This is just added to the orders and is just typed into the area where they ask questions about your taking blood thinners.  This is administered by anesthesia to make you more comfortable than I can do safely during your colonoscopy.  This is done with anesthesia.   You have a lot of sharp turns in your colon and ansesthesia may be able to keep you more comfortable.  You will need and History and physical for this.  Just remind your primary doctor about this when ordering your next colonoscopy.    Your colons job is to absorb the liquid in your stool.  As we get older the colon or other medications can slow down the colon and allow the stool to get to firm.  Fiber mixes with your stool and does not allow all the water to get absorbed by the colon.  This will not give you diarrhea in fact I use it for people with diarrhea since it causes the stool to bulk up more and is easier to control.    Below are several fiber options.    For your constipation try using Metamucil or it's generic equivalent 1-2 tablespoons with a large glass of water or juice every day.      You can also use flax seed that is easily obtained at your grocery store. Make sure it is ground up and sprinkle 2 tablespoons on your cereal each morning and drink a large glass of water with it.  You can also mix in yogurt, and even bake in bread or muffins.    Flax seed seems to give less gas and does not have any taste.   If these are not working for you I would be glad to see you back in my clinic to discuss other options.  Call [phone]: to set up an appointment.    Sincerely     Cole Costello M.D.

## 2020-10-20 LAB — COPATH REPORT: NORMAL

## 2020-11-16 ENCOUNTER — HEALTH MAINTENANCE LETTER (OUTPATIENT)
Age: 55
End: 2020-11-16

## 2020-12-02 ENCOUNTER — MYC MEDICAL ADVICE (OUTPATIENT)
Dept: FAMILY MEDICINE | Facility: CLINIC | Age: 55
End: 2020-12-02

## 2020-12-02 NOTE — TELEPHONE ENCOUNTER
Work her in tomorrow afternoon for a virtual appt., ok to double book at 3:20 (tell patient I'll call her around 3:40)

## 2020-12-02 NOTE — TELEPHONE ENCOUNTER
Left message on voice mail for patient to call clinic. [phone]    Patient has not read her My Chart message.     Wanted to be sure she is aware of the phone visit with [place] tomorrow.     Rachel Levi RN BSN

## 2020-12-03 ENCOUNTER — VIRTUAL VISIT (OUTPATIENT)
Dept: FAMILY MEDICINE | Facility: CLINIC | Age: 55
End: 2020-12-03
Payer: COMMERCIAL

## 2020-12-03 DIAGNOSIS — F41.8 DEPRESSION WITH ANXIETY: ICD-10-CM

## 2020-12-03 PROCEDURE — 99214 OFFICE O/P EST MOD 30 MIN: CPT | Mod: TEL | Performed by: PHYSICIAN ASSISTANT

## 2020-12-03 RX ORDER — BUSPIRONE HYDROCHLORIDE 10 MG/1
10 TABLET ORAL 2 TIMES DAILY
Qty: 60 TABLET | Refills: 1 | Status: SHIPPED | OUTPATIENT
Start: 2020-12-03 | End: 2021-02-09

## 2020-12-03 RX ORDER — FLUOXETINE 10 MG/1
CAPSULE ORAL
Qty: 60 CAPSULE | Refills: 1 | Status: SHIPPED | OUTPATIENT
Start: 2020-12-03 | End: 2020-12-07

## 2020-12-03 RX ORDER — BUPROPION HYDROCHLORIDE 150 MG/1
TABLET ORAL
Qty: 15 TABLET | Refills: 0 | Status: SHIPPED | OUTPATIENT
Start: 2020-12-03 | End: 2021-03-19

## 2020-12-03 RX ORDER — LORAZEPAM 0.5 MG/1
.25-.5 TABLET ORAL EVERY 6 HOURS PRN
Qty: 30 TABLET | Refills: 0 | Status: SHIPPED | OUTPATIENT
Start: 2020-12-03 | End: 2022-12-27

## 2020-12-03 ASSESSMENT — ANXIETY QUESTIONNAIRES
6. BECOMING EASILY ANNOYED OR IRRITABLE: NEARLY EVERY DAY
1. FEELING NERVOUS, ANXIOUS, OR ON EDGE: NEARLY EVERY DAY
7. FEELING AFRAID AS IF SOMETHING AWFUL MIGHT HAPPEN: NEARLY EVERY DAY
5. BEING SO RESTLESS THAT IT IS HARD TO SIT STILL: NEARLY EVERY DAY
3. WORRYING TOO MUCH ABOUT DIFFERENT THINGS: NEARLY EVERY DAY
IF YOU CHECKED OFF ANY PROBLEMS ON THIS QUESTIONNAIRE, HOW DIFFICULT HAVE THESE PROBLEMS MADE IT FOR YOU TO DO YOUR WORK, TAKE CARE OF THINGS AT HOME, OR GET ALONG WITH OTHER PEOPLE: VERY DIFFICULT
GAD7 TOTAL SCORE: 21
2. NOT BEING ABLE TO STOP OR CONTROL WORRYING: NEARLY EVERY DAY

## 2020-12-03 ASSESSMENT — PATIENT HEALTH QUESTIONNAIRE - PHQ9
5. POOR APPETITE OR OVEREATING: NEARLY EVERY DAY
SUM OF ALL RESPONSES TO PHQ QUESTIONS 1-9: 22

## 2020-12-03 NOTE — PROGRESS NOTES
"Lisette Owens is a 55 year old female who is being evaluated via a billable telephone visit.      The patient has been notified of following:     \"This telephone visit will be conducted via a call between you and your physician/provider. We have found that certain health care needs can be provided without the need for a physical exam.  This service lets us provide the care you need with a short phone conversation.  If a prescription is necessary we can send it directly to your pharmacy.  If lab work is needed we can place an order for that and you can then stop by our lab to have the test done at a later time.    Telephone visits are billed at different rates depending on your insurance coverage. During this emergency period, for some insurers they may be billed the same as an in-person visit.  Please reach out to your insurance provider with any questions.    If during the course of the call the physician/provider feels a telephone visit is not appropriate, you will not be charged for this service.\"    Patient has given verbal consent for Telephone visit?  Yes    What phone number would you like to be contacted at? [phone]    How would you like to obtain your AVS? Sophiahart    Subjective     Lisette Owens is a 55 year old female who presents via phone visit today for the following health issues:    HPI     Depression and Anxiety Follow-Up    How are you doing with your depression since your last visit?  Small improvement with change in medication and dose but pt reports she is back to where she was before theses changes were made and is not sure how to handle it.    How are you doing with your anxiety since your last visit?  Small improvement with change in medication and dose but pt reports she is back to where she was before theses changes were made and is not sure how to handle it.    Are you having other symptoms that might be associated with depression or anxiety? Yes:  .    Have you had a significant " life event? Job Concerns and Grief or Loss     Do you have any concerns with your use of alcohol or other drugs? No  Going to an emotional healer. Noting less anxiety, but more depression since starting the bupropion.  Social History     Tobacco Use     Smoking status: Never Smoker     Smokeless tobacco: Never Used   Substance Use Topics     Alcohol use: Yes     Comment: occassional     Drug use: No     PHQ 6/17/2020 10/7/2020 12/3/2020   PHQ-9 Total Score 21 21 22   Q9: Thoughts of better off dead/self-harm past 2 weeks Not at all Not at all Not at all     SRAVAN-7 SCORE 6/17/2020 10/7/2020 12/3/2020   Total Score - - -   Total Score 17 21 21     Last PHQ-9 12/3/2020   1.  Little interest or pleasure in doing things 3   2.  Feeling down, depressed, or hopeless 3   3.  Trouble falling or staying asleep, or sleeping too much 1   4.  Feeling tired or having little energy 3   5.  Poor appetite or overeating 3   6.  Feeling bad about yourself 3   7.  Trouble concentrating 3   8.  Moving slowly or restless 3   Q9: Thoughts of better off dead/self-harm past 2 weeks 0   PHQ-9 Total Score 22   Difficulty at work, home, or with people Very difficult     SRAVAN-7  12/3/2020   1. Feeling nervous, anxious, or on edge 3   2. Not being able to stop or control worrying 3   3. Worrying too much about different things 3   4. Trouble relaxing 3   5. Being so restless that it is hard to sit still 3   6. Becoming easily annoyed or irritable 3   7. Feeling afraid, as if something awful might happen 3   SRAVAN-7 Total Score 21   If you checked any problems, how difficult have they made it for you to do your work, take care of things at home, or get along with other people? Very difficult       Suicide Assessment Five-step Evaluation and Treatment (SAFE-T)      How many servings of fruits and vegetables do you eat daily?  0-1    On average, how many sweetened beverages do you drink each day (Examples: soda, juice, sweet tea, etc.  Do NOT count  diet or artificially sweetened beverages)?   0    How many days per week do you exercise enough to make your heart beat faster? 3 or less    How many minutes a day do you exercise enough to make your heart beat faster? 9 or less    How many days per week do you miss taking your medication? 0      Work and distance learning has made her very anxious. Makes work a significant struggle.       Review of Systems   Constitutional, HEENT, cardiovascular, pulmonary, GI, , musculoskeletal, neuro, skin, endocrine and psych systems are negative, except as otherwise noted.       Objective          Vitals:  No vitals were obtained today due to virtual visit.    healthy, alert and no distress  PSYCH: Alert and oriented times 3; coherent speech, normal   rate and volume, able to articulate logical thoughts, able   to abstract reason, no tangential thoughts, no hallucinations   or delusions  Her affect is normal  RESP: No cough, no audible wheezing, able to talk in full sentences  Remainder of exam unable to be completed due to telephone visits            Assessment/Plan:    Lisette was seen today for recheck medication.    Diagnoses and all orders for this visit:    Depression with anxiety  -     buPROPion (WELLBUTRIN XL) 150 MG 24 hr tablet; Take 1 tab daily for 10 days, then 1 tab every other day for 10 days , then stop.  -     busPIRone (BUSPAR) 10 MG tablet; Take 1 tablet (10 mg) by mouth 2 times daily  -     LORazepam (ATIVAN) 0.5 MG tablet; Take 0.5-1 tablets (0.25-0.5 mg) by mouth every 6 hours as needed for anxiety  -     FLUoxetine (PROZAC) 10 MG capsule; Take 1 caps daily for 1 week, then increase to 2 caps daily thereafter.      Wean off of wellbutrin. Increase buspar. Start prozac.    work on lifestyle modification  Recheck in 1 mos.    Phone call duration:  24 minutes

## 2020-12-04 ENCOUNTER — MYC MEDICAL ADVICE (OUTPATIENT)
Dept: FAMILY MEDICINE | Facility: CLINIC | Age: 55
End: 2020-12-04

## 2020-12-04 DIAGNOSIS — F41.8 DEPRESSION WITH ANXIETY: Primary | ICD-10-CM

## 2020-12-04 ASSESSMENT — ANXIETY QUESTIONNAIRES: GAD7 TOTAL SCORE: 21

## 2020-12-04 NOTE — TELEPHONE ENCOUNTER
Blossom at the pharmacy said a PA was initiated for the Prozac 10 mg.     Insurance didn't want to cover more than 1 pill daily.     Another option would be to write 2 separate prescriptions:    Prozac 10 mg daily x 1 week (7 tablets)   Prozac 20 mg daily (30 tablets)    Rachel Levi RN BSN

## 2020-12-07 RX ORDER — FLUOXETINE 10 MG/1
CAPSULE ORAL
Qty: 7 CAPSULE | Refills: 0 | Status: SHIPPED | OUTPATIENT
Start: 2020-12-07 | End: 2021-05-18

## 2020-12-07 NOTE — TELEPHONE ENCOUNTER
Pharmacist [facility] states that the separate prescriptions for Fluoxetine 10 mg and Fluoxetine 20 mg were approved by insurance. They will contact patient to inform her.    Rachel Levi RN BSN

## 2021-02-03 DIAGNOSIS — F41.8 DEPRESSION WITH ANXIETY: ICD-10-CM

## 2021-02-06 NOTE — CONFIDENTIAL NOTE
Routing refill request to provider for review/approval because:  PHQ-9 score:    PHQ 12/3/2020   PHQ-9 Total Score 22   Q9: Thoughts of better off dead/self-harm past 2 weeks Not at all

## 2021-02-09 RX ORDER — BUSPIRONE HYDROCHLORIDE 10 MG/1
TABLET ORAL
Qty: 60 TABLET | Refills: 0 | Status: SHIPPED | OUTPATIENT
Start: 2021-02-09 | End: 2021-03-08

## 2021-02-09 NOTE — TELEPHONE ENCOUNTER
marylin is due for a visit with me. Schedule her for a virtual (video or phone based on patient preference. Always promote a video visit first) visit with me.

## 2021-02-11 ENCOUNTER — MYC MEDICAL ADVICE (OUTPATIENT)
Dept: FAMILY MEDICINE | Facility: CLINIC | Age: 56
End: 2021-02-11

## 2021-03-06 DIAGNOSIS — J45.41 MODERATE PERSISTENT ASTHMA WITH ACUTE EXACERBATION: ICD-10-CM

## 2021-03-06 DIAGNOSIS — F41.8 DEPRESSION WITH ANXIETY: ICD-10-CM

## 2021-03-08 NOTE — TELEPHONE ENCOUNTER
Routing refill request to provider for review/approval because:  Labs out of range:  phq9  Labs not current:  ACT  Patient needs to be seen because:  Pt was due 1/3/21    Medication pended for approval, 30 day supply with reminder.

## 2021-03-09 RX ORDER — BUSPIRONE HYDROCHLORIDE 10 MG/1
TABLET ORAL
Qty: 60 TABLET | Refills: 0 | Status: SHIPPED | OUTPATIENT
Start: 2021-03-09 | End: 2021-03-19

## 2021-03-09 RX ORDER — MONTELUKAST SODIUM 10 MG/1
TABLET ORAL
Qty: 30 TABLET | Refills: 0 | Status: SHIPPED | OUTPATIENT
Start: 2021-03-09 | End: 2021-05-19

## 2021-03-19 ENCOUNTER — VIRTUAL VISIT (OUTPATIENT)
Dept: FAMILY MEDICINE | Facility: CLINIC | Age: 56
End: 2021-03-19
Payer: COMMERCIAL

## 2021-03-19 DIAGNOSIS — F41.8 DEPRESSION WITH ANXIETY: ICD-10-CM

## 2021-03-19 DIAGNOSIS — R41.3 MEMORY CHANGES: ICD-10-CM

## 2021-03-19 DIAGNOSIS — F43.10 PTSD (POST-TRAUMATIC STRESS DISORDER): Primary | ICD-10-CM

## 2021-03-19 PROCEDURE — 99214 OFFICE O/P EST MOD 30 MIN: CPT | Mod: TEL | Performed by: PHYSICIAN ASSISTANT

## 2021-03-19 RX ORDER — BUSPIRONE HYDROCHLORIDE 10 MG/1
10 TABLET ORAL 2 TIMES DAILY
Qty: 180 TABLET | Refills: 1 | Status: SHIPPED | OUTPATIENT
Start: 2021-03-19 | End: 2021-10-19

## 2021-03-19 NOTE — PROGRESS NOTES
Lisette is a 56 year old who is being evaluated via a billable telephone visit.      What phone number would you like to be contacted at? [phone]  How would you like to obtain your AVS? Malcom Smith   Lisette is a 56 year old who presents for the following health issues     HPI     Depression and Anxiety Follow-Up    How are you doing with your depression since your last visit? Improved     How are you doing with your anxiety since your last visit?  Improved     Are you having other symptoms that might be associated with depression or anxiety? No    Have you had a significant life event? No     Do you have any concerns with your use of alcohol or other drugs? No  Removing bupropion helped immensely. Upping her buspar and starting fluoxetine has helped so much .  Depression is doing better than ever. Less anhedonia. No panic attacks.   Some memory concerns. Mother had a history  of dementia (diagnosed as Alzheimer's). Her mother had a diagnosis of parkinson's as well.  Patient has noted some tremors.   Social History     Tobacco Use     Smoking status: Never Smoker     Smokeless tobacco: Never Used   Substance Use Topics     Alcohol use: Yes     Comment: occassional     Drug use: No     PHQ 6/17/2020 10/7/2020 12/3/2020   PHQ-9 Total Score 21 21 22   Q9: Thoughts of better off dead/self-harm past 2 weeks Not at all Not at all Not at all     SRAVAN-7 SCORE 6/17/2020 10/7/2020 12/3/2020   Total Score - - -   Total Score 17 21 21     Last PHQ-9 12/3/2020   1.  Little interest or pleasure in doing things 3   2.  Feeling down, depressed, or hopeless 3   3.  Trouble falling or staying asleep, or sleeping too much 1   4.  Feeling tired or having little energy 3   5.  Poor appetite or overeating 3   6.  Feeling bad about yourself 3   7.  Trouble concentrating 3   8.  Moving slowly or restless 3   Q9: Thoughts of better off dead/self-harm past 2 weeks 0   PHQ-9 Total Score 22   Difficulty at work, home, or with  people Very difficult     SRAVAN-7  12/3/2020   1. Feeling nervous, anxious, or on edge 3   2. Not being able to stop or control worrying 3   3. Worrying too much about different things 3   4. Trouble relaxing 3   5. Being so restless that it is hard to sit still 3   6. Becoming easily annoyed or irritable 3   7. Feeling afraid, as if something awful might happen 3   SRAVAN-7 Total Score 21   If you checked any problems, how difficult have they made it for you to do your work, take care of things at home, or get along with other people? Very difficult       Suicide Assessment Five-step Evaluation and Treatment (SAFE-T)      How many servings of fruits and vegetables do you eat daily?  2-3    On average, how many sweetened beverages do you drink each day (Examples: soda, juice, sweet tea, etc.  Do NOT count diet or artificially sweetened beverages)?   0    How many days per week do you exercise enough to make your heart beat faster? 3 or less    How many minutes a day do you exercise enough to make your heart beat faster? 9 or less    How many days per week do you miss taking your medication? 0        Review of Systems   Constitutional, HEENT, cardiovascular, pulmonary, GI, , musculoskeletal, neuro, skin, endocrine and psych systems are negative, except as otherwise noted.      Objective           Vitals:  No vitals were obtained today due to virtual visit.    Physical Exam   healthy, alert and no distress  PSYCH: Alert and oriented times 3; coherent speech, normal   rate and volume, able to articulate logical thoughts, able   to abstract reason, no tangential thoughts, no hallucinations   or delusions  Her affect is normal  RESP: No cough, no audible wheezing, able to talk in full sentences  Remainder of exam unable to be completed due to telephone visits    Lisette was seen today for depression and anxiety.    Diagnoses and all orders for this visit:    PTSD (post-traumatic stress disorder)    Depression with anxiety  -      busPIRone (BUSPAR) 10 MG tablet; Take 1 tablet (10 mg) by mouth 2 times daily  -     FLUoxetine (PROZAC) 20 MG capsule; TAKE 1 CAPSULE BY MOUTH ONCE DAILY    Memory changes  -     NEUROLOGY ADULT REFERRAL              Phone call duration: 19 minutes

## 2021-03-20 ASSESSMENT — ASTHMA QUESTIONNAIRES: ACT_TOTALSCORE: 23

## 2021-04-03 ENCOUNTER — HEALTH MAINTENANCE LETTER (OUTPATIENT)
Age: 56
End: 2021-04-03

## 2021-04-09 DIAGNOSIS — R00.2 PALPITATIONS: ICD-10-CM

## 2021-04-12 RX ORDER — DILTIAZEM HYDROCHLORIDE 240 MG/1
240 CAPSULE, EXTENDED RELEASE ORAL DAILY
Qty: 90 CAPSULE | Refills: 0 | Status: SHIPPED | OUTPATIENT
Start: 2021-04-12 | End: 2021-07-10

## 2021-04-12 NOTE — TELEPHONE ENCOUNTER
Routing refill request to provider for review/approval because:  Labs not current:    Lab Results   Component Value Date    ALT 48 04/15/2016

## 2021-04-13 DIAGNOSIS — R00.2 PALPITATIONS: ICD-10-CM

## 2021-04-15 RX ORDER — DILTIAZEM HYDROCHLORIDE 240 MG/1
CAPSULE, EXTENDED RELEASE ORAL
Qty: 90 CAPSULE | Refills: 0 | OUTPATIENT
Start: 2021-04-15

## 2021-05-17 DIAGNOSIS — J45.41 MODERATE PERSISTENT ASTHMA WITH ACUTE EXACERBATION: ICD-10-CM

## 2021-05-18 ENCOUNTER — OFFICE VISIT (OUTPATIENT)
Dept: NEUROLOGY | Facility: CLINIC | Age: 56
End: 2021-05-18
Attending: PHYSICIAN ASSISTANT
Payer: COMMERCIAL

## 2021-05-18 VITALS
WEIGHT: 197.8 LBS | DIASTOLIC BLOOD PRESSURE: 77 MMHG | SYSTOLIC BLOOD PRESSURE: 133 MMHG | HEART RATE: 67 BPM | BODY MASS INDEX: 30.98 KG/M2

## 2021-05-18 DIAGNOSIS — R25.1 EXCESSIVE PHYSIOLOGIC TREMOR: ICD-10-CM

## 2021-05-18 DIAGNOSIS — R41.3 MEMORY CHANGE: Primary | ICD-10-CM

## 2021-05-18 LAB
ALBUMIN SERPL-MCNC: 4.1 G/DL (ref 3.4–5)
ALP SERPL-CCNC: 49 U/L (ref 40–150)
ALT SERPL W P-5'-P-CCNC: 31 U/L (ref 0–50)
ANION GAP SERPL CALCULATED.3IONS-SCNC: 5 MMOL/L (ref 3–14)
AST SERPL W P-5'-P-CCNC: 16 U/L (ref 0–45)
BILIRUB SERPL-MCNC: 0.3 MG/DL (ref 0.2–1.3)
BUN SERPL-MCNC: 13 MG/DL (ref 7–30)
CALCIUM SERPL-MCNC: 9.1 MG/DL (ref 8.5–10.1)
CHLORIDE SERPL-SCNC: 108 MMOL/L (ref 94–109)
CO2 SERPL-SCNC: 27 MMOL/L (ref 20–32)
CREAT SERPL-MCNC: 0.73 MG/DL (ref 0.52–1.04)
ERYTHROCYTE [DISTWIDTH] IN BLOOD BY AUTOMATED COUNT: 12.5 % (ref 10–15)
GFR SERPL CREATININE-BSD FRML MDRD: >90 ML/MIN/{1.73_M2}
GLUCOSE SERPL-MCNC: 108 MG/DL (ref 70–99)
HCT VFR BLD AUTO: 38.1 % (ref 35–47)
HGB BLD-MCNC: 12.7 G/DL (ref 11.7–15.7)
MCH RBC QN AUTO: 29 PG (ref 26.5–33)
MCHC RBC AUTO-ENTMCNC: 33.3 G/DL (ref 31.5–36.5)
MCV RBC AUTO: 87 FL (ref 78–100)
PLATELET # BLD AUTO: 289 10E9/L (ref 150–450)
POTASSIUM SERPL-SCNC: 4 MMOL/L (ref 3.4–5.3)
PROT SERPL-MCNC: 7.6 G/DL (ref 6.8–8.8)
RBC # BLD AUTO: 4.38 10E12/L (ref 3.8–5.2)
SODIUM SERPL-SCNC: 140 MMOL/L (ref 133–144)
TSH SERPL DL<=0.005 MIU/L-ACNC: 1.26 MU/L (ref 0.4–4)
VIT B12 SERPL-MCNC: 240 PG/ML (ref 193–986)
WBC # BLD AUTO: 6 10E9/L (ref 4–11)

## 2021-05-18 PROCEDURE — 36415 COLL VENOUS BLD VENIPUNCTURE: CPT | Performed by: INTERNAL MEDICINE

## 2021-05-18 PROCEDURE — 83921 ORGANIC ACID SINGLE QUANT: CPT | Performed by: INTERNAL MEDICINE

## 2021-05-18 PROCEDURE — 82306 VITAMIN D 25 HYDROXY: CPT | Performed by: INTERNAL MEDICINE

## 2021-05-18 PROCEDURE — 82607 VITAMIN B-12: CPT | Performed by: INTERNAL MEDICINE

## 2021-05-18 PROCEDURE — 84443 ASSAY THYROID STIM HORMONE: CPT | Performed by: INTERNAL MEDICINE

## 2021-05-18 PROCEDURE — 80053 COMPREHEN METABOLIC PANEL: CPT | Performed by: INTERNAL MEDICINE

## 2021-05-18 PROCEDURE — 99205 OFFICE O/P NEW HI 60 MIN: CPT | Performed by: INTERNAL MEDICINE

## 2021-05-18 PROCEDURE — 85027 COMPLETE CBC AUTOMATED: CPT | Performed by: INTERNAL MEDICINE

## 2021-05-18 ASSESSMENT — PAIN SCALES - GENERAL: PAINLEVEL: NO PAIN (0)

## 2021-05-18 NOTE — LETTER
"    5/18/2021         RE: Lisette Owens  [address] [place] [address]        Dear Colleague,    Thank you for referring your patient, Lisette Owens, to the [facility]UR[facility] [place]. Please see a copy of my visit note below.    [facility] Neurology Consultation    Lisette Owens MRN# [id number]   Age: 56 year old YOB: 1965     Requesting physician: Kolton Benjamin     Reason for Consultation: memory changes      History of Presenting Symptoms:   Lisette Owens is a 56 year old female who presents today for evaluation of memory changes.    Patient's daughter passed away 5 years ago. This was very traumatic for her. It was after this that she thinks she first developed problems with the memory. She believes that her trauma related to daughter's death is at least playing a part in memory concerns, but given her family history of dementia she would like to evaluate for other possibilities. She often times forgets conversations. She has brain freezes and has difficulties with multi-tasking. She will start working on tasks and forgot what she is doing. Memory is worse when she feels more in a \"trauma state\". Patient works as a . 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 Patient's  is concerned. He frequently comments on things that she doesn't remember.     Patient has noticed tremor at times in the hands, left > right. She is right handed. She plays Tog[place] horn and she gets tired much easily. She sometimes will feel her lips shaking when she tries to play.     Last summer she started Wellbutrin and bupropion. She was crying less, but anxiety was significantly worse. In the fall Wellbutrin was stopped and Prosac was started in its place. This has helped anxiety significantly. Memory symptoms now compared to last fall are about the same to slightly worse. She denies any hallucinations.     She is driving without severe issues. Yesterday she " drove to [facility]. There was road construction and she thought she made a wrong turn. She turned around and then realized she didn't make a wrong turn.Similar mild problems have happened a few times.      She has chronic asthma. She has slow swelling in feet. She gets cramping in the feet, but denies any numbness/tingling in the feet. She previously had a skin rash along the nerve line, which was not shingles.       Past Medical History:     Patient Active Problem List   Diagnosis     Dry eye syndrome     CARDIOVASCULAR SCREENING; LDL GOAL LESS THAN 160     Seasonal allergic rhinitis     Allergic rhinitis due to animal dander     Rhinitis, allergic to other allergen     House dust mite allergy     Moderate persistent asthma     PTSD (post-traumatic stress disorder)     Vitamin D deficiency     Hypersomnia     Esophageal reflux (GERD)     Depression with anxiety     Acute cystitis with hematuria     Type 2 diabetes mellitus without complication, without long-term current use of insulin (H)     Anemia     Dyspareunia (CODE)     Insomnia, unspecified     Past Medical History:   Diagnosis Date     Allergic rhinitis due to animal dander      Amblyopia     LT     Arthritis      Colon polyp      Endometriosis      Heart attack (H)     2016     House dust mite allergy      Moderate persistent asthma      Rhinitis, allergic to other allergen      Seasonal allergic rhinitis 7/25/05 skin tests pos. for: cat/dog/horse/DM/M/T/G/RW per Dr. Granado    pt. did IT from 7/06 to 11/5/07 to: cat/dog/DM/M/T/G/W dc'd per self.      Type 2 diabetes mellitus without complication, without long-term current use of insulin (H) 3/13/2017        Past Surgical History:     Past Surgical History:   Procedure Laterality Date     EXCISE EXOSTOSIS FOOT Right 10/2/2018    Procedure: EXCISE EXOSTOSIS FOOT;  Right foot removal of first tarsometatarsal joint dorsal bossing;  Surgeon: Will Barraza DPM;  Location: [facility] OR     HYSTEROSCOPY           Social History:     Social History     Tobacco Use     Smoking status: Never Smoker     Smokeless tobacco: Never Used   Substance Use Topics     Alcohol use: Yes     Comment: occassional     Drug use: No        Family History:     Family History   Problem Relation Age of Onset     Hypertension Mother      Alzheimer Disease Mother      Diabetes Mother      Tremor Mother      Hypertension Father      Skin Cancer Father      Alzheimer Disease Paternal Grandmother      Psychotic Disorder Paternal Grandmother         bipolar     Heart Disease Paternal Grandfather      Breast Cancer Maternal Grandmother      Tremor Maternal Grandfather      Thyroid Disease Brother      Tremor Brother      Thyroid Disease Sister      Diabetes Sister      Skin Cancer Sister      Cerebrovascular Disease No family hx of      Glaucoma No family hx of      Macular Degeneration No family hx of         Medications:     Current Outpatient Medications   Medication Sig     albuterol (PROAIR HFA) 108 (90 Base) MCG/ACT inhaler INHALE TWO PUFFS BY MOUTH EVERY FOUR HOURS AS NEEDED FOR SHORTNESS OF BREATH/ DIFFICULTY BREATHING     albuterol (PROVENTIL) (2.5 MG/3ML) 0.083% neb solution Inhale the contents of 1 vial via nebulizer every 4 hours as needed for shortness of breath     aspirin  MG EC tablet Take 1 tablet (325 mg) by mouth daily     busPIRone (BUSPAR) 10 MG tablet Take 1 tablet (10 mg) by mouth 2 times daily     diltiazem ER (TIAZAC) 240 MG 24 hr ER beaded capsule Take 1 capsule (240 mg) by mouth daily     esomeprazole (NEXIUM) 40 MG DR capsule Take 1 capsule (40 mg) by mouth every morning (before breakfast) Take 30-60 minutes before eating.     FLUoxetine (PROZAC) 20 MG capsule TAKE 1 CAPSULE BY MOUTH ONCE DAILY     fluticasone-vilanterol (BREO ELLIPTA) 200-25 MCG/INH inhaler Inhale 1 puff into the lungs daily     losartan (COZAAR) 25 MG tablet Take 1 tablet (25 mg) by mouth daily     metFORMIN (GLUCOPHAGE-XR) 500 MG 24 hr tablet TAKE 2  TABLETS BY MOUTH TWICE DAILY with meals (due for appointment for further refills)     montelukast (SINGULAIR) 10 MG tablet TAKE 1 TABLET BY MOUTH AT BEDTIME     nitroglycerin (NITRODUR) 0.4 MG/HR Place 1 patch onto the skin as needed Reported on 2/14/2017     LORazepam (ATIVAN) 0.5 MG tablet Take 0.5-1 tablets (0.25-0.5 mg) by mouth every 6 hours as needed for anxiety (Patient not taking: Reported on 5/18/2021)     umeclidinium (INCRUSE ELLIPTA) 62.5 MCG/INH inhaler Inhale 1 puff into the lungs daily (Patient not taking: Reported on 3/19/2021)     No current facility-administered medications for this visit.         Allergies:     Allergies   Allergen Reactions     Ampicillin Rash     Cipro [Quinolones] Anaphylaxis     Macrobid [Nitrofuran Derivatives] GI Disturbance        Review of Systems:   A comprehensive 10 point review of systems (constitutional, ENT, cardiac, peripheral vascular, lymphatic, respiratory, GI, , Musculoskeletal, skin, Neurological) was performed and found to be negative except as described in this note.      Physical Exam:   Vitals: /77 (BP Location: Right arm, Patient Position: Sitting, Cuff Size: Adult Regular)   Pulse 67   Wt 89.7 kg (197 lb 12.8 oz)   LMP 05/15/2014 (Approximate)   BMI 30.98 kg/m     General: Seated comfortably in no acute distress.  HEENT:  Optic discs sharp and vasculature normal on funduscopic exam.   Heart: Regular rate  Lungs: breathing comfortably  Extremities: no edema  Skin:hypopigmented lesions in bilateral arms  Neurologic:     Mental Status: MoCA 28/30 (-1 calling rhino a hippo, -1 delayed recall)      Cranial Nerves: Visual fields intact. PERRL. EOMI with normal smooth pursuit. Facial sensation intact/symmetric. Facial movements symmetric. Hearing not formally tested but intact to conversation. Palate elevation symmetric, uvula midline. No dysarthria. Shoulder shrug strong bilaterally. Tongue protrusion midline.     Motor: With exception of minimal  intermittent postural tremor of left hand, no tremors or other abnormal movements observed. Muscle tone normal throughout. Normal/symmetric rapid finger tapping. Strength 5/5 throughout upper and lower extremities.     Deep Tendon Reflexes: 2+/symmetric throughout upper and lower extremities. Toes downgoing bilaterally.     Sensory: Possible mild decrease in temperature sensation in right foot compared to left, otherwise intact/symmetric to light touch, pinprick, temperature, vibration and proprioception throughout upper and lower extremities. Vibration is 15 seconds in right great toe and 18 seconds in left great toe. Negative Romberg.      Coordination: Finger-nose-finger and heel-shin intact without dysmetria. Rapid alternating movements intact/symmetric with normal speed and rhythm.     Gait: Normal, steady casual gait. Able to walk on toes, heels and tandem without difficulty.         Data: Pertinent prior to visit   Imaging:  None     Procedures:  None    Laboratory:  TSH normal 2019  BMP normal 8/2020  CBC normal 2018         Assessment and Plan:   Assessment:  Lisette Owens is a 56 year old female who presents today for evaluation of memory changes. Short term memory concerns first developed after her daughter's death 5 years ago and have continued since. On MoCA today patient scored 28/30 (-1 on delayed recall and -1 for calling rhino a [employer]). Remainder of neurological exam is unremarkable. She has likely enhanced physiologic tremor in the left hand and to a lesser extent in the right hand. We discussed that memory complaints are related at least in part to mood symptoms and patient is in agreement. We discussed evaluating for additional etiologies with MRI brain and serologic lab work as below. Patient is following closely with psychiatrist.     Plan:  - MRI brain without contrast  - CMP, CBC, TSH, B12, Vitamin D    Follow up in Neurology clinic in 6 months or earlier as needed should new concerns  arise.    Jean Marie Waters MD   of Neurology  [facility]      The total time of this encounter today amounted to 60 minutes. This time included time spent with the patient, prep work, ordering tests, and performing post visit documentation.        Again, thank you for allowing me to participate in the care of your patient.        Sincerely,        Jean Marie Waters MD

## 2021-05-18 NOTE — PROGRESS NOTES
"[facility] Neurology Consultation    Lisette Owens MRN# [id number]   Age: 56 year old YOB: 1965     Requesting physician: Kolton Benjamin     Reason for Consultation: memory changes      History of Presenting Symptoms:   Lisette Owens is a 56 year old female who presents today for evaluation of memory changes.    Patient's daughter passed away 5 years ago. This was very traumatic for her. It was after this that she thinks she first developed problems with the memory. She believes that her trauma related to daughter's death is at least playing a part in memory concerns, but given her family history of dementia she would like to evaluate for other possibilities. She often times forgets conversations. She has brain freezes and has difficulties with multi-tasking. She will start working on tasks and forgot what she is doing. Memory is worse when she feels more in a \"trauma state\". Patient works as a . 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 Patient's  is concerned. He frequently comments on things that she doesn't remember.     Patient has noticed tremor at times in the hands, left > right. She is right handed. She plays [place] horn and she gets tired much easily. She sometimes will feel her lips shaking when she tries to play.     Last summer she started Wellbutrin and bupropion. She was crying less, but anxiety was significantly worse. In the fall Wellbutrin was stopped and Prosac was started in its place. This has helped anxiety significantly. Memory symptoms now compared to last fall are about the same to slightly worse. She denies any hallucinations.     She is driving without severe issues. Yesterday she drove to [place]. There was road construction and she thought she made a wrong turn. She turned around and then realized she didn't make a wrong turn.Similar mild problems have happened a few times.      She has chronic asthma. She has slow swelling in feet. She gets " cramping in the feet, but denies any numbness/tingling in the feet. She previously had a skin rash along the nerve line, which was not shingles.       Past Medical History:     Patient Active Problem List   Diagnosis     Dry eye syndrome     CARDIOVASCULAR SCREENING; LDL GOAL LESS THAN 160     Seasonal allergic rhinitis     Allergic rhinitis due to animal dander     Rhinitis, allergic to other allergen     House dust mite allergy     Moderate persistent asthma     PTSD (post-traumatic stress disorder)     Vitamin D deficiency     Hypersomnia     Esophageal reflux (GERD)     Depression with anxiety     Acute cystitis with hematuria     Type 2 diabetes mellitus without complication, without long-term current use of insulin (H)     Anemia     Dyspareunia (CODE)     Insomnia, unspecified     Past Medical History:   Diagnosis Date     Allergic rhinitis due to animal dander      Amblyopia     LT     Arthritis      Colon polyp      Endometriosis      Heart attack (H)     2016     House dust mite allergy      Moderate persistent asthma      Rhinitis, allergic to other allergen      Seasonal allergic rhinitis 7/25/05 skin tests pos. for: cat/dog/horse/DM/M/T/G/RW per Dr. Granado    pt. did IT from 7/06 to 11/5/07 to: cat/dog/DM/M/T/G/W dc'd per self.      Type 2 diabetes mellitus without complication, without long-term current use of insulin (H) 3/13/2017        Past Surgical History:     Past Surgical History:   Procedure Laterality Date     EXCISE EXOSTOSIS FOOT Right 10/2/2018    Procedure: EXCISE EXOSTOSIS FOOT;  Right foot removal of first tarsometatarsal joint dorsal bossing;  Surgeon: Will Barraza DPM;  Location: [facility] OR     HYSTEROSCOPY          Social History:     Social History     Tobacco Use     Smoking status: Never Smoker     Smokeless tobacco: Never Used   Substance Use Topics     Alcohol use: Yes     Comment: occassional     Drug use: No        Family History:     Family History   Problem Relation Age of  Onset     Hypertension Mother      Alzheimer Disease Mother      Diabetes Mother      Tremor Mother      Hypertension Father      Skin Cancer Father      Alzheimer Disease Paternal Grandmother      Psychotic Disorder Paternal Grandmother         bipolar     Heart Disease Paternal Grandfather      Breast Cancer Maternal Grandmother      Tremor Maternal Grandfather      Thyroid Disease Brother      Tremor Brother      Thyroid Disease Sister      Diabetes Sister      Skin Cancer Sister      Cerebrovascular Disease No family hx of      Glaucoma No family hx of      Macular Degeneration No family hx of         Medications:     Current Outpatient Medications   Medication Sig     albuterol (PROAIR HFA) 108 (90 Base) MCG/ACT inhaler INHALE TWO PUFFS BY MOUTH EVERY FOUR HOURS AS NEEDED FOR SHORTNESS OF BREATH/ DIFFICULTY BREATHING     albuterol (PROVENTIL) (2.5 MG/3ML) 0.083% neb solution Inhale the contents of 1 vial via nebulizer every 4 hours as needed for shortness of breath     aspirin  MG EC tablet Take 1 tablet (325 mg) by mouth daily     busPIRone (BUSPAR) 10 MG tablet Take 1 tablet (10 mg) by mouth 2 times daily     diltiazem ER (TIAZAC) 240 MG 24 hr ER beaded capsule Take 1 capsule (240 mg) by mouth daily     esomeprazole (NEXIUM) 40 MG DR capsule Take 1 capsule (40 mg) by mouth every morning (before breakfast) Take 30-60 minutes before eating.     FLUoxetine (PROZAC) 20 MG capsule TAKE 1 CAPSULE BY MOUTH ONCE DAILY     fluticasone-vilanterol (BREO ELLIPTA) 200-25 MCG/INH inhaler Inhale 1 puff into the lungs daily     losartan (COZAAR) 25 MG tablet Take 1 tablet (25 mg) by mouth daily     metFORMIN (GLUCOPHAGE-XR) 500 MG 24 hr tablet TAKE 2 TABLETS BY MOUTH TWICE DAILY with meals (due for appointment for further refills)     montelukast (SINGULAIR) 10 MG tablet TAKE 1 TABLET BY MOUTH AT BEDTIME     nitroglycerin (NITRODUR) 0.4 MG/HR Place 1 patch onto the skin as needed Reported on 2/14/2017     LORazepam  (ATIVAN) 0.5 MG tablet Take 0.5-1 tablets (0.25-0.5 mg) by mouth every 6 hours as needed for anxiety (Patient not taking: Reported on 5/18/2021)     umeclidinium (INCRUSE ELLIPTA) 62.5 MCG/INH inhaler Inhale 1 puff into the lungs daily (Patient not taking: Reported on 3/19/2021)     No current facility-administered medications for this visit.         Allergies:     Allergies   Allergen Reactions     Ampicillin Rash     Cipro [Quinolones] Anaphylaxis     Macrobid [Nitrofuran Derivatives] GI Disturbance        Review of Systems:   A comprehensive 10 point review of systems (constitutional, ENT, cardiac, peripheral vascular, lymphatic, respiratory, GI, , Musculoskeletal, skin, Neurological) was performed and found to be negative except as described in this note.      Physical Exam:   Vitals: /77 (BP Location: Right arm, Patient Position: Sitting, Cuff Size: Adult Regular)   Pulse 67   Wt 89.7 kg (197 lb 12.8 oz)   LMP 05/15/2014 (Approximate)   BMI 30.98 kg/m     General: Seated comfortably in no acute distress.  HEENT:  Optic discs sharp and vasculature normal on funduscopic exam.   Heart: Regular rate  Lungs: breathing comfortably  Extremities: no edema  Skin:hypopigmented lesions in bilateral arms  Neurologic:     Mental Status: MoCA 28/30 (-1 calling rhino a hippo, -1 delayed recall)      Cranial Nerves: Visual fields intact. PERRL. EOMI with normal smooth pursuit. Facial sensation intact/symmetric. Facial movements symmetric. Hearing not formally tested but intact to conversation. Palate elevation symmetric, uvula midline. No dysarthria. Shoulder shrug strong bilaterally. Tongue protrusion midline.     Motor: With exception of minimal intermittent postural tremor of left hand, no tremors or other abnormal movements observed. Muscle tone normal throughout. Normal/symmetric rapid finger tapping. Strength 5/5 throughout upper and lower extremities.     Deep Tendon Reflexes: 2+/symmetric throughout upper  and lower extremities. Toes downgoing bilaterally.     Sensory: Possible mild decrease in temperature sensation in right foot compared to left, otherwise intact/symmetric to light touch, pinprick, temperature, vibration and proprioception throughout upper and lower extremities. Vibration is 15 seconds in right great toe and 18 seconds in left great toe. Negative Romberg.      Coordination: Finger-nose-finger and heel-shin intact without dysmetria. Rapid alternating movements intact/symmetric with normal speed and rhythm.     Gait: Normal, steady casual gait. Able to walk on toes, heels and tandem without difficulty.         Data: Pertinent prior to visit   Imaging:  None     Procedures:  None    Laboratory:  TSH normal 2019  BMP normal 8/2020  CBC normal 2018         Assessment and Plan:   Assessment:  Lisette Owens is a 56 year old female who presents today for evaluation of memory changes. Short term memory concerns first developed after her daughter's death 5 years ago and have continued since. On MoCA today patient scored 28/30 (-1 on delayed recall and -1 for calling rhino a hippo). Remainder of neurological exam is unremarkable. She has likely enhanced physiologic tremor in the left hand and to a lesser extent in the right hand. We discussed that memory complaints are related at least in part to mood symptoms and patient is in agreement. We discussed evaluating for additional etiologies with MRI brain and serologic lab work as below. Patient is following closely with psychiatrist.     Plan:  - MRI brain without contrast  - CMP, CBC, TSH, B12, Vitamin D    Follow up in Neurology clinic in 6 months or earlier as needed should new concerns arise.    Jean Marie Waters MD   of Neurology  [facility]      The total time of this encounter today amounted to 60 minutes. This time included time spent with the patient, prep work, ordering tests, and performing post visit documentation.

## 2021-05-19 RX ORDER — MONTELUKAST SODIUM 10 MG/1
TABLET ORAL
Qty: 30 TABLET | Refills: 0 | Status: SHIPPED | OUTPATIENT
Start: 2021-05-19 | End: 2021-07-10

## 2021-05-19 NOTE — RESULT ENCOUNTER NOTE
Hi All,     Can we call the lab to add on a methylmalonic acid for this patient? I just placed the order.     Thanks  Jean Marie

## 2021-05-20 LAB — DEPRECATED CALCIDIOL+CALCIFEROL SERPL-MC: 61 UG/L (ref 20–75)

## 2021-05-26 ENCOUNTER — TELEPHONE (OUTPATIENT)
Dept: NEUROLOGY | Facility: CLINIC | Age: 56
End: 2021-05-26

## 2021-05-26 NOTE — TELEPHONE ENCOUNTER
Procedure  was unable to reach pt after multiple attempts. Pt's VM box is full so no message could be left. Writer contacted pt's spouse to relay message to pt. Pt to call general number [phone] to schedule her MRI. To call back if any further questions.       Stephanie Briggs, RNCC  Neurology

## 2021-05-27 LAB — METHYLMALONATE SERPL-SCNC: 0.44 UMOL/L (ref 0–0.4)

## 2021-06-16 ENCOUNTER — ANCILLARY PROCEDURE (OUTPATIENT)
Dept: MRI IMAGING | Facility: CLINIC | Age: 56
End: 2021-06-16
Attending: INTERNAL MEDICINE
Payer: COMMERCIAL

## 2021-06-16 DIAGNOSIS — R41.3 MEMORY CHANGE: ICD-10-CM

## 2021-06-16 PROCEDURE — 70551 MRI BRAIN STEM W/O DYE: CPT | Performed by: RADIOLOGY

## 2021-07-01 ENCOUNTER — TRANSFERRED RECORDS (OUTPATIENT)
Dept: MULTI SPECIALTY CLINIC | Facility: CLINIC | Age: 56
End: 2021-07-01

## 2021-07-01 LAB — PAP SMEAR - HIM PATIENT REPORTED: NORMAL

## 2021-07-08 DIAGNOSIS — R00.2 PALPITATIONS: ICD-10-CM

## 2021-07-08 DIAGNOSIS — J45.41 MODERATE PERSISTENT ASTHMA WITH ACUTE EXACERBATION: ICD-10-CM

## 2021-07-10 RX ORDER — MONTELUKAST SODIUM 10 MG/1
1 TABLET ORAL AT BEDTIME
Qty: 90 TABLET | Refills: 0 | Status: SHIPPED | OUTPATIENT
Start: 2021-07-10 | End: 2021-10-19

## 2021-07-10 RX ORDER — DILTIAZEM HYDROCHLORIDE 240 MG/1
CAPSULE, EXTENDED RELEASE ORAL
Qty: 90 CAPSULE | Refills: 0 | Status: SHIPPED | OUTPATIENT
Start: 2021-07-10 | End: 2021-10-19

## 2021-08-01 ENCOUNTER — TRANSFERRED RECORDS (OUTPATIENT)
Dept: MULTI SPECIALTY CLINIC | Facility: CLINIC | Age: 56
End: 2021-08-01

## 2021-08-01 LAB — RETINOPATHY: NORMAL

## 2021-09-18 ENCOUNTER — HEALTH MAINTENANCE LETTER (OUTPATIENT)
Age: 56
End: 2021-09-18

## 2021-10-03 DIAGNOSIS — E11.9 TYPE 2 DIABETES MELLITUS WITHOUT COMPLICATION, WITHOUT LONG-TERM CURRENT USE OF INSULIN (H): ICD-10-CM

## 2021-10-05 ENCOUNTER — TELEPHONE (OUTPATIENT)
Dept: PULMONOLOGY | Facility: CLINIC | Age: 56
End: 2021-10-05

## 2021-10-05 DIAGNOSIS — J45.40 MODERATE PERSISTENT ASTHMA WITHOUT COMPLICATION: ICD-10-CM

## 2021-10-05 RX ORDER — METFORMIN HCL 500 MG
TABLET, EXTENDED RELEASE 24 HR ORAL
Qty: 120 TABLET | Refills: 0 | Status: SHIPPED | OUTPATIENT
Start: 2021-10-05 | End: 2021-10-19

## 2021-10-05 RX ORDER — LOSARTAN POTASSIUM 25 MG/1
TABLET ORAL
Qty: 30 TABLET | Refills: 0 | Status: SHIPPED | OUTPATIENT
Start: 2021-10-05 | End: 2021-10-19

## 2021-10-05 NOTE — TELEPHONE ENCOUNTER
"Routing refill request to provider for review/approval because:  Labs not current:  a1c  Patient needs to be seen because:  Pt due for follow up  Break in medication- 6 month supply last on 10/8/20    Medication pended for approval, 30 day supply with reminder.     Requested Prescriptions   Pending Prescriptions Disp Refills     losartan (COZAAR) 25 MG tablet [Pharmacy Med Name: Losartan Potassium Oral Tablet 25 MG] 90 tablet 0     Sig: TAKE 1 TABLET BY MOUTH ONCE DAILY       Angiotensin-II Receptors Passed - 10/3/2021  9:32 AM        Passed - Last blood pressure under 140/90 in past 12 months     BP Readings from Last 3 Encounters:   05/18/21 133/77   10/15/20 131/81   08/17/20 112/63                 Passed - Recent (12 mo) or future (30 days) visit within the authorizing provider's specialty     Patient has had an office visit with the authorizing provider or a provider within the authorizing providers department within the previous 12 mos or has a future within next 30 days. See \"Patient Info\" tab in inbasket, or \"Choose Columns\" in Meds & Orders section of the refill encounter.              Passed - Medication is active on med list        Passed - Patient is age 18 or older        Passed - No active pregnancy on record        Passed - Normal serum creatinine on file in past 12 months     Recent Labs   Lab Test 05/18/21  1700   CR 0.73       Ok to refill medication if creatinine is low          Passed - Normal serum potassium on file in past 12 months     Recent Labs   Lab Test 05/18/21  1700   POTASSIUM 4.0                    Passed - No positive pregnancy test in past 12 months           metFORMIN (GLUCOPHAGE-XR) 500 MG 24 hr tablet [Pharmacy Med Name: metFORMIN HCl ER Oral Tablet Extended Release 24 Hour 500 MG] 360 tablet 0     Sig: TAKE 2 TABLETS BY MOUTH TWICE DAILY with meals       Biguanide Agents Failed - 10/3/2021  9:32 AM        Failed - Patient has documented A1c within the specified period of time. " "    If HgbA1C is 8 or greater, it needs to be on file within the past 3 months.  If less than 8, must be on file within the past 6 months.     Recent Labs   Lab Test 08/17/20  1011   A1C 6.8*             Failed - Recent (6 mo) or future (30 days) visit within the authorizing provider's specialty     Patient had office visit in the last 6 months or has a visit in the next 30 days with authorizing provider or within the authorizing provider's specialty.  See \"Patient Info\" tab in inbasket, or \"Choose Columns\" in Meds & Orders section of the refill encounter.            Passed - Patient is age 10 or older        Passed - Patient's CR is NOT>1.4 OR Patient's EGFR is NOT<45 within past 12 mos.     Recent Labs   Lab Test 05/18/21  1700   GFRESTIMATED >90   GFRESTBLACK >90       Recent Labs   Lab Test 05/18/21  1700   CR 0.73             Passed - Patient does NOT have a diagnosis of CHF.        Passed - Medication is active on med list        Passed - Patient is not pregnant        Passed - Patient has not had a positive pregnancy test within the past 12 mos.                    "

## 2021-10-06 ENCOUNTER — TELEPHONE (OUTPATIENT)
Dept: PULMONOLOGY | Facility: CLINIC | Age: 56
End: 2021-10-06

## 2021-10-06 NOTE — TELEPHONE ENCOUNTER
LVM again with direct call back and call center number to discuss scheduling follow up with Dr. Ley for next available

## 2021-10-06 NOTE — TELEPHONE ENCOUNTER
Pt returned call and able to schedule follow up appt with Dr. Ley for next available (end of January). Appt scheduled and details confirmed with pt.

## 2021-10-16 DIAGNOSIS — J45.40 MODERATE PERSISTENT ASTHMA WITHOUT COMPLICATION: ICD-10-CM

## 2021-10-18 RX ORDER — ESOMEPRAZOLE MAGNESIUM 40 MG/1
CAPSULE, DELAYED RELEASE ORAL
Qty: 30 CAPSULE | Refills: 0 | Status: SHIPPED | OUTPATIENT
Start: 2021-10-18 | End: 2021-10-26

## 2021-10-26 ENCOUNTER — VIRTUAL VISIT (OUTPATIENT)
Dept: FAMILY MEDICINE | Facility: CLINIC | Age: 56
End: 2021-10-26
Payer: COMMERCIAL

## 2021-10-26 DIAGNOSIS — Z12.31 ENCOUNTER FOR SCREENING MAMMOGRAM FOR BREAST CANCER: ICD-10-CM

## 2021-10-26 DIAGNOSIS — R07.89 ATYPICAL CHEST PAIN: ICD-10-CM

## 2021-10-26 DIAGNOSIS — Z11.4 ENCOUNTER FOR SCREENING FOR HIV: ICD-10-CM

## 2021-10-26 DIAGNOSIS — Z11.59 ENCOUNTER FOR HEPATITIS C SCREENING TEST FOR LOW RISK PATIENT: ICD-10-CM

## 2021-10-26 DIAGNOSIS — E11.9 TYPE 2 DIABETES MELLITUS WITHOUT COMPLICATION, WITHOUT LONG-TERM CURRENT USE OF INSULIN (H): Primary | ICD-10-CM

## 2021-10-26 DIAGNOSIS — J45.40 MODERATE PERSISTENT ASTHMA WITHOUT COMPLICATION: ICD-10-CM

## 2021-10-26 DIAGNOSIS — Z71.89 ADVANCE CARE PLANNING: ICD-10-CM

## 2021-10-26 DIAGNOSIS — E53.8 VITAMIN B12 DEFICIENCY (NON ANEMIC): ICD-10-CM

## 2021-10-26 DIAGNOSIS — R00.2 PALPITATIONS: ICD-10-CM

## 2021-10-26 DIAGNOSIS — F41.8 DEPRESSION WITH ANXIETY: ICD-10-CM

## 2021-10-26 DIAGNOSIS — J45.41 MODERATE PERSISTENT ASTHMA WITH ACUTE EXACERBATION: ICD-10-CM

## 2021-10-26 DIAGNOSIS — F43.10 PTSD (POST-TRAUMATIC STRESS DISORDER): ICD-10-CM

## 2021-10-26 PROCEDURE — 99214 OFFICE O/P EST MOD 30 MIN: CPT | Mod: TEL | Performed by: PHYSICIAN ASSISTANT

## 2021-10-26 PROCEDURE — 96127 BRIEF EMOTIONAL/BEHAV ASSMT: CPT | Mod: 95 | Performed by: PHYSICIAN ASSISTANT

## 2021-10-26 RX ORDER — NITROGLYCERIN 0.3 MG/1
TABLET SUBLINGUAL
Qty: 25 TABLET | Refills: 1 | Status: SHIPPED | OUTPATIENT
Start: 2021-10-26 | End: 2021-10-28

## 2021-10-26 RX ORDER — DILTIAZEM HYDROCHLORIDE 240 MG/1
CAPSULE, EXTENDED RELEASE ORAL
Qty: 90 CAPSULE | Refills: 1 | Status: SHIPPED | OUTPATIENT
Start: 2021-10-26 | End: 2022-06-21

## 2021-10-26 RX ORDER — MONTELUKAST SODIUM 10 MG/1
1 TABLET ORAL AT BEDTIME
Qty: 90 TABLET | Refills: 1 | Status: SHIPPED | OUTPATIENT
Start: 2021-10-26 | End: 2022-06-10

## 2021-10-26 RX ORDER — BUSPIRONE HYDROCHLORIDE 10 MG/1
10 TABLET ORAL 2 TIMES DAILY
Qty: 180 TABLET | Refills: 1 | Status: SHIPPED | OUTPATIENT
Start: 2021-10-26 | End: 2022-05-27

## 2021-10-26 RX ORDER — METFORMIN HCL 500 MG
TABLET, EXTENDED RELEASE 24 HR ORAL
Qty: 360 TABLET | Refills: 1 | Status: SHIPPED | OUTPATIENT
Start: 2021-10-26 | End: 2022-05-14

## 2021-10-26 RX ORDER — LOSARTAN POTASSIUM 25 MG/1
25 TABLET ORAL DAILY
Qty: 90 TABLET | Refills: 1 | Status: SHIPPED | OUTPATIENT
Start: 2021-10-26 | End: 2022-04-25

## 2021-10-26 RX ORDER — ESOMEPRAZOLE MAGNESIUM 40 MG/1
CAPSULE, DELAYED RELEASE ORAL
Qty: 90 CAPSULE | Refills: 3 | Status: SHIPPED | OUTPATIENT
Start: 2021-10-26 | End: 2022-10-20

## 2021-10-26 ASSESSMENT — ANXIETY QUESTIONNAIRES
5. BEING SO RESTLESS THAT IT IS HARD TO SIT STILL: MORE THAN HALF THE DAYS
IF YOU CHECKED OFF ANY PROBLEMS ON THIS QUESTIONNAIRE, HOW DIFFICULT HAVE THESE PROBLEMS MADE IT FOR YOU TO DO YOUR WORK, TAKE CARE OF THINGS AT HOME, OR GET ALONG WITH OTHER PEOPLE: VERY DIFFICULT
3. WORRYING TOO MUCH ABOUT DIFFERENT THINGS: SEVERAL DAYS
2. NOT BEING ABLE TO STOP OR CONTROL WORRYING: SEVERAL DAYS
GAD7 TOTAL SCORE: 11
6. BECOMING EASILY ANNOYED OR IRRITABLE: SEVERAL DAYS
7. FEELING AFRAID AS IF SOMETHING AWFUL MIGHT HAPPEN: NEARLY EVERY DAY
1. FEELING NERVOUS, ANXIOUS, OR ON EDGE: SEVERAL DAYS

## 2021-10-26 ASSESSMENT — PATIENT HEALTH QUESTIONNAIRE - PHQ9
SUM OF ALL RESPONSES TO PHQ QUESTIONS 1-9: 14
5. POOR APPETITE OR OVEREATING: MORE THAN HALF THE DAYS

## 2021-10-26 NOTE — PROGRESS NOTES
Lisette is a 56 year old who is being evaluated via a billable telephone visit.    What phone number would you like to be contacted at? [phone]  How would you like to obtain your AVS? Malcom Smith   Lisette is a 56 year old who presents for the following health issues     HPI     Diabetes Follow-up    How often are you checking your blood sugar? Two times daily  Blood sugar testing frequency justification:  check daily  What time of day are you checking your blood sugars (select all that apply)?  Before meals and At bedtime  Have you had any blood sugars above 200?  No  Have you had any blood sugars below 70?  No    What symptoms do you notice when your blood sugar is low?  None    What concerns do you have today about your diabetes? None     Do you have any of these symptoms? (Select all that apply)  Blurry vision    Have you had a diabetic eye exam in the last 12 months? Yes- Date of last eye exam: 8/2021,  Location: [facility]    Some fatigue.   Home glucose numbers running near goal range.   Noting episodic chest pain. Some fatigue also. Not always exertional. Nitroglycerine seems to help. The chest pain is not exertional at all. History of a nstemi mi with and a completely normal angiogram in 2016. Echo was normal.     BP Readings from Last 2 Encounters:   05/18/21 133/77   10/15/20 131/81     Hemoglobin A1C (%)   Date Value   08/17/2020 6.8 (H)   01/06/2020 7.4 (H)     LDL Cholesterol Calculated (mg/dL)   Date Value   08/17/2020 101 (H)   05/03/2018 95               Review of Systems   Constitutional, HEENT, cardiovascular, pulmonary, GI, , musculoskeletal, neuro, skin, endocrine and psych systems are negative, except as otherwise noted.      Objective           Vitals:  No vitals were obtained today due to virtual visit.    Physical Exam   healthy, alert and no distress  PSYCH: Alert and oriented times 3; coherent speech, normal   rate and volume, able to articulate logical thoughts, able    to abstract reason, no tangential thoughts, no hallucinations   or delusions  Her affect is normal  RESP: No cough, no audible wheezing, able to talk in full sentences  Remainder of exam unable to be completed due to telephone visits    Lisette was seen today for recheck.    Diagnoses and all orders for this visit:    Type 2 diabetes mellitus without complication, without long-term current use of insulin (H)  -     Hemoglobin A1c; Future  -     Albumin Random Urine Quantitative with Creat Ratio; Future  -     Lipid panel reflex to direct LDL Fasting; Future  -     losartan (COZAAR) 25 MG tablet; Take 1 tablet (25 mg) by mouth daily  -     metFORMIN (GLUCOPHAGE-XR) 500 MG 24 hr tablet; TAKE 2 TABLETS BY MOUTH TWICE DAILY with meals    Advance care planning    Depression with anxiety  -     FLUoxetine (PROZAC) 20 MG capsule; TAKE 1 CAPSULE BY MOUTH ONCE DAILY  -     busPIRone (BUSPAR) 10 MG tablet; Take 1 tablet (10 mg) by mouth 2 times daily    PTSD (post-traumatic stress disorder)    Encounter for screening for HIV  -     HIV Antigen Antibody Combo; Future    Encounter for hepatitis C screening test for low risk patient  -     Hepatitis C Screen Reflex to HCV RNA Quant and Genotype; Future    Vitamin B12 deficiency (non anemic)  -     Vitamin B12; Future    Encounter for screening mammogram for breast cancer  -     *MA Screening Digital Bilateral; Future    Atypical chest pain  -     Echocardiogram Exercise Stress; Future  -     nitroGLYcerin (NITROSTAT) 0.3 MG sublingual tablet; For chest pain place 1 tablet under the tongue every 5 minutes for 3 doses. If symptoms persist 5 minutes after 1st dose call 911.    Moderate persistent asthma with acute exacerbation  -     montelukast (SINGULAIR) 10 MG tablet; Take 1 tablet (10 mg) by mouth At Bedtime    Moderate persistent asthma without complication  -     esomeprazole (NEXIUM) 40 MG DR capsule; TAKE 1 CAPSULE BY MOUTH EVERY MORNING 30 TO 60 MINUTES BEFORE  BREAKFAST    Palpitations  -     diltiazem ER (TIAZAC) 240 MG 24 hr ER beaded capsule; TAKE 1 CAPSULE BY MOUTH ONCE DAILY    Other orders  -     REVIEW OF HEALTH MAINTENANCE PROTOCOL ORDERS      Advised supportive and symptomatic treatment.  Follow up with Provider - if condition persists or worsens.   work on lifestyle modification  Recheck in 6 mos           Phone call duration: 26 minutes

## 2021-10-27 ENCOUNTER — TELEPHONE (OUTPATIENT)
Dept: FAMILY MEDICINE | Facility: CLINIC | Age: 56
End: 2021-10-27

## 2021-10-27 DIAGNOSIS — R07.89 ATYPICAL CHEST PAIN: ICD-10-CM

## 2021-10-27 ASSESSMENT — ANXIETY QUESTIONNAIRES: GAD7 TOTAL SCORE: 11

## 2021-10-27 NOTE — TELEPHONE ENCOUNTER
Pharmacy calling regarding nitroGLYcerin (NITROSTAT) 0.3 MG sublingual tablet. They stated this is an unusual dose and typically it is 0.4 MG.     They are wanting to clarify that the provider does in fact want nitroGLYcerin (NITROSTAT) 0.3 MG sublingual tablet or if a prescription could be sent in for the 0.4 MG dosage for the patient.    Routed to provider to review.     Thank you,  Maryellen Berg RN

## 2021-10-28 RX ORDER — NITROGLYCERIN 0.4 MG/1
TABLET SUBLINGUAL
Qty: 25 TABLET | Refills: 4 | Status: SHIPPED | OUTPATIENT
Start: 2021-10-28

## 2021-10-28 NOTE — TELEPHONE ENCOUNTER
It was suppose to have been the 0.4 mg dose. Must of clicked on the 0.3 mg dose accidentally. A new rx has been routed

## 2021-11-13 ENCOUNTER — HEALTH MAINTENANCE LETTER (OUTPATIENT)
Age: 56
End: 2021-11-13

## 2021-11-18 ENCOUNTER — E-VISIT (OUTPATIENT)
Dept: URGENT CARE | Facility: CLINIC | Age: 56
End: 2021-11-18
Payer: COMMERCIAL

## 2021-11-18 DIAGNOSIS — J01.90 ACUTE SINUSITIS WITH SYMPTOMS > 10 DAYS: Primary | ICD-10-CM

## 2021-11-18 PROCEDURE — 99421 OL DIG E/M SVC 5-10 MIN: CPT | Performed by: PHYSICIAN ASSISTANT

## 2021-11-18 RX ORDER — DOXYCYCLINE HYCLATE 100 MG
100 TABLET ORAL 2 TIMES DAILY
Qty: 14 TABLET | Refills: 0 | Status: SHIPPED | OUTPATIENT
Start: 2021-11-18 | End: 2021-11-25

## 2021-11-18 NOTE — PATIENT INSTRUCTIONS
Sinusitis (Antibiotic Treatment)    The sinuses are air-filled spaces within the bones of the face. They connect to the inside of the nose. Sinusitis is an inflammation of the tissue that lines the sinuses. Sinusitis can occur during a cold. It can also happen due to allergies to pollens and other particles in the air. Sinusitis can cause symptoms of sinus congestion and a feeling of fullness. A sinus infection causes fever, headache, and facial pain. There is often green or yellow fluid draining from the nose or into the back of the throat (post-nasal drip). You have been given antibiotics to treat this condition.   Home care    Take the full course of antibiotics as instructed. Don't stop taking them, even when you feel better.    Drink plenty of water, hot tea, and other liquids as directed by the healthcare provider. This may help thin nasal mucus. It also may help your sinuses drain fluids.    Heat may help soothe painful areas of your face. Use a towel soaked in hot water. Or,  the shower and direct the warm spray onto your face. Using a vaporizer along with a menthol rub at night may also help soothe symptoms.     An expectorant with guaifenesin may help thin nasal mucus and help your sinuses drain fluids. Talk with your provider or pharmacists before taking an over-the-counter (OTC) medicine if you have any questions about it or its side effects..    You can use an OTC decongestant, unless a similar medicine was prescribed to you. Nasal sprays work the fastest. Use one that contains phenylephrine or oxymetazoline. First blow your nose gently. Then use the spray. Don't use these medicines more often than directed on the label. If you do, your symptoms may get worse. You may also take pills that contain pseudoephedrine. Don t use products that combine multiple medicines. This is because side effects may be increased. Read labels. You can also ask the pharmacist for help. (People with high blood  pressure should not use decongestants. They can raise blood pressure.) Talk with your provider or pharmacist if you have any questions about the medicine..    OTC antihistamines may help if allergies contributed to your sinusitis. Talk with your provider or pharmacist if you have any questions about the medicine..    Don't use nasal rinses or irrigation during an acute sinus infection, unless your healthcare provider tells you to. Rinsing may spread the infection to other areas in your sinuses.    Use acetaminophen or ibuprofen to control pain, unless another pain medicine was prescribed to you. If you have chronic liver or kidney disease or ever had a stomach ulcer, talk with your healthcare provider before using these medicines. Never give aspirin to anyone under age 18 who is ill with a fever. It may cause severe liver damage.    Don't smoke. This can make symptoms worse.    Follow-up care  Follow up with your healthcare provider, or as advised.   When to seek medical advice  Call your healthcare provider if any of these occur:     Facial pain or headache that gets worse    Stiff neck    Unusual drowsiness or confusion    Swelling of your forehead or eyelids    Symptoms don't go away in 10 days    Vision problems, such as blurred or double vision    Fever of 100.4 F (38 C) or higher, or as directed by your healthcare provider  Call 911  Call 911 if any of these occur:     Seizure    Trouble breathing    Feeling dizzy or faint    Fingernails, skin or lips look blue, purple , or gray  Prevention  Here are steps you can take to help prevent an infection:     Keep good hand washing habits.    Don t have close contact with people who have sore throats, colds, or other upper respiratory infections.    Don t smoke, and stay away from secondhand smoke.    Stay up to date with of your vaccines.  [employer] last reviewed this educational content on 12/1/2019 2000-2021 [employer]. All rights reserved. This  information is not intended as a substitute for professional medical care. Always follow your healthcare professional's instructions.        Dear Lisette Owens    After reviewing your responses, I've been able to diagnose you with?a sinus infection caused by bacteria.?     Based on your responses and diagnosis, I have prescribed Doxycycline to treat your symptoms. I have sent this to your pharmacy.?     It is also important to stay well hydrated, get lots of rest and take over-the-counter decongestants,?tylenol?or ibuprofen if you?are able to?take those medications per your primary care provider to help relieve discomfort.?     It is important that you take?all of?your prescribed medication even if your symptoms are improving after a few doses.? Taking?all of?your medicine helps prevent the symptoms from returning.?     If your symptoms worsen, you develop severe headache, vomiting, high fever (>102), or are not improving in 7 days, please contact your primary care provider for an appointment or visit any of our convenient Walk-in Care or Urgent Care Centers to be seen which can be found on our website?here.?     Thanks again for choosing?us?as your health care partner,?   ?  Linda Hurst PA-C, [facility]?

## 2021-11-20 ENCOUNTER — LAB (OUTPATIENT)
Dept: LAB | Facility: CLINIC | Age: 56
End: 2021-11-20
Payer: COMMERCIAL

## 2021-11-20 ENCOUNTER — MEDICAL CORRESPONDENCE (OUTPATIENT)
Dept: HEALTH INFORMATION MANAGEMENT | Facility: CLINIC | Age: 56
End: 2021-11-20

## 2021-11-20 DIAGNOSIS — Z11.4 ENCOUNTER FOR SCREENING FOR HIV: ICD-10-CM

## 2021-11-20 DIAGNOSIS — R07.89 ATYPICAL CHEST PAIN: ICD-10-CM

## 2021-11-20 DIAGNOSIS — E11.9 TYPE 2 DIABETES MELLITUS WITHOUT COMPLICATION, WITHOUT LONG-TERM CURRENT USE OF INSULIN (H): ICD-10-CM

## 2021-11-20 DIAGNOSIS — Z11.59 ENCOUNTER FOR HEPATITIS C SCREENING TEST FOR LOW RISK PATIENT: ICD-10-CM

## 2021-11-20 DIAGNOSIS — E78.5 HYPERLIPIDEMIA LDL GOAL <100: Primary | ICD-10-CM

## 2021-11-20 DIAGNOSIS — E53.8 VITAMIN B12 DEFICIENCY (NON ANEMIC): ICD-10-CM

## 2021-11-20 LAB
HBA1C MFR BLD: 6.6 % (ref 0–5.6)
VIT B12 SERPL-MCNC: 452 PG/ML (ref 193–986)

## 2021-11-20 PROCEDURE — 82607 VITAMIN B-12: CPT

## 2021-11-20 PROCEDURE — 86803 HEPATITIS C AB TEST: CPT

## 2021-11-20 PROCEDURE — 36415 COLL VENOUS BLD VENIPUNCTURE: CPT

## 2021-11-20 PROCEDURE — 82043 UR ALBUMIN QUANTITATIVE: CPT

## 2021-11-20 PROCEDURE — 87389 HIV-1 AG W/HIV-1&-2 AB AG IA: CPT

## 2021-11-20 PROCEDURE — 80061 LIPID PANEL: CPT

## 2021-11-20 PROCEDURE — 83036 HEMOGLOBIN GLYCOSYLATED A1C: CPT

## 2021-11-22 LAB
CHOLEST SERPL-MCNC: 205 MG/DL
CREAT UR-MCNC: 109 MG/DL
FASTING STATUS PATIENT QL REPORTED: YES
HCV AB SERPL QL IA: NONREACTIVE
HDLC SERPL-MCNC: 41 MG/DL
HIV 1+2 AB+HIV1 P24 AG SERPL QL IA: NONREACTIVE
LDLC SERPL CALC-MCNC: 127 MG/DL
MICROALBUMIN UR-MCNC: 8 MG/L
MICROALBUMIN/CREAT UR: 7.34 MG/G CR (ref 0–25)
NONHDLC SERPL-MCNC: 164 MG/DL
TRIGL SERPL-MCNC: 187 MG/DL

## 2021-11-29 RX ORDER — ROSUVASTATIN CALCIUM 5 MG/1
5 TABLET, COATED ORAL DAILY
Qty: 90 TABLET | Refills: 1 | Status: SHIPPED | OUTPATIENT
Start: 2021-11-29 | End: 2022-05-29

## 2021-12-04 ENCOUNTER — MYC MEDICAL ADVICE (OUTPATIENT)
Dept: FAMILY MEDICINE | Facility: CLINIC | Age: 56
End: 2021-12-04
Payer: COMMERCIAL

## 2021-12-17 ENCOUNTER — ANCILLARY PROCEDURE (OUTPATIENT)
Dept: CARDIOLOGY | Facility: CLINIC | Age: 56
End: 2021-12-17
Attending: PHYSICIAN ASSISTANT
Payer: COMMERCIAL

## 2021-12-17 DIAGNOSIS — R07.89 ATYPICAL CHEST PAIN: Primary | ICD-10-CM

## 2021-12-17 PROCEDURE — 2894A ECHO STRESS ECHOCARDIOGRAM: CPT | Performed by: INTERNAL MEDICINE

## 2021-12-17 PROCEDURE — 93321 DOPPLER ECHO F-UP/LMTD STD: CPT | Performed by: INTERNAL MEDICINE

## 2021-12-17 PROCEDURE — 93018 CV STRESS TEST I&R ONLY: CPT | Performed by: INTERNAL MEDICINE

## 2021-12-17 PROCEDURE — 93325 DOPPLER ECHO COLOR FLOW MAPG: CPT | Performed by: INTERNAL MEDICINE

## 2021-12-17 PROCEDURE — 93016 CV STRESS TEST SUPVJ ONLY: CPT | Performed by: INTERNAL MEDICINE

## 2021-12-17 PROCEDURE — 93017 CV STRESS TEST TRACING ONLY: CPT | Performed by: INTERNAL MEDICINE

## 2021-12-17 PROCEDURE — 2894A VOIDCORRECT: CPT | Performed by: INTERNAL MEDICINE

## 2021-12-17 PROCEDURE — 93350 STRESS TTE ONLY: CPT | Performed by: INTERNAL MEDICINE

## 2021-12-17 PROCEDURE — 93352 ADMIN ECG CONTRAST AGENT: CPT | Performed by: INTERNAL MEDICINE

## 2021-12-17 RX ADMIN — Medication 5 ML: at 14:08

## 2021-12-30 ENCOUNTER — MYC MEDICAL ADVICE (OUTPATIENT)
Dept: FAMILY MEDICINE | Facility: CLINIC | Age: 56
End: 2021-12-30
Payer: COMMERCIAL

## 2021-12-30 DIAGNOSIS — R07.89 ATYPICAL CHEST PAIN: Primary | ICD-10-CM

## 2021-12-30 NOTE — TELEPHONE ENCOUNTER
Routed to PCP Kolton Yin to address stress echo results, see patient's mychart report of symptoms during the test.    Jossy Ford RN  [facility]

## 2021-12-31 NOTE — TELEPHONE ENCOUNTER
Routed response to patient with cardiology contact/scheduling information.    Jossy Ford RN  [facility]

## 2022-01-15 DIAGNOSIS — J45.40 MODERATE PERSISTENT ASTHMA WITHOUT COMPLICATION: ICD-10-CM

## 2022-01-18 ENCOUNTER — OFFICE VISIT (OUTPATIENT)
Dept: FAMILY MEDICINE | Facility: CLINIC | Age: 57
End: 2022-01-18
Payer: COMMERCIAL

## 2022-01-18 VITALS
SYSTOLIC BLOOD PRESSURE: 120 MMHG | OXYGEN SATURATION: 95 % | BODY MASS INDEX: 28.82 KG/M2 | HEART RATE: 70 BPM | WEIGHT: 184 LBS | RESPIRATION RATE: 16 BRPM | TEMPERATURE: 98.4 F | DIASTOLIC BLOOD PRESSURE: 70 MMHG

## 2022-01-18 DIAGNOSIS — J45.41 MODERATE PERSISTENT ASTHMA WITH ACUTE EXACERBATION: ICD-10-CM

## 2022-01-18 DIAGNOSIS — R05.9 COUGH: Primary | ICD-10-CM

## 2022-01-18 DIAGNOSIS — J06.9 UPPER RESPIRATORY TRACT INFECTION, UNSPECIFIED TYPE: ICD-10-CM

## 2022-01-18 PROCEDURE — 99214 OFFICE O/P EST MOD 30 MIN: CPT | Performed by: PHYSICIAN ASSISTANT

## 2022-01-18 RX ORDER — PREDNISONE 20 MG/1
TABLET ORAL
Qty: 20 TABLET | Refills: 0 | Status: SHIPPED | OUTPATIENT
Start: 2022-01-18 | End: 2022-08-16

## 2022-01-18 NOTE — PATIENT INSTRUCTIONS
Your symptoms are concerning for COVID-19 or other viral illness as well as a flareup of your asthma.  Please follow isolation guidelines until your results are available.  If positive for COVID-19, follow the [place] Department of Health guidelines for quarantine.  To help with your wheezing and asthma, you have been prescribed a steroid taper.  Start this medication right away.    You may use Tylenol for fever and pain management and Robitussin-DM/Mucinex DM for cough and congestion.  Make sure to get plenty of rest and stay hydrated.      If you have severe symptoms such as chest pain, wheezing, shortness of breath/inability to speak in a full sentence, or fevers that you cannot control, please go to the emergency department.      Please reach out with any questions or concerns.  Take care,  Mariaa Milian PA-C               Patient Education     Viral Upper Respiratory Illness with Wheezing (Adult)    You have a viral upper respiratory illness (URI), which is another term for the common cold. When the infection causes a lot of irritation, the air passages can go into spasm. This causes wheezing and shortness of breath.  This illness is contagious during the first few days. It is spread through the air by coughing and sneezing. It may also be spread by direct contact. This could be by touching the sick person and then touching your own eyes, nose, or mouth. Frequent handwashing will decrease the risk.  Most viral illnesses go away within 7 to 10 days with rest and simple home remedies. Sometimes the illness may last for several weeks. Antibiotics will not kill a virus, and they are generally not prescribed for this condition.  Home care    If symptoms are severe, rest at home for the first 2 to 3 days. When you resume activity, don't let yourself get too tired.    If you smoke, stop. Ask your healthcare provider if you need help.    Stay away from secondhand cigarette smoke. Don't let people smoke in your house or  car.    You may use acetaminophen or ibuprofen to control pain and fever, unless another medicine was prescribed. Take the medicine only as directed on the label. If you have chronic liver or kidney disease, have ever had a stomach ulcer or gastrointestinal bleeding, or are taking blood-thinning medicines, talk with your healthcare provider before using these medicines. Aspirin should never be given to anyone under 18 years of age who is ill with a viral infection or fever. It may cause severe liver or brain damage.    Your appetite may be poor, so a light diet is fine. Stay well hydrated by drinking 6 to 8 glasses of fluids per day (water, soft drinks, juices, tea, or soup). Extra fluids will help loosen secretions in the nose and lungs.    Over-the-counter cold medicines will not shorten the length of time you re sick, but they may be helpful for the following symptoms: cough, sore throat, and nasal and sinus congestion. Ask your healthcare provider or pharmacist which over-the-counter medicine to use. Don't use decongestants if you have high blood pressure.  Follow-up care  Follow up with your healthcare provider, or as advised.  When to seek medical advice  Call your healthcare provider right away if any of these occur:    Cough with lots of colored sputum (mucus)    Severe headache; face, neck, or ear pain    Difficulty swallowing due to throat pain    Fever of 100.4 F (38 C) or higher, or as directed by your healthcare provider  Call 911  Call 911 if any of these occur:    Chest pain, shortness of breath, worsening wheezing, or difficulty breathing    Coughing up blood    Very severe pain when swallowing, especially if it goes along with a muffled voice  [employer] last reviewed this educational content on 6/1/2018 2000-2021 [employer]. All rights reserved. This information is not intended as a substitute for professional medical care. Always follow your healthcare professional's  instructions.

## 2022-01-27 ASSESSMENT — ENCOUNTER SYMPTOMS
PANIC: 0
MUSCLE WEAKNESS: 0
NERVOUS/ANXIOUS: 1
HYPOTENSION: 0
EXERCISE INTOLERANCE: 0
COUGH: 1
SLEEP DISTURBANCES DUE TO BREATHING: 0
SPUTUM PRODUCTION: 1
LIGHT-HEADEDNESS: 1
COUGH DISTURBING SLEEP: 1
SYNCOPE: 0
MUSCLE CRAMPS: 1
DYSPNEA ON EXERTION: 1
SHORTNESS OF BREATH: 1
HYPERTENSION: 0
MYALGIAS: 0
PALPITATIONS: 1
DECREASED CONCENTRATION: 1
SNORES LOUDLY: 1
ARTHRALGIAS: 1
WHEEZING: 1
ORTHOPNEA: 0
BACK PAIN: 0
LEG PAIN: 0
INSOMNIA: 0
STIFFNESS: 0
POSTURAL DYSPNEA: 0
DEPRESSION: 1
HEMOPTYSIS: 0
JOINT SWELLING: 0

## 2022-01-28 ENCOUNTER — OFFICE VISIT (OUTPATIENT)
Dept: PULMONOLOGY | Facility: CLINIC | Age: 57
End: 2022-01-28
Attending: INTERNAL MEDICINE
Payer: COMMERCIAL

## 2022-01-28 VITALS
OXYGEN SATURATION: 97 % | HEART RATE: 69 BPM | SYSTOLIC BLOOD PRESSURE: 117 MMHG | HEIGHT: 67 IN | WEIGHT: 183 LBS | DIASTOLIC BLOOD PRESSURE: 64 MMHG | BODY MASS INDEX: 28.72 KG/M2

## 2022-01-28 DIAGNOSIS — E11.9 TYPE 2 DIABETES MELLITUS WITHOUT COMPLICATION, WITHOUT LONG-TERM CURRENT USE OF INSULIN (H): ICD-10-CM

## 2022-01-28 DIAGNOSIS — J32.9 CHRONIC RHINOSINUSITIS: ICD-10-CM

## 2022-01-28 DIAGNOSIS — J45.40 MODERATE PERSISTENT ASTHMA WITHOUT COMPLICATION: Primary | ICD-10-CM

## 2022-01-28 PROCEDURE — G0463 HOSPITAL OUTPT CLINIC VISIT: HCPCS

## 2022-01-28 PROCEDURE — 99215 OFFICE O/P EST HI 40 MIN: CPT | Performed by: INTERNAL MEDICINE

## 2022-01-28 ASSESSMENT — MIFFLIN-ST. JEOR: SCORE: 1444.77

## 2022-01-28 NOTE — PATIENT INSTRUCTIONS
Continue singulair and anti-reflux therapy  For rhinosinusitis - continue daily Claritin and nasal steroids (Flonase or equivalent)    Plan:  Switch Breo to Trelegy - use once a day in AM and rinse mouth and gargle after use.  ENT referral for additional treatment options  Return to clinic in 4 months with PFTs  Consider need for biologic therapy if continuing to have flares of asthma that require steroids    For problems or questions, please contact our Nursing staff - Fady Hays -[phone].

## 2022-01-28 NOTE — LETTER
1/28/2022     RE: Lisette Owens  [address] [place] [address]    Dear Colleague,    Thank you for referring your patient, Lisette Owens, to the [facility] [place]. Please see a copy of my visit note below.    Reason for Visit  Lisette Owens is a 57 year old year old female who is being seen for Follow Up (pr pt )    Pulmonary HPI    The patient was seen and examined by Eyad Ley MD     I had the pleasure of seeing Lisette Owens today at the [facility] in followup for her moderate persistent asthma.  I saw her by virtual visit on 09/04/2020. At that time, she told me she had more difficulty with asthma control in the prior year, particularly with coughing and need for prednisone.  She required 3 rounds of steroids and antibiotics.  Her controller regimen at that time was Advair Diskus 500/25 b.i.d., Singulair and as-needed albuterol MDI or nebs.  She said that only certain albuterol manufactured forms work for her. (ProAir works, but Perrigo does not.)  She is a brass player and musician and  and feels that it is hard to take a deep breath.  She was able to walk 2+ miles at a moderate pace, but was winded after climbing 3 flights of stairs at a relatively quick pace.  She has had her biologic son with unexplained breathing issues as a child that he grew out of and a sister who had lung surgery for an uncertain problem with calcification.  Her plan was to treat potentially silent GERD with Nexium and also to use Flonase regularly.  I had her use albuterol 5-10 minutes before Advair and other controller medication and also recommended switching from Advair to Breo 200/25.  The plan was to try Spiriva after 3 weeks on Breo if the Breo was not leading to improvement.  I refilled her ProAir with a do-not-substitute prescription.    Returning to clinic today, she  "says she is approximately the same.  She is still coughing and that it is difficult for her to take full deep breaths, describing \"guppy breathing and sucking in the air hard\" in order to play a brass instrument.  If she takes a deep breath, she feels that she will cough, so she avoids taking very deep breaths.  She did make the switch to Breo Ellipta without significant change from the Advair she was previously taking.  However, she did not try the Spiriva.  She does think she has had other inflammation and has changed her diet markedly to try to improve and decrease causes of inflammation of her sinuses or lungs.  Otherwise, a detailed pulmonary ROS is unchanged, and there are no new abnormalities in the general review of systems.          Current Outpatient Medications   Medication     albuterol (PROAIR HFA) 108 (90 Base) MCG/ACT inhaler     albuterol (PROVENTIL) (2.5 MG/3ML) 0.083% neb solution     aspirin  MG EC tablet     BREO ELLIPTA 200-25 MCG/INH Inhaler     busPIRone (BUSPAR) 10 MG tablet     diltiazem ER (TIAZAC) 240 MG 24 hr ER beaded capsule     esomeprazole (NEXIUM) 40 MG DR capsule     FLUoxetine (PROZAC) 20 MG capsule     Fluticasone-Umeclidin-Vilanterol (TRELEGY ELLIPTA) 200-62.5-25 MCG/INH oral inhaler     LORazepam (ATIVAN) 0.5 MG tablet     losartan (COZAAR) 25 MG tablet     metFORMIN (GLUCOPHAGE-XR) 500 MG 24 hr tablet     montelukast (SINGULAIR) 10 MG tablet     nitroglycerin (NITRODUR) 0.4 MG/HR     nitroGLYcerin (NITROSTAT) 0.4 MG sublingual tablet     predniSONE (DELTASONE) 20 MG tablet     rosuvastatin (CRESTOR) 5 MG tablet     cyanocobalamin (CYANOCOBALAMIN) 1000 MCG/ML injection     Syringe/Needle, Disp, 20G X 1\" 3 ML MISC     umeclidinium (INCRUSE ELLIPTA) 62.5 MCG/INH inhaler     No current facility-administered medications for this visit.     Facility-Administered Medications Ordered in Other Visits   Medication     sodium chloride (PF) 0.9% PF flush 10 mL     Allergies "   Allergen Reactions     Ampicillin Rash     Cipro [Quinolones] Anaphylaxis     Macrobid [Nitrofuran Derivatives] GI Disturbance     Past Medical History:   Diagnosis Date     Allergic rhinitis due to animal dander      Amblyopia     LT     Arthritis      Colon polyp      Depressive disorder      Endometriosis      Heart attack (H)     2016     House dust mite allergy      Moderate persistent asthma      Rhinitis, allergic to other allergen      Seasonal allergic rhinitis 7/25/05 skin tests pos. for: cat/dog/horse/DM/M/T/G/RW per Dr. Granado    pt. did IT from 7/06 to 11/5/07 to: cat/dog/DM/M/T/G/W dc'd per self.      Type 2 diabetes mellitus without complication, without long-term current use of insulin (H) 3/13/2017       Past Surgical History:   Procedure Laterality Date     COLONOSCOPY  10/2020     EXCISE EXOSTOSIS FOOT Right 10/2/2018    Procedure: EXCISE EXOSTOSIS FOOT;  Right foot removal of first tarsometatarsal joint dorsal bossing;  Surgeon: Will Barraza DPM;  Location: [facility] OR     GYN SURGERY  June 24 2016    Hysterectomy complete     HYSTEROSCOPY         Social History     Socioeconomic History     Marital status:      Spouse name: Not on file     Number of children: Not on file     Years of education: Not on file     Highest education level: Not on file   Occupational History     Not on file   Tobacco Use     Smoking status: Never Smoker     Smokeless tobacco: Never Used   Vaping Use     Vaping Use: Never used   Substance and Sexual Activity     Alcohol use: Yes     Comment: Rarely     Drug use: No     Sexual activity: Yes     Partners: Male     Birth control/protection: None     Comment: N/A   Other Topics Concern     Parent/sibling w/ CABG, MI or angioplasty before 65F 55M? No   Social History Narrative     Not on file     Social Determinants of Health     Financial Resource Strain: Not on file   Food Insecurity: Not on file   Transportation Needs: Not on file   Physical Activity: Not on  "file   Stress: Not on file   Social Connections: Not on file   Intimate Partner Violence: Not on file   Housing Stability: Not on file       ROS Pulmonary    A complete ROS was otherwise negative except as noted in the HPI.  /64 (BP Location: Right arm)   Pulse 69   Ht 1.689 m (5' 6.5\")   Wt 83 kg (183 lb)   LMP 05/15/2014 (Approximate)   SpO2 97%   BMI 29.09 kg/m    Exam:   GENERAL APPEARANCE: Well developed, well nourished, alert, and in no apparent distress.  EYES: PERRL, EOMI  HENT: Nasal mucosa with no edema and no hyperemia. No nasal polyps.  MOUTH: Oral mucosa is moist, without any lesions, no tonsillar enlargement, no oropharyngeal exudate.  NECK: supple, no masses, no thyromegaly.  LYMPHATICS: No significant axillary, cervical, or supraclavicular nodes.  RESP: normal inspection, palpation & percussion, good air flow throughout.  No crackles. No rhonchi. No wheezes.  CV: RRR Normal S1, S2, regular rhythm, normal rate. No murmur.  No rub. No gallop. No LE edema.   ABDOMEN:  deferred  MS: extremities normal. No clubbing. No cyanosis.  SKIN: no rash on limited exam  NEURO: Mentation intact, speech normal, normal strength and tone, normal gait and stance  PSYCH: mentation appears normal. and affect normal/bright  Results:  No results found for this or any previous visit (from the past 168 hour(s)).     PFTs 8/21 reviewed by me  FEV1/FVC 1.9/3.2 (65/87 % pred) =  59% with 14% increase in FEV1 with BDs  Nl TLC and DLCO    Assessment and plan:   1.  Moderate persistent asthma:  I recommended that she continue her Singulair and anti-reflux therapy.  We will switch the Breo Ellipta to Trelegy Ellipta used once in the morning.  If she continues to have relatively frequent flares of asthma and asthma that is not well controlled, then we will consider the need for biologic therapies based on systemic eosinophil count, etc.    2.  Chronic rhinosinusitis:  I recommended that she continue daily Claritin and " nasal steroids.  We also thought she should have an ENT referral to look at additional treatment options, since this has been a chronic problem for her.    3.  GERD:  I recommended she continue her anti-reflux therapy so that this does not contribute to her asthma and chronic cough.    I plan to have her return to clinic in 4 months with followup PFTs.  She was given contact information for our nursing staff if she has problems or questions in the interim.  She was satisfied with this plan and will make the change.  I said that if she was not improving at all, she should not wait 4 months to be back in contact with us.    I spent a total of 50 minutes dedicated to her care today, 01/28/2022, including review of her past PFTs and imaging studies, including personal review of the images, review of her chart and past visits, in contact time with her and my documentation.      Answers for HPI/ROS submitted by the patient on 1/27/2022  General Symptoms: No  Skin Symptoms: No  HENT Symptoms: No  EYE SYMPTOMS: No  HEART SYMPTOMS: Yes  LUNG SYMPTOMS: Yes  INTESTINAL SYMPTOMS: No  URINARY SYMPTOMS: No  GYNECOLOGIC SYMPTOMS: No  BREAST SYMPTOMS: No  SKELETAL SYMPTOMS: Yes  BLOOD SYMPTOMS: No  NERVOUS SYSTEM SYMPTOMS: No  MENTAL HEALTH SYMPTOMS: Yes  Chest pain or pressure: Yes  Fast or irregular heartbeat: Yes  Pain in legs with walking: No  Trouble breathing while lying down: No  Fingers or toes appear blue: No  High blood pressure: No  Low blood pressure: No  Fainting: No  Murmurs: No  Pacemaker: No  Varicose veins: No  Edema or swelling: No  Wake up at night with shortness of breath: No  Light-headedness: Yes  Exercise intolerance: No  Cough: Yes  Sputum or phlegm: Yes  Coughing up blood: No  Difficulty breating or shortness of breath: Yes  Snoring: Yes  Wheezing: Yes  Difficulty breathing on exertion: Yes  Nighttime Cough: Yes  Difficulty breathing when lying flat: No  Back pain: No  Muscle aches: No  Swollen joints:  No  Joint pain: Yes  Bone pain: No  Muscle cramps: Yes  Muscle weakness: No  Joint stiffness: No  Bone fracture: No  Nervous or Anxious: Yes  Depression: Yes  Trouble sleeping: No  Trouble thinking or concentrating: Yes  Mood changes: Yes  Panic attacks: No    Again, thank you for allowing me to participate in the care of your patient.      Sincerely,    Eyad Ley MD

## 2022-01-28 NOTE — PROGRESS NOTES
"Reason for Visit  Lisette Owens is a 57 year old year old female who is being seen for Follow Up (pr pt )    Pulmonary HPI    The patient was seen and examined by Eyad Ley MD     I had the pleasure of seeing Lisette Owens today at the [facility] in followup for her moderate persistent asthma.  I saw her by virtual visit on 09/04/2020. At that time, she told me she had more difficulty with asthma control in the prior year, particularly with coughing and need for prednisone.  She required 3 rounds of steroids and antibiotics.  Her controller regimen at that time was Advair Diskus 500/25 b.i.d., Singulair and as-needed albuterol MDI or nebs.  She said that only certain albuterol manufactured forms work for her. (ProAir works, but Perrigo does not.)  She is a brass player and musician and  and feels that it is hard to take a deep breath.  She was able to walk 2+ miles at a moderate pace, but was winded after climbing 3 flights of stairs at a relatively quick pace.  She has had her biologic son with unexplained breathing issues as a child that he grew out of and a sister who had lung surgery for an uncertain problem with calcification.  Her plan was to treat potentially silent GERD with Nexium and also to use Flonase regularly.  I had her use albuterol 5-10 minutes before Advair and other controller medication and also recommended switching from Advair to Breo 200/25.  The plan was to try Spiriva after 3 weeks on Breo if the Breo was not leading to improvement.  I refilled her ProAir with a do-not-substitute prescription.    Returning to clinic today, she says she is approximately the same.  She is still coughing and that it is difficult for her to take full deep breaths, describing \"guppy breathing and sucking in the air hard\" in order to play a brass instrument.  If she takes a deep breath, she feels that she will cough, so she avoids " "taking very deep breaths.  She did make the switch to Breo Ellipta without significant change from the Advair she was previously taking.  However, she did not try the Spiriva.  She does think she has had other inflammation and has changed her diet markedly to try to improve and decrease causes of inflammation of her sinuses or lungs.  Otherwise, a detailed pulmonary ROS is unchanged, and there are no new abnormalities in the general review of systems.          Current Outpatient Medications   Medication     albuterol (PROAIR HFA) 108 (90 Base) MCG/ACT inhaler     albuterol (PROVENTIL) (2.5 MG/3ML) 0.083% neb solution     aspirin  MG EC tablet     BREO ELLIPTA 200-25 MCG/INH Inhaler     busPIRone (BUSPAR) 10 MG tablet     diltiazem ER (TIAZAC) 240 MG 24 hr ER beaded capsule     esomeprazole (NEXIUM) 40 MG DR capsule     FLUoxetine (PROZAC) 20 MG capsule     Fluticasone-Umeclidin-Vilanterol (TRELEGY ELLIPTA) 200-62.5-25 MCG/INH oral inhaler     LORazepam (ATIVAN) 0.5 MG tablet     losartan (COZAAR) 25 MG tablet     metFORMIN (GLUCOPHAGE-XR) 500 MG 24 hr tablet     montelukast (SINGULAIR) 10 MG tablet     nitroglycerin (NITRODUR) 0.4 MG/HR     nitroGLYcerin (NITROSTAT) 0.4 MG sublingual tablet     predniSONE (DELTASONE) 20 MG tablet     rosuvastatin (CRESTOR) 5 MG tablet     cyanocobalamin (CYANOCOBALAMIN) 1000 MCG/ML injection     Syringe/Needle, Disp, 20G X 1\" 3 ML MISC     umeclidinium (INCRUSE ELLIPTA) 62.5 MCG/INH inhaler     No current facility-administered medications for this visit.     Facility-Administered Medications Ordered in Other Visits   Medication     sodium chloride (PF) 0.9% PF flush 10 mL     Allergies   Allergen Reactions     Ampicillin Rash     Cipro [Quinolones] Anaphylaxis     Macrobid [Nitrofuran Derivatives] GI Disturbance     Past Medical History:   Diagnosis Date     Allergic rhinitis due to animal dander      Amblyopia     LT     Arthritis      Colon polyp      Depressive disorder  " "    Endometriosis      Heart attack (H)     2016     House dust mite allergy      Moderate persistent asthma      Rhinitis, allergic to other allergen      Seasonal allergic rhinitis 7/25/05 skin tests pos. for: cat/dog/horse/DM/M/T/G/RW per Dr. Granado    pt. did IT from 7/06 to 11/5/07 to: cat/dog/DM/M/T/G/W dc'd per self.      Type 2 diabetes mellitus without complication, without long-term current use of insulin (H) 3/13/2017       Past Surgical History:   Procedure Laterality Date     COLONOSCOPY  10/2020     EXCISE EXOSTOSIS FOOT Right 10/2/2018    Procedure: EXCISE EXOSTOSIS FOOT;  Right foot removal of first tarsometatarsal joint dorsal bossing;  Surgeon: Will Barraza DPM;  Location: [facility] OR     GYN SURGERY  June 24 2016    Hysterectomy complete     HYSTEROSCOPY         Social History     Socioeconomic History     Marital status:      Spouse name: Not on file     Number of children: Not on file     Years of education: Not on file     Highest education level: Not on file   Occupational History     Not on file   Tobacco Use     Smoking status: Never Smoker     Smokeless tobacco: Never Used   Vaping Use     Vaping Use: Never used   Substance and Sexual Activity     Alcohol use: Yes     Comment: Rarely     Drug use: No     Sexual activity: Yes     Partners: Male     Birth control/protection: None     Comment: N/A   Other Topics Concern     Parent/sibling w/ CABG, MI or angioplasty before 65F 55M? No   Social History Narrative     Not on file     Social Determinants of Health     Financial Resource Strain: Not on file   Food Insecurity: Not on file   Transportation Needs: Not on file   Physical Activity: Not on file   Stress: Not on file   Social Connections: Not on file   Intimate Partner Violence: Not on file   Housing Stability: Not on file       ROS Pulmonary    A complete ROS was otherwise negative except as noted in the HPI.  /64 (BP Location: Right arm)   Pulse 69   Ht 1.689 m (5' 6.5\")  "  Wt 83 kg (183 lb)   LMP 05/15/2014 (Approximate)   SpO2 97%   BMI 29.09 kg/m    Exam:   GENERAL APPEARANCE: Well developed, well nourished, alert, and in no apparent distress.  EYES: PERRL, EOMI  HENT: Nasal mucosa with no edema and no hyperemia. No nasal polyps.  MOUTH: Oral mucosa is moist, without any lesions, no tonsillar enlargement, no oropharyngeal exudate.  NECK: supple, no masses, no thyromegaly.  LYMPHATICS: No significant axillary, cervical, or supraclavicular nodes.  RESP: normal inspection, palpation & percussion, good air flow throughout.  No crackles. No rhonchi. No wheezes.  CV: RRR Normal S1, S2, regular rhythm, normal rate. No murmur.  No rub. No gallop. No LE edema.   ABDOMEN:  deferred  MS: extremities normal. No clubbing. No cyanosis.  SKIN: no rash on limited exam  NEURO: Mentation intact, speech normal, normal strength and tone, normal gait and stance  PSYCH: mentation appears normal. and affect normal/bright  Results:  No results found for this or any previous visit (from the past 168 hour(s)).     PFTs 8/21 reviewed by me  FEV1/FVC 1.9/3.2 (65/87 % pred) =  59% with 14% increase in FEV1 with BDs  Nl TLC and DLCO    Assessment and plan:   1.  Moderate persistent asthma:  I recommended that she continue her Singulair and anti-reflux therapy.  We will switch the Breo Ellipta to Trelegy Ellipta used once in the morning.  If she continues to have relatively frequent flares of asthma and asthma that is not well controlled, then we will consider the need for biologic therapies based on systemic eosinophil count, etc.    2.  Chronic rhinosinusitis:  I recommended that she continue daily Claritin and nasal steroids.  We also thought she should have an ENT referral to look at additional treatment options, since this has been a chronic problem for her.    3.  GERD:  I recommended she continue her anti-reflux therapy so that this does not contribute to her asthma and chronic cough.    I plan to  have her return to clinic in 4 months with followup PFTs.  She was given contact information for our nursing staff if she has problems or questions in the interim.  She was satisfied with this plan and will make the change.  I said that if she was not improving at all, she should not wait 4 months to be back in contact with us.    I spent a total of 50 minutes dedicated to her care today, 01/28/2022, including review of her past PFTs and imaging studies, including personal review of the images, review of her chart and past visits, in contact time with her and my documentation.      Answers for HPI/ROS submitted by the patient on 1/27/2022  General Symptoms: No  Skin Symptoms: No  HENT Symptoms: No  EYE SYMPTOMS: No  HEART SYMPTOMS: Yes  LUNG SYMPTOMS: Yes  INTESTINAL SYMPTOMS: No  URINARY SYMPTOMS: No  GYNECOLOGIC SYMPTOMS: No  BREAST SYMPTOMS: No  SKELETAL SYMPTOMS: Yes  BLOOD SYMPTOMS: No  NERVOUS SYSTEM SYMPTOMS: No  MENTAL HEALTH SYMPTOMS: Yes  Chest pain or pressure: Yes  Fast or irregular heartbeat: Yes  Pain in legs with walking: No  Trouble breathing while lying down: No  Fingers or toes appear blue: No  High blood pressure: No  Low blood pressure: No  Fainting: No  Murmurs: No  Pacemaker: No  Varicose veins: No  Edema or swelling: No  Wake up at night with shortness of breath: No  Light-headedness: Yes  Exercise intolerance: No  Cough: Yes  Sputum or phlegm: Yes  Coughing up blood: No  Difficulty breating or shortness of breath: Yes  Snoring: Yes  Wheezing: Yes  Difficulty breathing on exertion: Yes  Nighttime Cough: Yes  Difficulty breathing when lying flat: No  Back pain: No  Muscle aches: No  Swollen joints: No  Joint pain: Yes  Bone pain: No  Muscle cramps: Yes  Muscle weakness: No  Joint stiffness: No  Bone fracture: No  Nervous or Anxious: Yes  Depression: Yes  Trouble sleeping: No  Trouble thinking or concentrating: Yes  Mood changes: Yes  Panic attacks: No

## 2022-01-28 NOTE — NURSING NOTE
Chief Complaint   Patient presents with     Follow Up     pr pt      Vitals were taken and medications were reconciled.    Vida Kraft RMA  9:00 AM

## 2022-02-01 ENCOUNTER — VIRTUAL VISIT (OUTPATIENT)
Dept: CARDIOLOGY | Facility: CLINIC | Age: 57
End: 2022-02-01
Attending: PHYSICIAN ASSISTANT
Payer: COMMERCIAL

## 2022-02-01 DIAGNOSIS — R07.89 ATYPICAL CHEST PAIN: ICD-10-CM

## 2022-02-01 DIAGNOSIS — R07.9 CHEST PAIN, UNSPECIFIED TYPE: Primary | ICD-10-CM

## 2022-02-01 PROCEDURE — 99214 OFFICE O/P EST MOD 30 MIN: CPT | Mod: 95 | Performed by: INTERNAL MEDICINE

## 2022-02-01 NOTE — PROGRESS NOTES
"Lisette Owens  is being evaluated via a billable video visit.      How would you like to obtain your AVS? [employer]  For the video visit, send the invitation by: Text to cell phone: [phone] if mychart fails   Will anyone else be joining your video visit? No  {If patient encounters technical issues they should call [phone]     The patient has been notified of following:     \"This video visit will be conducted via a call between you and your physician/provider. We have found that certain health care needs can be provided without the need for an in-person physical exam.  This service lets us provide the care you need with a video conversation.  If a prescription is necessary we can send it directly to your pharmacy.  If lab work is needed we can place an order for that and you can then stop by our lab to have the test done at a later time.    Video visits are billed at different rates depending on your insurance coverage.  Please reach out to your insurance provider with any questions.    If during the course of the call the physician/provider feels a video visit is not appropriate, you will not be charged for this service.\"    Patient has given verbal consent for video visit? Yes    How would you like to obtain your AVS? Mail    Video-Visit Details    Type of service:  Video Visit    Video Start Time:147pm    Video End Time:209pm    Total visit time including video visit, chart review, charting, coordination of care =47min    Originating Location (pt. Location):patient home      Distant Location (provider location):  home office    Platform used for Video Visit: Kristopher    See dictation #[id number]    [facility]#:[id number]      "

## 2022-02-01 NOTE — NURSING NOTE
Chief Complaint   Patient presents with     Consult     atypical chest pain, referred by PCP, no questions/concerns at time of rooming, pt had no vitals for todays visit      Patient denies any changes since echeck-in regarding medication and allergies and states all information entered during echeck-in remains accurate.    Kurt Noyola, VF/CMA

## 2022-02-01 NOTE — PROGRESS NOTES
Visit Date: 2022        Kolton Yin, [facility]  [address] [place] [address]    Patient:  Lisette Owens  MRN:  [id number]  :  1965    Dear Mr. Yin:    It was a pleasure participating in the care of your patient, Ms. Lisette Owens.  As you know, she is a 57-year-old lady whom I saw today over a virtual video visit via [employer] for persistent and recurrent chest pain.    PAST MEDICAL HISTORY:      1.  Type 2 diabetes.  2.  Hyperlipidemia.  3.  Depression/anxiety.  4.  PTSD.  5.  Asthma/allergic rhinitis.  6.  Hypersomnia.    Her cardiac history is based on her verbal report and formal documentation is not available for review.  However, she does report that her daughter  and was killed in 2016.  She was 3 days out from a hysterectomy at that time and once she heard the news, she had pain in the center of her chest.  In 2016, after having intercourse with her , she had chest pain radiating to her back.  She went to the Emergency Room and was admitted and supposedly had a coronary angiogram performed for non-ST elevation myocardial infarction and no obstructive disease was detected and supposedly no intervention was performed.    She presents now with different symptoms of chest discomfort.  For 3-4 months, she has been having a sharp discomfort in the middle of her chest, not related to activity.  She had a stress echo performed on 2021 that was unremarkable.  However, she continues to have episodes once every couple of weeks anywhere from 10 minutes to 2 hours.  Her last episode was 2 days ago, and she describes it as focused, localized, sharp in nature without a pattern, it is not related to activity, not related to breathing, position or eating and nitroglycerin does help.  It has not been accelerating in frequency or severity, but it persists despite her prior test.    She otherwise denies any significant shortness of breath,  PND, orthopnea, edema, palpitations, syncope or near syncope.    In terms of her current medications, she is taking aspirin 325 mg a day, buspirone, diltiazem 240 mg a day, fluoxetine, losartan 25 mg a day, metformin, rosuvastatin 5 mg a day.    PHYSICAL EXAMINATION:      VITAL SIGNS:  Her blood pressure 01/28/2022 was 117/64 with a pulse of 69.  Her weight was 183 pounds.  GENERAL:  She appears comfortable, well groomed.  PSYCHIATRIC:  She is alert and oriented x3.  HEENT:  Her eyes do not appear grossly erythematous or have exudate.  RESPIRATORY:  She is breathing comfortably without gross cough.    The remainder of the comprehensive physical exam was deferred secondary to the COVID-19 pandemic and secondary to video visit restrictions.    LABORATORY DATA:  On 11/20/2021, LDL was 127.  On 05/18/2021, potassium 4.0, GFR normal.  Hemoglobin A1c 6.6.    Stress echo 12/17/2021 reveals an ejection fraction of 60% to 65%.  No significant valvular pathology.  No significant inducible ischemia after exercising 6 minutes and 21 seconds, reaching a peak heart rate of 144 beats per minute and a rate pressure product of 32,900.    EKG (09/27/2018):  Normal sinus rhythm at a rate of 81 beats per minute, late R-wave progression.    Holter monitor exam 10/13/2016 reveals symptoms corresponding to normal sinus rhythm, occasionally associated with PACs and PVCs.      IMPRESSION:      Lisette is a 57-year-old lady who has several active issues:    1.  History of chest pain of unclear etiology:      She gives a verbal history of symptoms back in 2016 consistent with either Takotsubo cardiomyopathy (stress cardiomyopathy) associated with the traumatic event of her daughter being killed versus coronary spasm versus other less likely condition.      However, she presents now with recurrent symptoms that started 3-4 months ago that are relatively random in nature; however, nitroglycerin does help improve the underlying chest pain.  This  has occurred despite having a normal stress echo 2021 and her chest pain persists and continues to recur.  Further noninvasive evaluation would be indicated.    2.  Hyperlipidemia:      The patient has an elevated LDL of 127 ,and further recommendations regarding this can be made after our noninvasive evaluation has been completed.      PLAN:    1.  Coronary CTA.      In this way, we can more definitively identify the underlying cause for her symptoms and rule out underlying coronary disease as a potential culprit in the context of her recent negative stress echo with persistent and current chest pain symptoms.    2.  Pending coronary CTA results, if there is the presence of any underlying coronary artery disease, then her goal LDL would be less than 70 and we can up-titrate Crestor as needed to achieve this target.    3.  Further updates to follow pending the above test results.    Once again, it was a pleasure participating in the care of your patient, Ms. Lisette Owens.  Please feel free to contact me at any time with any questions regarding her care in the future.        Neal Spann MD    Addendum 3/16/22:    Coronary CTA reveals:    LM nl  LAD nl  Cx nl  RCAnl  Ca score 0    Plan:    1.  Followup primary MD for continued eval/tx    2.  Mediterranean diet    D: 2022   T: 2022   MT: [id number]    Name:     LISETTE OWENS  MRN:      [id number]        Account:    [id number]   :      1965           Visit Date: 2022     Document: [id number]    cc:  Kolton Yin PA-C

## 2022-02-03 NOTE — TELEPHONE ENCOUNTER
FUTURE VISIT INFORMATION      FUTURE VISIT INFORMATION:    Date: 3/30/22    Time: 3PM    Location: [facility]  REFERRAL INFORMATION:    Referring provider:  Eyad Ley MD    Referring providers clinic:  [facility] [place]     Reason for visit/diagnosis  Rhino for sinus concern, needs, referred by Eyad Ley MD in  CTR LUNG SCIENCE, recds in epic per pt    RECORDS REQUESTED FROM:       Clinic name Comments Records Status Imaging Status    [facility]  1/28/22 note and referral from Eyad Ley MD [facility]    Imaging 6/16/21 MR Brain [facility] PACS   [facility]  2/26/14 CT Head Neck  Care everywhere  req 2/3/22 - PACS                       2/3/22 9:10AM sent a fax to [facility] for images - [facility]   2/14/22 3:15pm images received in PACS - [facility]

## 2022-02-21 ENCOUNTER — TELEPHONE (OUTPATIENT)
Dept: NEUROLOGY | Facility: CLINIC | Age: 57
End: 2022-02-21

## 2022-02-21 NOTE — TELEPHONE ENCOUNTER
2/21/22 [facility] for patient that if she wanted to change her in person visit to a virtual visit that she could call [phone].    Hanna Molina Procedure   Neurology & Neurosurgery Specialties  [facility]   [phone]

## 2022-02-22 ENCOUNTER — VIRTUAL VISIT (OUTPATIENT)
Dept: NEUROLOGY | Facility: CLINIC | Age: 57
End: 2022-02-22
Payer: COMMERCIAL

## 2022-02-22 DIAGNOSIS — R40.0 DROWSINESS: ICD-10-CM

## 2022-02-22 DIAGNOSIS — R41.3 MEMORY CHANGE: Primary | ICD-10-CM

## 2022-02-22 DIAGNOSIS — E53.8 VITAMIN B12 DEFICIENCY (NON ANEMIC): ICD-10-CM

## 2022-02-22 PROCEDURE — 99215 OFFICE O/P EST HI 40 MIN: CPT | Mod: 95 | Performed by: INTERNAL MEDICINE

## 2022-02-22 NOTE — PROGRESS NOTES
Lisette is a 57 year old who is being evaluated via a billable video visit.      How would you like to obtain your AVS? MyChart  If the video visit is dropped, the invitation should be resent by: Text to cell phone: [phone]  Will anyone else be joining your video visit? No      Video-Visit Details    Type of service:  Video Visit    Video Duration: 35 minutes    Originating Location (pt. Location): Home    Distant Location (provider location):  [facility] [place]     Platform used for Video Visit: [employer]

## 2022-02-22 NOTE — PROGRESS NOTES
[facility] Neurology Follow Up Visit    Lisette LINCOLN Ukpeggygbu MRN# [id number]   Age: 57 year old YOB: 1965     Brief history of symptoms: The patient was initially seen in neurologic consultation on 5/18/2021 for evaluation of memory changes. Please see the comprehensive neurologic consultation note from that date in the Epic records for details.     Interval history:   Memory concerns are about the same to mildly worse when compared to last visit.     She is not sure if it is a memory problems, attention issue, or a trauma issue related to her daughter's death. She continues to have a daily level of sadness. Anxiety has been increasing in the last 4 months. She follows closely with psychiatry.    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 She walks into a room and forgets what she was looking for. It is difficult to focus on things. She sometimes struggles to find the right words at times.     She hasn't noticed any improvement with B12 supplementation. She noticed side effects when she did the B12 injections. After every shot she would be fatigued for about 5 days.    She has made changes to her diet based off of allergy testing. She hasn't eaten meat for several years. She is no longer eating dairy or eggs. She is trying to get protein from alternative sources.     About 12 years ago patient saw a sleep doctor and had a sleep study. At one point she was on Provigil. She had benefit from this. Insurance stopped paying for this medication.  She was placed on Nuvigil, which didn't work as well and caused side effects.      Past Medical History:     Patient Active Problem List   Diagnosis     Dry eye syndrome     CARDIOVASCULAR SCREENING; LDL GOAL LESS THAN 160     Seasonal allergic rhinitis     Allergic rhinitis due to animal dander     Rhinitis, allergic to other allergen     House dust mite allergy     Moderate persistent asthma     PTSD (post-traumatic stress disorder)     Vitamin D  deficiency     Hypersomnia     Esophageal reflux (GERD)     Depression with anxiety     Acute cystitis with hematuria     Type 2 diabetes mellitus without complication, without long-term current use of insulin (H)     Anemia     Dyspareunia (CODE)     Insomnia, unspecified     Past Medical History:   Diagnosis Date     Allergic rhinitis due to animal dander      Amblyopia     LT     Arthritis      Colon polyp      Depressive disorder      Endometriosis      Heart attack (H)     2016     House dust mite allergy      Moderate persistent asthma      Rhinitis, allergic to other allergen      Seasonal allergic rhinitis 7/25/05 skin tests pos. for: cat/dog/horse/DM/M/T/G/RW per Dr. Granado    pt. did IT from 7/06 to 11/5/07 to: cat/dog/DM/M/T/G/W dc'd per self.      Type 2 diabetes mellitus without complication, without long-term current use of insulin (H) 3/13/2017        Past Surgical History:     Past Surgical History:   Procedure Laterality Date     COLONOSCOPY  10/2020     EXCISE EXOSTOSIS FOOT Right 10/2/2018    Procedure: EXCISE EXOSTOSIS FOOT;  Right foot removal of first tarsometatarsal joint dorsal bossing;  Surgeon: Will Barraza DPM;  Location: [facility] OR     GYN SURGERY  June 24 2016    Hysterectomy complete     HYSTEROSCOPY          Social History:     Social History     Tobacco Use     Smoking status: Never Smoker     Smokeless tobacco: Never Used   Vaping Use     Vaping Use: Never used   Substance Use Topics     Alcohol use: Yes     Comment: Rarely     Drug use: No        Family History:     Family History   Problem Relation Age of Onset     Hypertension Mother      Alzheimer Disease Mother      Diabetes Mother      Tremor Mother      Hypertension Father      Skin Cancer Father      Alzheimer Disease Paternal Grandmother      Psychotic Disorder Paternal Grandmother         bipolar     Heart Disease Paternal Grandfather      Breast Cancer Maternal Grandmother      Tremor Maternal Grandfather      Thyroid  Disease Brother         Tumors removed     Tremor Brother      Thyroid Disease Sister      Diabetes Sister      Skin Cancer Sister      Mental Illness Brother         Bi-Polar     Cerebrovascular Disease No family hx of      Glaucoma No family hx of      Macular Degeneration No family hx of         Medications:     Current Outpatient Medications   Medication Sig     albuterol (PROAIR HFA) 108 (90 Base) MCG/ACT inhaler INHALE TWO PUFFS BY MOUTH EVERY FOUR HOURS AS NEEDED FOR SHORTNESS OF BREATH/ DIFFICULTY BREATHING     albuterol (PROVENTIL) (2.5 MG/3ML) 0.083% neb solution Inhale the contents of 1 vial via nebulizer every 4 hours as needed for shortness of breath     aspirin  MG EC tablet Take 1 tablet (325 mg) by mouth daily     BREO ELLIPTA 200-25 MCG/INH Inhaler INHALE 1 PUFF INTO THE LUNGS ONCE DAILY. NEEDS TO BE SEEN FOR ANY FURTHER REFILLS.     busPIRone (BUSPAR) 10 MG tablet Take 1 tablet (10 mg) by mouth 2 times daily     diltiazem ER (TIAZAC) 240 MG 24 hr ER beaded capsule TAKE 1 CAPSULE BY MOUTH ONCE DAILY     esomeprazole (NEXIUM) 40 MG DR capsule TAKE 1 CAPSULE BY MOUTH EVERY MORNING 30 TO 60 MINUTES BEFORE BREAKFAST     FLUoxetine (PROZAC) 20 MG capsule TAKE 1 CAPSULE BY MOUTH ONCE DAILY     Fluticasone-Umeclidin-Vilanterol (TRELEGY ELLIPTA) 200-62.5-25 MCG/INH oral inhaler Inhale 1 puff into the lungs daily Rinse mouth and gargle after use     LORazepam (ATIVAN) 0.5 MG tablet Take 0.5-1 tablets (0.25-0.5 mg) by mouth every 6 hours as needed for anxiety     losartan (COZAAR) 25 MG tablet Take 1 tablet (25 mg) by mouth daily     metFORMIN (GLUCOPHAGE-XR) 500 MG 24 hr tablet TAKE 2 TABLETS BY MOUTH TWICE DAILY with meals     montelukast (SINGULAIR) 10 MG tablet Take 1 tablet (10 mg) by mouth At Bedtime     nitroglycerin (NITRODUR) 0.4 MG/HR Place 1 patch onto the skin as needed Reported on 2/14/2017     nitroGLYcerin (NITROSTAT) 0.4 MG sublingual tablet For chest pain place 1 tablet under the tongue  every 5 minutes for 3 doses. If symptoms persist 5 minutes after 1st dose call 911.     predniSONE (DELTASONE) 20 MG tablet Take 3 tabs by mouth daily x 3 days, then 2 tabs daily x 3 days, then 1 tab daily x 3 days, then 1/2 tab daily x 3 days.     rosuvastatin (CRESTOR) 5 MG tablet Take 1 tablet (5 mg) by mouth daily     No current facility-administered medications for this visit.     Facility-Administered Medications Ordered in Other Visits   Medication     sodium chloride (PF) 0.9% PF flush 10 mL        Allergies:     Allergies   Allergen Reactions     Ampicillin Rash     Cipro [Quinolones] Anaphylaxis     Macrobid [Nitrofuran Derivatives] GI Disturbance        Review of Systems:   As above     Physical Exam:   Vitals: LMP 05/15/2014 (Approximate)    General: Seated comfortably in no acute distress.    Lungs: breathing comfortably  Neurologic:     Mental Status: Fully alert, attentive and oriented. Normal memory and fund of knowledge. Language normal, speech clear and fluent, no paraphasic errors. TICS-m 42/51 (-3 immediate recall, -6 delayed recall).       Data reviewed on previous visits    Laboratory:  TSH normal 2019  BMP normal 8/2020  CBC normal 2018    Pertinent Investigations since last visit:   MRI brain 6/2021  Impression: Essentially normal brain MRI for age.    B12 240, MMA 0.44 (5/2021)         Assessment and Plan:   Assessment:  Lisette Owens is a 56 year old female who presents today for follow-up of memory changes. Short term memory concerns first developed after her daughter's death 5 years ago and have continued since. On MOCA at initial visit in 5/2021 patient scored 28/30 (-1 on delayed recall and -1 for calling rhino a hippo). MRI brain was unremarkable. She was found to be mildly B12 deficient and was started on B12 supplementation. She continues to have bothersome symptoms today. She believes symptoms are at least in part related to mood symptoms. Poor sleep may also be contributing and  sleep referral was placed today. TICS-m score was within normal limits at 42/51 as above. Neuropsychology testing ordered to establish cognitive baseline and evaluate for any objective deficits. We will recheck B12 levels and decide on need for additional supplementation.     Plan:  - Neuropsychology testing  - B12, MMA   - Sleep referral     Follow up in Neurology clinic after neuropsychology testing    Jean Marie Waters MD   of Neurology  [facility]    The total time of this encounter today amounted to 50 minutes. This time included time spent with the patient, prep work, ordering tests, and performing post visit documentation.

## 2022-02-22 NOTE — LETTER
2/22/2022         RE: Lisette Owens  [address] [place] [address]        Dear Colleague,    Thank you for referring your patient, Lisette Owens, to the [facility] [place]. Please see a copy of my visit note below.    [facility] Neurology Follow Up Visit    Lisette Owens MRN# [id number]   Age: 57 year old YOB: 1965     Brief history of symptoms: The patient was initially seen in neurologic consultation on 5/18/2021 for evaluation of memory changes. Please see the comprehensive neurologic consultation note from that date in the Epic records for details.     Interval history:   Memory concerns are about the same to mildly worse when compared to last visit.     She is not sure if it is a memory problems, attention issue, or a trauma issue related to her daughter's death. She continues to have a daily level of sadness. Anxiety has been increasing in the last 4 months. She follows closely with psychiatry.    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 She walks into a room and forgets what she was looking for. It is difficult to focus on things. She sometimes struggles to find the right words at times.     She hasn't noticed any improvement with B12 supplementation. She noticed side effects when she did the B12 injections. After every shot she would be fatigued for about 5 days.    She has made changes to her diet based off of allergy testing. She hasn't eaten meat for several years. She is no longer eating dairy or eggs. She is trying to get protein from alternative sources.     About 12 years ago patient saw a sleep doctor and had a sleep study. At one point she was on Provigil. She had benefit from this. Insurance stopped paying for this medication.  She was placed on Nuvigil, which didn't work as well and caused side effects.      Past Medical History:     Patient Active Problem List   Diagnosis     Dry eye syndrome     CARDIOVASCULAR  SCREENING; LDL GOAL LESS THAN 160     Seasonal allergic rhinitis     Allergic rhinitis due to animal dander     Rhinitis, allergic to other allergen     House dust mite allergy     Moderate persistent asthma     PTSD (post-traumatic stress disorder)     Vitamin D deficiency     Hypersomnia     Esophageal reflux (GERD)     Depression with anxiety     Acute cystitis with hematuria     Type 2 diabetes mellitus without complication, without long-term current use of insulin (H)     Anemia     Dyspareunia (CODE)     Insomnia, unspecified     Past Medical History:   Diagnosis Date     Allergic rhinitis due to animal dander      Amblyopia     LT     Arthritis      Colon polyp      Depressive disorder      Endometriosis      Heart attack (H)     2016     House dust mite allergy      Moderate persistent asthma      Rhinitis, allergic to other allergen      Seasonal allergic rhinitis 7/25/05 skin tests pos. for: cat/dog/horse/DM/M/T/G/RW per Dr. Granado    pt. did IT from 7/06 to 11/5/07 to: cat/dog/DM/M/T/G/W dc'd per self.      Type 2 diabetes mellitus without complication, without long-term current use of insulin (H) 3/13/2017        Past Surgical History:     Past Surgical History:   Procedure Laterality Date     COLONOSCOPY  10/2020     EXCISE EXOSTOSIS FOOT Right 10/2/2018    Procedure: EXCISE EXOSTOSIS FOOT;  Right foot removal of first tarsometatarsal joint dorsal bossing;  Surgeon: Will Barraza DPM;  Location: [facility] OR     GYN SURGERY  June 24 2016    Hysterectomy complete     HYSTEROSCOPY          Social History:     Social History     Tobacco Use     Smoking status: Never Smoker     Smokeless tobacco: Never Used   Vaping Use     Vaping Use: Never used   Substance Use Topics     Alcohol use: Yes     Comment: Rarely     Drug use: No        Family History:     Family History   Problem Relation Age of Onset     Hypertension Mother      Alzheimer Disease Mother      Diabetes Mother      Tremor Mother      Hypertension  Father      Skin Cancer Father      Alzheimer Disease Paternal Grandmother      Psychotic Disorder Paternal Grandmother         bipolar     Heart Disease Paternal Grandfather      Breast Cancer Maternal Grandmother      Tremor Maternal Grandfather      Thyroid Disease Brother         Tumors removed     Tremor Brother      Thyroid Disease Sister      Diabetes Sister      Skin Cancer Sister      Mental Illness Brother         Bi-Polar     Cerebrovascular Disease No family hx of      Glaucoma No family hx of      Macular Degeneration No family hx of         Medications:     Current Outpatient Medications   Medication Sig     albuterol (PROAIR HFA) 108 (90 Base) MCG/ACT inhaler INHALE TWO PUFFS BY MOUTH EVERY FOUR HOURS AS NEEDED FOR SHORTNESS OF BREATH/ DIFFICULTY BREATHING     albuterol (PROVENTIL) (2.5 MG/3ML) 0.083% neb solution Inhale the contents of 1 vial via nebulizer every 4 hours as needed for shortness of breath     aspirin  MG EC tablet Take 1 tablet (325 mg) by mouth daily     BREO ELLIPTA 200-25 MCG/INH Inhaler INHALE 1 PUFF INTO THE LUNGS ONCE DAILY. NEEDS TO BE SEEN FOR ANY FURTHER REFILLS.     busPIRone (BUSPAR) 10 MG tablet Take 1 tablet (10 mg) by mouth 2 times daily     diltiazem ER (TIAZAC) 240 MG 24 hr ER beaded capsule TAKE 1 CAPSULE BY MOUTH ONCE DAILY     esomeprazole (NEXIUM) 40 MG DR capsule TAKE 1 CAPSULE BY MOUTH EVERY MORNING 30 TO 60 MINUTES BEFORE BREAKFAST     FLUoxetine (PROZAC) 20 MG capsule TAKE 1 CAPSULE BY MOUTH ONCE DAILY     Fluticasone-Umeclidin-Vilanterol (TRELEGY ELLIPTA) 200-62.5-25 MCG/INH oral inhaler Inhale 1 puff into the lungs daily Rinse mouth and gargle after use     LORazepam (ATIVAN) 0.5 MG tablet Take 0.5-1 tablets (0.25-0.5 mg) by mouth every 6 hours as needed for anxiety     losartan (COZAAR) 25 MG tablet Take 1 tablet (25 mg) by mouth daily     metFORMIN (GLUCOPHAGE-XR) 500 MG 24 hr tablet TAKE 2 TABLETS BY MOUTH TWICE DAILY with meals     montelukast  (SINGULAIR) 10 MG tablet Take 1 tablet (10 mg) by mouth At Bedtime     nitroglycerin (NITRODUR) 0.4 MG/HR Place 1 patch onto the skin as needed Reported on 2/14/2017     nitroGLYcerin (NITROSTAT) 0.4 MG sublingual tablet For chest pain place 1 tablet under the tongue every 5 minutes for 3 doses. If symptoms persist 5 minutes after 1st dose call 911.     predniSONE (DELTASONE) 20 MG tablet Take 3 tabs by mouth daily x 3 days, then 2 tabs daily x 3 days, then 1 tab daily x 3 days, then 1/2 tab daily x 3 days.     rosuvastatin (CRESTOR) 5 MG tablet Take 1 tablet (5 mg) by mouth daily     No current facility-administered medications for this visit.     Facility-Administered Medications Ordered in Other Visits   Medication     sodium chloride (PF) 0.9% PF flush 10 mL        Allergies:     Allergies   Allergen Reactions     Ampicillin Rash     Cipro [Quinolones] Anaphylaxis     Macrobid [Nitrofuran Derivatives] GI Disturbance        Review of Systems:   As above     Physical Exam:   Vitals: LMP 05/15/2014 (Approximate)    General: Seated comfortably in no acute distress.    Lungs: breathing comfortably  Neurologic:     Mental Status: Fully alert, attentive and oriented. Normal memory and fund of knowledge. Language normal, speech clear and fluent, no paraphasic errors. TICS-m 42/51 (-3 immediate recall, -6 delayed recall).       Data reviewed on previous visits    Laboratory:  TSH normal 2019  BMP normal 8/2020  CBC normal 2018    Pertinent Investigations since last visit:   MRI brain 6/2021  Impression: Essentially normal brain MRI for age.    B12 240, MMA 0.44 (5/2021)         Assessment and Plan:   Assessment:  Lisette Owens is a 56 year old female who presents today for follow-up of memory changes. Short term memory concerns first developed after her daughter's death 5 years ago and have continued since. On MOCA at initial visit in 5/2021 patient scored 28/30 (-1 on delayed recall and -1 for calling rhino a hippo).  MRI brain was unremarkable. She was found to be mildly B12 deficient and was started on B12 supplementation. She continues to have bothersome symptoms today. She believes symptoms are at least in part related to mood symptoms. Poor sleep may also be contributing and sleep referral was placed today. TICS-m score was within normal limits at 42/51 as above. Neuropsychology testing ordered to establish cognitive baseline and evaluate for any objective deficits. We will recheck B12 levels and decide on need for additional supplementation.     Plan:  - Neuropsychology testing  - B12, MMA   - Sleep referral     Follow up in Neurology clinic after neuropsychology testing    Jean Marie Waters MD   of Neurology  [facility]    The total time of this encounter today amounted to 50 minutes. This time included time spent with the patient, prep work, ordering tests, and performing post visit documentation.      Lisette is a 57 year old who is being evaluated via a billable video visit.      How would you like to obtain your AVS? MyChart  If the video visit is dropped, the invitation should be resent by: Text to cell phone: [phone]  Will anyone else be joining your video visit? No      Video-Visit Details    Type of service:  Video Visit    Video Duration: 35 minutes    Originating Location (pt. Location): Home    Distant Location (provider location):  [facility] [place]     Platform used for Video Visit: [employer]      Again, thank you for allowing me to participate in the care of your patient.        Sincerely,        Jean Marie Waters MD

## 2022-03-05 ENCOUNTER — HEALTH MAINTENANCE LETTER (OUTPATIENT)
Age: 57
End: 2022-03-05

## 2022-03-15 ENCOUNTER — HOSPITAL ENCOUNTER (OUTPATIENT)
Dept: CT IMAGING | Facility: CLINIC | Age: 57
Discharge: HOME | End: 2022-03-15
Attending: INTERNAL MEDICINE
Payer: COMMERCIAL

## 2022-03-15 ENCOUNTER — MYC MEDICAL ADVICE (OUTPATIENT)
Dept: FAMILY MEDICINE | Facility: CLINIC | Age: 57
End: 2022-03-15

## 2022-03-15 VITALS — DIASTOLIC BLOOD PRESSURE: 42 MMHG | SYSTOLIC BLOOD PRESSURE: 90 MMHG | HEART RATE: 55 BPM | RESPIRATION RATE: 16 BRPM

## 2022-03-15 DIAGNOSIS — R07.9 CHEST PAIN, UNSPECIFIED TYPE: ICD-10-CM

## 2022-03-15 PROCEDURE — 75574 CT ANGIO HRT W/3D IMAGE: CPT | Mod: 26 | Performed by: STUDENT IN AN ORGANIZED HEALTH CARE EDUCATION/TRAINING PROGRAM

## 2022-03-15 PROCEDURE — 75574 CT ANGIO HRT W/3D IMAGE: CPT

## 2022-03-15 PROCEDURE — 250N000013 HC RX MED GY IP 250 OP 250 PS 637: Performed by: STUDENT IN AN ORGANIZED HEALTH CARE EDUCATION/TRAINING PROGRAM

## 2022-03-15 PROCEDURE — 250N000011 HC RX IP 250 OP 636: Performed by: STUDENT IN AN ORGANIZED HEALTH CARE EDUCATION/TRAINING PROGRAM

## 2022-03-15 RX ORDER — ACYCLOVIR 200 MG/1
0-1 CAPSULE ORAL
Status: DISCONTINUED | OUTPATIENT
Start: 2022-03-15 | End: 2022-03-16 | Stop reason: HOSPADM

## 2022-03-15 RX ORDER — METOPROLOL TARTRATE 25 MG/1
25-100 TABLET, FILM COATED ORAL
Status: COMPLETED | OUTPATIENT
Start: 2022-03-15 | End: 2022-03-15

## 2022-03-15 RX ORDER — DIPHENHYDRAMINE HCL 25 MG
25 CAPSULE ORAL
Status: DISCONTINUED | OUTPATIENT
Start: 2022-03-15 | End: 2022-03-16 | Stop reason: HOSPADM

## 2022-03-15 RX ORDER — IVABRADINE 5 MG/1
5-15 TABLET, FILM COATED ORAL
Status: COMPLETED | OUTPATIENT
Start: 2022-03-15 | End: 2022-03-15

## 2022-03-15 RX ORDER — IOPAMIDOL 755 MG/ML
120 INJECTION, SOLUTION INTRAVASCULAR ONCE
Status: COMPLETED | OUTPATIENT
Start: 2022-03-15 | End: 2022-03-15

## 2022-03-15 RX ORDER — METOPROLOL TARTRATE 1 MG/ML
5-15 INJECTION, SOLUTION INTRAVENOUS
Status: DISCONTINUED | OUTPATIENT
Start: 2022-03-15 | End: 2022-03-16 | Stop reason: HOSPADM

## 2022-03-15 RX ORDER — ONDANSETRON 2 MG/ML
4 INJECTION INTRAMUSCULAR; INTRAVENOUS
Status: DISCONTINUED | OUTPATIENT
Start: 2022-03-15 | End: 2022-03-16 | Stop reason: HOSPADM

## 2022-03-15 RX ORDER — METHYLPREDNISOLONE SODIUM SUCCINATE 125 MG/2ML
125 INJECTION, POWDER, LYOPHILIZED, FOR SOLUTION INTRAMUSCULAR; INTRAVENOUS
Status: DISCONTINUED | OUTPATIENT
Start: 2022-03-15 | End: 2022-03-16 | Stop reason: HOSPADM

## 2022-03-15 RX ORDER — NITROGLYCERIN 0.4 MG/1
.4-.8 TABLET SUBLINGUAL
Status: DISCONTINUED | OUTPATIENT
Start: 2022-03-15 | End: 2022-03-16 | Stop reason: HOSPADM

## 2022-03-15 RX ORDER — DIPHENHYDRAMINE HYDROCHLORIDE 50 MG/ML
25-50 INJECTION INTRAMUSCULAR; INTRAVENOUS
Status: DISCONTINUED | OUTPATIENT
Start: 2022-03-15 | End: 2022-03-16 | Stop reason: HOSPADM

## 2022-03-15 RX ADMIN — METOPROLOL TARTRATE 100 MG: 100 TABLET, FILM COATED ORAL at 12:17

## 2022-03-15 RX ADMIN — IVABRADINE 15 MG: 5 TABLET, FILM COATED ORAL at 12:17

## 2022-03-15 RX ADMIN — IOPAMIDOL 120 ML: 755 INJECTION, SOLUTION INTRAVENOUS at 13:08

## 2022-03-15 RX ADMIN — NITROGLYCERIN 0.8 MG: 0.4 TABLET SUBLINGUAL at 13:13

## 2022-03-15 NOTE — PROGRESS NOTES
Pt arrived for Coronary CT angiogram. Test, meds and side effects reviewed with pt.  Resting HR  80 bpm. Given 100 mg PO Metoprolol + 15 mg PO Ivabradine per verbal order. Administered 0.8 mg SL nitro on CTA table per order. CTA completed.  Patient tolerated procedure well and denies symptoms of allergic reaction.  Post monitoring completed and VSS.  D/C instructions reviewed with pt whom verbalized understanding of need to increase PO fluids today. D/C to gold waiting room accompanied by staff.

## 2022-03-19 ENCOUNTER — TELEPHONE (OUTPATIENT)
Dept: PULMONOLOGY | Facility: CLINIC | Age: 57
End: 2022-03-19
Payer: COMMERCIAL

## 2022-03-19 DIAGNOSIS — R91.1 LUNG NODULE: Primary | ICD-10-CM

## 2022-03-19 PROCEDURE — 99207 PR NO BILLABLE SERVICE THIS VISIT: CPT | Performed by: INTERNAL MEDICINE

## 2022-03-19 NOTE — TELEPHONE ENCOUNTER
Pulmonary Staff    Pt had Cardiac CT angiogram done on 3/15/22 and an 8 mm nodule was noted in perfissural area of RUL.  To my eye it has areas of punctate calcification within it.  She is a never smoker.  Dr. Ivis Guo recommends f/u in ~ 3 months and I called her today.  Got voice mail.  Told her as a non-smoker the cancer risk was lower but that I thought she should have a CT in 3-4 months in follow-up.  Order placed by me.  She was asked to call our nursing staff for questions or concerns.    Eyad Ley MD

## 2022-03-21 ENCOUNTER — TELEPHONE (OUTPATIENT)
Dept: PULMONOLOGY | Facility: CLINIC | Age: 57
End: 2022-03-21

## 2022-03-21 NOTE — TELEPHONE ENCOUNTER
Called patient and scheduled chest CT per nurse message for 6/28/22. Patient was offered to do the chest CT in early June but declined and asked for late June. Details of appointment confirmed with patient.

## 2022-03-30 ENCOUNTER — OFFICE VISIT (OUTPATIENT)
Dept: OTOLARYNGOLOGY | Facility: CLINIC | Age: 57
End: 2022-03-30
Attending: INTERNAL MEDICINE
Payer: COMMERCIAL

## 2022-03-30 ENCOUNTER — PRE VISIT (OUTPATIENT)
Dept: OTOLARYNGOLOGY | Facility: CLINIC | Age: 57
End: 2022-03-30

## 2022-03-30 VITALS
HEIGHT: 67 IN | TEMPERATURE: 98 F | SYSTOLIC BLOOD PRESSURE: 110 MMHG | BODY MASS INDEX: 28.72 KG/M2 | HEART RATE: 67 BPM | WEIGHT: 183 LBS | DIASTOLIC BLOOD PRESSURE: 56 MMHG

## 2022-03-30 DIAGNOSIS — J45.40 MODERATE PERSISTENT REACTIVE AIRWAY DISEASE WITHOUT COMPLICATION: ICD-10-CM

## 2022-03-30 DIAGNOSIS — J30.2 SEASONAL ALLERGIC RHINITIS, UNSPECIFIED TRIGGER: Primary | ICD-10-CM

## 2022-03-30 DIAGNOSIS — J01.41 ACUTE RECURRENT PANSINUSITIS: ICD-10-CM

## 2022-03-30 DIAGNOSIS — J45.40 MODERATE PERSISTENT ASTHMA WITHOUT COMPLICATION: ICD-10-CM

## 2022-03-30 DIAGNOSIS — J34.2 DEVIATED NASAL SEPTUM: ICD-10-CM

## 2022-03-30 PROCEDURE — 99203 OFFICE O/P NEW LOW 30 MIN: CPT | Mod: 25 | Performed by: OTOLARYNGOLOGY

## 2022-03-30 PROCEDURE — 31231 NASAL ENDOSCOPY DX: CPT | Performed by: OTOLARYNGOLOGY

## 2022-03-30 RX ORDER — BUDESONIDE 0.5 MG/2ML
INHALANT ORAL
Qty: 120 ML | Refills: 3 | Status: SHIPPED | OUTPATIENT
Start: 2022-03-30 | End: 2023-03-21

## 2022-03-30 ASSESSMENT — PAIN SCALES - GENERAL: PAINLEVEL: NO PAIN (0)

## 2022-03-30 NOTE — PROGRESS NOTES
[facility]  New Patient Visit      Encounter date:   March 30, 2022    Referring Provider:   Eyad Ley MD  [address]  [place] [address]    Chief Complaint: Post Nasal Drip, Cough    History of Present Illness:   Lisette Owens is a 57 year old female with history of persistent asthma who presents for consultation regarding chronic congestion. She does have extensive history of asthma that has been followed by Dr. Ley here and she is on a number of inhaler treatments for this. She does have a component of sinonasal congestion that she feels is blocking her breathing and hence Dr. Ley's referral for sinus involvement rule out.     Today she tells me that she has been dealing with post nasal drip for some several year that can often trigger a cough. 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 She will have bouts of discolored drainage, congestion, pressure, that spreads to her lungs and involves productive coughs. Prior to Trelegy she was taking antibiotics and Prednisone 3-4x/ year along with antibiotics for recurrent acute sinus infections. She takes Claritin, Singulair, Nasacort, and was recently switched to Trelegy; Trelegy seems to be helping her breathing since swi tching. In the past she did saline rinses, but she has not been using them for some time.     She has not experienced a sinus flare, reportedly, in some time.    She has seen Dr. Carroll in the past, started on GERD medication, and evaluated by speech therapy. She did attend speech therapy and saw some improvements with this. She continues to be on PPIs.         Sino-Nasal Outcome Test (SNOT - 22)  1. Need to Blow Nose: (P) Moderate  2. Nasal Blockage: (P) Moderate  3. Sneezing: (P) Very mild  4. Runny Nose: (P) Very mild  5. Cough: (P) Moderate  6. Post-nasal discharge: (P) Severe  7. Thick nasal discharge: (P) Moderate  8. Ear fullness: (P) Mild or  slight  9. Dizziness: (P) Very mild  10. Ear Pain: (P) None  11. Facial pain/pressure: (P) Mild or slight  12. Decreased Sense of Smell/Taste: (P) Mild or slight  13. Difficulty falling asleep: (P) None  14. Wake up at night: (P) Moderate  15. Lack of a good night's sleep: (P) Mild or slight  16. Wake up tired: (P) Moderate  17. Fatigue: (P) Moderate  18. Reduced Productivity: (P) Moderate  19. Reduced Concentration: (P) Moderate  20. Frustrated/restless/irritable: (P) Mild or slight  21. Sad: (P) Moderate  22. Embarrassed: (P) Very mild  Total Score: (P) 48    [place] [facility]:  No sinonasal surgery    Review of systems: A 14-point review of systems has been conducted and was negative for any notable symptoms, except as dictated in the history of present illness.     Medical History:  Past Medical History:   Diagnosis Date     Allergic rhinitis due to animal dander      Amblyopia     LT     Arthritis      Colon polyp      Depressive disorder      Endometriosis      Heart attack (H)     2016     House dust mite allergy      Moderate persistent asthma      Rhinitis, allergic to other allergen      Seasonal allergic rhinitis 7/25/05 skin tests pos. for: cat/dog/horse/DM/M/T/G/RW per Dr. Granado    pt. did IT from 7/06 to 11/5/07 to: cat/dog/DM/M/T/G/W dc'd per self.      Type 2 diabetes mellitus without complication, without long-term current use of insulin (H) 3/13/2017        Surgical History:   Past Surgical History:   Procedure Laterality Date     COLONOSCOPY  10/2020     EXCISE EXOSTOSIS FOOT Right 10/2/2018    Procedure: EXCISE EXOSTOSIS FOOT;  Right foot removal of first tarsometatarsal joint dorsal bossing;  Surgeon: Will Barraza DPM;  Location: [facility] OR     GYN SURGERY  June 24 2016    Hysterectomy complete     HYSTEROSCOPY          Family History:  Family History   Problem Relation Age of Onset     Hypertension Mother      Alzheimer Disease Mother      Diabetes Mother      Tremor Mother       Hypertension Father      Skin Cancer Father      Alzheimer Disease Paternal Grandmother      Psychotic Disorder Paternal Grandmother         bipolar     Heart Disease Paternal Grandfather      Breast Cancer Maternal Grandmother      Tremor Maternal Grandfather      Thyroid Disease Brother         Tumors removed     Tremor Brother      Thyroid Disease Sister      Diabetes Sister      Skin Cancer Sister      Mental Illness Brother         Bi-Polar     Cerebrovascular Disease No family hx of      Glaucoma No family hx of      Macular Degeneration No family hx of         Social History:   Social History     Socioeconomic History     Marital status:      Spouse name: Not on file     Number of children: Not on file     Years of education: Not on file     Highest education level: Not on file   Occupational History     Not on file   Tobacco Use     Smoking status: Never Smoker     Smokeless tobacco: Never Used   Vaping Use     Vaping Use: Never used   Substance and Sexual Activity     Alcohol use: Yes     Comment: Rarely     Drug use: No     Sexual activity: Yes     Partners: Male     Birth control/protection: None     Comment: N/A   Other Topics Concern     Parent/sibling w/ CABG, MI or angioplasty before 65F 55M? No   Social History Narrative     Not on file     Social Determinants of Health     Financial Resource Strain: Not on file   Food Insecurity: Not on file   Transportation Needs: Not on file   Physical Activity: Not on file   Stress: Not on file   Social Connections: Not on file   Intimate Partner Violence: Not on file   Housing Stability: Not on file        Physical Exam:  Vital signs: LMP 05/15/2014 (Approximate)    General Appearance: No acute distress, appropriate demeanor, conversant  Eyes: moist conjunctivae; EOMI; pupils symmetric; visual acuity grossly intact; no proptosis  Head: normocephalic; overall symmetric appearance without deformity  Face: overall symmetric without deformity; HB I/VI  Ears:  Normal appearance of external ear; external meatus normal in appearance; TMs intact without perforation bilaterally;   Nose: No external deformity; septum deviated to right causing greater than 70% obstruction; inferior turbinates with significant hypertrophy  Oral Cavity/oropharynx: Normal appearance of mucosa; tongue midline; no mass or lesions; oropharynx without obvious mucosal abnormality  Neck: no palpable lymphadenopathy; thyroid without palpable nodules  Lungs: symmetric chest rise; no wheezing  CV: Good distal perfusion; normal hear rate  Extremities: No deformity  Neurologic Exam: Cranial nerves II-XII are grossly intact; no focal deficit      Procedure Note  Procedure performed: Rigid nasal endoscopy  Indication: To evaluate for sinonasal pathology not visualized on routine anterior rhinoscopy  Anesthesia: 4% topical lidocaine with 0.05% oxymetazoline  Description of procedure: A 30 degree, 3 mm rigid endoscope was inserted into bilateral nasal cavities and the nasal valves, nasal cavity, middle meatus, sphenoethmoid recess, and nasopharynx were thoroughly evaluated for evidence of obstruction, edema, purulence, polyps and/or mass/lesion.     Rosmery-Ken Endoscopic Scoring System  Endoscopic observation Right Left   Polyps in middle meatus (0 = absent, 1 = restricted to middle meatus, 2 = Beyond middle meatus) 0 0   Discharge (0 = absent, 1 = thin and clear, 2 = thick, purulent) 1 1   Edema (0 = absent, 1 = mild-moderate, 2 = moderate-severe) 1 1   Crusting (0 = absent, 1 = mild-moderate, 2 = moderate-severe) 0 0   Scarring (0= absent, 1 = mild-moderate, 2 = moderate-severe) 0 0   Total 2 2     Findings  RT: significant inferior turbinate hypertrophy; MM is edematous; rightward deviation limiting evaluation of SER  LT: Uncinate process is everted; MM clear drainage; SER clear; inferior turbinate hypertrophy    The patient tolerated the procedure well without complication.     Laboratory  Review:  n/a    Imaging Review:  CTA 3/15/22  IMPRESSION:  1.  Widely patent coronary arteries without evidence of  atherosclerosis or stenosis.  2.  Total Agatston score 0 placing the patient in the lowest  percentile when compared to age and gender matched control group.  3.  Please review the separate Radiology report for incidental  noncardiac findings.      Pathology Review:  n/a    Assessment/Medical Decision Making:  Asthma  Recurrent acute sinusitis  GERD, on PPI  Chronic cough    Plan:  Nasal endoscopy was performed today and I see no signs of acute or severe chronic sinus inflammation. Lisette does have a history that is consistent with recurrent acute sinusitis that does appear to exacerbate her lower airway disease.  In the past, this is required antibiotics 3-4 times a year along with systemic steroids.  I would like to capture these events with a contemporaneous CT scan of the sinuses.  In the meantime we will start her on budesonide irrigations to prevent recurrent sinus flares.  If she is indeed experiencing recurrent acute sinusitis, then we may need to escalate her care and possibly discuss endoscopic sinus surgery.    She will follow-up with me in 3 months, or sooner as needed.    Bandar Villa MD    [facility]  Rhinology  Endoscopic Skull Base Surgery  [facility]  Department of Otolaryngology - Head & Neck Surgery    The above documentation was completed with the aid of voice recognition dictation software, and as a result, unexpected dictation errors may occur. Please don't hesitate to contact me for any clarification needed regarding this note.     Scribe Disclosure:  I, Salinas Beltrán, am serving as a scribe to document services personally performed by Bandar Villa MD at this visit, based upon the provider's statements to me. All documentation has been reviewed by the aforementioned provider prior to being entered into the official medical  record.

## 2022-03-30 NOTE — NURSING NOTE
"Chief Complaint   Patient presents with     Consult     sinus concerns    Blood pressure 110/56, pulse 67, temperature 98  F (36.7  C), temperature source Temporal, height 1.689 m (5' 6.5\"), weight 83 kg (183 lb), last menstrual period 05/15/2014, not currently breastfeeding. Zoë Liao, EMT  "

## 2022-03-30 NOTE — LETTER
3/30/2022       RE: Lisette Owens  [address]  [place] [address]     Dear Colleague,    Thank you for referring your patient, Lisette Owens, to the [facility] [place] at [facility]. Please see a copy of my visit note below.      [facility]  New Patient Visit      Encounter date:   March 30, 2022    Referring Provider:   Eyad Ley MD  [address] [place] [address] [address]  [place] [address]    Chief Complaint: Post Nasal Drip, Cough    History of Present Illness:   Lisette Owens is a 57 year old female with history of persistent asthma who presents for consultation regarding chronic congestion. She does have extensive history of asthma that has been followed by Dr. Ley here and she is on a number of inhaler treatments for this. She does have a component of sinonasal congestion that she feels is blocking her breathing and hence Dr. Ley's referral for sinus involvement rule out.     Today she tells me that she has been dealing with post nasal drip for some several year that can often trigger a cough. 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 She will have bouts of discolored drainage, congestion, pressure, that spreads to her lungs and involves productive coughs. Prior to Trelegy she was taking antibiotics and Prednisone 3-4x/ year along with antibiotics for recurrent acute sinus infections. She takes Claritin, Singulair, Nasacort, and was recently switched to Trelegy; Trelegy seems to be helping her breathing since swi tching. In the past she did saline rinses, but she has not been using them for some time.     She has not experienced a sinus flare, reportedly, in some time.    She has seen Dr. Carroll in the past, started on GERD medication, and evaluated by speech therapy. She did attend speech therapy and saw some improvements with this. She  continues to be on PPIs.         Sino-Nasal Outcome Test (SNOT - 22)  1. Need to Blow Nose: (P) Moderate  2. Nasal Blockage: (P) Moderate  3. Sneezing: (P) Very mild  4. Runny Nose: (P) Very mild  5. Cough: (P) Moderate  6. Post-nasal discharge: (P) Severe  7. Thick nasal discharge: (P) Moderate  8. Ear fullness: (P) Mild or slight  9. Dizziness: (P) Very mild  10. Ear Pain: (P) None  11. Facial pain/pressure: (P) Mild or slight  12. Decreased Sense of Smell/Taste: (P) Mild or slight  13. Difficulty falling asleep: (P) None  14. Wake up at night: (P) Moderate  15. Lack of a good night's sleep: (P) Mild or slight  16. Wake up tired: (P) Moderate  17. Fatigue: (P) Moderate  18. Reduced Productivity: (P) Moderate  19. Reduced Concentration: (P) Moderate  20. Frustrated/restless/irritable: (P) Mild or slight  21. Sad: (P) Moderate  22. Embarrassed: (P) Very mild  Total Score: (P) 48    [place] [facility]:  No sinonasal surgery    Review of systems: A 14-point review of systems has been conducted and was negative for any notable symptoms, except as dictated in the history of present illness.     Medical History:  Past Medical History:   Diagnosis Date     Allergic rhinitis due to animal dander      Amblyopia     LT     Arthritis      Colon polyp      Depressive disorder      Endometriosis      Heart attack (H)     2016     House dust mite allergy      Moderate persistent asthma      Rhinitis, allergic to other allergen      Seasonal allergic rhinitis 7/25/05 skin tests pos. for: cat/dog/horse/DM/M/T/G/RW per Dr. Granado    pt. did IT from 7/06 to 11/5/07 to: cat/dog/DM/M/T/G/W dc'd per self.      Type 2 diabetes mellitus without complication, without long-term current use of insulin (H) 3/13/2017        Surgical History:   Past Surgical History:   Procedure Laterality Date     COLONOSCOPY  10/2020     EXCISE EXOSTOSIS FOOT Right 10/2/2018    Procedure: EXCISE EXOSTOSIS FOOT;  Right foot removal of first  tarsometatarsal joint dorsal bossing;  Surgeon: Will Barraza DPM;  Location: [facility] OR     GYN SURGERY  June 24 2016    Hysterectomy complete     HYSTEROSCOPY          Family History:  Family History   Problem Relation Age of Onset     Hypertension Mother      Alzheimer Disease Mother      Diabetes Mother      Tremor Mother      Hypertension Father      Skin Cancer Father      Alzheimer Disease Paternal Grandmother      Psychotic Disorder Paternal Grandmother         bipolar     Heart Disease Paternal Grandfather      Breast Cancer Maternal Grandmother      Tremor Maternal Grandfather      Thyroid Disease Brother         Tumors removed     Tremor Brother      Thyroid Disease Sister      Diabetes Sister      Skin Cancer Sister      Mental Illness Brother         Bi-Polar     Cerebrovascular Disease No family hx of      Glaucoma No family hx of      Macular Degeneration No family hx of         Social History:   Social History     Socioeconomic History     Marital status:      Spouse name: Not on file     Number of children: Not on file     Years of education: Not on file     Highest education level: Not on file   Occupational History     Not on file   Tobacco Use     Smoking status: Never Smoker     Smokeless tobacco: Never Used   Vaping Use     Vaping Use: Never used   Substance and Sexual Activity     Alcohol use: Yes     Comment: Rarely     Drug use: No     Sexual activity: Yes     Partners: Male     Birth control/protection: None     Comment: N/A   Other Topics Concern     Parent/sibling w/ CABG, MI or angioplasty before 65F 55M? No   Social History Narrative     Not on file     Social Determinants of Health     Financial Resource Strain: Not on file   Food Insecurity: Not on file   Transportation Needs: Not on file   Physical Activity: Not on file   Stress: Not on file   Social Connections: Not on file   Intimate Partner Violence: Not on file   Housing Stability: Not on file        Physical Exam:  Vital  signs: LMP 05/15/2014 (Approximate)    General Appearance: No acute distress, appropriate demeanor, conversant  Eyes: moist conjunctivae; EOMI; pupils symmetric; visual acuity grossly intact; no proptosis  Head: normocephalic; overall symmetric appearance without deformity  Face: overall symmetric without deformity; HB I/VI  Ears: Normal appearance of external ear; external meatus normal in appearance; TMs intact without perforation bilaterally;   Nose: No external deformity; septum deviated to right causing greater than 70% obstruction; inferior turbinates with significant hypertrophy  Oral Cavity/oropharynx: Normal appearance of mucosa; tongue midline; no mass or lesions; oropharynx without obvious mucosal abnormality  Neck: no palpable lymphadenopathy; thyroid without palpable nodules  Lungs: symmetric chest rise; no wheezing  CV: Good distal perfusion; normal hear rate  Extremities: No deformity  Neurologic Exam: Cranial nerves II-XII are grossly intact; no focal deficit      Procedure Note  Procedure performed: Rigid nasal endoscopy  Indication: To evaluate for sinonasal pathology not visualized on routine anterior rhinoscopy  Anesthesia: 4% topical lidocaine with 0.05% oxymetazoline  Description of procedure: A 30 degree, 3 mm rigid endoscope was inserted into bilateral nasal cavities and the nasal valves, nasal cavity, middle meatus, sphenoethmoid recess, and nasopharynx were thoroughly evaluated for evidence of obstruction, edema, purulence, polyps and/or mass/lesion.     Rosmery-Ken Endoscopic Scoring System  Endoscopic observation Right Left   Polyps in middle meatus (0 = absent, 1 = restricted to middle meatus, 2 = Beyond middle meatus) 0 0   Discharge (0 = absent, 1 = thin and clear, 2 = thick, purulent) 1 1   Edema (0 = absent, 1 = mild-moderate, 2 = moderate-severe) 1 1   Crusting (0 = absent, 1 = mild-moderate, 2 = moderate-severe) 0 0   Scarring (0= absent, 1 = mild-moderate, 2 = moderate-severe) 0  0   Total 2 2     Findings  RT: significant inferior turbinate hypertrophy; MM is edematous; rightward deviation limiting evaluation of SER  LT: Uncinate process is everted; MM clear drainage; SER clear; inferior turbinate hypertrophy    The patient tolerated the procedure well without complication.     Laboratory Review:  n/a    Imaging Review:  CTA 3/15/22  IMPRESSION:  1.  Widely patent coronary arteries without evidence of  atherosclerosis or stenosis.  2.  Total Agatston score 0 placing the patient in the lowest  percentile when compared to age and gender matched control group.  3.  Please review the separate Radiology report for incidental  noncardiac findings.      Pathology Review:  n/a    Assessment/Medical Decision Making:  Asthma  Recurrent acute sinusitis  GERD, on PPI  Chronic cough    Plan:  Nasal endoscopy was performed today and I see no signs of acute or severe chronic sinus inflammation. Lisette does have a history that is consistent with recurrent acute sinusitis that does appear to exacerbate her lower airway disease.  In the past, this is required antibiotics 3-4 times a year along with systemic steroids.  I would like to capture these events with a contemporaneous CT scan of the sinuses.  In the meantime we will start her on budesonide irrigations to prevent recurrent sinus flares.  If she is indeed experiencing recurrent acute sinusitis, then we may need to escalate her care and possibly discuss endoscopic sinus surgery.    She will follow-up with me in 3 months, or sooner as needed.    Bandar Villa MD    [facility]  Rhinology  Endoscopic Skull Base Surgery  [facility]  Department of Otolaryngology - Head & Neck Surgery    The above documentation was completed with the aid of voice recognition dictation software, and as a result, unexpected dictation errors may occur. Please don't hesitate to contact me for any clarification needed regarding this  note.     Scribe Disclosure:  I, August Leigh Ann, am serving as a scribe to document services personally performed by Bandar Villa MD at this visit, based upon the provider's statements to me. All documentation has been reviewed by the aforementioned provider prior to being entered into the official medical record.       Again, thank you for allowing me to participate in the care of your patient.      Sincerely,    Bandar Villa MD

## 2022-03-30 NOTE — PATIENT INSTRUCTIONS
"1. You were seen in the clinic today by Dr. Villa. If you have any questions or concerns after your appointment, please call the clinic at [phone]. Press \"1\" for scheduling, press \"3\" for nurse advice.    2.   The following has been recommended for you based upon your appointment today:   -Start budesonide irrigations twice daily.   -Please obtain a CT scan when you develop symptoms. The number to call to schedule this is [phone].    3.   Plan to return the clinic in 3 months.       Henrietta Morris RNCC  [facility]  Department of Otolaryngology  [phone]    Cleaning of the Nasal or Sinus Cavity    Budesonide (Pulmicort)  -Budesonide is an anti-inflammatory steroid medication used to decrease nasal and sinus inflammation.   -It is dispensed in liquid form in a 2 mL respules (small plastic pouch).   -Although it is manufactured for use with a nebulizer, we intend for you to use it with the NeilMed Sinus Rinse bottle (preferred).   -This medication is filled at your preferred pharmacy for you.      Please follow all steps. Nasal irrigation (cleaning) should be done 2 times/day.    Preparation:  1.  Wash your hands  2.  We suggest that you buy the NeilMed Sinus Rinse Kit  3.  Use distilled water or tap water that has been boiled and brought to room temperature. This is important because serious infections can result from using tap water that is not clean. These infections are very rare, but it's better to be absolutely safe.  4.  Fill the irrigation bottle with room temperature water (distilled or boiled tap water) and add mixture (pre made packet or homemade recipe).        If you wish to make a homemade recipe:        Mix 1/4 teaspoon salt (kosher,non-iodine salt) with 1/4 teaspoon baking soda in each bottle.  5. Add one respule of budesonide. Mix well to ensure that everything is dissolved.  Cleansing Irrigation/Sinus Rinse:    1.  Lean forward over a sink and insert the rinse bottle applicator into " the right side of your nose. Make sure to point the applicator towards the back of your head.     2.  Tilt head down and to the left side.  With your mouth open (breathing through your mouth), gently direct the water around the inside of your nose until clear fluid starts to drain from the opposite nostril.  This is called flushing or irrigation.    3.  When you have used 1/4 to 1/2 or the solution, switch to the left nostril.    4.  To irrigate the left nostril, tilt your head down and to the right side.  With your mouth open (breathing through your mouth),  gently direct the water around the inside of your nose until clear fluid starts to drain from the opposite nostril.     Cleaning the Equipment:  1.  Throw away any leftover solution  2.  Clean the rinse bottle and cap with clear water. Air dry.      Call the ENT Clinic if you have any questions or concerns at [phone]

## 2022-04-25 DIAGNOSIS — E11.9 TYPE 2 DIABETES MELLITUS WITHOUT COMPLICATION, WITHOUT LONG-TERM CURRENT USE OF INSULIN (H): ICD-10-CM

## 2022-04-25 RX ORDER — LOSARTAN POTASSIUM 25 MG/1
25 TABLET ORAL DAILY
Qty: 90 TABLET | Refills: 1 | Status: CANCELLED | OUTPATIENT
Start: 2022-04-25

## 2022-04-25 RX ORDER — LOSARTAN POTASSIUM 25 MG/1
25 TABLET ORAL DAILY
Qty: 90 TABLET | Refills: 0 | Status: SHIPPED | OUTPATIENT
Start: 2022-04-25 | End: 2022-06-21

## 2022-04-25 NOTE — TELEPHONE ENCOUNTER
Medication is being filled for 1 time refill only due to:  Patient needs to be seen because due for 6 month recheck.   Kayla Suh RN

## 2022-04-30 ENCOUNTER — HEALTH MAINTENANCE LETTER (OUTPATIENT)
Age: 57
End: 2022-04-30

## 2022-05-03 ENCOUNTER — TELEPHONE (OUTPATIENT)
Dept: PULMONOLOGY | Facility: CLINIC | Age: 57
End: 2022-05-03

## 2022-05-04 ENCOUNTER — TELEPHONE (OUTPATIENT)
Dept: PULMONOLOGY | Facility: CLINIC | Age: 57
End: 2022-05-04

## 2022-05-06 ENCOUNTER — TELEPHONE (OUTPATIENT)
Dept: PULMONOLOGY | Facility: CLINIC | Age: 57
End: 2022-05-06

## 2022-05-06 NOTE — TELEPHONE ENCOUNTER
LVM regarding rescheduling upcoming appointment with Dr. Ley. Patient needs to reschedule appointment and FPFT with Dr. Ley on 6/10/22. Left direct call back phone number to reschedule.

## 2022-05-10 ENCOUNTER — TELEPHONE (OUTPATIENT)
Dept: PULMONOLOGY | Facility: CLINIC | Age: 57
End: 2022-05-10

## 2022-05-10 NOTE — TELEPHONE ENCOUNTER
Called spouse no answer lvm to please call me directly [phone] to reschedule Friday 6/10 appt to Wednesday 6/15

## 2022-05-14 DIAGNOSIS — E11.9 TYPE 2 DIABETES MELLITUS WITHOUT COMPLICATION, WITHOUT LONG-TERM CURRENT USE OF INSULIN (H): ICD-10-CM

## 2022-05-14 RX ORDER — METFORMIN HCL 500 MG
TABLET, EXTENDED RELEASE 24 HR ORAL
Qty: 360 TABLET | Refills: 0 | Status: SHIPPED | OUTPATIENT
Start: 2022-05-14 | End: 2022-08-10

## 2022-05-16 ENCOUNTER — TELEPHONE (OUTPATIENT)
Dept: PULMONOLOGY | Facility: CLINIC | Age: 57
End: 2022-05-16

## 2022-05-16 NOTE — TELEPHONE ENCOUNTER
CAMELIAM regarding rescheduling appointment with Dr. Ley. Patient needs to reschedule appointment with Dr. Ley on 6/10/22. Stated in message that we will be cancelling her appointment with Dr. Ley and that we will be sending her a letter in the mail confirming this. Left direct call back phone number to reschedule and left call center's phone number to schedule.

## 2022-05-17 ENCOUNTER — TELEPHONE (OUTPATIENT)
Dept: PULMONOLOGY | Facility: CLINIC | Age: 57
End: 2022-05-17

## 2022-05-17 NOTE — TELEPHONE ENCOUNTER
Called pt again to try and reschedule no answer lvm to inform a letter will be mailed. To call and reschedule. Appointment to be cancelled.

## 2022-05-17 NOTE — TELEPHONE ENCOUNTER
Patient called back regarding needing to reschedule her 6/10 appt with Dr. Ley as provider is unavailable. Appt rescheduled for video visit on 6/15 and patient will complete FPFT on 6/10 as scheduled. Details confirmed with pt

## 2022-05-26 DIAGNOSIS — F41.8 DEPRESSION WITH ANXIETY: ICD-10-CM

## 2022-05-27 RX ORDER — BUSPIRONE HYDROCHLORIDE 10 MG/1
10 TABLET ORAL 2 TIMES DAILY
Qty: 180 TABLET | Refills: 0 | Status: SHIPPED | OUTPATIENT
Start: 2022-05-27 | End: 2022-08-23

## 2022-05-28 DIAGNOSIS — E78.5 HYPERLIPIDEMIA LDL GOAL <100: ICD-10-CM

## 2022-05-29 RX ORDER — ROSUVASTATIN CALCIUM 5 MG/1
TABLET, COATED ORAL
Qty: 90 TABLET | Refills: 1 | Status: SHIPPED | OUTPATIENT
Start: 2022-05-29 | End: 2022-11-29

## 2022-06-02 DIAGNOSIS — J45.40 MODERATE PERSISTENT ASTHMA, UNCOMPLICATED: ICD-10-CM

## 2022-06-02 NOTE — PROGRESS NOTES
Lisette is a 57 year old who is being evaluated via a billable telephone visit.      What phone number would you like to be contacted at? [phone]  How would you like to obtain your AVS? Malcom        Luis Knox is a 57 year old who presents for the following health issues     History of Present Illness     Asthma:  She presents for follow up of asthma.  She has some cough, no wheezing, and no shortness of breath. She is using a relief medication a few times a month. She does not miss any doses of her controller medication throughout the week.Patient is aware of the following triggers: same as previous visit. The patient has not had a visit to the Emergency Room, Urgent Care or Hospital due to asthma since the last clinic visit.     Mental Health Follow-up:  Patient presents to follow-up on Depression & Anxiety.Patient's depression since last visit has been:  Medium  The patient is having other symptoms associated with depression.  Patient's anxiety since last visit has been:  Medium  The patient is having other symptoms associated with anxiety.  Any significant life events: grief or loss and other  Patient is feeling anxious or having panic attacks.  Patient has no concerns about alcohol or drug use.    Diabetes:   She presents for follow up of diabetes.  She is checking home blood glucose a few times a month. She checks blood glucose before and after meals and at bedtime.  Blood glucose is never over 200 and never under 70. She is aware of hypoglycemia symptoms including shakiness. She has no concerns regarding her diabetes at this time.  She is not experiencing numbness or burning in feet, excessive thirst, blurry vision, weight changes or redness, sores or blisters on feet.         Hypertension: She presents for follow up of hypertension.  She does not check blood pressure  regularly outside of the clinic. Outpatient blood pressures have not been over 140/90. She follows a low salt diet.      Today's  PHQ-9         PHQ-9 Total Score: 12    PHQ-9 Q9 Thoughts of better off dead/self-harm past 2 weeks :   Not at all    How difficult have these problems made it for you to do your work, take care of things at home, or get along with other people: Somewhat difficult  Today's SRAVAN-7 Score: 10   still noting some lack of energy.   No chest pain/sob/palpitations/dizziness/ha's  Will trial her off of diltiazem and see if this helps her level of fatigue. Her blood pressure has been really well controlled.   Has lost 40 lbs. Watching diet. Eating a lot of salads and salmon/fish.  Depression and anxiety has been up and down. Her son has been experiencing mental health issues. Not attending counseling but plans to go back soon (total health and healing).  Her asthma has been relatively stable.   Review of Systems   Constitutional, HEENT, cardiovascular, pulmonary, GI, , musculoskeletal, neuro, skin, endocrine and psych systems are negative, except as otherwise noted.      Objective           Vitals:  No vitals were obtained today due to virtual visit.    Physical Exam   healthy, alert and no distress  PSYCH: Alert and oriented times 3; coherent speech, normal   rate and volume, able to articulate logical thoughts, able   to abstract reason, no tangential thoughts, no hallucinations   or delusions  Her affect is normal  RESP: No cough, no audible wheezing, able to talk in full sentences  Remainder of exam unable to be completed due to telephone visits    Lisette was seen today for recheck medication.    Diagnoses and all orders for this visit:    Type 2 diabetes mellitus without complication, without long-term current use of insulin (H)  -     BASIC METABOLIC PANEL; Future  -     HEMOGLOBIN A1C; Future  -     losartan (COZAAR) 25 MG tablet; Take 1 tablet (25 mg) by mouth daily    Depression with anxiety  -     FLUoxetine (PROZAC) 40 MG capsule; Take 1 capsule (40 mg) by mouth daily TAKE 1 CAPSULE BY MOUTH ONCE DAILY    PTSD  (post-traumatic stress disorder)    Fatigue, unspecified type  -     CBC with platelets and differential; Future  -     TSH with free T4 reflex; Future    Vitamin B12 deficiency (non anemic)  -     Vitamin B12; Future    Moderate persistent asthma with acute exacerbation    Vitamin D deficiency  -     Vitamin D Deficiency; Future    Palpitations      work on lifestyle modification          Phone call duration: 22 minutes

## 2022-06-05 NOTE — TELEPHONE ENCOUNTER
Needs updated- will send on [employer].  Kayla Suh RN    ACT Total Scores 1/6/2020 8/17/2020 3/19/2021   ACT TOTAL SCORE - - -   ASTHMA ER VISITS - - -   ASTHMA HOSPITALIZATIONS - - -   ACT TOTAL SCORE (Goal Greater than or Equal to 20) 7 20 23   In the past 12 months, how many times did you visit the emergency room for your asthma without being admitted to the hospital? 0 0 0   In the past 12 months, how many times were you hospitalized overnight because of your asthma? 0 0 0

## 2022-06-07 DIAGNOSIS — J45.41 MODERATE PERSISTENT ASTHMA WITH ACUTE EXACERBATION: ICD-10-CM

## 2022-06-08 NOTE — TELEPHONE ENCOUNTER
"Routing refill request to provider for review/approval because:  Labs not current:  ACT  Sent forms to fill out via [employer]  Has future appt  Appointments in Next Year     [phone]      Jun 21, 2022  3:20 PM  (Arrive by 3:00 PM)  Provider Visit with Kolton Yin PA-C  [facility] ([facility] ) [phone]     Requested Prescriptions   Pending Prescriptions Disp Refills     albuterol (PROVENTIL) (2.5 MG/3ML) 0.083% neb solution [Pharmacy Med Name: Albuterol Sulfate Inhalation Nebulization Solution (2.5 MG/3ML) 0.083%] 90 mL 0     Sig: INHALE 1 VIAL VIA NEVULIZER EVERY 4 HOURS AS NEEDED FOR SHORTNESS OF BREATH       Asthma Maintenance Inhalers - Anticholinergics Failed - 6/2/2022 11:05 AM        Failed - Asthma control assessment score within normal limits in last 6 months     Please review ACT score.           Passed - Patient is age 12 years or older        Passed - Medication is active on med list        Passed - Recent (6 mo) or future (30 days) visit within the authorizing provider's specialty     Patient had office visit in the last 6 months or has a visit in the next 30 days with authorizing provider or within the authorizing provider's specialty.  See \"Patient Info\" tab in inbasket, or \"Choose Columns\" in Meds & Orders section of the refill encounter.           Short-Acting Beta Agonist Inhalers Protocol  Failed - 6/2/2022 11:05 AM        Failed - Asthma control assessment score within normal limits in last 6 months     Please review ACT score.           Passed - Patient is age 12 or older        Passed - Medication is active on med list        Passed - Recent (6 mo) or future (30 days) visit within the authorizing provider's specialty     Patient had office visit in the last 6 months or has a visit in the next 30 days with authorizing provider or within the authorizing provider's specialty.  See \"Patient Info\" tab in inbasket, or \"Choose Columns\" in Meds & " Orders section of the refill encounter.

## 2022-06-09 RX ORDER — ALBUTEROL SULFATE 0.83 MG/ML
SOLUTION RESPIRATORY (INHALATION)
Qty: 30 ML | Refills: 0 | Status: SHIPPED | OUTPATIENT
Start: 2022-06-09 | End: 2022-09-02

## 2022-06-09 NOTE — TELEPHONE ENCOUNTER
Routing refill request to provider for review/approval because:  ACT  Has upcoming appointment on 6/21    ACT Total Scores 1/6/2020 8/17/2020 3/19/2021   ACT TOTAL SCORE - - -   ASTHMA ER VISITS - - -   ASTHMA HOSPITALIZATIONS - - -   ACT TOTAL SCORE (Goal Greater than or Equal to 20) 7 20 23   In the past 12 months, how many times did you visit the emergency room for your asthma without being admitted to the hospital? 0 0 0   In the past 12 months, how many times were you hospitalized overnight because of your asthma? 0 0 0              Pending Prescriptions:                       Disp   Refills    montelukast (SINGULAIR) 10 MG tablet [Phar*90 tab*0        Sig: TAKE 1 TABLET BY MOUTH AT BEDTIME        Michele Patel RN

## 2022-06-10 DIAGNOSIS — J45.909 ASTHMA: Primary | ICD-10-CM

## 2022-06-10 LAB
DLCOUNC-%PRED-PRE: 124 %
DLCOUNC-PRE: 28.09 ML/MIN/MMHG
DLCOUNC-PRED: 22.52 ML/MIN/MMHG
ERV-%PRED-PRE: 114 %
ERV-PRE: 1.08 L
ERV-PRED: 0.94 L
EXPTIME-PRE: 8.96 SEC
FEF2575-%PRED-POST: 61 %
FEF2575-%PRED-PRE: 52 %
FEF2575-POST: 1.61 L/SEC
FEF2575-PRE: 1.35 L/SEC
FEF2575-PRED: 2.6 L/SEC
FEFMAX-%PRED-PRE: 83 %
FEFMAX-PRE: 5.75 L/SEC
FEFMAX-PRED: 6.93 L/SEC
FEV1-%PRED-PRE: 80 %
FEV1-PRE: 2.31 L
FEV1FEV6-PRE: 66 %
FEV1FEV6-PRED: 81 %
FEV1FVC-PRE: 64 %
FEV1FVC-PRED: 79 %
FEV1SVC-PRE: 63 %
FEV1SVC-PRED: 77 %
FIFMAX-PRE: 6.7 L/SEC
FRCPLETH-%PRED-PRE: 125 %
FRCPLETH-PRE: 3.58 L
FRCPLETH-PRED: 2.85 L
FVC-%PRED-PRE: 99 %
FVC-PRE: 3.6 L
FVC-PRED: 3.62 L
IC-%PRED-PRE: 92 %
IC-PRE: 2.56 L
IC-PRED: 2.76 L
RVPLETH-%PRED-PRE: 127 %
RVPLETH-PRE: 2.5 L
RVPLETH-PRED: 1.96 L
TLCPLETH-%PRED-PRE: 113 %
TLCPLETH-PRE: 6.14 L
TLCPLETH-PRED: 5.4 L
VA-%PRED-PRE: 101 %
VA-PRE: 5.48 L
VC-%PRED-PRE: 98 %
VC-PRE: 3.64 L
VC-PRED: 3.7 L

## 2022-06-10 PROCEDURE — 94729 DIFFUSING CAPACITY: CPT | Performed by: INTERNAL MEDICINE

## 2022-06-10 PROCEDURE — 94726 PLETHYSMOGRAPHY LUNG VOLUMES: CPT | Performed by: INTERNAL MEDICINE

## 2022-06-10 PROCEDURE — 94060 EVALUATION OF WHEEZING: CPT | Performed by: INTERNAL MEDICINE

## 2022-06-10 RX ORDER — MONTELUKAST SODIUM 10 MG/1
TABLET ORAL
Qty: 90 TABLET | Refills: 0 | Status: SHIPPED | OUTPATIENT
Start: 2022-06-10 | End: 2022-09-13

## 2022-06-11 DIAGNOSIS — F41.8 DEPRESSION WITH ANXIETY: ICD-10-CM

## 2022-06-13 ENCOUNTER — OFFICE VISIT (OUTPATIENT)
Dept: NEUROPSYCHOLOGY | Facility: CLINIC | Age: 57
End: 2022-06-13
Attending: INTERNAL MEDICINE
Payer: COMMERCIAL

## 2022-06-13 DIAGNOSIS — F41.8 DEPRESSION WITH ANXIETY: ICD-10-CM

## 2022-06-13 DIAGNOSIS — R41.9 COGNITIVE COMPLAINTS WITH NORMAL NEUROPSYCHOLOGICAL EXAM: Primary | ICD-10-CM

## 2022-06-13 PROCEDURE — 96133 NRPSYC TST EVAL PHYS/QHP EA: CPT

## 2022-06-13 PROCEDURE — 96132 NRPSYC TST EVAL PHYS/QHP 1ST: CPT

## 2022-06-13 PROCEDURE — 96138 PSYCL/NRPSYC TECH 1ST: CPT

## 2022-06-13 PROCEDURE — 90791 PSYCH DIAGNOSTIC EVALUATION: CPT

## 2022-06-13 PROCEDURE — 96139 PSYCL/NRPSYC TST TECH EA: CPT
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
## 2022-06-13 NOTE — TELEPHONE ENCOUNTER
"Routing refill request to provider for review/approval because:  Labs not current:  phq9  Patient needs to be seen because:  Has future appt  Appointments in Next Year       Jun 21, 2022  3:20 PM  (Arrive by 3:00 PM)  Provider Visit with Kolton Yin PA-C  [facility] ([facility] ) [phone]     Medication pended for approval, 30 day supply with reminder.     Requested Prescriptions   Pending Prescriptions Disp Refills     FLUoxetine (PROZAC) 20 MG capsule [Pharmacy Med Name: FLUoxetine HCl Oral Capsule 20 MG] 30 capsule 0     Sig: TAKE 1 CAPSULE BY MOUTH ONCE DAILY       SSRIs Protocol Failed - 6/11/2022 10:23 AM        Failed - PHQ-9 score less than 5 in past 6 months     Please review last PHQ-9 score.           Passed - Medication is active on med list        Passed - Patient is age 18 or older        Passed - No active pregnancy on record        Passed - No positive pregnancy test in last 12 months        Passed - Recent (6 mo) or future (30 days) visit within the authorizing provider's specialty     Patient had office visit in the last 6 months or has a visit in the next 30 days with authorizing provider or within the authorizing provider's specialty.  See \"Patient Info\" tab in inbasket, or \"Choose Columns\" in Meds & Orders section of the refill encounter.                         "

## 2022-06-14 NOTE — NURSING NOTE
The patient was seen for neuropsychological evaluation at the request of Dr. Jean Marie Waters, for the purposes of diagnostic clarification and treatment planning. 201 minutes of test administration and scoring were provided by this writer, Masoud Desouza. Please see Dr. Ernestine Henley's report for a full interpretation of the findings.  
PHYSICAL EXAM:  GENERAL: very weak, delicate lady lying in bed   HEAD:  Atraumatic, Normocephalic  EYES: EOMI, PERRLA, conjunctiva and sclera clear  ENT: Moist mucous membranes  NECK: Supple, No JVD  CHEST/LUNG: Clear to auscultation bilaterally; No rales, rhonchi, wheezing, or rubs.   HEART: Regular rate and rhythm; No murmurs, rubs, or gallops  ABDOMEN: Bowel sounds present; Soft, Nontender, Nondistended.  EXTREMITIES: hands bent and contracted with arthritic changes. with No clubbing, cyanosis, or edema  NERVOUS SYSTEM:   awake and alert, oriented to name only. confused   MSK: FROM all 4 extremities, full and equal strength  SKIN: No rashes or lesions

## 2022-06-15 ENCOUNTER — VIRTUAL VISIT (OUTPATIENT)
Dept: PULMONOLOGY | Facility: CLINIC | Age: 57
End: 2022-06-15
Attending: INTERNAL MEDICINE
Payer: COMMERCIAL

## 2022-06-15 DIAGNOSIS — J45.40 MODERATE PERSISTENT ASTHMA WITHOUT COMPLICATION: ICD-10-CM

## 2022-06-15 DIAGNOSIS — R05.3 CHRONIC COUGH: Primary | ICD-10-CM

## 2022-06-15 PROCEDURE — 99215 OFFICE O/P EST HI 40 MIN: CPT | Mod: 95 | Performed by: INTERNAL MEDICINE

## 2022-06-15 NOTE — LETTER
"    6/15/2022         RE: Lisette Owens  [address] [place] [address]        Dear Colleague,    Thank you for referring your patient, Lisette Owens, to the [facility] [place]. Please see a copy of my visit note below.    Lisette is a 57 year old who is being evaluated via a billable video visit.      How would you like to obtain your AVS? MyChart  If the video visit is dropped, the invitation should be resent by: Send to e-mail at: [email or web address]  Will anyone else be joining your video visit? No    Reason for Visit  Lisette Owens is a 57 year old year old female who is being seen for Video Visit (4 month follow up )  Pulmonary HPI    The patient was seen and examined by Eyad Ley MD     Last seen by me for her moderate persistent asthma on 1/28/2022 and I switched her from Breo to Trelegy Ellipta along with continuing Singulair.  Trelegy reduced cough significantly     Developed COVID 5/16/222 and had worsening of cough since then and has not gotten back to pre-COVID coughing level.     Still having some coughing fits \"pulling up my lungs\"  She has lost 40 pounds and wondering if she is overmedicated on BP meds and DM meds. She complains of persistent fatigue since COVID    Also she had recent UTI and developed rash after first Abx and was switched.  Has completed 3 days of Abx total    Saw ENT and sinus rinse with steroids was recommended with sgnificant improvement    Hasn't vigorously exercised but she is OK with exercise tolerance.  Main issue is coughing.    Quantitative estimate:  Breo cough 6/10  Trelegy 3/10; Now 4-5/10  Cough usually is non-productive, but occasionally productive. No hemoptysis  No fevers, chills, sweats    Under significant stress due to adopted son from [place] being hospitalized with difficulty MS/Psych issues    Current Outpatient Medications   Medication     albuterol (PROAIR HFA) 108 (90 Base) MCG/ACT " inhaler     albuterol (PROVENTIL) (2.5 MG/3ML) 0.083% neb solution     aspirin  MG EC tablet     budesonide (PULMICORT) 0.5 MG/2ML neb solution     busPIRone (BUSPAR) 10 MG tablet     diltiazem ER (TIAZAC) 240 MG 24 hr ER beaded capsule     esomeprazole (NEXIUM) 40 MG DR capsule     FLUoxetine (PROZAC) 20 MG capsule     Fluticasone-Umeclidin-Vilanterol (TRELEGY ELLIPTA) 200-62.5-25 MCG/INH oral inhaler     LORazepam (ATIVAN) 0.5 MG tablet     losartan (COZAAR) 25 MG tablet     metFORMIN (GLUCOPHAGE XR) 500 MG 24 hr tablet     montelukast (SINGULAIR) 10 MG tablet     nitroGLYcerin (NITROSTAT) 0.4 MG sublingual tablet     predniSONE (DELTASONE) 20 MG tablet     rosuvastatin (CRESTOR) 5 MG tablet     nitroglycerin (NITRODUR) 0.4 MG/HR     No current facility-administered medications for this visit.     Facility-Administered Medications Ordered in Other Visits   Medication     sodium chloride (PF) 0.9% PF flush 10 mL     Allergies   Allergen Reactions     Ampicillin Rash     Cipro [Quinolones] Anaphylaxis     Macrobid [Nitrofuran Derivatives] GI Disturbance     Nitrofurantoin      Other reaction(s): Vomiting     Past Medical History:   Diagnosis Date     Allergic rhinitis due to animal dander      Amblyopia     LT     Arthritis      Colon polyp      Depressive disorder      Endometriosis      Heart attack (H)     2016     House dust mite allergy      Moderate persistent asthma      Rhinitis, allergic to other allergen      Seasonal allergic rhinitis 7/25/05 skin tests pos. for: cat/dog/horse/DM/M/T/G/RW per Dr. Granado    pt. did IT from 7/06 to 11/5/07 to: cat/dog/DM/M/T/G/W dc'd per self.      Type 2 diabetes mellitus without complication, without long-term current use of insulin (H) 3/13/2017       Past Surgical History:   Procedure Laterality Date     COLONOSCOPY  10/2020     EXCISE EXOSTOSIS FOOT Right 10/2/2018    Procedure: EXCISE EXOSTOSIS FOOT;  Right foot removal of first tarsometatarsal joint dorsal  valerie;  Surgeon: Will Barraza DPM;  Location: [facility] OR     GYN SURGERY  June 24 2016    Hysterectomy complete     HYSTEROSCOPY         Social History     Socioeconomic History     Marital status:      Spouse name: Not on file     Number of children: Not on file     Years of education: Not on file     Highest education level: Not on file   Occupational History     Not on file   Tobacco Use     Smoking status: Never Smoker     Smokeless tobacco: Never Used   Vaping Use     Vaping Use: Never used   Substance and Sexual Activity     Alcohol use: Yes     Comment: Rarely     Drug use: No     Sexual activity: Yes     Partners: Male     Birth control/protection: None     Comment: N/A   Other Topics Concern     Parent/sibling w/ CABG, MI or angioplasty before 65F 55M? No   Social History Narrative     Not on file     Social Determinants of Health     Financial Resource Strain: Not on file   Food Insecurity: Not on file   Transportation Needs: Not on file   Physical Activity: Not on file   Stress: Not on file   Social Connections: Not on file   Intimate Partner Violence: Not on file   Housing Stability: Not on file       ROS Pulmonary  A complete ROS was otherwise negative except as noted in the HPI.  LMP 05/15/2014 (Approximate)   Exam:   GENERAL APPEARANCE: Well developed, well nourished, alert, and in no apparent distress.  EYES: PERRL, EOMI  No cough wheeze stridor or tachypnea  NEURO: Mentation intact, speech normal, normal strength and tone,  PSYCH: mentation appears normal. and affect normal/bright  Results:  Recent Results (from the past 168 hour(s))   General PFT Lab (Please always keep checked)    Collection Time: 06/10/22  8:23 AM   Result Value Ref Range    FVC-Pred 3.62 L    FVC-Pre 3.60 L    FVC-%Pred-Pre 99 %    FEV1-Pre 2.31 L    FEV1-%Pred-Pre 80 %    FEV1FVC-Pred 79 %    FEV1FVC-Pre 64 %    FEFMax-Pred 6.93 L/sec    FEFMax-Pre 5.75 L/sec    FEFMax-%Pred-Pre 83 %    FEF2575-Pred 2.60 L/sec     FEF2575-Pre 1.35 L/sec    RBQ4125-%Pred-Pre 52 %    FEF2575-Post 1.61 L/sec    [id number]-%Pred-Post 61 %    ExpTime-Pre 8.96 sec    FIFMax-Pre 6.70 L/sec    VC-Pred 3.70 L    VC-Pre 3.64 L    VC-%Pred-Pre 98 %    IC-Pred 2.76 L    IC-Pre 2.56 L    IC-%Pred-Pre 92 %    ERV-Pred 0.94 L    ERV-Pre 1.08 L    ERV-%Pred-Pre 114 %    FEV1FEV6-Pred 81 %    FEV1FEV6-Pre 66 %    FRCPleth-Pred 2.85 L    FRCPleth-Pre 3.58 L    FRCPleth-%Pred-Pre 125 %    RVPleth-Pred 1.96 L    RVPleth-Pre 2.50 L    RVPleth-%Pred-Pre 127 %    TLCPleth-Pred 5.40 L    TLCPleth-Pre 6.14 L    TLCPleth-%Pred-Pre 113 %    DLCOunc-Pred 22.52 ml/min/mmHg    DLCOunc-Pre 28.09 ml/min/mmHg    DLCOunc-%Pred-Pre 124 %    VA-Pre 5.48 L    VA-%Pred-Pre 101 %    FEV1SVC-Pred 77 %    FEV1SVC-Pre 63 %       Assessment and plan:   Moderate Persistent Asthma  Chronic cough  Recent COVID 5/2022 in spite of vaccinated x2 and boosted x1  Lisette had improved control of asthma & cough on Trelegy but was set back significantly by COVID one month ago.  She continues to have increased fatigue and cough and is not yet back to pre-COVID baseline.  Will add inhaled steroid to deal with potential augmented airway inflammation with a 1 month trial.  Plan:  Continue high strength Trelegy and Singulair  Continue sinus rinses per ENT  Continue Nexium  ADD  Flovent Diskus (moderate strength) for 1 month  If no improvement in cough after 2 weeks, then call us and we will switch to high strength Flovent  Get 2nd COVID booster in August  Call our nurses in 2 week if not improved  Return visit in 6 months  Call our nurses if problems or questions in between  I spent a total of 40 minutes devoted to her care today 6/15/2022, including 25 minutes in video visit from 8:35 AM to 9:00 AM.  Eyad Ley MD

## 2022-06-15 NOTE — PROGRESS NOTES
"Lisette is a 57 year old who is being evaluated via a billable video visit.      How would you like to obtain your AVS? MyChart  If the video visit is dropped, the invitation should be resent by: Send to e-mail at: [email or web address]  Will anyone else be joining your video visit? No    Reason for Visit  Lisette Owens is a 57 year old year old female who is being seen for Video Visit (4 month follow up )  Pulmonary HPI    The patient was seen and examined by Eyad Ley MD     Last seen by me for her moderate persistent asthma on 1/28/2022 and I switched her from Breo to Trelegy Ellipta along with continuing Singulair.  Trelegy reduced cough significantly     Developed COVID 5/16/222 and had worsening of cough since then and has not gotten back to pre-COVID coughing level.     Still having some coughing fits \"pulling up my lungs\"  She has lost 40 pounds and wondering if she is overmedicated on BP meds and DM meds. She complains of persistent fatigue since COVID    Also she had recent UTI and developed rash after first Abx and was switched.  Has completed 3 days of Abx total    Saw ENT and sinus rinse with steroids was recommended with sgnificant improvement    Hasn't vigorously exercised but she is OK with exercise tolerance.  Main issue is coughing.    Quantitative estimate:  Breo cough 6/10  Trelegy 3/10; Now 4-5/10  Cough usually is non-productive, but occasionally productive. No hemoptysis  No fevers, chills, sweats    Under significant stress due to adopted son from [place] being hospitalized with difficulty MS/Psych issues    Current Outpatient Medications   Medication     albuterol (PROAIR HFA) 108 (90 Base) MCG/ACT inhaler     albuterol (PROVENTIL) (2.5 MG/3ML) 0.083% neb solution     aspirin  MG EC tablet     budesonide (PULMICORT) 0.5 MG/2ML neb solution     busPIRone (BUSPAR) 10 MG tablet     diltiazem ER (TIAZAC) 240 MG 24 hr ER beaded capsule     esomeprazole (NEXIUM) 40 MG DR capsule     " FLUoxetine (PROZAC) 20 MG capsule     Fluticasone-Umeclidin-Vilanterol (TRELEGY ELLIPTA) 200-62.5-25 MCG/INH oral inhaler     LORazepam (ATIVAN) 0.5 MG tablet     losartan (COZAAR) 25 MG tablet     metFORMIN (GLUCOPHAGE XR) 500 MG 24 hr tablet     montelukast (SINGULAIR) 10 MG tablet     nitroGLYcerin (NITROSTAT) 0.4 MG sublingual tablet     predniSONE (DELTASONE) 20 MG tablet     rosuvastatin (CRESTOR) 5 MG tablet     nitroglycerin (NITRODUR) 0.4 MG/HR     No current facility-administered medications for this visit.     Facility-Administered Medications Ordered in Other Visits   Medication     sodium chloride (PF) 0.9% PF flush 10 mL     Allergies   Allergen Reactions     Ampicillin Rash     Cipro [Quinolones] Anaphylaxis     Macrobid [Nitrofuran Derivatives] GI Disturbance     Nitrofurantoin      Other reaction(s): Vomiting     Past Medical History:   Diagnosis Date     Allergic rhinitis due to animal dander      Amblyopia     LT     Arthritis      Colon polyp      Depressive disorder      Endometriosis      Heart attack (H)     2016     House dust mite allergy      Moderate persistent asthma      Rhinitis, allergic to other allergen      Seasonal allergic rhinitis 7/25/05 skin tests pos. for: cat/dog/horse/DM/M/T/G/RW per Dr. Granado    pt. did IT from 7/06 to 11/5/07 to: cat/dog/DM/M/T/G/W dc'd per self.      Type 2 diabetes mellitus without complication, without long-term current use of insulin (H) 3/13/2017       Past Surgical History:   Procedure Laterality Date     COLONOSCOPY  10/2020     EXCISE EXOSTOSIS FOOT Right 10/2/2018    Procedure: EXCISE EXOSTOSIS FOOT;  Right foot removal of first tarsometatarsal joint dorsal bossing;  Surgeon: Will Barraza DPM;  Location: [facility] OR     GYN SURGERY  June 24 2016    Hysterectomy complete     HYSTEROSCOPY         Social History     Socioeconomic History     Marital status:      Spouse name: Not on file     Number of children: Not on file     Years of  education: Not on file     Highest education level: Not on file   Occupational History     Not on file   Tobacco Use     Smoking status: Never Smoker     Smokeless tobacco: Never Used   Vaping Use     Vaping Use: Never used   Substance and Sexual Activity     Alcohol use: Yes     Comment: Rarely     Drug use: No     Sexual activity: Yes     Partners: Male     Birth control/protection: None     Comment: N/A   Other Topics Concern     Parent/sibling w/ CABG, MI or angioplasty before 65F 55M? No   Social History Narrative     Not on file     Social Determinants of Health     Financial Resource Strain: Not on file   Food Insecurity: Not on file   Transportation Needs: Not on file   Physical Activity: Not on file   Stress: Not on file   Social Connections: Not on file   Intimate Partner Violence: Not on file   Housing Stability: Not on file       ROS Pulmonary  A complete ROS was otherwise negative except as noted in the HPI.  LMP 05/15/2014 (Approximate)   Exam:   GENERAL APPEARANCE: Well developed, well nourished, alert, and in no apparent distress.  EYES: PERRL, EOMI  No cough wheeze stridor or tachypnea  NEURO: Mentation intact, speech normal, normal strength and tone,  PSYCH: mentation appears normal. and affect normal/bright  Results:  Recent Results (from the past 168 hour(s))   General PFT Lab (Please always keep checked)    Collection Time: 06/10/22  8:23 AM   Result Value Ref Range    FVC-Pred 3.62 L    FVC-Pre 3.60 L    FVC-%Pred-Pre 99 %    FEV1-Pre 2.31 L    FEV1-%Pred-Pre 80 %    FEV1FVC-Pred 79 %    FEV1FVC-Pre 64 %    FEFMax-Pred 6.93 L/sec    FEFMax-Pre 5.75 L/sec    FEFMax-%Pred-Pre 83 %    FEF2575-Pred 2.60 L/sec    FEF2575-Pre 1.35 L/sec    WAI2380-%Pred-Pre 52 %    FEF2575-Post 1.61 L/sec    SGR3407-%Pred-Post 61 %    ExpTime-Pre 8.96 sec    FIFMax-Pre 6.70 L/sec    VC-Pred 3.70 L    VC-Pre 3.64 L    VC-%Pred-Pre 98 %    IC-Pred 2.76 L    IC-Pre 2.56 L    IC-%Pred-Pre 92 %    ERV-Pred 0.94 L     ERV-Pre 1.08 L    ERV-%Pred-Pre 114 %    FEV1FEV6-Pred 81 %    FEV1FEV6-Pre 66 %    FRCPleth-Pred 2.85 L    FRCPleth-Pre 3.58 L    FRCPleth-%Pred-Pre 125 %    RVPleth-Pred 1.96 L    RVPleth-Pre 2.50 L    RVPleth-%Pred-Pre 127 %    TLCPleth-Pred 5.40 L    TLCPleth-Pre 6.14 L    TLCPleth-%Pred-Pre 113 %    DLCOunc-Pred 22.52 ml/min/mmHg    DLCOunc-Pre 28.09 ml/min/mmHg    DLCOunc-%Pred-Pre 124 %    VA-Pre 5.48 L    VA-%Pred-Pre 101 %    FEV1SVC-Pred 77 %    FEV1SVC-Pre 63 %       Assessment and plan:   Moderate Persistent Asthma  Chronic cough  Recent COVID 5/2022 in spite of vaccinated x2 and boosted x1  Lisette had improved control of asthma & cough on Trelegy but was set back significantly by COVID one month ago.  She continues to have increased fatigue and cough and is not yet back to pre-COVID baseline.  Will add inhaled steroid to deal with potential augmented airway inflammation with a 1 month trial.  Plan:  Continue high strength Trelegy and Singulair  Continue sinus rinses per ENT  Continue Nexium  ADD  Flovent Diskus (moderate strength) for 1 month  If no improvement in cough after 2 weeks, then call us and we will switch to high strength Flovent  Get 2nd COVID booster in August  Call our nurses in 2 week if not improved  Return visit in 6 months  Call our nurses if problems or questions in between  I spent a total of 40 minutes devoted to her care today 6/15/2022, including 25 minutes in video visit from 8:35 AM to 9:00 AM.  Eyad Ley MD

## 2022-06-15 NOTE — PATIENT INSTRUCTIONS
Moderate Persistent Asthma  Chronic cough  Recent COVID 5/2022 in spite of vaccinated x2 and boosted x1    Plan:  Continue high strength Trelegy and Singulair  Continue sinus rinses per ENT  Continue Nexium  ADD  Flovent Diskus (moderate strength) for 1 month  If no improvement in cough after 2 weeks, then call us and we will switch to high strength Flovent  Get 2nd booster in August    Please call our nurses in 2 week if not improved - [phone] (Fady / Bridget / Tamsine)    Return visit in 6 months  Call our nurses if problems or questions in between
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
## 2022-06-17 NOTE — CONFIDENTIAL NOTE
Provider: CE      Tech: JULIAN      Patient Name: Lisette Owens     : [birth year]      MRN: [id number]      YOUNG: 22      Age: 57      Education: 16      Ethnicity: C      Handedness: Right      Station: OP             NEUROPSYCHOLOGICAL TESTS RAW SCORE STANDARDIZED SCORE* DESCRIPTIVE RANGE**   PREMORBID ABILITY       WAIS-IV ACS Test of Premorbid Functioning (Estimated FSIQ) 63 SS= 120 Above Average     Estimated FSIQ = 115 High Average          ATTENTION AND EXECUTIVE FUNCTIONS RAW SCORE STANDARDIZED SCORE* DESCRIPTIVE RANGE**   Wechsler Adult Intelligence Scale - 4th Edition (WAIS-IV)     Digit Span Forward 13 ss= 14 Above Average   Digit Span Backward 8 ss= 10 Average   Digit Span Sequencing 9 ss= 11 Average   Digit Span Total 30 ss= 12 High Average     TSs,r,e = 53 Average   Coding 57 ss= 10 Average     TSs,r,e = 44 Average   Similarities 31 ss= 13 High Average     TSs,r,e = 55 Average   Matrix Reasoning 13 ss= 9 Average     TSs,r,e = 40 Low Average   Trail Making Test (Time/errors)        Part A s,r,e 32/0 TS= 46 Average   Part B s,r,e 83/0 TS= 42 Low Average   Stroop       Word e 93 TS= 41 Low Average   Color e 64 TS= 39 Low Average   Color/Word e 44 TS= 47 Average   Interference e 7 TS= 57 High Average          LANGUAGE RAW SCORE STANDARDIZED SCORE* DESCRIPTIVE RANGE**   Neuropsychological Assessment Battery (NAB) Naming s,e 31 TS= 53 Average   Letter-Cue Verbal Fluency (FAS) s,r,e 13-12-18 (43) TS= 48 Average   Animal Fluency s,r,e 22 TS=  50 Average          VISUOSPATIAL PROCESSING RAW SCORE STANDARDIZED SCORE* DESCRIPTIVE RANGE**   Sushil Complex Figure Test (RCFT) Copy 34 %ile= >16 WNL   RBANS       Line Orientation 16 %ile= 26-50 Average   WMS-IV Visual Reproduction Copy 43 %= >75 High Average          LEARNING & MEMORY RAW SCORE STANDARDIZED SCORE* DESCRIPTIVE RANGE**   Wechsler Memory Scale-4th Edition (WMS-IV)      Logical Memory I 30 ss= 12 High Average     TSs,r,e = 52 Average   Logical Memory II 24 ss=  11 Average     TSs,r,e = 49 Average   Logical Memory Recognition 27 %= >75 High Average   Visual Reproduction I 36 ss= 11 Average     TSs,r,e = 50 Average   Visual Reproduction II 37 ss= 15 Above Average     TSs,r,e = 65 Above Average   Visual Reproduction Recognition 7 %= >75 High Average   [place] Verbal Learning Test - 3rd Edition (CVLT-3; Standard Form)    Total Trials 1-5 se 6-8-10-12-14 (50) TS=  52 Average   Trial 1 se 6 TS= 46 Average   Trial 5se 14 TS= 51 Average   Learning Apache 2 ss= 13 High Average   SD Free Recall se 12 TS= 51 Average   SD Cued Recall se 13 TS= 48 Average   LD Free Recall se 15 TS= 58 High Average   LD Cued Recall se 14 TS= 51 Average   Total Intrusions se 2 TS= 50 Average   Total Recognition Hits se 15 TS= 47 Average   False Positives se 0 TS= 55 Average   d' se 3.6 TS= 51 Average          PSYCHOLOGICAL & BEHAVIORAL SYMPTOMS RAW SCORE STANDARDIZED SCORE* DESCRIPTIVE RANGE**   Ni Anxiety Inventory (YVETTE) 26 --  Severe   Ni Depression Inventory - 2nd Edition (BDI-II) 34 --  Severe          PERFORMANCE VALIDITY RAW SCORE STANDARDIZED SCORE* DESCRIPTIVE RANGE**   FC 16/16 --  Valid Range   RDS 11   Valid Range   TOMM       Trial 1 50   Valid Range          * All standardized scores are adjusted for age. Superscripts denote additional adjustment for (s)ex, (r)ace/ethnicity, (l)anguage, and/or (e)ducation.   ** Descriptive ranges are based on American Academy of Clinical Neuropsychology (2020) consensus guidelines, or test manuals where appropriate.    WNL = within normal limits. DC = discontinued due to patient s inability to complete the test.    Standardized scores: TS = T-score; mean = 50, standard deviation =10; z = z-score; mean = 0, standard deviation = 1; ss = scaled score; mean = 10, standard deviation = 3; MAS = Leroy Older Adult Normative Study age adjusted scaled score; mean = 10, standard deviation = 3; SS = standard score; mean = 100, standard deviation = 15.

## 2022-06-21 ENCOUNTER — VIRTUAL VISIT (OUTPATIENT)
Dept: FAMILY MEDICINE | Facility: CLINIC | Age: 57
End: 2022-06-21
Payer: COMMERCIAL

## 2022-06-21 DIAGNOSIS — J45.41 MODERATE PERSISTENT ASTHMA WITH ACUTE EXACERBATION: ICD-10-CM

## 2022-06-21 DIAGNOSIS — F41.8 DEPRESSION WITH ANXIETY: ICD-10-CM

## 2022-06-21 DIAGNOSIS — F43.10 PTSD (POST-TRAUMATIC STRESS DISORDER): ICD-10-CM

## 2022-06-21 DIAGNOSIS — R00.2 PALPITATIONS: ICD-10-CM

## 2022-06-21 DIAGNOSIS — E53.8 VITAMIN B12 DEFICIENCY (NON ANEMIC): ICD-10-CM

## 2022-06-21 DIAGNOSIS — E55.9 VITAMIN D DEFICIENCY: ICD-10-CM

## 2022-06-21 DIAGNOSIS — E11.9 TYPE 2 DIABETES MELLITUS WITHOUT COMPLICATION, WITHOUT LONG-TERM CURRENT USE OF INSULIN (H): Primary | ICD-10-CM

## 2022-06-21 DIAGNOSIS — R53.83 FATIGUE, UNSPECIFIED TYPE: ICD-10-CM

## 2022-06-21 PROCEDURE — 96127 BRIEF EMOTIONAL/BEHAV ASSMT: CPT | Mod: 95 | Performed by: PHYSICIAN ASSISTANT

## 2022-06-21 PROCEDURE — 99214 OFFICE O/P EST MOD 30 MIN: CPT | Mod: TEL | Performed by: PHYSICIAN ASSISTANT

## 2022-06-21 RX ORDER — FLUOXETINE 40 MG/1
40 CAPSULE ORAL DAILY
Qty: 60 CAPSULE | Refills: 1 | Status: SHIPPED | OUTPATIENT
Start: 2022-06-21 | End: 2022-10-29

## 2022-06-21 RX ORDER — LOSARTAN POTASSIUM 25 MG/1
25 TABLET ORAL DAILY
Qty: 90 TABLET | Refills: 1 | Status: SHIPPED | OUTPATIENT
Start: 2022-06-21 | End: 2023-03-27

## 2022-06-21 ASSESSMENT — ANXIETY QUESTIONNAIRES
GAD7 TOTAL SCORE: 10
GAD7 TOTAL SCORE: 10
8. IF YOU CHECKED OFF ANY PROBLEMS, HOW DIFFICULT HAVE THESE MADE IT FOR YOU TO DO YOUR WORK, TAKE CARE OF THINGS AT HOME, OR GET ALONG WITH OTHER PEOPLE?: SOMEWHAT DIFFICULT
GAD7 TOTAL SCORE: 10
2. NOT BEING ABLE TO STOP OR CONTROL WORRYING: MORE THAN HALF THE DAYS
1. FEELING NERVOUS, ANXIOUS, OR ON EDGE: SEVERAL DAYS
3. WORRYING TOO MUCH ABOUT DIFFERENT THINGS: MORE THAN HALF THE DAYS
7. FEELING AFRAID AS IF SOMETHING AWFUL MIGHT HAPPEN: MORE THAN HALF THE DAYS
4. TROUBLE RELAXING: SEVERAL DAYS
7. FEELING AFRAID AS IF SOMETHING AWFUL MIGHT HAPPEN: MORE THAN HALF THE DAYS
6. BECOMING EASILY ANNOYED OR IRRITABLE: SEVERAL DAYS
5. BEING SO RESTLESS THAT IT IS HARD TO SIT STILL: SEVERAL DAYS

## 2022-06-21 ASSESSMENT — PATIENT HEALTH QUESTIONNAIRE - PHQ9
10. IF YOU CHECKED OFF ANY PROBLEMS, HOW DIFFICULT HAVE THESE PROBLEMS MADE IT FOR YOU TO DO YOUR WORK, TAKE CARE OF THINGS AT HOME, OR GET ALONG WITH OTHER PEOPLE: SOMEWHAT DIFFICULT
SUM OF ALL RESPONSES TO PHQ QUESTIONS 1-9: 12
SUM OF ALL RESPONSES TO PHQ QUESTIONS 1-9: 12

## 2022-06-25 ENCOUNTER — HEALTH MAINTENANCE LETTER (OUTPATIENT)
Age: 57
End: 2022-06-25

## 2022-07-13 ENCOUNTER — TELEPHONE (OUTPATIENT)
Dept: OTOLARYNGOLOGY | Facility: CLINIC | Age: 57
End: 2022-07-13

## 2022-07-13 ENCOUNTER — OFFICE VISIT (OUTPATIENT)
Dept: OTOLARYNGOLOGY | Facility: CLINIC | Age: 57
End: 2022-07-13
Payer: COMMERCIAL

## 2022-07-13 VITALS
HEART RATE: 70 BPM | DIASTOLIC BLOOD PRESSURE: 62 MMHG | WEIGHT: 158 LBS | BODY MASS INDEX: 24.8 KG/M2 | HEIGHT: 67 IN | TEMPERATURE: 97.5 F | SYSTOLIC BLOOD PRESSURE: 120 MMHG | OXYGEN SATURATION: 97 %

## 2022-07-13 DIAGNOSIS — J01.41 ACUTE RECURRENT PANSINUSITIS: ICD-10-CM

## 2022-07-13 DIAGNOSIS — J30.2 SEASONAL ALLERGIC RHINITIS, UNSPECIFIED TRIGGER: Primary | ICD-10-CM

## 2022-07-13 DIAGNOSIS — J45.40 MODERATE PERSISTENT REACTIVE AIRWAY DISEASE WITHOUT COMPLICATION: ICD-10-CM

## 2022-07-13 PROCEDURE — 31231 NASAL ENDOSCOPY DX: CPT | Performed by: OTOLARYNGOLOGY

## 2022-07-13 PROCEDURE — 99212 OFFICE O/P EST SF 10 MIN: CPT | Mod: 25 | Performed by: OTOLARYNGOLOGY

## 2022-07-13 RX ORDER — BUDESONIDE 0.5 MG/2ML
INHALANT ORAL
Qty: 240 ML | Refills: 6 | Status: SHIPPED | OUTPATIENT
Start: 2022-07-13

## 2022-07-13 ASSESSMENT — PAIN SCALES - GENERAL: PAINLEVEL: NO PAIN (0)

## 2022-07-13 NOTE — NURSING NOTE
"Chief Complaint   Patient presents with     RECHECK     3 month follow up    Blood pressure 120/62, pulse 70, temperature 97.5  F (36.4  C), temperature source Temporal, height 1.689 m (5' 6.5\"), weight 71.7 kg (158 lb), last menstrual period 05/15/2014, SpO2 97 %, not currently breastfeeding. Zoë Liao, EMT  "

## 2022-07-13 NOTE — PROGRESS NOTES
"  [facility]  Return Visit  Encounter date:   July 13, 2022    Chief Complaint:   Post Nasal Drip, Cough    Interval History:   Lisette Owens is a 57 year old female with history of persistent asthma who presents for follow up chronic congestion. She was initially referred here to rule out sinus involvement, and at our last visit on 3/30/22, she reported enduring post nasal drip for years, which often triggered a cough. She also mentioned that she suffered bouts of discolored drainage, congestion, and pressure that spreads to her lungs and involves productive coughs. She initially attempted to resolve her symptoms by using antibiotics and Prednisone for acute sinus infections, but has since switched to Trelegy, which has improved her breathing. At the time of the visit, she did not endorse any sinus flares.     Today, she tells me that she has not had any bouts of sinusitis since our last appointment. She did however get Covid-19 in May, but this did not affect her sinuses, rather asthmatic exacerbation and diminished smell/taste. She did start Flovent as adjunct to her Trelegy to push her through this exacerbation. She does have a chronic cough that continues to bother her as well as dysphonia. However she is unable to say if these symptoms are lingering versus acute and does feel overall she is doing well.     Sino-Nasal Outcome Test (SNOT - 22)  DNT    [place] [facility]  No sinonasal surgery    Review of systems: A 14-point review of systems has been conducted and is negative for any notable symptoms, except as dictated in the history of present illness.     Physical Exam:  Vital signs: /62 (BP Location: Left arm, Patient Position: Sitting, Cuff Size: Adult Regular)   Pulse 70   Temp 97.5  F (36.4  C) (Temporal)   Ht 1.689 m (5' 6.5\")   Wt 71.7 kg (158 lb)   LMP 05/15/2014 (Approximate)   SpO2 97%   BMI 25.12 kg/m     General Appearance: No acute distress, appropriate demeanor, " conversant  Eyes: moist conjunctivae; EOMI; pupils symmetric; visual acuity grossly intact; no proptosis  Head: normocephalic; overall symmetric appearance without deformity  Face: overall symmetric without deformity; HB I/VI  Nose: No external deformity; see endoscopy  Lungs: symmetric chest rise; no wheezing  CV: Good distal perfusion; normal hear rate  Extremities: No deformity  Neurologic Exam: Cranial nerves II-XII are grossly intact; no focal deficit       Procedure Note  Procedure performed: Rigid nasal endoscopy  Indication: To evaluate for sinonasal pathology not visualized on routine anterior rhinoscopy  Anesthesia: 4% topical lidocaine with 0.05 % oxymetazoline  Description of procedure: A 30 degree, 3 mm rigid endoscope was inserted into bilateral nasal cavities and the nasal valves, nasal cavity, middle meatus, sphenoethmoid recess, nasopharynx were evaluated for evidence of obstruction, edema, purulence, polyps and/or mass/lesion.     Rosmer[place]-Ken Endoscopic Scoring System  Endoscopic observation Right Left   Polyps in middle meatus (0 = absent, 1 = restricted to middle meatus, 2 = Beyond middle meatus) 0 0   Discharge (0 = absent, 1 = thin and clear, 2 = thick, purulent) 0 0   Edema (0 = absent, 1 = mild-moderate, 2 = moderate-severe) 1 1   Crusting (0 = absent, 1 = mild-moderate, 2 = moderate-severe) 0 0   Scarring (0= absent, 1 = mild-moderate, 2 = moderate-severe) 0 0   Total 1 1     Findings  RT: significant IT hypertrophy; MM is stably edematous  LT: significant IT hypertrophy; MM is stably edematous    The patient tolerated the procedure well without complication.     Laboratory Review:  n/a     Imaging Review:  CTA 3/15/22   1.  Widely patent coronary arteries without evidence of  atherosclerosis or stenosis.   2.  Total Agatston score 0 placing the patient in the lowest  percentile when compared to age and gender matched control group.  3.  Please review the separate Radiology report for  incidental  noncardiac findings.     Pathology Review:  n/a     Assessment/Medical Decision Making:  Asthma  Recurrent acute sinusitis  GERD, on PPI  Chronic cough    Plan:  Bilateral endoscopy performed today shows widely stable exam of her nose without signs of worsening sinus disase. We will have her continue with 2x day budesonide rinses and she is happy with this. RTC in 6 months or sooner prn. Standing CT is good for one year.     Bandar Villa MD    [facility]  Rhinology, Endoscopic Skull Base Surgery  [facility]  Department of Otolaryngology - Head & Neck Surgery    Scribe Disclosure:  I, Char Saini, am serving as a scribe to document services personally performed by Bandar Villa MD at this visit, based upon the provider's statements to me. All documentation has been reviewed by the aforementioned provider prior to being entered into the official medical record.   ~~~~~~~~~~~~~~~~~~~~~~~~~~~~~~~~~~~~~~~~~~~~~~~~~~~~~~~~~~~~~~~~~~~~~~~~~~~~~~~~~~~~~~~~~~~~~~~~~~~~~~~~~~~~~~~~~~~~~~~~~~~~~~~~~~~~~~~    Past Medical History:   Diagnosis Date     Allergic rhinitis due to animal dander      Amblyopia     LT     Arthritis      Colon polyp      Depressive disorder      Endometriosis      Heart attack (H)     2016     House dust mite allergy      Moderate persistent asthma      Rhinitis, allergic to other allergen      Seasonal allergic rhinitis 7/25/05 skin tests pos. for: cat/dog/horse/DM/M/T/G/RW per Dr. Granado    pt. did IT from 7/06 to 11/5/07 to: cat/dog/DM/M/T/G/W dc'd per self.      Type 2 diabetes mellitus without complication, without long-term current use of insulin (H) 3/13/2017        Past Surgical History:   Procedure Laterality Date     COLONOSCOPY  10/2020     EXCISE EXOSTOSIS FOOT Right 10/2/2018    Procedure: EXCISE EXOSTOSIS FOOT;  Right foot removal of first tarsometatarsal joint dorsal bossing;  Surgeon: Will Barraza DPM;  Location:  [facility]     GYN SURGERY  June 24 2016    Hysterectomy complete     HYSTEROSCOPY          Family History   Problem Relation Age of Onset     Hypertension Mother      Alzheimer Disease Mother      Diabetes Mother      Tremor Mother      Hypertension Father      Skin Cancer Father      Alzheimer Disease Paternal Grandmother      Psychotic Disorder Paternal Grandmother         bipolar     Heart Disease Paternal Grandfather      Breast Cancer Maternal Grandmother      Tremor Maternal Grandfather      Thyroid Disease Brother         Tumors removed     Tremor Brother      Thyroid Disease Sister      Diabetes Sister      Skin Cancer Sister      Mental Illness Brother         Bi-Polar     Cerebrovascular Disease No family hx of      Glaucoma No family hx of      Macular Degeneration No family hx of         Social History     Socioeconomic History     Marital status:    Tobacco Use     Smoking status: Never Smoker     Smokeless tobacco: Never Used   Vaping Use     Vaping Use: Never used   Substance and Sexual Activity     Alcohol use: Yes     Comment: Rarely     Drug use: No     Sexual activity: Yes     Partners: Male     Birth control/protection: None     Comment: N/A   Other Topics Concern     Parent/sibling w/ CABG, MI or angioplasty before 65F 55M? No

## 2022-07-13 NOTE — LETTER
7/13/2022       RE: Lisette Owens  [address] [place] [address]     Dear Colleague,    Thank you for referring your patient, Lisette Owens, to the [facility] [place] at [facility]. Please see a copy of my visit note below.      [facility]  Return Visit  Encounter date:   July 13, 2022    Chief Complaint:   Post Nasal Drip, Cough    Interval History:   Lisette Owens is a 57 year old female with history of persistent asthma who presents for follow up chronic congestion. She was initially referred here to rule out sinus involvement, and at our last visit on 3/30/22, she reported enduring post nasal drip for years, which often triggered a cough. She also mentioned that she suffered bouts of discolored drainage, congestion, and pressure that spreads to her lungs and involves productive coughs. She initially attempted to resolve her symptoms by using antibiotics and Prednisone for acute sinus infections, but has since switched to Trelegy, which has improved her breathing. At the time of the visit, she did not endorse any sinus flares.     Today, she tells me that she has not had any bouts of sinusitis since our last appointment. She did however get Covid-19 in May, but this did not affect her sinuses, rather asthmatic exacerbation and diminished smell/taste. She did start Flovent as adjunct to her Trelegy to push her through this exacerbation. She does have a chronic cough that continues to bother her as well as dysphonia. However she is unable to say if these symptoms are lingering versus acute and does feel overall she is doing well.     Sino-Nasal Outcome Test (SNOT - 22)  DNT    [place] Operative [facility]  No sinonasal surgery    Review of systems: A 14-point review of systems has been conducted and is negative for any notable symptoms, except as dictated in the history of present illness.     Physical  "Exam:  Vital signs: /62 (BP Location: Left arm, Patient Position: Sitting, Cuff Size: Adult Regular)   Pulse 70   Temp 97.5  F (36.4  C) (Temporal)   Ht 1.689 m (5' 6.5\")   Wt 71.7 kg (158 lb)   LMP 05/15/2014 (Approximate)   SpO2 97%   BMI 25.12 kg/m     General Appearance: No acute distress, appropriate demeanor, conversant  Eyes: moist conjunctivae; EOMI; pupils symmetric; visual acuity grossly intact; no proptosis  Head: normocephalic; overall symmetric appearance without deformity  Face: overall symmetric without deformity; HB I/VI  Nose: No external deformity; see endoscopy  Lungs: symmetric chest rise; no wheezing  CV: Good distal perfusion; normal hear rate  Extremities: No deformity  Neurologic Exam: Cranial nerves II-XII are grossly intact; no focal deficit       Procedure Note  Procedure performed: Rigid nasal endoscopy  Indication: To evaluate for sinonasal pathology not visualized on routine anterior rhinoscopy  Anesthesia: 4% topical lidocaine with 0.05 % oxymetazoline  Description of procedure: A 30 degree, 3 mm rigid endoscope was inserted into bilateral nasal cavities and the nasal valves, nasal cavity, middle meatus, sphenoethmoid recess, nasopharynx were evaluated for evidence of obstruction, edema, purulence, polyps and/or mass/lesion.     [place]-Ken Endoscopic Scoring System  Endoscopic observation Right Left   Polyps in middle meatus (0 = absent, 1 = restricted to middle meatus, 2 = Beyond middle meatus) 0 0   Discharge (0 = absent, 1 = thin and clear, 2 = thick, purulent) 0 0   Edema (0 = absent, 1 = mild-moderate, 2 = moderate-severe) 1 1   Crusting (0 = absent, 1 = mild-moderate, 2 = moderate-severe) 0 0   Scarring (0= absent, 1 = mild-moderate, 2 = moderate-severe) 0 0   Total 1 1     Findings  RT: significant IT hypertrophy; MM is stably edematous  LT: significant IT hypertrophy; MM is stably edematous    The patient tolerated the procedure well without complication. "     Laboratory Review:  n/a     Imaging Review:  CTA 3/15/22   1.  Widely patent coronary arteries without evidence of  atherosclerosis or stenosis.   2.  Total Agatston score 0 placing the patient in the lowest  percentile when compared to age and gender matched control group.  3.  Please review the separate Radiology report for incidental  noncardiac findings.     Pathology Review:  n/a     Assessment/Medical Decision Making:  Asthma  Recurrent acute sinusitis  GERD, on PPI  Chronic cough    Plan:  Bilateral endoscopy performed today shows widely stable exam of her nose without signs of worsening sinus disase. We will have her continue with 2x day budesonide rinses and she is happy with this. RTC in 6 months or sooner prn. Standing CT is good for one year.     Bandar Villa MD    [facility]  Rhinology, Endoscopic Skull Base Surgery  [facility]  Department of Otolaryngology - Head & Neck Surgery    Scribe Disclosure:  I, Char Saini, am serving as a scribe to document services personally performed by Bandar Villa MD at this visit, based upon the provider's statements to me. All documentation has been reviewed by the aforementioned provider prior to being entered into the official medical record.   ~~~~~~~~~~~~~~~~~~~~~~~~~~~~~~~~~~~~~~~~~~~~~~~~~~~~~~~~~~~~~~~~~~~~~~~~~~~~~~~~~~~~~~~~~~~~~~~~~~~~~~~~~~~~~~~~~~~~~~~~~~~~~~~~~~~~~~~    Past Medical History:   Diagnosis Date     Allergic rhinitis due to animal dander      Amblyopia     LT     Arthritis      Colon polyp      Depressive disorder      Endometriosis      Heart attack (H)     2016     House dust mite allergy      Moderate persistent asthma      Rhinitis, allergic to other allergen      Seasonal allergic rhinitis 7/25/05 skin tests pos. for: cat/dog/horse/DM/M/T/G/RW per Dr. Granado    pt. did IT from 7/06 to 11/5/07 to: cat/dog/DM/M/T/G/W dc'd per self.      Type 2 diabetes mellitus without  complication, without long-term current use of insulin (H) 3/13/2017        Past Surgical History:   Procedure Laterality Date     COLONOSCOPY  10/2020     EXCISE EXOSTOSIS FOOT Right 10/2/2018    Procedure: EXCISE EXOSTOSIS FOOT;  Right foot removal of first tarsometatarsal joint dorsal bossing;  Surgeon: Will Barraza DPM;  Location: [facility]     GYN SURGERY  June 24 2016    Hysterectomy complete     HYSTEROSCOPY          Family History   Problem Relation Age of Onset     Hypertension Mother      Alzheimer Disease Mother      Diabetes Mother      Tremor Mother      Hypertension Father      Skin Cancer Father      Alzheimer Disease Paternal Grandmother      Psychotic Disorder Paternal Grandmother         bipolar     Heart Disease Paternal Grandfather      Breast Cancer Maternal Grandmother      Tremor Maternal Grandfather      Thyroid Disease Brother         Tumors removed     Tremor Brother      Thyroid Disease Sister      Diabetes Sister      Skin Cancer Sister      Mental Illness Brother         Bi-Polar     Cerebrovascular Disease No family hx of      Glaucoma No family hx of      Macular Degeneration No family hx of         Social History     Socioeconomic History     Marital status:    Tobacco Use     Smoking status: Never Smoker     Smokeless tobacco: Never Used   Vaping Use     Vaping Use: Never used   Substance and Sexual Activity     Alcohol use: Yes     Comment: Rarely     Drug use: No     Sexual activity: Yes     Partners: Male     Birth control/protection: None     Comment: N/A   Other Topics Concern     Parent/sibling w/ CABG, MI or angioplasty before 65F 55M? No          Again, thank you for allowing me to participate in the care of your patient.      Sincerely,    Bandar Villa MD

## 2022-07-13 NOTE — PATIENT INSTRUCTIONS
"You were seen in the clinic today by Dr. Villa. If you have any questions or concerns after your appointment, please call the clinic at [phone]. Press \"1\" for scheduling, press \"3\" for nurse advice.    2.   The following has been recommended for you based upon your appointment today:   -Continue Budesonide rinses. A refill for this medication has been sent to your pharmacy.    3.   Plan to return the clinic in 6 months.       Kim Khan LPN  [facility]  Department of Otolaryngology  [phone]   "

## 2022-08-10 ENCOUNTER — TELEPHONE (OUTPATIENT)
Dept: FAMILY MEDICINE | Facility: CLINIC | Age: 57
End: 2022-08-10

## 2022-08-10 ENCOUNTER — LAB (OUTPATIENT)
Dept: LAB | Facility: CLINIC | Age: 57
End: 2022-08-10
Payer: COMMERCIAL

## 2022-08-10 DIAGNOSIS — E55.9 VITAMIN D DEFICIENCY: ICD-10-CM

## 2022-08-10 DIAGNOSIS — E53.8 VITAMIN B12 DEFICIENCY (NON ANEMIC): ICD-10-CM

## 2022-08-10 DIAGNOSIS — R53.83 FATIGUE, UNSPECIFIED TYPE: ICD-10-CM

## 2022-08-10 DIAGNOSIS — R30.0 DYSURIA: Primary | ICD-10-CM

## 2022-08-10 DIAGNOSIS — N30.00 ACUTE CYSTITIS WITHOUT HEMATURIA: Primary | ICD-10-CM

## 2022-08-10 DIAGNOSIS — R35.0 URINARY FREQUENCY: ICD-10-CM

## 2022-08-10 DIAGNOSIS — R30.0 DYSURIA: ICD-10-CM

## 2022-08-10 DIAGNOSIS — E11.9 TYPE 2 DIABETES MELLITUS WITHOUT COMPLICATION, WITHOUT LONG-TERM CURRENT USE OF INSULIN (H): ICD-10-CM

## 2022-08-10 LAB
ALBUMIN UR-MCNC: NEGATIVE MG/DL
ANION GAP SERPL CALCULATED.3IONS-SCNC: 9 MMOL/L (ref 3–14)
APPEARANCE UR: ABNORMAL
BACTERIA #/AREA URNS HPF: ABNORMAL /HPF
BASOPHILS # BLD AUTO: 0.1 10E3/UL (ref 0–0.2)
BASOPHILS NFR BLD AUTO: 2 %
BILIRUB UR QL STRIP: NEGATIVE
BUN SERPL-MCNC: 13 MG/DL (ref 7–30)
CALCIUM SERPL-MCNC: 9.1 MG/DL (ref 8.5–10.1)
CAOX CRY #/AREA URNS HPF: ABNORMAL /HPF
CHLORIDE BLD-SCNC: 105 MMOL/L (ref 94–109)
CO2 SERPL-SCNC: 24 MMOL/L (ref 20–32)
COLOR UR AUTO: YELLOW
CREAT SERPL-MCNC: 0.83 MG/DL (ref 0.52–1.04)
EOSINOPHIL # BLD AUTO: 0.3 10E3/UL (ref 0–0.7)
EOSINOPHIL NFR BLD AUTO: 6 %
ERYTHROCYTE [DISTWIDTH] IN BLOOD BY AUTOMATED COUNT: 12.9 % (ref 10–15)
GFR SERPL CREATININE-BSD FRML MDRD: 82 ML/MIN/1.73M2
GLUCOSE BLD-MCNC: 97 MG/DL (ref 70–99)
GLUCOSE UR STRIP-MCNC: NEGATIVE MG/DL
HBA1C MFR BLD: 5.4 % (ref 0–5.6)
HCT VFR BLD AUTO: 39.8 % (ref 35–47)
HGB BLD-MCNC: 13.1 G/DL (ref 11.7–15.7)
HGB UR QL STRIP: NEGATIVE
KETONES UR STRIP-MCNC: NEGATIVE MG/DL
LEUKOCYTE ESTERASE UR QL STRIP: ABNORMAL
LYMPHOCYTES # BLD AUTO: 1.9 10E3/UL (ref 0.8–5.3)
LYMPHOCYTES NFR BLD AUTO: 43 %
MCH RBC QN AUTO: 30.8 PG (ref 26.5–33)
MCHC RBC AUTO-ENTMCNC: 32.9 G/DL (ref 31.5–36.5)
MCV RBC AUTO: 94 FL (ref 78–100)
MONOCYTES # BLD AUTO: 0.3 10E3/UL (ref 0–1.3)
MONOCYTES NFR BLD AUTO: 8 %
NEUTROPHILS # BLD AUTO: 1.8 10E3/UL (ref 1.6–8.3)
NEUTROPHILS NFR BLD AUTO: 41 %
NITRATE UR QL: POSITIVE
PH UR STRIP: 5.5 [PH] (ref 5–7)
PLATELET # BLD AUTO: 252 10E3/UL (ref 150–450)
POTASSIUM BLD-SCNC: 3.9 MMOL/L (ref 3.4–5.3)
RBC # BLD AUTO: 4.25 10E6/UL (ref 3.8–5.2)
RBC #/AREA URNS AUTO: ABNORMAL /HPF
SODIUM SERPL-SCNC: 138 MMOL/L (ref 133–144)
SP GR UR STRIP: 1.02 (ref 1–1.03)
SQUAMOUS #/AREA URNS AUTO: ABNORMAL /LPF
TSH SERPL DL<=0.005 MIU/L-ACNC: 2.16 MU/L (ref 0.4–4)
UROBILINOGEN UR STRIP-ACNC: 0.2 E.U./DL
VIT B12 SERPL-MCNC: 197 PG/ML (ref 232–1245)
WBC # BLD AUTO: 4.3 10E3/UL (ref 4–11)
WBC #/AREA URNS AUTO: ABNORMAL /HPF

## 2022-08-10 PROCEDURE — 85025 COMPLETE CBC W/AUTO DIFF WBC: CPT

## 2022-08-10 PROCEDURE — 82607 VITAMIN B-12: CPT

## 2022-08-10 PROCEDURE — 82306 VITAMIN D 25 HYDROXY: CPT

## 2022-08-10 PROCEDURE — 36415 COLL VENOUS BLD VENIPUNCTURE: CPT

## 2022-08-10 PROCEDURE — 87086 URINE CULTURE/COLONY COUNT: CPT

## 2022-08-10 PROCEDURE — 83036 HEMOGLOBIN GLYCOSYLATED A1C: CPT

## 2022-08-10 PROCEDURE — 84443 ASSAY THYROID STIM HORMONE: CPT

## 2022-08-10 PROCEDURE — 87186 SC STD MICRODIL/AGAR DIL: CPT

## 2022-08-10 PROCEDURE — 80048 BASIC METABOLIC PNL TOTAL CA: CPT

## 2022-08-10 PROCEDURE — 81001 URINALYSIS AUTO W/SCOPE: CPT

## 2022-08-10 RX ORDER — SULFAMETHOXAZOLE/TRIMETHOPRIM 800-160 MG
1 TABLET ORAL 2 TIMES DAILY
Qty: 14 TABLET | Refills: 0 | Status: SHIPPED | OUTPATIENT
Start: 2022-08-10 | End: 2022-08-17

## 2022-08-10 NOTE — TELEPHONE ENCOUNTER
See UA Result Note.    I reviewed the directives from Kolton Yin PA-C with patient. She had not read her My Chart message regarding her UA result. She will start the Bactrim today.     Rachel Levi RN BSN  [facility]

## 2022-08-10 NOTE — TELEPHONE ENCOUNTER
"Patient came in to the [place] lab for blood work this AM.     While she was here, she asked staff to also collect a Urine sample (which they are holding for orders).    Patient states \"I always get Urinary Tract Infections and I think I have one now. Kolton knows that I am very prone to getting them.\"       I called patient to ask about her symptoms.     Over the past week, she has had urinary frequency and burning.  She states \"I get UTI's fairly often and that's what this feels like.\"     No fever, flank pain or hematuria.     She states that since Covid her sense of smell has not completely recovered so she is not sure if there is a foul smell to her urine.     She has been taking Azo, pushing fluids and also drinking cranberry juice with no improvement in her symptoms.     Patient is willing to do an E-visit if needed.     Please review and advise.     Rachel Levi RN BSN  [facility]      "

## 2022-08-11 LAB — DEPRECATED CALCIDIOL+CALCIFEROL SERPL-MC: 73 UG/L (ref 20–75)

## 2022-08-12 LAB
BACTERIA UR CULT: ABNORMAL
BACTERIA UR CULT: ABNORMAL

## 2022-08-15 NOTE — PROGRESS NOTES
Lisette is a 57 year old who is being evaluated via a billable telephone visit.      What phone number would you like to be contacted at? [phone]  How would you like to obtain your AVS? Malcom Smith   Lisette is a 57 year old{, presenting for the following health issues:  No chief complaint on file.      History of Present Illness       Reason for visit:  Lab results and plan    She eats 2-3 servings of fruits and vegetables daily.She consumes 0 sweetened beverage(s) daily.She exercises with enough effort to increase her heart rate 20 to 29 minutes per day.  She exercises with enough effort to increase her heart rate 3 or less days per week.   She is taking medications regularly.    Today's PHQ-9         PHQ-9 Total Score: 17    PHQ-9 Q9 Thoughts of better off dead/self-harm past 2 weeks :   Not at all    How difficult have these problems made it for you to do your work, take care of things at home, or get along with other people: Very difficult     Has lost 40 lbs . Watching her diet and staying active    Reviewed labs. a1c 5.4.  uti resolving. uti's related to sex with .   Review of Systems   Constitutional, HEENT, cardiovascular, pulmonary, GI, , musculoskeletal, neuro, skin, endocrine and psych systems are negative, except as otherwise noted.      Objective           Vitals:  No vitals were obtained today due to virtual visit.    Physical Exam   healthy, alert and no distress  PSYCH: Alert and oriented times 3; coherent speech, normal   rate and volume, able to articulate logical thoughts, able   to abstract reason, no tangential thoughts, no hallucinations   or delusions  Her affect is normal  RESP: No cough, no audible wheezing, able to talk in full sentences  Remainder of exam unable to be completed due to telephone visits    Lisette was seen today for results.    Diagnoses and all orders for this visit:    Vitamin B12 deficiency (non anemic)  -     cyanocobalamin (CYANOCOBALAMIN) 1000  "MCG/ML injection; Inject 1 mL (1,000 mcg) into the muscle every 30 days  -     Syringe/Needle, Disp, 20G X 1\" 3 ML MISC; Use for B12 injections    Type 2 diabetes mellitus without complication, without long-term current use of insulin (H)  -     metFORMIN (GLUCOPHAGE XR) 500 MG 24 hr tablet; TAKE 1 TABLETS BY MOUTH TWICE DAILY WITH MEALS      Restart b12 injections.  Reduce dose of metformin.  work on lifestyle modification          Phone call duration: 20 minutes    .  ..  "

## 2022-08-16 ENCOUNTER — VIRTUAL VISIT (OUTPATIENT)
Dept: FAMILY MEDICINE | Facility: CLINIC | Age: 57
End: 2022-08-16
Payer: COMMERCIAL

## 2022-08-16 DIAGNOSIS — E53.8 VITAMIN B12 DEFICIENCY (NON ANEMIC): Primary | ICD-10-CM

## 2022-08-16 DIAGNOSIS — E11.9 TYPE 2 DIABETES MELLITUS WITHOUT COMPLICATION, WITHOUT LONG-TERM CURRENT USE OF INSULIN (H): ICD-10-CM

## 2022-08-16 PROCEDURE — 99214 OFFICE O/P EST MOD 30 MIN: CPT | Mod: TEL | Performed by: PHYSICIAN ASSISTANT

## 2022-08-16 PROCEDURE — 96127 BRIEF EMOTIONAL/BEHAV ASSMT: CPT | Mod: 95 | Performed by: PHYSICIAN ASSISTANT

## 2022-08-16 RX ORDER — METFORMIN HCL 500 MG
TABLET, EXTENDED RELEASE 24 HR ORAL
Qty: 180 TABLET | Refills: 0 | Status: SHIPPED | OUTPATIENT
Start: 2022-08-16 | End: 2023-02-27

## 2022-08-16 RX ORDER — CYANOCOBALAMIN 1000 UG/ML
1 INJECTION, SOLUTION INTRAMUSCULAR; SUBCUTANEOUS
Qty: 10 ML | Refills: 0 | Status: SHIPPED | OUTPATIENT
Start: 2022-08-16 | End: 2022-12-27

## 2022-08-16 ASSESSMENT — PATIENT HEALTH QUESTIONNAIRE - PHQ9
SUM OF ALL RESPONSES TO PHQ QUESTIONS 1-9: 17
SUM OF ALL RESPONSES TO PHQ QUESTIONS 1-9: 17
10. IF YOU CHECKED OFF ANY PROBLEMS, HOW DIFFICULT HAVE THESE PROBLEMS MADE IT FOR YOU TO DO YOUR WORK, TAKE CARE OF THINGS AT HOME, OR GET ALONG WITH OTHER PEOPLE: VERY DIFFICULT

## 2022-08-21 DIAGNOSIS — F41.8 DEPRESSION WITH ANXIETY: ICD-10-CM

## 2022-08-23 RX ORDER — BUSPIRONE HYDROCHLORIDE 10 MG/1
TABLET ORAL
Qty: 180 TABLET | Refills: 0 | Status: SHIPPED | OUTPATIENT
Start: 2022-08-23 | End: 2022-11-15

## 2022-08-25 ENCOUNTER — MYC MEDICAL ADVICE (OUTPATIENT)
Dept: PULMONOLOGY | Facility: CLINIC | Age: 57
End: 2022-08-25

## 2022-08-25 ENCOUNTER — TELEPHONE (OUTPATIENT)
Dept: PULMONOLOGY | Facility: CLINIC | Age: 57
End: 2022-08-25

## 2022-08-25 NOTE — TELEPHONE ENCOUNTER
Pulmonary Staff    Got message that patient's cough has not improved with adding Flovent to Trelegy (mid June) after worsening with COVID Mid-May 2022.  She also c/o hoarse voice attributed to coughing.    I called her and she said that coughing is not waking her up at night because she is 'so tired.'    She is taking Nexium 40 q AM    Past h/o vocal cord nodules with treatment for GERD  Saw Dr. Villa and he said nose/sinuses were OK but did not look at Vocal Cords.  He offered referral to ENT/Voice.    P:    1.  Stop Flovent  2.  Try albuterol to see if it helps cough.  Would try 3x /day every other day and see if it helps cough or not.  3.  If there is no help with albuterol, then try Nexium twice daily for 2 weeks.  4.  Report back to our nursing staff in 3-4 weeks.  5. Continue fall allergy Rx - currently using steroid based sinus rinse and also using Allerflow (fluticasone). Using zyrtec (prev on zyrtec and claritin)    On B12 shots    Prev snored, but has lost 45 pounds in past year and no longer snores.  No apnea reported by spouse.    Eyad Ley MD

## 2022-08-29 NOTE — TELEPHONE ENCOUNTER
Second request to advise sent to MD via secure e-mail.     Addendum: MD called patient and addressed message.

## 2022-08-30 DIAGNOSIS — J45.40 MODERATE PERSISTENT ASTHMA, UNCOMPLICATED: ICD-10-CM

## 2022-08-30 RX ORDER — ALBUTEROL SULFATE 90 UG/1
AEROSOL, METERED RESPIRATORY (INHALATION)
Qty: 18 G | Refills: 4 | Status: SHIPPED | OUTPATIENT
Start: 2022-08-30

## 2022-09-22 ENCOUNTER — TRANSFERRED RECORDS (OUTPATIENT)
Dept: HEALTH INFORMATION MANAGEMENT | Facility: CLINIC | Age: 57
End: 2022-09-22

## 2022-10-13 ENCOUNTER — TRANSFERRED RECORDS (OUTPATIENT)
Dept: HEALTH INFORMATION MANAGEMENT | Facility: CLINIC | Age: 57
End: 2022-10-13

## 2022-10-19 ENCOUNTER — MYC MEDICAL ADVICE (OUTPATIENT)
Dept: FAMILY MEDICINE | Facility: CLINIC | Age: 57
End: 2022-10-19

## 2022-10-19 NOTE — TELEPHONE ENCOUNTER
Writer called patient to gather more information related to MyChart message. Write left a voice message to have patient call back.    Writer made another attempt to call patient with no answer.

## 2022-10-20 ENCOUNTER — ANCILLARY PROCEDURE (OUTPATIENT)
Dept: CT IMAGING | Facility: CLINIC | Age: 57
End: 2022-10-20
Attending: INTERNAL MEDICINE
Payer: COMMERCIAL

## 2022-10-20 DIAGNOSIS — R91.1 LUNG NODULE: ICD-10-CM

## 2022-10-20 DIAGNOSIS — J45.40 MODERATE PERSISTENT ASTHMA WITHOUT COMPLICATION: ICD-10-CM

## 2022-10-20 PROCEDURE — 71250 CT THORAX DX C-: CPT | Performed by: RADIOLOGY

## 2022-10-20 RX ORDER — ESOMEPRAZOLE MAGNESIUM 40 MG/1
CAPSULE, DELAYED RELEASE ORAL
Qty: 90 CAPSULE | Refills: 3 | Status: SHIPPED | OUTPATIENT
Start: 2022-10-20 | End: 2023-08-03

## 2022-10-20 NOTE — TELEPHONE ENCOUNTER
Spoke with patient and she has had chronic cough for 2 years she states. She has used Albuterol 3 times a day every other day with no improvement in cough per Dr Ley plan from Aug. Then she took Nexium BID for 2 weeks without improvement with cough per same plan. She has cold now without fever. Her cough has not changed with cold: deep cough with pale yellow sputum resulting occasionally and denies wheezing. She had not completed chest ct that had been scheduled in June. Gave her radiology number to call and schedule ASAP. She takes Trelegy, Singulair, Clariton, Mucinex, Budesonide sinus rinses, Albuterol and Nexium. Will message Dr Ley for advisement.

## 2022-10-27 ENCOUNTER — TELEPHONE (OUTPATIENT)
Dept: PULMONOLOGY | Facility: CLINIC | Age: 57
End: 2022-10-27

## 2022-10-27 ENCOUNTER — TELEPHONE (OUTPATIENT)
Dept: ALLERGY | Facility: CLINIC | Age: 57
End: 2022-10-27

## 2022-10-27 DIAGNOSIS — J45.41 MODERATE PERSISTENT ASTHMA WITH EXACERBATION: Primary | ICD-10-CM

## 2022-10-27 DIAGNOSIS — J32.9 CHRONIC RHINOSINUSITIS: ICD-10-CM

## 2022-10-27 DIAGNOSIS — J45.40 MODERATE PERSISTENT ASTHMA WITHOUT COMPLICATION: Primary | ICD-10-CM

## 2022-10-27 DIAGNOSIS — R05.3 CHRONIC COUGH: ICD-10-CM

## 2022-10-27 RX ORDER — DOXYCYCLINE 100 MG/1
100 CAPSULE ORAL 2 TIMES DAILY
Qty: 20 CAPSULE | Refills: 0 | Status: SHIPPED | OUTPATIENT
Start: 2022-10-27 | End: 2022-12-27

## 2022-10-27 RX ORDER — PREDNISONE 10 MG/1
10 TABLET ORAL DAILY
Qty: 100 TABLET | Refills: 0 | Status: SHIPPED | OUTPATIENT
Start: 2022-10-27 | End: 2022-12-27

## 2022-10-27 NOTE — TELEPHONE ENCOUNTER
Pulmonary Staff Follow Up    Pt called 10/24 with cold and ongoing continued deep cough with occasional pale yellow sputum production.    I reviewed my 3 prior visit notes, her 2 sets of PFTs and her older and most recent 10/20/22 Chest CT.        The CT shows only the piror pulmonary nodule without increase in size.      I called Ms Owens on her mobile phone, but got her voice mail.  Message left with CT result and I said that I would try to reach her again.    Eyad Ley MD    Addendum    I called patient again and reached her    She is not SOB but is troubled by cough, both at rest and with any activity.  Not improving and on maximal traditional therapy.      She has not definitely had nasal polyps but Dr. Villa wants her to get Nasal/sinus CT for next sinus flare.    Plan: treat subacute asthma flare with antibiotic (Doxy 100 BID x 10 days) and Prednisone (40/d to 30 to 20 to 10 to 5 to office for 3 days per dose.  Will also place Allergy consult to . Reji Ochoa  Will start prior approval process for either Nucala or Dupixent - she is checking on prior skin disease Dx.

## 2022-10-27 NOTE — TELEPHONE ENCOUNTER
[facility] Call Center    Phone Message    May a detailed message be left on voicemail: yes     Reason for Call: Other: medicaton came with 2 sets and the dose was high - looking to talk to provider regerding medication order.      Action Taken: Message routed to:  [facility]    Travel Screening: Not Applicable

## 2022-10-27 NOTE — TELEPHONE ENCOUNTER
Pharmacy called to clarify order for prednisone. Writer clarified Dr. Andres order and verified with provider note as follows: Prednisone (40/d to 30 to 20 to 10 to 5 to office for 3 days per dose.

## 2022-10-27 NOTE — TELEPHONE ENCOUNTER
This encounter is being sent to inform the clinic that this patient has a referral from Dr Eyad Ley for the diagnoses of Moderate persistent asthma without complication [J45.40]  Chronic cough [R05.3]  Chronic rhinosinusitis [J31.0, J32.9] and has requested that this patient be seen within 1-2 weeks and/or with Dr Reji Ochoa.  Based on the availability of our provider(s), we are unable to accommodate this request.      Were all sites offered this patient?  Yes      Does scheduling algorithm request to schedule next available?  Patient has been scheduled for the first available opening with Dr Reji Ochoa on 1/23/23.  We have informed the patient that the clinic will review their referral and reach out if a sooner appointment is medically necessary.

## 2022-10-28 ENCOUNTER — TELEPHONE (OUTPATIENT)
Dept: PULMONOLOGY | Facility: CLINIC | Age: 57
End: 2022-10-28

## 2022-10-28 DIAGNOSIS — J45.41 MODERATE PERSISTENT ASTHMA WITH EXACERBATION: Primary | ICD-10-CM

## 2022-10-28 NOTE — TELEPHONE ENCOUNTER
Called patient and left message for patient notifying her that Dr. Ley is starting her on Nucala as they had discussed. Writer told her Meghana, clinic navigator, will be reaching out to go over paperwork.

## 2022-10-31 ENCOUNTER — TELEPHONE (OUTPATIENT)
Dept: PULMONOLOGY | Facility: CLINIC | Age: 57
End: 2022-10-31

## 2022-10-31 NOTE — TELEPHONE ENCOUNTER
CAMELIAM with direct call back to discuss completing enrollment forms for [place] to Nucala as initiated by Dr. Ley. Requsted a call back to discuss.

## 2022-10-31 NOTE — TELEPHONE ENCOUNTER
PA Initiation    Medication: Nucala PA pending  Insurance Company: [employer] ([facility]) - Phone [phone] Fax [phone]  Pharmacy Filling the Rx: [facility] - [place], [place] - [facility] [address]  Filling Pharmacy Phone:    Filling Pharmacy Fax:    Start Date: 10/31/2022    Lisette Owens ([place]: [id number])

## 2022-10-31 NOTE — TELEPHONE ENCOUNTER
Pt returned call and discussed at length regarding obtaining signatures for [place] to [facility]. Informed her this was authorization for program to speak to us on her behalf as well as vice versa. Also discussed this was to authorize enrollment into [place] to [facility] which would include patient support services as well as investigating if she would be eligible in co-pay program or patient assistance program (if needed). Pt requested forms be emailed to her (email address verified) and informed her that I would provide a document with return methods. She voiced understanding.

## 2022-11-01 NOTE — TELEPHONE ENCOUNTER
Called patient to talk about referral for Moderate persistent asthma. Patient was at the dentist and requesting a call back around 1630. Will call patient back to discuss options based on chart review with Dr. Ochoa.    CLINT PhanN, RN

## 2022-11-01 NOTE — TELEPHONE ENCOUNTER
Called patient to discuss option for sooner appointment with Dr. Ochoa. Based on chart review and plan from Dr. Ley to hopefully start biologics soon it may be best to keep patient scheduled in January. Patient agreeable to this plan. Did give patient RN direct line to reach out if current treatment plan changes. No further action needed at this time.     Jennifer Corbett, CLINTN, RN

## 2022-11-01 NOTE — TELEPHONE ENCOUNTER
Fax cover sheet, demographic sheet and completed enrollment form for [place] to Nucala faxed to Chapis Santos/Elroy Gomez [facility] Team [phone] to finalize and [facility] [place] [facility]

## 2022-11-02 ENCOUNTER — TELEPHONE (OUTPATIENT)
Dept: PULMONOLOGY | Facility: CLINIC | Age: 57
End: 2022-11-02

## 2022-11-02 NOTE — TELEPHONE ENCOUNTER
Called patient to go over Nucala administration. Suggested patient look at instructional video at [email or web address]. Patient has experience with self injections and is comfortable with self administration. Went over instructions below:    Find a clean, flat work surface. Make sure you have the following supplies:    NUCALA Pre-filled Pen  1 alcohol wipe*  1 cotton ball or gauze*  Sharps Disposal Container* (See Step 13)      Check the Pen:    *Do not use the NUCALA Pre-filled Pen if it has been damaged.    *Do not use the NUCALA Pre-filled Pen if the clear cap is missing or not securely attached.    *Do not use the NUCALA Pre-filled Pen if the Window is yellow before use.    *Check to be sure that you have the correct Medicine and dose.    *Check the expiration date.    *Do not use the NUCALA Pre-filled Pen if the expiration date has passed.    *Look at the Medicine through the Window: it should be clear and colorless to pale yellow.  Note: You may see an air bubble, this is normal.    *Do not use the NUCALA Pre-filled Pen if the liquid is discolored or cloudy, or if it contains visible  flakes or particles.    *Lay the NUCALA Pre-filled Pen on a flat surface and let it naturally warm up at room temperature less than 77 F (25 C) for at least 30 minutes    *Do not heat the NUCALA Pre-filled Pen in a microwave, hot water or direct sunlight.        Give the medication:    *Wash your hands.    *Clean administration area with an alcohol wipe. It can be given on top of the thigh, the back of the upper arm, or in the lower abdomen. It just needs an area with a little fatty tissue under it.    *Remove the cap by pulling it straight off. Do not twist the cap off. (Do not remove the cap until you are ready to inject.)    **Do not press or touch the yellow Needle Cover with your fingers. The Needle is inside.    **Do not put the cap back on the NUCALA Pre-filled Pen after you have removed it.    *Hold the NUCALA Pre-filled Pen so  "that you can see the Window.     *Place the yellow Needle Cover on your skin at approximately a 90-degree angle.    *Press and hold the NUCALA Pre-filled Pen firmly against your skin until you cannot see the yellow Needle Cover.    *There will be a \"click\" when the injection starts, and the Window will start to turn yellow.    *Keep pressing the NUCALA Pre-filled Pen against your skin and watch the window. The window will turn completely yellow, and you will hear a 2nd \"click\".    *Keep pressing the NUCALA Pre-filled Pen against your skin and count to 5 to make sure you get your full dose.    *After you have completed your injection, pull straight up to remove NUCALA Pre-filled Pen from the skin. The yellow Needle Cover will cover the needle.    *If you see any blood at the site, lightly dab a cotton ball or gauze pad.    **Do not rub your skin after the injection.    **If the Window does not turn completely Yellow, remove the pen and call your healthcare provider.  Do not give yourself a second dose without speaking with your healthcare provider.        Dispose of your Pen:    *Put your used NUCALA Pre-filled Pens in an FDA-cleared sharps disposal container right away after use.    **Do not dispose of (throw away) NUCALA Pre-filled Pens and Caps in your household trash.    **If you do not have an FDA-cleared sharps disposal container, you may use a household container that is:  Made of a heavy-duty plastic,  Can be closed with a tight-fitting, puncture-resistant lid, without sharps being able to come out,  Upright and stable during use,  Leak-resistant, and  Properly labeled to warn of hazardous waste inside the container    *When your sharps disposal container is almost full, you will need to follow your community guidelines for the right way to dispose of your sharps disposal container. There may be state or local laws about how you should throw away used NUCALA Pre-filled Pens.     **For more information about " safe sharps disposal, and for specific information about sharps disposal in the state that you live in, go to the FDA's website at: http://www.fda.gov/safesharpsdisposal.

## 2022-11-04 NOTE — TELEPHONE ENCOUNTER
[place] to [facility] Forms have been completed by patient and MD and faxed to [facility].

## 2022-11-04 NOTE — TELEPHONE ENCOUNTER
Prior Authorization Approval    Authorization Effective Date: 10/31/2022  Authorization Expiration Date: 10/31/2023  Medication: Nucala PA Approved  Approved Dose/Quantity: 1 Per 28 DS  Reference #: [id number]   Insurance Company: [employer] ([facility]) - Phone [phone] Fax [phone]  Expected CoPay: 1403.21     CoPay Card Available: Yes    Foundation Assistance Needed:    Which Pharmacy is filling the prescription (Not needed for infusion/clinic administered): [facility] - [place], [place] - [address]   Pharmacy Notified: Yes  Patient Notified: Yes

## 2022-11-04 NOTE — TELEPHONE ENCOUNTER
Left Voicemail with patient regarding approval and copay card instructions.  Will also send MyC message.  RX released to SP

## 2022-11-20 ENCOUNTER — HEALTH MAINTENANCE LETTER (OUTPATIENT)
Age: 57
End: 2022-11-20

## 2022-12-26 NOTE — PROGRESS NOTES
Lisette is a 57 year old who is being evaluated via a billable telephone visit.      What phone number would you like to be contacted at? [phone]  How would you like to obtain your AVS? [place]  [place] Location (provider location):  On-site        Subjective   Lisette is a 57 year old presenting for the following health issues:  Diabetes (recheck)      HPI     Diabetes Follow-up    How often are you checking your blood sugar? A few times a month  What time of day are you checking your blood sugars (select all that apply)?  when she wakes up before eating  Have you had any blood sugars above 200?  No  Have you had any blood sugars below 70?  No    What symptoms do you notice when your blood sugar is low?  Shaky and lightheaded    What concerns do you have today about your diabetes? None     Do you have any of these symptoms? (Select all that apply)  Blurry vision    Have you had a diabetic eye exam in the last 12 months? No    Overall she's been feeling pretty good.   Mood had been decent. She's become more social. Noting less depression   Some anxiety still at times. At noc especially.  No chest pain/sob/palpitations/dizziness/ha's  Taking and tolerating all meds.  Watching diet some. Getting more exercise again.   BP Readings from Last 2 Encounters:   07/13/22 120/62   03/30/22 110/56     Hemoglobin A1C (%)   Date Value   08/10/2022 5.4   11/20/2021 6.6 (H)   08/17/2020 6.8 (H)   01/06/2020 7.4 (H)     LDL Cholesterol Calculated (mg/dL)   Date Value   11/20/2021 127 (H)   08/17/2020 101 (H)   05/03/2018 95               Review of Systems   Constitutional, HEENT, cardiovascular, pulmonary, GI, , musculoskeletal, neuro, skin, endocrine and psych systems are negative, except as otherwise noted.      Objective           Vitals:  No vitals were obtained today due to virtual visit.    Physical Exam   healthy, alert and no distress  PSYCH: Alert and oriented times 3; coherent speech, normal   rate and volume, able to  articulate logical thoughts, able   to abstract reason, no tangential thoughts, no hallucinations   or delusions  Her affect is normal  RESP: No cough, no audible wheezing, able to talk in full sentences  Remainder of exam unable to be completed due to telephone visits    Lisette was seen today for diabetes.    Diagnoses and all orders for this visit:    Type 2 diabetes mellitus without complication, without long-term current use of insulin (H)  -     Adult Eye  Referral; Future  -     Lipid panel reflex to direct LDL Non-fasting; Future  -     Albumin Random Urine Quantitative with Creat Ratio; Future  -     Hemoglobin A1c; Future    Visit for screening mammogram  -     MA SCREENING DIGITAL BILAT - Future  (s+30); Future    Encounter for screening mammogram for breast cancer  -     MA SCREENING DIGITAL BILAT - Future  (s+30); Future    Screening for hyperlipidemia  -     Lipid panel reflex to direct LDL Non-fasting; Future    Depression with anxiety  -     busPIRone (BUSPAR) 15 MG tablet; Take 1 tablet (15 mg) by mouth 3 times daily    Vitamin B12 deficiency (non anemic)  -     Vitamin B12; Future    Vitamin D deficiency  -     Vitamin D Deficiency; Future    Other orders  -     REVIEW OF HEALTH MAINTENANCE PROTOCOL ORDERS      Trial inc of buspirone to 15 mg tid.  work on lifestyle modification          Phone call duration: 10 minutes

## 2022-12-27 ENCOUNTER — VIRTUAL VISIT (OUTPATIENT)
Dept: FAMILY MEDICINE | Facility: CLINIC | Age: 57
End: 2022-12-27
Payer: COMMERCIAL

## 2022-12-27 DIAGNOSIS — E11.9 TYPE 2 DIABETES MELLITUS WITHOUT COMPLICATION, WITHOUT LONG-TERM CURRENT USE OF INSULIN (H): Primary | ICD-10-CM

## 2022-12-27 DIAGNOSIS — F41.8 DEPRESSION WITH ANXIETY: ICD-10-CM

## 2022-12-27 DIAGNOSIS — Z13.220 SCREENING FOR HYPERLIPIDEMIA: ICD-10-CM

## 2022-12-27 DIAGNOSIS — Z12.31 VISIT FOR SCREENING MAMMOGRAM: ICD-10-CM

## 2022-12-27 DIAGNOSIS — E53.8 VITAMIN B12 DEFICIENCY (NON ANEMIC): ICD-10-CM

## 2022-12-27 DIAGNOSIS — E55.9 VITAMIN D DEFICIENCY: ICD-10-CM

## 2022-12-27 DIAGNOSIS — Z12.31 ENCOUNTER FOR SCREENING MAMMOGRAM FOR BREAST CANCER: ICD-10-CM

## 2022-12-27 PROCEDURE — 99214 OFFICE O/P EST MOD 30 MIN: CPT | Mod: TEL | Performed by: PHYSICIAN ASSISTANT

## 2022-12-27 RX ORDER — BUSPIRONE HYDROCHLORIDE 15 MG/1
15 TABLET ORAL 3 TIMES DAILY
Qty: 270 TABLET | Refills: 1 | Status: SHIPPED | OUTPATIENT
Start: 2022-12-27 | End: 2023-06-29

## 2022-12-27 ASSESSMENT — ASTHMA QUESTIONNAIRES
ACT_TOTALSCORE: 22
QUESTION_2 LAST FOUR WEEKS HOW OFTEN HAVE YOU HAD SHORTNESS OF BREATH: ONCE OR TWICE A WEEK
QUESTION_5 LAST FOUR WEEKS HOW WOULD YOU RATE YOUR ASTHMA CONTROL: WELL CONTROLLED
QUESTION_4 LAST FOUR WEEKS HOW OFTEN HAVE YOU USED YOUR RESCUE INHALER OR NEBULIZER MEDICATION (SUCH AS ALBUTEROL): ONCE A WEEK OR LESS
QUESTION_1 LAST FOUR WEEKS HOW MUCH OF THE TIME DID YOUR ASTHMA KEEP YOU FROM GETTING AS MUCH DONE AT WORK, SCHOOL OR AT HOME: NONE OF THE TIME
QUESTION_3 LAST FOUR WEEKS HOW OFTEN DID YOUR ASTHMA SYMPTOMS (WHEEZING, COUGHING, SHORTNESS OF BREATH, CHEST TIGHTNESS OR PAIN) WAKE YOU UP AT NIGHT OR EARLIER THAN USUAL IN THE MORNING: NOT AT ALL
ACT_TOTALSCORE: 22

## 2022-12-27 ASSESSMENT — PATIENT HEALTH QUESTIONNAIRE - PHQ9: SUM OF ALL RESPONSES TO PHQ QUESTIONS 1-9: 18

## 2022-12-27 NOTE — LETTER
My Asthma Action Plan    Name: Lisette LINCOLN Ukaegbu   YOB: 1965  Date: 12/27/2022   My doctor: Kolton Yin PA-C   My clinic: [facility]        My Control Medicine:   My Rescue Medicine:    My Asthma Severity:   Moderate Persistent  Know your asthma triggers:   cold air  cats            GREEN ZONE   Good Control    I feel good    No cough or wheeze    Can work, sleep and play without asthma symptoms       Take your asthma control medicine every day.     1. If exercise triggers your asthma, take your rescue medication    15 minutes before exercise or sports, and    During exercise if you have asthma symptoms  2. Spacer to use with inhaler: If you have a spacer, make sure to use it with your inhaler             YELLOW ZONE Getting Worse  I have ANY of these:    I do not feel good    Cough or wheeze    Chest feels tight    Wake up at night   1. Keep taking your Green Zone medications  2. Start taking your rescue medicine:    every 20 minutes for up to 1 hour. Then every 4 hours for 24-48 hours.  3. If you stay in the Yellow Zone for more than 12-24 hours, contact your doctor.  4. If you do not return to the Green Zone in 12-24 hours or you get worse, start taking your oral steroid medicine if prescribed by your provider.           RED ZONE Medical Alert - Get Help  I have ANY of these:    I feel awful    Medicine is not helping    Breathing getting harder    Trouble walking or talking    Nose opens wide to breathe       1. Take your rescue medicine NOW  2. If your provider has prescribed an oral steroid medicine, start taking it NOW  3. Call your doctor NOW  4. If you are still in the Red Zone after 20 minutes and you have not reached your doctor:    Take your rescue medicine again and    Call 911 or go to the emergency room right away    See your regular doctor within 2 weeks of an Emergency Room or Urgent Care visit for follow-up treatment.          Annual Reminders:  Meet with  Asthma Educator,  Flu Shot in the Fall, consider Pneumonia Vaccination for patients with asthma (aged 19 and older).    Pharmacy:    [id number] - [place] - [address] [id number] - [place], [place] - [address] [facility] - [place], [place] - [address]    Electronically signed by Kolton Yin PA-C   Date: 12/27/22                      Asthma Triggers  How To Control Things That Make Your Asthma Worse    Triggers are things that make your asthma worse.  Look at the list below to help you find your triggers and what you can do about them.  You can help prevent asthma flare-ups by staying away from your triggers.      Trigger                                                          What you can do   Cigarette Smoke  Tobacco smoke can make asthma worse. Do not allow smoking in your home, car or around you.  Be sure no one smokes at a child s day care or school.  If you smoke, ask your health care provider for ways to help you quit.  Ask family members to quit too.  Ask your health care provider for a referral to Quit Plan to help you quit smoking, or call 2-866-816-PLAN.     Colds, Flu, Bronchitis  These are common triggers of asthma. Wash your hands often.  Don t touch your eyes, nose or mouth.  Get a flu shot every year.     Dust Mites  These are tiny bugs that live in cloth or carpet. They are too small to see. Wash sheets and blankets in hot water every week.   Encase pillows and mattress in dust mite proof covers.  Avoid having carpet if you can. If you have carpet, vacuum weekly.   Use a dust mask and HEPA vacuum.   Pollen and Outdoor Mold  Some people are allergic to trees, grass, or weed pollen, or molds. Try to keep your windows closed.  Limit time out doors when pollen count is high.   Ask you health care provider about taking medicine during allergy season.     Animal Dander  Some people are  allergic to skin flakes, urine or saliva from pets with fur or feathers. Keep pets with fur or feathers out of your home.    If you can t keep the pet outdoors, then keep the pet out of your bedroom.  Keep the bedroom door closed.  Keep pets off cloth furniture and away from stuffed toys.     Mice, Rats, and Cockroaches   Some people are allergic to the waste from these pests.   Cover food and garbage.  Clean up spills and food crumbs.  Store grease in the refrigerator.   Keep food out of the bedroom.   Indoor Mold  This can be a trigger if your home has high moisture. Fix leaking faucets, pipes, or other sources of water.   Clean moldy surfaces.  Dehumidify basement if it is damp and smelly.   Smoke, Strong Odors, and Sprays  These can reduce air quality. Stay away from strong odors and sprays, such as perfume, powder, hair spray, paints, smoke incense, paint, cleaning products, candles and new carpet.   Exercise or Sports  Some people with asthma have this trigger. Be active!  Ask your doctor about taking medicine before sports or exercise to prevent symptoms.    Warm up for 5-10 minutes before and after sports or exercise.     Other Triggers of Asthma  Cold air:  Cover your nose and mouth with a scarf.  Sometimes laughing or crying can be a trigger.  Some medicines and food can trigger asthma.

## 2022-12-30 ENCOUNTER — ANCILLARY PROCEDURE (OUTPATIENT)
Dept: MAMMOGRAPHY | Facility: CLINIC | Age: 57
End: 2022-12-30
Attending: PHYSICIAN ASSISTANT
Payer: COMMERCIAL

## 2022-12-30 DIAGNOSIS — Z12.31 ENCOUNTER FOR SCREENING MAMMOGRAM FOR BREAST CANCER: ICD-10-CM

## 2022-12-30 DIAGNOSIS — Z12.31 VISIT FOR SCREENING MAMMOGRAM: ICD-10-CM

## 2022-12-30 PROCEDURE — 77067 SCR MAMMO BI INCL CAD: CPT | Mod: GC | Performed by: RADIOLOGY

## 2022-12-30 PROCEDURE — 77063 BREAST TOMOSYNTHESIS BI: CPT | Mod: GC | Performed by: RADIOLOGY

## 2023-01-23 ENCOUNTER — OFFICE VISIT (OUTPATIENT)
Dept: ALLERGY | Facility: CLINIC | Age: 58
End: 2023-01-23
Payer: COMMERCIAL

## 2023-01-23 VITALS — HEART RATE: 72 BPM | DIASTOLIC BLOOD PRESSURE: 79 MMHG | SYSTOLIC BLOOD PRESSURE: 124 MMHG | OXYGEN SATURATION: 98 %

## 2023-01-23 DIAGNOSIS — J30.1 SEASONAL ALLERGIC RHINITIS DUE TO POLLEN: ICD-10-CM

## 2023-01-23 DIAGNOSIS — J32.9 CHRONIC RHINOSINUSITIS: Primary | ICD-10-CM

## 2023-01-23 DIAGNOSIS — J30.81 ALLERGIC RHINITIS DUE TO ANIMALS: ICD-10-CM

## 2023-01-23 DIAGNOSIS — J45.40 MODERATE PERSISTENT ASTHMA WITHOUT COMPLICATION: ICD-10-CM

## 2023-01-23 DIAGNOSIS — R05.3 CHRONIC COUGH: ICD-10-CM

## 2023-01-23 PROCEDURE — 95004 PERQ TESTS W/ALRGNC XTRCS: CPT | Performed by: INTERNAL MEDICINE

## 2023-01-23 PROCEDURE — 99204 OFFICE O/P NEW MOD 45 MIN: CPT | Mod: 25 | Performed by: INTERNAL MEDICINE

## 2023-01-23 RX ORDER — AZITHROMYCIN 250 MG/1
TABLET, FILM COATED ORAL
Qty: 10 TABLET | Refills: 1 | Status: SHIPPED | OUTPATIENT
Start: 2023-01-23 | End: 2023-03-21

## 2023-01-23 RX ORDER — PREDNISONE 20 MG/1
TABLET ORAL
Qty: 9 TABLET | Refills: 0 | Status: SHIPPED | OUTPATIENT
Start: 2023-01-23 | End: 2023-02-20

## 2023-01-23 ASSESSMENT — ENCOUNTER SYMPTOMS
SORE THROAT: 0
DYSURIA: 0
PALPITATIONS: 0
SEIZURES: 0
BACK PAIN: 0
COLOR CHANGE: 0
SINUS PRESSURE: 1
ARTHRALGIAS: 0
COUGH: 1
HEMATURIA: 0
CHILLS: 0
ABDOMINAL PAIN: 0
EYE PAIN: 0
SHORTNESS OF BREATH: 0
VOMITING: 0
FEVER: 0

## 2023-01-23 ASSESSMENT — ASTHMA QUESTIONNAIRES: ACT_TOTALSCORE: 15

## 2023-01-23 NOTE — PATIENT INSTRUCTIONS
Will check IgE level  Will give Nucala 2-3 more months to assess effectiveness  Will consider Voice clinic referral  Check Sinus CT scan now  Continue Trelegy and Flovent and Singulair  May consider allergy shots  Start Azithro x 10 days  Follow up in 4 weeks  Zyrtec 10 mg at night      Allergy Staff Appt Hours Shot Hours Location       Physician   Reji Ochoa MD      Support Staff   Jennifer HALLMAN, RN   Onela OLIVAREZ, RN   Renan ZULETA, MA   Tito URIOSTEGUI, LPN      Mondays Tuesdays Thursdays and Fridays:  [place] 7-5 Wednesdays  Close                Mondays, Tuesdays and Fridays:  7:40 - 3:20              [facility]  [address]  [place] [address]  Appt Line: [phone]    Pulmonary Function Scheduling:  [place]: [phone]           Questions about cost of your care  For questions about your cost of your visit, procedure, lab or imaging contact:  [employer] [place] Consumer Price Line [phone] or visit:  [email or web address]

## 2023-01-23 NOTE — PROGRESS NOTES
Lisette Owens was seen in the Allergy Clinic at [facility].    Lisette Owens is a 58 year old female being seen today at the request of Eyad Ley MD in consultation for asthma.    She had a illness in early 2020 when she was quite ill and has had a persistent cough since that time.  She also developed COVID in May 2022.  Persistent cough is what bothers her the most.  She also has a history of chronic rhinosinusitis and has seen an ENT provider.  Currently using budesonide rinse.  A CT scan has been ordered but has not been performed  She was recommended to have this done when she was having increased sinus symptoms.    Dr. Ley started Trelegy 1 puff daily and she is taking Singulair.  She is not sure Singulair is working.  She was on Flovent in addition to the Trelegy but that was not effective and was stopped.      More recently she started Nucala and she has had 3 doses without any improvement.  Her eosinophils are 300.    She has also tried PPI twice daily which did not result in any improvement.  She is back to Nexium once daily.    She had a CT scan of her chest and does have a stable 8 mm nodule.  Pulmonary function test performed relatively recently showed an FEV1 of 80% predicted and FVC of 99% predicted with no change after albuterol.    October 27 she was treated with prednisone and doxycycline which did provide some partial benefit but the cough did not completely resolve.    She has seen by the voice clinic in the past when she had a vocal cord nodule and difficulty with singing.    She did do allergy shots as an 9-year-old and a 21-year-old.    She is here for allergy evaluation also discussed Biologics.  Symptoms include postnasal drainage, sinus congestion, cough, with intermittent wheezing as well as sneezing and watery eyes.  She feels that the postnasal drainage contributing from her sinuses are contributing to the cough and is the primary  of her  symptoms.    She is currently taking Zyrtec and Claritin on a daily basis.      Past Medical History:   Diagnosis Date     Allergic rhinitis due to animal dander      Amblyopia     LT     Arthritis      Colon polyp      Depressive disorder      Endometriosis      Heart attack (H)     2016     House dust mite allergy      Moderate persistent asthma      Rhinitis, allergic to other allergen      Seasonal allergic rhinitis 7/25/05 skin tests pos. for: cat/dog/horse/DM/M/T/G/RW per Dr. Granado    pt. did IT from 7/06 to 11/5/07 to: cat/dog/DM/M/T/G/W dc'd per self.      Type 2 diabetes mellitus without complication, without long-term current use of insulin (H) 3/13/2017     Family History   Problem Relation Age of Onset     Hypertension Mother      Alzheimer Disease Mother      Diabetes Mother      Tremor Mother      Hypertension Father      Skin Cancer Father      Alzheimer Disease Paternal Grandmother      Psychotic Disorder Paternal Grandmother         bipolar     Heart Disease Paternal Grandfather      Breast Cancer Maternal Grandmother      Tremor Maternal Grandfather      Thyroid Disease Brother         Tumors removed     Tremor Brother      Thyroid Disease Sister      Diabetes Sister      Skin Cancer Sister      Mental Illness Brother         Bi-Polar     Cerebrovascular Disease No family hx of      Glaucoma No family hx of      Macular Degeneration No family hx of      Past Surgical History:   Procedure Laterality Date     COLONOSCOPY  10/2020     EXCISE EXOSTOSIS FOOT Right 10/2/2018    Procedure: EXCISE EXOSTOSIS FOOT;  Right foot removal of first tarsometatarsal joint dorsal bossing;  Surgeon: Will Barraza DPM;  Location: [facility] OR     GYN SURGERY  June 24 2016    Hysterectomy complete     HYSTEROSCOPY         ENVIRONMENTAL HISTORY:   Pets inside the house include 1 dog(s) and 2 bird(s) 2 rabbits .  Do you smoke cigarettes or other recreational drugs? No There is/are 0 smokers living in the house. The house  does not have a damp basement.     SOCIAL HISTORY:   Lisette is employed as  . She lives with her spouse.      Review of Systems   Constitutional: Negative for chills and fever.   HENT: Positive for sinus pressure. Negative for ear pain and sore throat.    Eyes: Negative for pain and visual disturbance.   Respiratory: Positive for cough. Negative for shortness of breath.    Cardiovascular: Negative for chest pain and palpitations.   Gastrointestinal: Negative for abdominal pain and vomiting.   Genitourinary: Negative for dysuria and hematuria.   Musculoskeletal: Negative for arthralgias and back pain.   Skin: Negative for color change and rash.   Neurological: Negative for seizures and syncope.   All other systems reviewed and are negative.        Current Outpatient Medications:      albuterol (PROAIR HFA/PROVENTIL HFA/VENTOLIN HFA) 108 (90 Base) MCG/ACT inhaler, INHALE 2 PUFFS BY MOUTH EVERY 6 HOURS, Disp: 18 g, Rfl: 4     albuterol (PROVENTIL) (2.5 MG/3ML) 0.083% neb solution, INHALE 1 VIAL VIA NEBULIZER EVERY 4 HOURS AS NEEDED FOR SHORTNESS OF BREATH, Disp: 90 mL, Rfl: 0     aspirin  MG EC tablet, Take 1 tablet (325 mg) by mouth daily, Disp: 90 tablet, Rfl: 3     azithromycin (ZITHROMAX) 250 MG tablet, Take 1 tablet daily x 10 days, Disp: 10 tablet, Rfl: 1     budesonide (PULMICORT) 0.5 MG/2ML neb solution, Empty contents of ampule into 240mL of saline solution and rinse both nasal cavities as instructed twice daily., Disp: 120 mL, Rfl: 3     busPIRone (BUSPAR) 15 MG tablet, Take 1 tablet (15 mg) by mouth 3 times daily, Disp: 270 tablet, Rfl: 1     Cholecalciferol (VITAMIN D3 PO), Take by mouth daily, Disp: , Rfl:      Cyanocobalamin (VITAMIN B-12 PO), , Disp: , Rfl:      esomeprazole (NEXIUM) 40 MG DR capsule, TAKE 1 CAPSULE BY MOUTH EVERY MORNING 30 TO 60 MINUTES BEFORE BREAKFAST, Disp: 90 capsule, Rfl: 3     Ferrous Sulfate (IRON PO), , Disp: , Rfl:      FLUoxetine (PROZAC) 40 MG capsule, TAKE  "1 CAPSULE BY MOUTH ONCE DAILY, Disp: 45 capsule, Rfl: 0     Fluticasone-Umeclidin-Vilanterol (TRELEGY ELLIPTA) 200-62.5-25 MCG/INH oral inhaler, Inhale 1 puff into the lungs daily Rinse mouth and gargle after use, Disp: 3 each, Rfl: 11     losartan (COZAAR) 25 MG tablet, Take 1 tablet (25 mg) by mouth daily, Disp: 90 tablet, Rfl: 1     mepolizumab (NUCALA) 100 MG/ML auto-injector, Inject 1 mL (100 mg) Subcutaneous every 28 days, Disp: 1 mL, Rfl: 11     metFORMIN (GLUCOPHAGE XR) 500 MG 24 hr tablet, TAKE 1 TABLETS BY MOUTH TWICE DAILY WITH MEALS, Disp: 180 tablet, Rfl: 0     montelukast (SINGULAIR) 10 MG tablet, TAKE 1 TABLET BY MOUTH AT BEDTIME, Disp: 90 tablet, Rfl: 0     nitroGLYcerin (NITROSTAT) 0.4 MG sublingual tablet, For chest pain place 1 tablet under the tongue every 5 minutes for 3 doses. If symptoms persist 5 minutes after 1st dose call 911., Disp: 25 tablet, Rfl: 4     predniSONE (DELTASONE) 20 MG tablet, Take 2 tabs daily x 3 days, then 1 tab daily x 3 days, Disp: 9 tablet, Rfl: 0     rosuvastatin (CRESTOR) 5 MG tablet, TAKE 1 TABLET BY MOUTH ONCE DAILY, Disp: 90 tablet, Rfl: 0     budesonide (PULMICORT) 0.5 MG/2ML neb solution, Empty contents of ampule into 240mL of saline solution and rinse both nasal cavities as instructed twice daily. (Patient not taking: Reported on 1/23/2023), Disp: 240 mL, Rfl: 6     Syringe/Needle, Disp, 20G X 1\" 3 ML MISC, Use for B12 injections (Patient not taking: Reported on 1/23/2023), Disp: 20 each, Rfl: 0  No current facility-administered medications for this visit.    Facility-Administered Medications Ordered in Other Visits:      sodium chloride (PF) 0.9% PF flush 10 mL, 10 mL, Intravenous, Once, Olamide Rodney MD  Allergies   Allergen Reactions     Ampicillin Rash     Cipro [Quinolones] Anaphylaxis     Macrobid [Nitrofuran Derivatives] GI Disturbance     Nitrofurantoin      Other reaction(s): Vomiting         EXAM:   /79   Pulse 72   LMP 05/15/2014 " (Approximate)   SpO2 98%     Physical Exam    Constitutional:       General: She is not in acute distress.     Appearance: Normal appearance. She is not ill-appearing.   HENT:      Head: Normocephalic and atraumatic.      Nose: She has 3+ inferior turbinate hypertrophy     Mouth/Throat:      Mouth: Mucous membranes are moist.      Pharynx: Oropharynx is clear. No posterior oropharyngeal erythema.   Eyes:      General:         Right eye: No discharge.         Left eye: No discharge.   Cardiovascular:      Rate and Rhythm: Normal rate and regular rhythm.      Heart sounds: Normal heart sounds.   Pulmonary:      Effort: Pulmonary effort is normal.      Breath sounds: Normal breath sounds. No wheezing or rhonchi.   Skin:     General: Skin is warm.      Findings: No erythema or rash.   Neurological:      General: No focal deficit present.      Mental Status: She is alert. Mental status is at baseline.   Psychiatric:         Mood and Affect: Mood normal.         Behavior: Behavior normal.     WORKUP: Skin testing was positive to cat, dog, dust mite, grass, trees and weeds    ENVIRONMENTAL PERCUTANEOUS SKIN TESTING: ADULT  [facility] 1/23/2023   Consent Y   Ordering Physician Dr. Ochoa   Interpreting Physician Dr. Ochoa   Testing Technician Jennifer CISNEROS RN   Location Back   Time start:  1:36 PM   Time End:  1:51 PM   Positive Control: Histatrol*ALK 1 mg/ml 3/3   Negative Control: 50% Glycerin 0   Cat Hair*ALK (10,000 BAU/ml) 3/4   AP Dog Hair/Dander (1:100 w/v) 6/10   Dust Mite p. 30,000 AU/ml 3/4   Dust Mite f. (30,000 AU/ml) 0   Kvng (W/F in millimeters) 0   Niko Grass (100,000 BAU/mL) 8/10   Red [place] (W/F in millimeters) 0   Maple/Nicholas (W/F in millimeters) 3/4   Hackberry (W/F in millimeters) 0   [place] (W/F in millimeters) 3/5   [place] *ALK (W/F in millimeters) 0   American Elm (W/F in millimeters) 0   [place] (W/F in millimeters) 0   Black [place] (W/F in millimeters) 0   Birch Mix (W/F in  millimeters) 3/6   [place] (W/F in millimeters) 0   Oak (W/F in millimeters) 3/5   Cocklebur (W/F in millimeters) 0   [place] (W/F in millimeters) 0   White [place] (W/F in millimeters) 0   Careless (W/F in millimeters) 0   Nettle (W/F in millimeters) 0   English Plantain (W/F in millimeters) 0   Kochia (W/F in millimeters) 0   Lamb's Quarter (W/F in millimeters) 0   Marshelder (W/F in millimeters) 5/6   Ragweed Mix* ALK (W/F in millimeters) 6/12   Russian Thistle (W/F in millimeters) 0   Sagebrush/Mugwort (W/F in millimeters) 4/6   Sheep Sorrel (W/F in millimeters) 0   Feather Mix* ALK (W/F in millimeters) 0   Penicillium Mix (1:10 w/v) 0   Curvularia spicifera (1:10 w/v) 0   Epicoccum (1:10 w/v) 0   Aspergillus fumigatus (1:10 w/v): 0   Alternaria tenius (1:10 w/v) 0   H. Cladosporium (1:10 w/v) 0   Phoma herbarum (1:10 w/v) 0        ASSESSMENT/PLAN:  Lisette Owens is a 58 year old female seen today for persistent cough.  She has a history of asthma, reflux, and possible chronic rhinosinusitis per ENT.  Currently on Trelegy, Singulair, budesonide rinses, pump inhibitor.    Today skin testing was positive to multiple allergens.  The question is what problems are contributing to her cough.  The cough started after a significant infection so postinfectious cough seems to be the initiating factor for her symptoms.  Not responding to Nucala at this time.  Recent pulmonary function test was within normal limits with no change after albuterol.    1. Will check IgE level  2. Will give Nucala 2-3 more months to assess effectiveness  3. Will consider Voice clinic referral  4. Check Sinus CT scan now  5. Continue Trelegy and Flovent and Singulair  6. May consider allergy shots  7. Start Azithro x 10 days and a short course of prednisone  8. Follow up in 4 weeks  9. Zyrtec 10 mg at night to continue    Follow-up in 4 weeks      Thank you for allowing me to participate in the care of Lisette Owens.      I spent 45 minutes on the  date of the encounter doing chart review, history and exam, documentation and further coordination as noted above exclusive of separately reported interpretations    Reji Ochoa MD  Allergy/Immunology  [facility]

## 2023-01-23 NOTE — LETTER
1/23/2023         RE: Lisette Owens  [address] [place] [address]        Dear Colleague,    Thank you for referring your patient, Lisette Owens, to the [facility] [place]. Please see a copy of my visit note below.    Lisette Owens was seen in the Allergy Clinic at [facility].    Lisette Owens is a 58 year old female being seen today at the request of Eyad Ley MD in consultation for asthma.    She had a illness in early 2020 when she was quite ill and has had a persistent cough since that time.  She also developed COVID in May 2022.  Persistent cough is what bothers her the most.  She also has a history of chronic rhinosinusitis and has seen an ENT provider.  Currently using budesonide rinse.  A CT scan has been ordered but has not been performed  She was recommended to have this done when she was having increased sinus symptoms.    Dr. Ley started Trelegy 1 puff daily and she is taking Singulair.  She is not sure Singulair is working.  She was on Flovent in addition to the Trelegy but that was not effective and was stopped.      More recently she started Nucala and she has had 3 doses without any improvement.  Her eosinophils are 300.    She has also tried PPI twice daily which did not result in any improvement.  She is back to Nexium once daily.    She had a CT scan of her chest and does have a stable 8 mm nodule.  Pulmonary function test performed relatively recently showed an FEV1 of 80% predicted and FVC of 99% predicted with no change after albuterol.    October 27 she was treated with prednisone and doxycycline which did provide some partial benefit but the cough did not completely resolve.    She has seen by the voice clinic in the past when she had a vocal cord nodule and difficulty with singing.    She did do allergy shots as an 9-year-old and a 21-year-old.    She is here for allergy evaluation also discussed Biologics.   Symptoms include postnasal drainage, sinus congestion, cough, with intermittent wheezing as well as sneezing and watery eyes.  She feels that the postnasal drainage contributing from her sinuses are contributing to the cough and is the primary  of her symptoms.    She is currently taking Zyrtec and Claritin on a daily basis.      Past Medical History:   Diagnosis Date     Allergic rhinitis due to animal dander      Amblyopia     LT     Arthritis      Colon polyp      Depressive disorder      Endometriosis      Heart attack (H)     2016     House dust mite allergy      Moderate persistent asthma      Rhinitis, allergic to other allergen      Seasonal allergic rhinitis 7/25/05 skin tests pos. for: cat/dog/horse/DM/M/T/G/RW per Dr. Granado    pt. did IT from 7/06 to 11/5/07 to: cat/dog/DM/M/T/G/W dc'd per self.      Type 2 diabetes mellitus without complication, without long-term current use of insulin (H) 3/13/2017     Family History   Problem Relation Age of Onset     Hypertension Mother      Alzheimer Disease Mother      Diabetes Mother      Tremor Mother      Hypertension Father      Skin Cancer Father      Alzheimer Disease Paternal Grandmother      Psychotic Disorder Paternal Grandmother         bipolar     Heart Disease Paternal Grandfather      Breast Cancer Maternal Grandmother      Tremor Maternal Grandfather      Thyroid Disease Brother         Tumors removed     Tremor Brother      Thyroid Disease Sister      Diabetes Sister      Skin Cancer Sister      Mental Illness Brother         Bi-Polar     Cerebrovascular Disease No family hx of      Glaucoma No family hx of      Macular Degeneration No family hx of      Past Surgical History:   Procedure Laterality Date     COLONOSCOPY  10/2020     EXCISE EXOSTOSIS FOOT Right 10/2/2018    Procedure: EXCISE EXOSTOSIS FOOT;  Right foot removal of first tarsometatarsal joint dorsal bossing;  Surgeon: Will Barraza DPM;  Location: [facility] OR     GYN SURGERY  June  24 2016    Hysterectomy complete     HYSTEROSCOPY         ENVIRONMENTAL HISTORY:   Pets inside the house include 1 dog(s) and 2 bird(s) 2 rabbits .  Do you smoke cigarettes or other recreational drugs? No There is/are 0 smokers living in the house. The house does not have a damp basement.     SOCIAL HISTORY:   Lisette is employed as  . She lives with her spouse.      Review of Systems   Constitutional: Negative for chills and fever.   HENT: Positive for sinus pressure. Negative for ear pain and sore throat.    Eyes: Negative for pain and visual disturbance.   Respiratory: Positive for cough. Negative for shortness of breath.    Cardiovascular: Negative for chest pain and palpitations.   Gastrointestinal: Negative for abdominal pain and vomiting.   Genitourinary: Negative for dysuria and hematuria.   Musculoskeletal: Negative for arthralgias and back pain.   Skin: Negative for color change and rash.   Neurological: Negative for seizures and syncope.   All other systems reviewed and are negative.        Current Outpatient Medications:      albuterol (PROAIR HFA/PROVENTIL HFA/VENTOLIN HFA) 108 (90 Base) MCG/ACT inhaler, INHALE 2 PUFFS BY MOUTH EVERY 6 HOURS, Disp: 18 g, Rfl: 4     albuterol (PROVENTIL) (2.5 MG/3ML) 0.083% neb solution, INHALE 1 VIAL VIA NEBULIZER EVERY 4 HOURS AS NEEDED FOR SHORTNESS OF BREATH, Disp: 90 mL, Rfl: 0     aspirin  MG EC tablet, Take 1 tablet (325 mg) by mouth daily, Disp: 90 tablet, Rfl: 3     azithromycin (ZITHROMAX) 250 MG tablet, Take 1 tablet daily x 10 days, Disp: 10 tablet, Rfl: 1     budesonide (PULMICORT) 0.5 MG/2ML neb solution, Empty contents of ampule into 240mL of saline solution and rinse both nasal cavities as instructed twice daily., Disp: 120 mL, Rfl: 3     busPIRone (BUSPAR) 15 MG tablet, Take 1 tablet (15 mg) by mouth 3 times daily, Disp: 270 tablet, Rfl: 1     Cholecalciferol (VITAMIN D3 PO), Take by mouth daily, Disp: , Rfl:      Cyanocobalamin (VITAMIN  "B-12 PO), , Disp: , Rfl:      esomeprazole (NEXIUM) 40 MG DR capsule, TAKE 1 CAPSULE BY MOUTH EVERY MORNING 30 TO 60 MINUTES BEFORE BREAKFAST, Disp: 90 capsule, Rfl: 3     Ferrous Sulfate (IRON PO), , Disp: , Rfl:      FLUoxetine (PROZAC) 40 MG capsule, TAKE 1 CAPSULE BY MOUTH ONCE DAILY, Disp: 45 capsule, Rfl: 0     Fluticasone-Umeclidin-Vilanterol (TRELEGY ELLIPTA) 200-62.5-25 MCG/INH oral inhaler, Inhale 1 puff into the lungs daily Rinse mouth and gargle after use, Disp: 3 each, Rfl: 11     losartan (COZAAR) 25 MG tablet, Take 1 tablet (25 mg) by mouth daily, Disp: 90 tablet, Rfl: 1     mepolizumab (NUCALA) 100 MG/ML auto-injector, Inject 1 mL (100 mg) Subcutaneous every 28 days, Disp: 1 mL, Rfl: 11     metFORMIN (GLUCOPHAGE XR) 500 MG 24 hr tablet, TAKE 1 TABLETS BY MOUTH TWICE DAILY WITH MEALS, Disp: 180 tablet, Rfl: 0     montelukast (SINGULAIR) 10 MG tablet, TAKE 1 TABLET BY MOUTH AT BEDTIME, Disp: 90 tablet, Rfl: 0     nitroGLYcerin (NITROSTAT) 0.4 MG sublingual tablet, For chest pain place 1 tablet under the tongue every 5 minutes for 3 doses. If symptoms persist 5 minutes after 1st dose call 911., Disp: 25 tablet, Rfl: 4     predniSONE (DELTASONE) 20 MG tablet, Take 2 tabs daily x 3 days, then 1 tab daily x 3 days, Disp: 9 tablet, Rfl: 0     rosuvastatin (CRESTOR) 5 MG tablet, TAKE 1 TABLET BY MOUTH ONCE DAILY, Disp: 90 tablet, Rfl: 0     budesonide (PULMICORT) 0.5 MG/2ML neb solution, Empty contents of ampule into 240mL of saline solution and rinse both nasal cavities as instructed twice daily. (Patient not taking: Reported on 1/23/2023), Disp: 240 mL, Rfl: 6     Syringe/Needle, Disp, 20G X 1\" 3 ML MISC, Use for B12 injections (Patient not taking: Reported on 1/23/2023), Disp: 20 each, Rfl: 0  No current facility-administered medications for this visit.    Facility-Administered Medications Ordered in Other Visits:      sodium chloride (PF) 0.9% PF flush 10 mL, 10 mL, Intravenous, Once, Olamide Rodney, " MD  Allergies   Allergen Reactions     Ampicillin Rash     Cipro [Quinolones] Anaphylaxis     Macrobid [Nitrofuran Derivatives] GI Disturbance     Nitrofurantoin      Other reaction(s): Vomiting         EXAM:   /79   Pulse 72   LMP 05/15/2014 (Approximate)   SpO2 98%     Physical Exam    Constitutional:       General: She is not in acute distress.     Appearance: Normal appearance. She is not ill-appearing.   HENT:      Head: Normocephalic and atraumatic.      Nose: She has 3+ inferior turbinate hypertrophy     Mouth/Throat:      Mouth: Mucous membranes are moist.      Pharynx: Oropharynx is clear. No posterior oropharyngeal erythema.   Eyes:      General:         Right eye: No discharge.         Left eye: No discharge.   Cardiovascular:      Rate and Rhythm: Normal rate and regular rhythm.      Heart sounds: Normal heart sounds.   Pulmonary:      Effort: Pulmonary effort is normal.      Breath sounds: Normal breath sounds. No wheezing or rhonchi.   Skin:     General: Skin is warm.      Findings: No erythema or rash.   Neurological:      General: No focal deficit present.      Mental Status: She is alert. Mental status is at baseline.   Psychiatric:         Mood and Affect: Mood normal.         Behavior: Behavior normal.     WORKUP: Skin testing was positive to cat, dog, dust mite, grass, trees and weeds    ENVIRONMENTAL PERCUTANEOUS SKIN TESTING: ADULT  [facility] 1/23/2023   Consent Y   Ordering Physician Dr. Ochoa   Interpreting Physician Dr. Ochoa   Testing Technician Jennifer CISNEROS RN   Location Back   Time start:  1:36 PM   Time End:  1:51 PM   Positive Control: Histatrol*ALK 1 mg/ml 3/3   Negative Control: 50% Glycerin 0   Cat Hair*ALK (10,000 BAU/ml) 3/4   AP Dog Hair/Dander (1:100 w/v) 6/10   Dust Mite p. 30,000 AU/ml 3/4   Dust Mite f. (30,000 AU/ml) 0   Kvng (W/F in millimeters) 0   Niko Grass (100,000 BAU/mL) 8/10   Red Tempe (W/F in millimeters) 0   Maple/Itasca (W/F in  millimeters) 3/4   Hackberry (W/F in millimeters) 0   O[place] (W/F in millimeters) 3/5   Marquette *ALK (W/F in millimeters) 0   American Elm (W/F in millimeters) 0   Smith (W/F in millimeters) 0   Black Raleigh (W/F in millimeters) 0   Birch Mix (W/F in millimeters) 3/6   Nemo (W/F in millimeters) 0   Oak (W/F in millimeters) 3/5   Cocklebur (W/F in millimeters) 0   Jackson (W/F in millimeters) 0   White Flynn (W/F in millimeters) 0   Careless (W/F in millimeters) 0   Nettle (W/F in millimeters) 0   English Plantain (W/F in millimeters) 0   Kochia (W/F in millimeters) 0   Lamb's Quarter (W/F in millimeters) 0   Marshelder (W/F in millimeters) 5/6   Ragweed Mix* ALK (W/F in millimeters) 6/12   Russian Thistle (W/F in millimeters) 0   Sagebrush/Mugwort (W/F in millimeters) 4/6   Sheep Sorrel (W/F in millimeters) 0   Feather Mix* ALK (W/F in millimeters) 0   Penicillium Mix (1:10 w/v) 0   Curvularia spicifera (1:10 w/v) 0   Epicoccum (1:10 w/v) 0   Aspergillus fumigatus (1:10 w/v): 0   Alternaria tenius (1:10 w/v) 0   H. Cladosporium (1:10 w/v) 0   Phoma herbarum (1:10 w/v) 0        ASSESSMENT/PLAN:  Lisette LINCOLN Ukaegbu is a 58 year old female seen today for persistent cough.  She has a history of asthma, reflux, and possible chronic rhinosinusitis per ENT.  Currently on Trelegy, Singulair, budesonide rinses, pump inhibitor.    Today skin testing was positive to multiple allergens.  The question is what problems are contributing to her cough.  The cough started after a significant infection so postinfectious cough seems to be the initiating factor for her symptoms.  Not responding to Nucala at this time.  Recent pulmonary function test was within normal limits with no change after albuterol.    1. Will check IgE level  2. Will give Nucala 2-3 more months to assess effectiveness  3. Will consider Voice clinic referral  4. Check Sinus CT scan now  5. Continue Trelegy and Flovent and Singulair  6. May consider allergy  shots  7. Start Azithro x 10 days and a short course of prednisone  8. Follow up in 4 weeks  9. Zyrtec 10 mg at night to continue    Follow-up in 4 weeks      Thank you for allowing me to participate in the care of Lisette Owens.      I spent 45 minutes on the date of the encounter doing chart review, history and exam, documentation and further coordination as noted above exclusive of separately reported interpretations    Reji Ochoa MD  Allergy/Immunology  [facility]      Per provider verbal order, placed Adult Environmental Panel scratch test.  Once panels were placed, patient was monitored for 15 minutes in clinic.  Provider read test after 15 minutes.  Pt tolerated procedure well.  All questions and concerns were addressed at office visit.     CLINT PhanN, RN                Again, thank you for allowing me to participate in the care of your patient.        Sincerely,        Reji cOhoa MD

## 2023-01-23 NOTE — PROGRESS NOTES
Per provider verbal order, placed Adult Environmental Panel scratch test.  Once panels were placed, patient was monitored for 15 minutes in clinic.  Provider read test after 15 minutes.  Pt tolerated procedure well.  All questions and concerns were addressed at office visit.     CLINT PhanN, RN

## 2023-01-28 ENCOUNTER — LAB (OUTPATIENT)
Dept: LAB | Facility: CLINIC | Age: 58
End: 2023-01-28
Payer: COMMERCIAL

## 2023-01-28 DIAGNOSIS — E53.8 VITAMIN B12 DEFICIENCY (NON ANEMIC): ICD-10-CM

## 2023-01-28 DIAGNOSIS — E55.9 VITAMIN D DEFICIENCY: ICD-10-CM

## 2023-01-28 DIAGNOSIS — E11.9 TYPE 2 DIABETES MELLITUS WITHOUT COMPLICATION, WITHOUT LONG-TERM CURRENT USE OF INSULIN (H): ICD-10-CM

## 2023-01-28 DIAGNOSIS — Z13.220 SCREENING FOR HYPERLIPIDEMIA: ICD-10-CM

## 2023-01-28 DIAGNOSIS — J45.40 MODERATE PERSISTENT ASTHMA WITHOUT COMPLICATION: ICD-10-CM

## 2023-01-28 DIAGNOSIS — R05.3 CHRONIC COUGH: ICD-10-CM

## 2023-01-28 DIAGNOSIS — J30.81 ALLERGIC RHINITIS DUE TO ANIMALS: ICD-10-CM

## 2023-01-28 LAB
HBA1C MFR BLD: 6.2 % (ref 0–5.6)
VIT B12 SERPL-MCNC: 2890 PG/ML (ref 232–1245)

## 2023-01-28 PROCEDURE — 80061 LIPID PANEL: CPT

## 2023-01-28 PROCEDURE — 82306 VITAMIN D 25 HYDROXY: CPT

## 2023-01-28 PROCEDURE — 82570 ASSAY OF URINE CREATININE: CPT

## 2023-01-28 PROCEDURE — 83036 HEMOGLOBIN GLYCOSYLATED A1C: CPT

## 2023-01-28 PROCEDURE — 82785 ASSAY OF IGE: CPT

## 2023-01-28 PROCEDURE — 82043 UR ALBUMIN QUANTITATIVE: CPT

## 2023-01-28 PROCEDURE — 36415 COLL VENOUS BLD VENIPUNCTURE: CPT

## 2023-01-28 PROCEDURE — 82607 VITAMIN B-12: CPT

## 2023-01-30 ENCOUNTER — OFFICE VISIT (OUTPATIENT)
Dept: OPTOMETRY | Facility: CLINIC | Age: 58
End: 2023-01-30
Attending: PHYSICIAN ASSISTANT
Payer: COMMERCIAL

## 2023-01-30 DIAGNOSIS — H52.4 PRESBYOPIA: ICD-10-CM

## 2023-01-30 DIAGNOSIS — H52.223 REGULAR ASTIGMATISM OF BOTH EYES: ICD-10-CM

## 2023-01-30 DIAGNOSIS — H52.13 MYOPIA OF BOTH EYES: ICD-10-CM

## 2023-01-30 DIAGNOSIS — E11.9 TYPE 2 DIABETES MELLITUS WITHOUT COMPLICATION, WITHOUT LONG-TERM CURRENT USE OF INSULIN (H): ICD-10-CM

## 2023-01-30 DIAGNOSIS — Z01.01 VISION EXAM WITH ABNORMAL FINDINGS: Primary | ICD-10-CM

## 2023-01-30 LAB
CHOLEST SERPL-MCNC: 158 MG/DL
CREAT UR-MCNC: 242 MG/DL
DEPRECATED CALCIDIOL+CALCIFEROL SERPL-MC: 41 UG/L (ref 20–75)
FASTING STATUS PATIENT QL REPORTED: YES
HDLC SERPL-MCNC: 75 MG/DL
IGE SERPL-ACNC: 217 KU/L (ref 0–114)
LDLC SERPL CALC-MCNC: 66 MG/DL
MICROALBUMIN UR-MCNC: 11 MG/L
MICROALBUMIN/CREAT UR: 4.55 MG/G CR (ref 0–25)
NONHDLC SERPL-MCNC: 83 MG/DL
TRIGL SERPL-MCNC: 87 MG/DL

## 2023-01-30 PROCEDURE — 92015 DETERMINE REFRACTIVE STATE: CPT | Performed by: OPTOMETRIST

## 2023-01-30 PROCEDURE — 92004 COMPRE OPH EXAM NEW PT 1/>: CPT | Performed by: OPTOMETRIST

## 2023-01-30 ASSESSMENT — REFRACTION_MANIFEST
OD_AXIS: 096
OS_SPHERE: -6.50
OD_CYLINDER: +0.25
OD_SPHERE: -4.50
OS_ADD: +2.25
OS_CYLINDER: +0.50
OD_CYLINDER: +0.25
OD_AXIS: 090
OD_SPHERE: -4.75
OS_CYLINDER: +0.50
OS_SPHERE: -6.25
OD_ADD: +2.25
OS_AXIS: 005
OS_AXIS: 027

## 2023-01-30 ASSESSMENT — VISUAL ACUITY
OD_CC+: -1
OS_CC+: -1
OS_CC: 20/20-1
METHOD: SNELLEN - LINEAR
OD_CC: 20/20
OD_CC: 20/20
CORRECTION_TYPE: GLASSES
OS_CC: 20/20

## 2023-01-30 ASSESSMENT — CONF VISUAL FIELD
OD_INFERIOR_NASAL_RESTRICTION: 0
OS_SUPERIOR_NASAL_RESTRICTION: 0
OD_SUPERIOR_NASAL_RESTRICTION: 0
OD_INFERIOR_TEMPORAL_RESTRICTION: 0
OD_SUPERIOR_TEMPORAL_RESTRICTION: 0
METHOD: COUNTING FINGERS
OS_INFERIOR_TEMPORAL_RESTRICTION: 0
OD_NORMAL: 1
OS_NORMAL: 1
OS_INFERIOR_NASAL_RESTRICTION: 0
OS_SUPERIOR_TEMPORAL_RESTRICTION: 0

## 2023-01-30 ASSESSMENT — KERATOMETRY
OS_K2POWER_DIOPTERS: 45.75
OD_K1POWER_DIOPTERS: 45.50
OS_AXISANGLE2_DEGREES: 12
OD_K2POWER_DIOPTERS: 45.50
OD_AXISANGLE2_DEGREES: 180
OS_K1POWER_DIOPTERS: 46.00

## 2023-01-30 ASSESSMENT — REFRACTION_WEARINGRX
OS_AXIS: 015
OS_SPHERE: -6.00
OD_AXIS: 108
OS_CYLINDER: +0.50
OD_ADD: +2.00
OS_ADD: +2.00
OD_CYLINDER: +0.25
SPECS_TYPE: PAL
OD_SPHERE: -4.75

## 2023-01-30 ASSESSMENT — EXTERNAL EXAM - RIGHT EYE: OD_EXAM: NORMAL

## 2023-01-30 ASSESSMENT — TONOMETRY
OD_IOP_MMHG: 16
IOP_METHOD: APPLANATION
OS_IOP_MMHG: 16

## 2023-01-30 ASSESSMENT — EXTERNAL EXAM - LEFT EYE: OS_EXAM: NORMAL

## 2023-01-30 ASSESSMENT — CUP TO DISC RATIO
OD_RATIO: 0.2
OS_RATIO: 0.2

## 2023-01-30 NOTE — LETTER
1/30/2023         RE: Lisette Owens  [address] [place] [address]        Dear Colleague,    Thank you for referring your patient, Lisette Owens, to the [facility]. No diabetic retinopathy was found at eye exam. Please see a copy of my visit note below.    Chief Complaint   Patient presents with     Diabetic Eye Exam      diabetes 2 since 2017     Chief Complaint(s) and History of Present Illness(es)     Diabetic Eye Exam            Vision: is blurred for near    Diabetes Type: Type 2, controlled with diet and taking oral medications    Blood Sugars: is controlled               Lab Results   Component Value Date    A1C 6.2 01/28/2023    A1C 5.4 08/10/2022    A1C 6.6 11/20/2021    A1C 6.8 08/17/2020    A1C 7.4 01/06/2020    A1C 6.9 05/20/2019    A1C 6.3 09/27/2018    A1C 6.0 05/03/2018            Last Eye Exam: 1 year   Dilated Previously: Yes    What are you currently using to see?  glasses    Distance Vision Acuity: Noticed gradual change in both eyes    Near Vision Acuity: Not satisfied, for sure changes to near vision. Mentioned that her near vision seems to change almost every time      Eye Comfort: dry  Do you use eye drops? 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       OBJECTIVE: See Ophthalmology exam    ASSESSMENT:    ICD-10-CM    1. Vision exam with abnormal findings  Z01.01       2. Type 2 diabetes mellitus without complication, without long-term current use of insulin (H)  E11.9 Adult Eye  Referral    no diabetic retinopathy found at 1-30-23 eye exam       3. Myopia of both eyes  H52.13       4. Presbyopia  H52.4       5. Regular astigmatism of both eyes  H52.223           PLAN:    Lisette Owens aware  eye exam results will be sent to [facility].  Patient Instructions   Fill glasses prescription  Keep blood sugar under good control  Return in 1 year for diabetic eye exam      Blood sugar and blood pressure control are very important in the prevention of ocular complications from diabetes.       Barby Byrd, OD  [phone]                        Again, thank you for allowing me to participate in the care of your patient.        Sincerely,        Barby Byrd, OD

## 2023-01-30 NOTE — PATIENT INSTRUCTIONS
Fill glasses prescription  Keep blood sugar under good control  Return in 1 year for diabetic eye exam      Blood sugar and blood pressure control are very important in the prevention of ocular complications from diabetes.       Barby Byrd, OD  [phone]

## 2023-01-30 NOTE — PROGRESS NOTES
Chief Complaint   Patient presents with     Diabetic Eye Exam      diabetes 2 since 2017     Chief Complaint(s) and History of Present Illness(es)     Diabetic Eye Exam            Vision: is blurred for near    Diabetes Type: Type 2, controlled with diet and taking oral medications    Blood Sugars: is controlled               Lab Results   Component Value Date    A1C 6.2 01/28/2023    A1C 5.4 08/10/2022    A1C 6.6 11/20/2021    A1C 6.8 08/17/2020    A1C 7.4 01/06/2020    A1C 6.9 05/20/2019    A1C 6.3 09/27/2018    A1C 6.0 05/03/2018            Last Eye Exam: 1 year   Dilated Previously: Yes    What are you currently using to see?  glasses    Distance Vision Acuity: Noticed gradual change in both eyes    Near Vision Acuity: Not satisfied, for sure changes to near vision. Mentioned that her near vision seems to change almost every time      Eye Comfort: dry  Do you use eye drops? 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       OBJECTIVE: See Ophthalmology exam    ASSESSMENT:    ICD-10-CM    1. Vision exam with abnormal findings  Z01.01       2. Type 2 diabetes mellitus without complication, without long-term current use of insulin (H)  E11.9 Adult Eye  Referral    no diabetic retinopathy found at 1-30-23 eye exam       3. Myopia of both eyes  H52.13       4. Presbyopia  H52.4       5. Regular astigmatism of both eyes  H52.223           PLAN:    Lisette Owens aware  eye exam results will be sent to [facility].  Patient Instructions   Fill glasses prescription  Keep blood sugar under good control  Return in 1 year for diabetic eye exam      Blood sugar and blood pressure control are very important in the prevention of ocular complications from diabetes.       Barby Byrd,  OD  [phone]

## 2023-02-01 ENCOUNTER — ANCILLARY PROCEDURE (OUTPATIENT)
Dept: CT IMAGING | Facility: CLINIC | Age: 58
End: 2023-02-01
Attending: INTERNAL MEDICINE
Payer: COMMERCIAL

## 2023-02-01 DIAGNOSIS — J45.40 MODERATE PERSISTENT ASTHMA WITHOUT COMPLICATION: ICD-10-CM

## 2023-02-01 DIAGNOSIS — J32.9 CHRONIC RHINOSINUSITIS: ICD-10-CM

## 2023-02-01 DIAGNOSIS — R05.3 CHRONIC COUGH: ICD-10-CM

## 2023-02-01 PROCEDURE — 70486 CT MAXILLOFACIAL W/O DYE: CPT | Mod: TC | Performed by: RADIOLOGY

## 2023-02-02 NOTE — RESULT ENCOUNTER NOTE
The sinus CT demonstrates a possible sinus infection in one of the sphenoid sinuses.  Hopefully the antibiotic and prednisone that you were prescribed will provide you benefit.

## 2023-02-20 ENCOUNTER — OFFICE VISIT (OUTPATIENT)
Dept: ALLERGY | Facility: CLINIC | Age: 58
End: 2023-02-20
Payer: COMMERCIAL

## 2023-02-20 VITALS
WEIGHT: 158.07 LBS | BODY MASS INDEX: 25.13 KG/M2 | DIASTOLIC BLOOD PRESSURE: 61 MMHG | OXYGEN SATURATION: 98 % | HEART RATE: 67 BPM | SYSTOLIC BLOOD PRESSURE: 119 MMHG

## 2023-02-20 DIAGNOSIS — J32.9 CHRONIC RHINOSINUSITIS: ICD-10-CM

## 2023-02-20 DIAGNOSIS — R05.3 CHRONIC COUGH: ICD-10-CM

## 2023-02-20 DIAGNOSIS — J45.40 MODERATE PERSISTENT ASTHMA WITHOUT COMPLICATION: Primary | ICD-10-CM

## 2023-02-20 DIAGNOSIS — J30.1 SEASONAL ALLERGIC RHINITIS DUE TO POLLEN: ICD-10-CM

## 2023-02-20 DIAGNOSIS — J30.81 ALLERGIC RHINITIS DUE TO ANIMALS: ICD-10-CM

## 2023-02-20 PROCEDURE — 99214 OFFICE O/P EST MOD 30 MIN: CPT | Performed by: INTERNAL MEDICINE

## 2023-02-20 NOTE — PROGRESS NOTES
Lisette Owens was seen in the Allergy Clinic at [facility].    Lisette Owens is a 58 year old female being seen today for ongoing evaluation of cough, asthma, chronic sinusitis, and allergic rhinitis.    Since the last visit the patient has been feeling the same.    She continues to have a productive cough on a daily basis as well as persistent postnasal drainage and sinus congestion.  After the last appointment a sinus CT scan was obtained which did show evidence of dependent sphenoid inflammation with possible infection on the left.  Her IgE level was obtained in 217.  Skin testing was performed and positive to cat, dog, dust mite, trees, grass and weeds.    She is not certain Zyrtec or Claritin or Singulair or Nucala are providing her benefit.  She does feel when she initially started Trelegy there was some improvement.  She is not experiencing any reflux symptoms.    She denied having any significant allergy symptoms such as itchy eyes or sneezing.  It is a persistent mucus production productive cough that bothers her the most.    She was treated with a 2-week course of azithromycin and 6 days of prednisone after last appointment.  She really did not notice much improvement.    PAST HISTORY:    Illness early 2020 - persistent cough since that time.    COVID in May 2022.      Treatments include:    Trelegy 1 puff daily   Singulair  Trial of Flovent with Trelegy - no change  Trial of high dose PPI - no change  Nucala x 4 - no change   Zyrtec and Claritin  Budesondie rinse  Doxycycline and Prednisone - mild improvement    EVALUATION:  CT scan of her chest - stable 8 mm nodule.    Pulmonary function test performed relatively recently showed an FEV1 of 80% predicted and FVC of 99% predicted with no change after albuterol.         Past Medical History:   Diagnosis Date     Allergic rhinitis due to animal dander      Amblyopia     LT     Arthritis      Colon polyp      Depressive disorder       Endometriosis      Heart attack (H)     2016     House dust mite allergy      Hypertension      Moderate persistent asthma      Rhinitis, allergic to other allergen      Seasonal allergic rhinitis 7/25/05 skin tests pos. for: cat/dog/horse/DM/M/T/G/RW per Dr. Granado    pt. did IT from 7/06 to 11/5/07 to: cat/dog/DM/M/T/G/W dc'd per self.      Type 2 diabetes mellitus without complication, without long-term current use of insulin (H) 03/13/2017     Family History   Problem Relation Age of Onset     Hypertension Mother      Alzheimer Disease Mother      Diabetes Mother      Tremor Mother      Eye Surgery Father         cataracts     Macular Degeneration Father      Hypertension Father      Skin Cancer Father      Breast Cancer Maternal Grandmother      Tremor Maternal Grandfather      Alzheimer Disease Paternal Grandmother      Psychotic Disorder Paternal Grandmother         bipolar     Heart Disease Paternal Grandfather      Thyroid Disease Brother         Tumors removed     Tremor Brother      Mental Illness Brother         Bi-Polar     Thyroid Disease Sister      Diabetes Sister      Skin Cancer Sister      Cerebrovascular Disease No family hx of      Glaucoma No family hx of      Past Surgical History:   Procedure Laterality Date     COLONOSCOPY  10/2020     EXCISE EXOSTOSIS FOOT Right 10/02/2018    Procedure: EXCISE EXOSTOSIS FOOT;  Right foot removal of first tarsometatarsal joint dorsal bossing;  Surgeon: Will Barraza DPM;  Location: [facility] OR     GYN SURGERY  06/24/2016    Hysterectomy complete     HYSTEROSCOPY           Current Outpatient Medications:      albuterol (PROAIR HFA/PROVENTIL HFA/VENTOLIN HFA) 108 (90 Base) MCG/ACT inhaler, INHALE 2 PUFFS BY MOUTH EVERY 6 HOURS, Disp: 18 g, Rfl: 4     albuterol (PROVENTIL) (2.5 MG/3ML) 0.083% neb solution, INHALE 1 VIAL VIA NEBULIZER EVERY 4 HOURS AS NEEDED FOR SHORTNESS OF BREATH, Disp: 90 mL, Rfl: 0     aspirin  MG EC tablet, Take 1 tablet (325 mg) by  mouth daily, Disp: 90 tablet, Rfl: 3     azithromycin (ZITHROMAX) 250 MG tablet, Take 1 tablet daily x 10 days, Disp: 10 tablet, Rfl: 1     budesonide (PULMICORT) 0.5 MG/2ML neb solution, Empty contents of ampule into 240mL of saline solution and rinse both nasal cavities as instructed twice daily., Disp: 120 mL, Rfl: 3     busPIRone (BUSPAR) 15 MG tablet, Take 1 tablet (15 mg) by mouth 3 times daily, Disp: 270 tablet, Rfl: 1     Cholecalciferol (VITAMIN D3 PO), Take by mouth daily, Disp: , Rfl:      Cyanocobalamin (VITAMIN B-12 PO), , Disp: , Rfl:      esomeprazole (NEXIUM) 40 MG DR capsule, TAKE 1 CAPSULE BY MOUTH EVERY MORNING 30 TO 60 MINUTES BEFORE BREAKFAST, Disp: 90 capsule, Rfl: 3     Ferrous Sulfate (IRON PO), , Disp: , Rfl:      FLUoxetine (PROZAC) 40 MG capsule, TAKE 1 CAPSULE BY MOUTH ONCE DAILY, Disp: 90 capsule, Rfl: 0     Fluticasone-Umeclidin-Vilanterol (TRELEGY ELLIPTA) 200-62.5-25 MCG/INH oral inhaler, Inhale 1 puff into the lungs daily Rinse mouth and gargle after use, Disp: 3 each, Rfl: 11     losartan (COZAAR) 25 MG tablet, Take 1 tablet (25 mg) by mouth daily, Disp: 90 tablet, Rfl: 1     mepolizumab (NUCALA) 100 MG/ML auto-injector, Inject 1 mL (100 mg) Subcutaneous every 28 days, Disp: 1 mL, Rfl: 11     metFORMIN (GLUCOPHAGE XR) 500 MG 24 hr tablet, TAKE 1 TABLETS BY MOUTH TWICE DAILY WITH MEALS, Disp: 180 tablet, Rfl: 0     montelukast (SINGULAIR) 10 MG tablet, TAKE 1 TABLET BY MOUTH AT BEDTIME, Disp: 90 tablet, Rfl: 0     nitroGLYcerin (NITROSTAT) 0.4 MG sublingual tablet, For chest pain place 1 tablet under the tongue every 5 minutes for 3 doses. If symptoms persist 5 minutes after 1st dose call 911., Disp: 25 tablet, Rfl: 4     rosuvastatin (CRESTOR) 5 MG tablet, TAKE 1 TABLET BY MOUTH ONCE DAILY, Disp: 90 tablet, Rfl: 0     budesonide (PULMICORT) 0.5 MG/2ML neb solution, Empty contents of ampule into 240mL of saline solution and rinse both nasal cavities as instructed twice daily.  "(Patient not taking: Reported on 1/23/2023), Disp: 240 mL, Rfl: 6     Syringe/Needle, Disp, 20G X 1\" 3 ML MISC, Use for B12 injections (Patient not taking: Reported on 1/23/2023), Disp: 20 each, Rfl: 0  No current facility-administered medications for this visit.    Facility-Administered Medications Ordered in Other Visits:      sodium chloride (PF) 0.9% PF flush 10 mL, 10 mL, Intravenous, Once, Olamide Rodney MD  Allergies   Allergen Reactions     Ampicillin Rash     Cipro [Quinolones] Anaphylaxis     Macrobid [Nitrofuran Derivatives] GI Disturbance     Nitrofurantoin      Other reaction(s): Vomiting         EXAM:   /61   Pulse 67   Wt 71.7 kg (158 lb 1.1 oz)   LMP 05/15/2014 (Approximate)   SpO2 98%   BMI 25.13 kg/m      Constitutional:       General: She is not in acute distress.     Appearance: Normal appearance. She is not ill-appearing.   HENT:      Head: Normocephalic and atraumatic.      Nose: Nose normal. No congestion or rhinorrhea.      Mouth/Throat:      Mouth: Mucous membranes are moist.      Pharynx: Oropharynx is clear. No posterior oropharyngeal erythema.   Eyes:      General:         Right eye: No discharge.         Left eye: No discharge.   Cardiovascular:      Rate and Rhythm: Normal rate and regular rhythm.      Heart sounds: Normal heart sounds.   Pulmonary:      Effort: Pulmonary effort is normal.      Breath sounds: Normal breath sounds. No wheezing or rhonchi.   Skin:     General: Skin is warm.      Findings: No erythema or rash.   Neurological:      General: No focal deficit present.      Mental Status: She is alert. Mental status is at baseline.   Psychiatric:         Mood and Affect: Mood normal.         Behavior: Behavior normal.       ASSESSMENT/PLAN:  Lisette Owens is a 58 year old female seen today for persistent cough with persistent postnasal drainage.  Recent sinus CT scan shows left sphenoid sinus disease.  Uncertain if allergies are contributing to her symptoms.  This " all seems to originate after a infection in 2020.  Do not think allergy shots at this time would be helpful.  Will ask ENT for their opinion on the sinus CT scan.  We will begin stopping medications to determine if they are providing any benefit.  She feels like she is overmedicated.    1. Stop Singulair  2. Stay off of Claritin  3. Will reach out to Dr. Villa in regards to the sinus CT and sphenoid sinus disease  4. Stop Zyrtec in 2 weeks  5. Would recommend stopping Nucala at this point  6. Remain on Budesonide rinse  7. Will consider allergy shots, but I am not convinced allergies are driving your symptoms  8. May also consider Dupixent if not improving    Follow-up in 4 weeks      Thank you for allowing me to participate in the care of Lisette LINCOLN [email or web address].      I spent 35 minutes on the date of the encounter doing chart review, history and exam, documentation and further coordination as noted above exclusive of separately reported interpretations    Reji Ochoa MD  Allergy/Immunology  [facility]

## 2023-02-20 NOTE — PATIENT INSTRUCTIONS
Stop Singulair  Stay off of Claritin  Will reach out to Dr. Villa in regards to the sinus CT and sphenoid sinus disease  Stop Zyrtec in 2 weeks  Would consider stopping Nucala at this point  Remain on Budesonide rinse  Will consider allergy shots, but I am not convinced allergies are driving your symptoms  May also consider Dupixent if not improving      Allergy Staff Appt Hours Shot Hours Location       Physician   Reji Ochoa MD      Support Staff   Jennifer HALLMAN, DAYANA OLIVAREZ, RN   Renan ZULETA, ROSY URIOSTEGUI LPN      Mondays Tuesdays Thursdays and Fridays:  Trang 7-5      Wednesdays  Close                Mondays, Tuesdays and Fridays:  7:40 - 3:20              [facility]  [address]  [place] [address]  Appt Line: [phone]    Pulmonary Function Scheduling:  [place]: [phone]           Questions about cost of your care  For questions about your cost of your visit, procedure, lab or imaging contact: [employer] Price Line [phone] or visit:  [email or web address]    Important Scheduling Information  Appointments for skin testing: Appointment will last approximately 45 minutes.  Please call the appointment line for your clinic to schedule.  Discontinue oral antihistamines 7 days prior to the appointment.  Discontinue nasal and ocular antihistamines 4 days prior to appointment.    Appointments for challenges (oral food challenge, penicillin testing, aspirin desensitization) If your provider instructs you to that this additional testing is indicated, it will need to be scheduled directly through the allergy department.  Please contact them via [employer] or by calling the clinic and asking to speak with the allergy team.  They will provide additional information and instructions for the appointment.  Discontinue oral antihistamines 7 days prior to the appointment.  Discontinue nasal and ocular antihistamines 4 days prior to the  appointment.    Thank you for trusting us with your care. Please feel free to contact us with any questions or concerns you may have.

## 2023-02-20 NOTE — LETTER
2/20/2023         RE: Lisette Owens  [address] [place] [address]        Dear Colleague,    Thank you for referring your patient, Lisette Owens, to the [facility]. Please see a copy of my visit note below.    Lisette Owens was seen in the Allergy Clinic at [facility].    Lisette Owens is a 58 year old female being seen today for ongoing evaluation of cough, asthma, chronic sinusitis, and allergic rhinitis.    Since the last visit the patient has been feeling the same.    She continues to have a productive cough on a daily basis as well as persistent postnasal drainage and sinus congestion.  After the last appointment a sinus CT scan was obtained which did show evidence of dependent sphenoid inflammation with possible infection on the left.  Her IgE level was obtained in 217.  Skin testing was performed and positive to cat, dog, dust mite, trees, grass and weeds.    She is not certain Zyrtec or Claritin or Singulair or Nucala are providing her benefit.  She does feel when she initially started Trelegy there was some improvement.  She is not experiencing any reflux symptoms.    She denied having any significant allergy symptoms such as itchy eyes or sneezing.  It is a persistent mucus production productive cough that bothers her the most.    She was treated with a 2-week course of azithromycin and 6 days of prednisone after last appointment.  She really did not notice much improvement.    PAST HISTORY:    Illness early 2020 - persistent cough since that time.    COVID in May 2022.      Treatments include:    Trelegy 1 puff daily   Singulair  Trial of Flovent with Trelegy - no change  Trial of high dose PPI - no change  Nucala x 4 - no change   Zyrtec and Claritin  Budesondie rinse  Doxycycline and Prednisone - mild improvement    EVALUATION:  CT scan of her chest - stable 8 mm nodule.    Pulmonary function test performed relatively recently  showed an FEV1 of 80% predicted and FVC of 99% predicted with no change after albuterol.         Past Medical History:   Diagnosis Date     Allergic rhinitis due to animal dander      Amblyopia     LT     Arthritis      Colon polyp      Depressive disorder      Endometriosis      Heart attack (H)     2016     House dust mite allergy      Hypertension      Moderate persistent asthma      Rhinitis, allergic to other allergen      Seasonal allergic rhinitis 7/25/05 skin tests pos. for: cat/dog/horse/DM/M/T/G/RW per Dr. Granado    pt. did IT from 7/06 to 11/5/07 to: cat/dog/DM/M/T/G/W dc'd per self.      Type 2 diabetes mellitus without complication, without long-term current use of insulin (H) 03/13/2017     Family History   Problem Relation Age of Onset     Hypertension Mother      Alzheimer Disease Mother      Diabetes Mother      Tremor Mother      Eye Surgery Father         cataracts     Macular Degeneration Father      Hypertension Father      Skin Cancer Father      Breast Cancer Maternal Grandmother      Tremor Maternal Grandfather      Alzheimer Disease Paternal Grandmother      Psychotic Disorder Paternal Grandmother         bipolar     Heart Disease Paternal Grandfather      Thyroid Disease Brother         Tumors removed     Tremor Brother      Mental Illness Brother         Bi-Polar     Thyroid Disease Sister      Diabetes Sister      Skin Cancer Sister      Cerebrovascular Disease No family hx of      Glaucoma No family hx of      Past Surgical History:   Procedure Laterality Date     COLONOSCOPY  10/2020     EXCISE EXOSTOSIS FOOT Right 10/02/2018    Procedure: EXCISE EXOSTOSIS FOOT;  Right foot removal of first tarsometatarsal joint dorsal bossing;  Surgeon: Will Barraza DPM;  Location: [facility] OR     GYN SURGERY  06/24/2016    Hysterectomy complete     HYSTEROSCOPY           Current Outpatient Medications:      albuterol (PROAIR HFA/PROVENTIL HFA/VENTOLIN HFA) 108 (90 Base) MCG/ACT inhaler, INHALE 2 PUFFS  BY MOUTH EVERY 6 HOURS, Disp: 18 g, Rfl: 4     albuterol (PROVENTIL) (2.5 MG/3ML) 0.083% neb solution, INHALE 1 VIAL VIA NEBULIZER EVERY 4 HOURS AS NEEDED FOR SHORTNESS OF BREATH, Disp: 90 mL, Rfl: 0     aspirin  MG EC tablet, Take 1 tablet (325 mg) by mouth daily, Disp: 90 tablet, Rfl: 3     azithromycin (ZITHROMAX) 250 MG tablet, Take 1 tablet daily x 10 days, Disp: 10 tablet, Rfl: 1     budesonide (PULMICORT) 0.5 MG/2ML neb solution, Empty contents of ampule into 240mL of saline solution and rinse both nasal cavities as instructed twice daily., Disp: 120 mL, Rfl: 3     busPIRone (BUSPAR) 15 MG tablet, Take 1 tablet (15 mg) by mouth 3 times daily, Disp: 270 tablet, Rfl: 1     Cholecalciferol (VITAMIN D3 PO), Take by mouth daily, Disp: , Rfl:      Cyanocobalamin (VITAMIN B-12 PO), , Disp: , Rfl:      esomeprazole (NEXIUM) 40 MG DR capsule, TAKE 1 CAPSULE BY MOUTH EVERY MORNING 30 TO 60 MINUTES BEFORE BREAKFAST, Disp: 90 capsule, Rfl: 3     Ferrous Sulfate (IRON PO), , Disp: , Rfl:      FLUoxetine (PROZAC) 40 MG capsule, TAKE 1 CAPSULE BY MOUTH ONCE DAILY, Disp: 90 capsule, Rfl: 0     Fluticasone-Umeclidin-Vilanterol (TRELEGY ELLIPTA) 200-62.5-25 MCG/INH oral inhaler, Inhale 1 puff into the lungs daily Rinse mouth and gargle after use, Disp: 3 each, Rfl: 11     losartan (COZAAR) 25 MG tablet, Take 1 tablet (25 mg) by mouth daily, Disp: 90 tablet, Rfl: 1     mepolizumab (NUCALA) 100 MG/ML auto-injector, Inject 1 mL (100 mg) Subcutaneous every 28 days, Disp: 1 mL, Rfl: 11     metFORMIN (GLUCOPHAGE XR) 500 MG 24 hr tablet, TAKE 1 TABLETS BY MOUTH TWICE DAILY WITH MEALS, Disp: 180 tablet, Rfl: 0     montelukast (SINGULAIR) 10 MG tablet, TAKE 1 TABLET BY MOUTH AT BEDTIME, Disp: 90 tablet, Rfl: 0     nitroGLYcerin (NITROSTAT) 0.4 MG sublingual tablet, For chest pain place 1 tablet under the tongue every 5 minutes for 3 doses. If symptoms persist 5 minutes after 1st dose call 911., Disp: 25 tablet, Rfl: 4      "rosuvastatin (CRESTOR) 5 MG tablet, TAKE 1 TABLET BY MOUTH ONCE DAILY, Disp: 90 tablet, Rfl: 0     budesonide (PULMICORT) 0.5 MG/2ML neb solution, Empty contents of ampule into 240mL of saline solution and rinse both nasal cavities as instructed twice daily. (Patient not taking: Reported on 1/23/2023), Disp: 240 mL, Rfl: 6     Syringe/Needle, Disp, 20G X 1\" 3 ML MISC, Use for B12 injections (Patient not taking: Reported on 1/23/2023), Disp: 20 each, Rfl: 0  No current facility-administered medications for this visit.    Facility-Administered Medications Ordered in Other Visits:      sodium chloride (PF) 0.9% PF flush 10 mL, 10 mL, Intravenous, Once, Olamide Rodney MD  Allergies   Allergen Reactions     Ampicillin Rash     Cipro [Quinolones] Anaphylaxis     Macrobid [Nitrofuran Derivatives] GI Disturbance     Nitrofurantoin      Other reaction(s): Vomiting         EXAM:   /61   Pulse 67   Wt 71.7 kg (158 lb 1.1 oz)   LMP 05/15/2014 (Approximate)   SpO2 98%   BMI 25.13 kg/m      Constitutional:       General: She is not in acute distress.     Appearance: Normal appearance. She is not ill-appearing.   HENT:      Head: Normocephalic and atraumatic.      Nose: Nose normal. No congestion or rhinorrhea.      Mouth/Throat:      Mouth: Mucous membranes are moist.      Pharynx: Oropharynx is clear. No posterior oropharyngeal erythema.   Eyes:      General:         Right eye: No discharge.         Left eye: No discharge.   Cardiovascular:      Rate and Rhythm: Normal rate and regular rhythm.      Heart sounds: Normal heart sounds.   Pulmonary:      Effort: Pulmonary effort is normal.      Breath sounds: Normal breath sounds. No wheezing or rhonchi.   Skin:     General: Skin is warm.      Findings: No erythema or rash.   Neurological:      General: No focal deficit present.      Mental Status: She is alert. Mental status is at baseline.   Psychiatric:         Mood and Affect: Mood normal.         Behavior: Behavior " normal.       ASSESSMENT/PLAN:  Lisette Owens is a 58 year old female seen today for persistent cough with persistent postnasal drainage.  Recent sinus CT scan shows left sphenoid sinus disease.  Uncertain if allergies are contributing to her symptoms.  This all seems to originate after a infection in 2020.  Do not think allergy shots at this time would be helpful.  Will ask ENT for their opinion on the sinus CT scan.  We will begin stopping medications to determine if they are providing any benefit.  She feels like she is overmedicated.    1. Stop Singulair  2. Stay off of Claritin  3. Will reach out to Dr. Villa in regards to the sinus CT and sphenoid sinus disease  4. Stop Zyrtec in 2 weeks  5. Would recommend stopping Nucala at this point  6. Remain on Budesonide rinse  7. Will consider allergy shots, but I am not convinced allergies are driving your symptoms  8. May also consider Dupixent if not improving    Follow-up in 4 weeks      Thank you for allowing me to participate in the care of Lisette Owens.      I spent 35 minutes on the date of the encounter doing chart review, history and exam, documentation and further coordination as noted above exclusive of separately reported interpretations    Reji Ochoa MD  Allergy/Immunology  [facility]      Again, thank you for allowing me to participate in the care of your patient.        Sincerely,        Reji Ochoa MD

## 2023-02-23 DIAGNOSIS — E78.5 HYPERLIPIDEMIA LDL GOAL <100: ICD-10-CM

## 2023-02-23 RX ORDER — ROSUVASTATIN CALCIUM 5 MG/1
TABLET, COATED ORAL
Qty: 90 TABLET | Refills: 0 | Status: SHIPPED | OUTPATIENT
Start: 2023-02-23 | End: 2023-05-30

## 2023-03-03 ENCOUNTER — TRANSFERRED RECORDS (OUTPATIENT)
Dept: HEALTH INFORMATION MANAGEMENT | Facility: CLINIC | Age: 58
End: 2023-03-03

## 2023-03-09 DIAGNOSIS — J45.40 MODERATE PERSISTENT ASTHMA WITHOUT COMPLICATION: ICD-10-CM

## 2023-03-09 RX ORDER — FLUTICASONE FUROATE, UMECLIDINIUM BROMIDE AND VILANTEROL TRIFENATATE 200; 62.5; 25 UG/1; UG/1; UG/1
POWDER RESPIRATORY (INHALATION)
Qty: 180 EACH | Refills: 0 | Status: SHIPPED | OUTPATIENT
Start: 2023-03-09 | End: 2023-06-16

## 2023-03-11 ENCOUNTER — MYC MEDICAL ADVICE (OUTPATIENT)
Dept: ALLERGY | Facility: CLINIC | Age: 58
End: 2023-03-11
Payer: COMMERCIAL

## 2023-03-14 ENCOUNTER — TRANSFERRED RECORDS (OUTPATIENT)
Dept: HEALTH INFORMATION MANAGEMENT | Facility: CLINIC | Age: 58
End: 2023-03-14

## 2023-03-21 ENCOUNTER — OFFICE VISIT (OUTPATIENT)
Dept: ALLERGY | Facility: CLINIC | Age: 58
End: 2023-03-21
Payer: COMMERCIAL

## 2023-03-21 VITALS
OXYGEN SATURATION: 97 % | SYSTOLIC BLOOD PRESSURE: 116 MMHG | BODY MASS INDEX: 28.43 KG/M2 | HEART RATE: 75 BPM | DIASTOLIC BLOOD PRESSURE: 73 MMHG | WEIGHT: 178.8 LBS

## 2023-03-21 DIAGNOSIS — J30.81 ALLERGIC RHINITIS DUE TO ANIMALS: ICD-10-CM

## 2023-03-21 DIAGNOSIS — R05.3 CHRONIC COUGH: ICD-10-CM

## 2023-03-21 DIAGNOSIS — J30.1 SEASONAL ALLERGIC RHINITIS DUE TO POLLEN: ICD-10-CM

## 2023-03-21 DIAGNOSIS — J32.9 CHRONIC RHINOSINUSITIS: ICD-10-CM

## 2023-03-21 DIAGNOSIS — J45.40 MODERATE PERSISTENT ASTHMA WITHOUT COMPLICATION: Primary | ICD-10-CM

## 2023-03-21 PROCEDURE — 99213 OFFICE O/P EST LOW 20 MIN: CPT | Performed by: INTERNAL MEDICINE

## 2023-03-21 ASSESSMENT — ASTHMA QUESTIONNAIRES
QUESTION_1 LAST FOUR WEEKS HOW MUCH OF THE TIME DID YOUR ASTHMA KEEP YOU FROM GETTING AS MUCH DONE AT WORK, SCHOOL OR AT HOME: A LITTLE OF THE TIME
ACT_TOTALSCORE: 20
ACT_TOTALSCORE: 20
QUESTION_4 LAST FOUR WEEKS HOW OFTEN HAVE YOU USED YOUR RESCUE INHALER OR NEBULIZER MEDICATION (SUCH AS ALBUTEROL): TWO OR THREE TIMES PER WEEK
QUESTION_2 LAST FOUR WEEKS HOW OFTEN HAVE YOU HAD SHORTNESS OF BREATH: ONCE OR TWICE A WEEK
QUESTION_3 LAST FOUR WEEKS HOW OFTEN DID YOUR ASTHMA SYMPTOMS (WHEEZING, COUGHING, SHORTNESS OF BREATH, CHEST TIGHTNESS OR PAIN) WAKE YOU UP AT NIGHT OR EARLIER THAN USUAL IN THE MORNING: NOT AT ALL
QUESTION_5 LAST FOUR WEEKS HOW WOULD YOU RATE YOUR ASTHMA CONTROL: WELL CONTROLLED

## 2023-03-21 NOTE — LETTER
3/21/2023         RE: Lisette Owens  [address] [place] [address]        Dear Colleague,    Thank you for referring your patient, Lisette Owens, to the [facility] [place]. Please see a copy of my visit note below.    Lisette Owens was seen in the Allergy Clinic at [facility].    Lisette Owens is a 58 year old female being seen today for ongoing evaluation of Moderate persistent asthma, chronic cough, as well as chronic sphenoid sinusitis by CT scan as well as allergic rhinitis.  It is the cough that bothers her the most.    Since the last visit the patient has been feeling better.    At the last appointment attempted to decrease medications.  Recommended to stop Singulair, Claritin as well as Zyrtec and Nucala.  She called shortly after stopping medications and Zyrtec seem to provide the most benefit.  She restarted Zyrtec and symptoms improved which included the rash on her arms as well as itchy eyes.  She is doing well off Claritin, Singulair and Nucala.    She also has air filters in the home and that seems to be providing some benefit.    She has done allergy shots on 3 different occasions that she recalls.  She does not want to repeat allergy shots if not completely necessary.    In the past we did do a course of prednisone and azithromycin for 14 days which did not provide any benefit.  The sinus CT scan did show evidence of sphenoid sinus disease.  She has an upcoming ENT appointment in early April.    Skin testing was positive to cat, dog, dust mite, grass, trees and weeds.    Treatment for her asthma and cough includes Trelegy 1 puff daily, Zyrtec daily, budesonide rinses.    Past Medical History:   Diagnosis Date     Allergic rhinitis due to animal dander      Amblyopia     LT     Arthritis      Colon polyp      Depressive disorder      Endometriosis      Heart attack (H)     2016     House dust mite allergy      Hypertension      Moderate  persistent asthma      Rhinitis, allergic to other allergen      Seasonal allergic rhinitis 7/25/05 skin tests pos. for: cat/dog/horse/DM/M/T/G/RW per Dr. Granado    pt. did IT from 7/06 to 11/5/07 to: cat/dog/DM/M/T/G/W dc'd per self.      Type 2 diabetes mellitus without complication, without long-term current use of insulin (H) 03/13/2017     Family History   Problem Relation Age of Onset     Hypertension Mother      Alzheimer Disease Mother      Diabetes Mother      Tremor Mother      Eye Surgery Father         cataracts     Macular Degeneration Father      Hypertension Father      Skin Cancer Father      Breast Cancer Maternal Grandmother      Tremor Maternal Grandfather      Alzheimer Disease Paternal Grandmother      Psychotic Disorder Paternal Grandmother         bipolar     Heart Disease Paternal Grandfather      Thyroid Disease Brother         Tumors removed     Tremor Brother      Mental Illness Brother         Bi-Polar     Thyroid Disease Sister      Diabetes Sister      Skin Cancer Sister      Cerebrovascular Disease No family hx of      Glaucoma No family hx of      Past Surgical History:   Procedure Laterality Date     COLONOSCOPY  10/2020     EXCISE EXOSTOSIS FOOT Right 10/02/2018    Procedure: EXCISE EXOSTOSIS FOOT;  Right foot removal of first tarsometatarsal joint dorsal bossing;  Surgeon: Will Barraza DPM;  Location: [facility] OR     GYN SURGERY  06/24/2016    Hysterectomy complete     HYSTEROSCOPY           Current Outpatient Medications:      albuterol (PROAIR HFA/PROVENTIL HFA/VENTOLIN HFA) 108 (90 Base) MCG/ACT inhaler, INHALE 2 PUFFS BY MOUTH EVERY 6 HOURS, Disp: 18 g, Rfl: 4     albuterol (PROVENTIL) (2.5 MG/3ML) 0.083% neb solution, INHALE 1 VIAL VIA NEBULIZER EVERY 4 HOURS AS NEEDED FOR SHORTNESS OF BREATH, Disp: 90 mL, Rfl: 0     aspirin  MG EC tablet, Take 1 tablet (325 mg) by mouth daily, Disp: 90 tablet, Rfl: 3     budesonide (PULMICORT) 0.5 MG/2ML neb solution, Empty contents of  "ampule into 240mL of saline solution and rinse both nasal cavities as instructed twice daily., Disp: 240 mL, Rfl: 6     busPIRone (BUSPAR) 15 MG tablet, Take 1 tablet (15 mg) by mouth 3 times daily, Disp: 270 tablet, Rfl: 1     Cholecalciferol (VITAMIN D3 PO), Take by mouth daily, Disp: , Rfl:      esomeprazole (NEXIUM) 40 MG DR capsule, TAKE 1 CAPSULE BY MOUTH EVERY MORNING 30 TO 60 MINUTES BEFORE BREAKFAST, Disp: 90 capsule, Rfl: 3     Ferrous Sulfate (IRON PO), , Disp: , Rfl:      FLUoxetine (PROZAC) 40 MG capsule, TAKE 1 CAPSULE BY MOUTH ONCE DAILY, Disp: 90 capsule, Rfl: 0     losartan (COZAAR) 25 MG tablet, Take 1 tablet (25 mg) by mouth daily, Disp: 90 tablet, Rfl: 1     metFORMIN (GLUCOPHAGE XR) 500 MG 24 hr tablet, Take 2 tablets (1,000 mg) by mouth 2 times daily (with meals), Disp: 360 tablet, Rfl: 0     rosuvastatin (CRESTOR) 5 MG tablet, TAKE 1 TABLET BY MOUTH ONCE DAILY, Disp: 90 tablet, Rfl: 0     TRELEGY ELLIPTA 200-62.5-25 MCG/ACT oral inhaler, INHALE 1 PUFF BY MOUTH ONCE DAILY. RINSE MOUTH AND GARGLE AFTER USE, Disp: 180 each, Rfl: 0     budesonide (PULMICORT) 0.5 MG/2ML neb solution, Empty contents of ampule into 240mL of saline solution and rinse both nasal cavities as instructed twice daily., Disp: 120 mL, Rfl: 3     Cyanocobalamin (VITAMIN B-12 PO), , Disp: , Rfl:      nitroGLYcerin (NITROSTAT) 0.4 MG sublingual tablet, For chest pain place 1 tablet under the tongue every 5 minutes for 3 doses. If symptoms persist 5 minutes after 1st dose call 911., Disp: 25 tablet, Rfl: 4     Syringe/Needle, Disp, 20G X 1\" 3 ML MISC, Use for B12 injections (Patient not taking: Reported on 1/23/2023), Disp: 20 each, Rfl: 0  No current facility-administered medications for this visit.    Facility-Administered Medications Ordered in Other Visits:      sodium chloride (PF) 0.9% PF flush 10 mL, 10 mL, Intravenous, Once, Olamide Rodney MD  Allergies   Allergen Reactions     Ampicillin Rash     Cipro [Quinolones] " Anaphylaxis     Macrobid [Nitrofuran Derivatives] GI Disturbance     Nitrofurantoin      Other reaction(s): Vomiting         EXAM:   /73   Pulse 75   Wt 81.1 kg (178 lb 12.8 oz)   LMP 05/15/2014 (Approximate)   SpO2 97%   BMI 28.43 kg/m      Constitutional:       General: She is not in acute distress.     Appearance: Normal appearance. She is not ill-appearing.   HENT:      Head: Normocephalic and atraumatic.   Eyes:      General:         Right eye: No discharge.         Left eye: No discharge.     Pulmonary:      Effort: Pulmonary effort is normal.     Neurological:      General: No focal deficit present.      Mental Status: She is alert. Mental status is at baseline.   Psychiatric:         Mood and Affect: Mood normal.         Behavior: Behavior normal.       ASSESSMENT/PLAN:  Lisette Owens is a 58 year old female seen today for persistent cough with persistent postnasal drainage.  Sinus CT scan shows left sphenoid sinus disease.  This all seems to originate after a infection in 2020.   Will await ENT for their opinion on the sinus CT scan.      She has done allergy shots on 3 different occasions as does not want to repeat allergy shots.  It is interesting that Zyrtec did provide some benefit.  Will recommend twice daily Zyrtec for 2 weeks until she sees Dr. Villa just to see if this provides any additional benefit.     1. Remain on Budesonide rinse  2. Will consider Dupixent depending on any recommendations per Dr. Villa for the sphenoid sinus disease.  3. Zyrtec twice daily for 2 weeks  4. Continue with the Trelegy 1 puff daily.    Follow-up in 5 weeks which will be a few weeks after seeing Dr. Villa      Thank you for allowing me to participate in the care of Lisette Owens.      I spent 25 minutes on the date of the encounter doing chart review, history and exam, documentation and further coordination as noted above exclusive of separately reported interpretations    Reji Ochoa  MD  Allergy/Immunology  [facility]      Again, thank you for allowing me to participate in the care of your patient.        Sincerely,        Reji Ochoa MD

## 2023-03-21 NOTE — PATIENT INSTRUCTIONS
Will consider Dupixent depending on any recommendations per Dr. Villa for the sphenoid sinus disease.          Allergy Staff Appt Hours Shot Hours Location       Physician   Reji Ochoa MD      Support Staff   Jennifer HALLMAN, DAYANA OLIVAREZ, DAYANA ZULETA, ROSY URIOSTEGUI, SMITHA      Mondays Tuesdays Thursdays and Fridays:  [place] 7-5      Wednesdays  Close                Mondays, Tuesdays and Fridays:  7:40 - 3:20              [facility]  [address]  [place] [address]  Appt Line: [phone]    Pulmonary Function Scheduling:  [place]: [phone]           Questions about cost of your care  For questions about your cost of your visit, procedure, lab or imaging contact: [facility] Price Line [phone] or visit:  [email or web address]    Important Scheduling Information  Appointments for skin testing: Appointment will last approximately 45 minutes.  Please call the appointment line for your clinic to schedule.  Discontinue oral antihistamines 7 days prior to the appointment.  Discontinue nasal and ocular antihistamines 4 days prior to appointment.    Appointments for challenges (oral food challenge, penicillin testing, aspirin desensitization) If your provider instructs you to that this additional testing is indicated, it will need to be scheduled directly through the allergy department.  Please contact them via [facility] or by calling the clinic and asking to speak with the allergy team.  They will provide additional information and instructions for the appointment.  Discontinue oral antihistamines 7 days prior to the appointment.  Discontinue nasal and ocular antihistamines 4 days prior to the appointment.    Thank you for trusting us with your care. Please feel free to contact us with any questions or concerns you may have.

## 2023-03-21 NOTE — PROGRESS NOTES
Lisette Owens was seen in the Allergy Clinic at [facility].    Lisette Owens is a 58 year old female being seen today for ongoing evaluation of Moderate persistent asthma, chronic cough, as well as chronic sphenoid sinusitis by CT scan as well as allergic rhinitis.  It is the cough that bothers her the most.    Since the last visit the patient has been feeling better.    At the last appointment attempted to decrease medications.  Recommended to stop Singulair, Claritin as well as Zyrtec and Nucala.  She called shortly after stopping medications and Zyrtec seem to provide the most benefit.  She restarted Zyrtec and symptoms improved which included the rash on her arms as well as itchy eyes.  She is doing well off Claritin, Singulair and Nucala.    She also has air filters in the home and that seems to be providing some benefit.    She has done allergy shots on 3 different occasions that she recalls.  She does not want to repeat allergy shots if not completely necessary.    In the past we did do a course of prednisone and azithromycin for 14 days which did not provide any benefit.  The sinus CT scan did show evidence of sphenoid sinus disease.  She has an upcoming ENT appointment in early April.    Skin testing was positive to cat, dog, dust mite, grass, trees and weeds.    Treatment for her asthma and cough includes Trelegy 1 puff daily, Zyrtec daily, budesonide rinses.    Past Medical History:   Diagnosis Date     Allergic rhinitis due to animal dander      Amblyopia     LT     Arthritis      Colon polyp      Depressive disorder      Endometriosis      Heart attack (H)     2016     House dust mite allergy      Hypertension      Moderate persistent asthma      Rhinitis, allergic to other allergen      Seasonal allergic rhinitis 7/25/05 skin tests pos. for: cat/dog/horse/DM/M/T/G/RW per Dr. Granado    pt. did IT from 7/06 to 11/5/07 to: cat/dog/DM/M/T/G/W dc'd per self.      Type 2 diabetes  mellitus without complication, without long-term current use of insulin (H) 03/13/2017     Family History   Problem Relation Age of Onset     Hypertension Mother      Alzheimer Disease Mother      Diabetes Mother      Tremor Mother      Eye Surgery Father         cataracts     Macular Degeneration Father      Hypertension Father      Skin Cancer Father      Breast Cancer Maternal Grandmother      Tremor Maternal Grandfather      Alzheimer Disease Paternal Grandmother      Psychotic Disorder Paternal Grandmother         bipolar     Heart Disease Paternal Grandfather      Thyroid Disease Brother         Tumors removed     Tremor Brother      Mental Illness Brother         Bi-Polar     Thyroid Disease Sister      Diabetes Sister      Skin Cancer Sister      Cerebrovascular Disease No family hx of      Glaucoma No family hx of      Past Surgical History:   Procedure Laterality Date     COLONOSCOPY  10/2020     EXCISE EXOSTOSIS FOOT Right 10/02/2018    Procedure: EXCISE EXOSTOSIS FOOT;  Right foot removal of first tarsometatarsal joint dorsal bossing;  Surgeon: Will Barraza DPM;  Location: [facility] OR     GYN SURGERY  06/24/2016    Hysterectomy complete     HYSTEROSCOPY           Current Outpatient Medications:      albuterol (PROAIR HFA/PROVENTIL HFA/VENTOLIN HFA) 108 (90 Base) MCG/ACT inhaler, INHALE 2 PUFFS BY MOUTH EVERY 6 HOURS, Disp: 18 g, Rfl: 4     albuterol (PROVENTIL) (2.5 MG/3ML) 0.083% neb solution, INHALE 1 VIAL VIA NEBULIZER EVERY 4 HOURS AS NEEDED FOR SHORTNESS OF BREATH, Disp: 90 mL, Rfl: 0     aspirin  MG EC tablet, Take 1 tablet (325 mg) by mouth daily, Disp: 90 tablet, Rfl: 3     budesonide (PULMICORT) 0.5 MG/2ML neb solution, Empty contents of ampule into 240mL of saline solution and rinse both nasal cavities as instructed twice daily., Disp: 240 mL, Rfl: 6     busPIRone (BUSPAR) 15 MG tablet, Take 1 tablet (15 mg) by mouth 3 times daily, Disp: 270 tablet, Rfl: 1     Cholecalciferol (VITAMIN D3  "PO), Take by mouth daily, Disp: , Rfl:      esomeprazole (NEXIUM) 40 MG DR capsule, TAKE 1 CAPSULE BY MOUTH EVERY MORNING 30 TO 60 MINUTES BEFORE BREAKFAST, Disp: 90 capsule, Rfl: 3     Ferrous Sulfate (IRON PO), , Disp: , Rfl:      FLUoxetine (PROZAC) 40 MG capsule, TAKE 1 CAPSULE BY MOUTH ONCE DAILY, Disp: 90 capsule, Rfl: 0     losartan (COZAAR) 25 MG tablet, Take 1 tablet (25 mg) by mouth daily, Disp: 90 tablet, Rfl: 1     metFORMIN (GLUCOPHAGE XR) 500 MG 24 hr tablet, Take 2 tablets (1,000 mg) by mouth 2 times daily (with meals), Disp: 360 tablet, Rfl: 0     rosuvastatin (CRESTOR) 5 MG tablet, TAKE 1 TABLET BY MOUTH ONCE DAILY, Disp: 90 tablet, Rfl: 0     TRELEGY ELLIPTA 200-62.5-25 MCG/ACT oral inhaler, INHALE 1 PUFF BY MOUTH ONCE DAILY. RINSE MOUTH AND GARGLE AFTER USE, Disp: 180 each, Rfl: 0     budesonide (PULMICORT) 0.5 MG/2ML neb solution, Empty contents of ampule into 240mL of saline solution and rinse both nasal cavities as instructed twice daily., Disp: 120 mL, Rfl: 3     Cyanocobalamin (VITAMIN B-12 PO), , Disp: , Rfl:      nitroGLYcerin (NITROSTAT) 0.4 MG sublingual tablet, For chest pain place 1 tablet under the tongue every 5 minutes for 3 doses. If symptoms persist 5 minutes after 1st dose call 911., Disp: 25 tablet, Rfl: 4     Syringe/Needle, Disp, 20G X 1\" 3 ML MISC, Use for B12 injections (Patient not taking: Reported on 1/23/2023), Disp: 20 each, Rfl: 0  No current facility-administered medications for this visit.    Facility-Administered Medications Ordered in Other Visits:      sodium chloride (PF) 0.9% PF flush 10 mL, 10 mL, Intravenous, Once, Olamide Rodney MD  Allergies   Allergen Reactions     Ampicillin Rash     Cipro [Quinolones] Anaphylaxis     Macrobid [Nitrofuran Derivatives] GI Disturbance     Nitrofurantoin      Other reaction(s): Vomiting         EXAM:   /73   Pulse 75   Wt 81.1 kg (178 lb 12.8 oz)   LMP 05/15/2014 (Approximate)   SpO2 97%   BMI 28.43 kg/m  "     Constitutional:       General: She is not in acute distress.     Appearance: Normal appearance. She is not ill-appearing.   HENT:      Head: Normocephalic and atraumatic.   Eyes:      General:         Right eye: No discharge.         Left eye: No discharge.     Pulmonary:      Effort: Pulmonary effort is normal.     Neurological:      General: No focal deficit present.      Mental Status: She is alert. Mental status is at baseline.   Psychiatric:         Mood and Affect: Mood normal.         Behavior: Behavior normal.       ASSESSMENT/PLAN:  Lisette Owens is a 58 year old female seen today for persistent cough with persistent postnasal drainage.  Sinus CT scan shows left sphenoid sinus disease.  This all seems to originate after a infection in 2020.   Will await ENT for their opinion on the sinus CT scan.      She has done allergy shots on 3 different occasions as does not want to repeat allergy shots.  It is interesting that Zyrtec did provide some benefit.  Will recommend twice daily Zyrtec for 2 weeks until she sees Dr. Villa just to see if this provides any additional benefit.     1. Remain on Budesonide rinse  2. Will consider Dupixent depending on any recommendations per Dr. Villa for the sphenoid sinus disease.  3. Zyrtec twice daily for 2 weeks  4. Continue with the Trelegy 1 puff daily.    Follow-up in 5 weeks which will be a few weeks after seeing Dr. Villa      Thank you for allowing me to participate in the care of Lisette Owens.      I spent 25 minutes on the date of the encounter doing chart review, history and exam, documentation and further coordination as noted above exclusive of separately reported interpretations    Reji Ochoa MD  Allergy/Immunology  [facility]

## 2023-03-25 DIAGNOSIS — E11.9 TYPE 2 DIABETES MELLITUS WITHOUT COMPLICATION, WITHOUT LONG-TERM CURRENT USE OF INSULIN (H): ICD-10-CM

## 2023-03-27 RX ORDER — LOSARTAN POTASSIUM 25 MG/1
TABLET ORAL
Qty: 90 TABLET | Refills: 0 | Status: SHIPPED | OUTPATIENT
Start: 2023-03-27 | End: 2023-06-29

## 2023-04-20 ENCOUNTER — OFFICE VISIT (OUTPATIENT)
Dept: FAMILY MEDICINE | Facility: CLINIC | Age: 58
End: 2023-04-20
Payer: COMMERCIAL

## 2023-04-20 ENCOUNTER — TELEPHONE (OUTPATIENT)
Dept: FAMILY MEDICINE | Facility: CLINIC | Age: 58
End: 2023-04-20

## 2023-04-20 VITALS
WEIGHT: 175 LBS | OXYGEN SATURATION: 98 % | BODY MASS INDEX: 27.47 KG/M2 | SYSTOLIC BLOOD PRESSURE: 111 MMHG | HEART RATE: 67 BPM | DIASTOLIC BLOOD PRESSURE: 73 MMHG | RESPIRATION RATE: 16 BRPM | HEIGHT: 67 IN | TEMPERATURE: 98.7 F

## 2023-04-20 DIAGNOSIS — K59.09 CHRONIC CONSTIPATION: Primary | ICD-10-CM

## 2023-04-20 DIAGNOSIS — Z23 NEED FOR TDAP VACCINATION: ICD-10-CM

## 2023-04-20 DIAGNOSIS — Z23 NEED FOR SHINGLES VACCINE: ICD-10-CM

## 2023-04-20 PROCEDURE — 90471 IMMUNIZATION ADMIN: CPT | Performed by: FAMILY MEDICINE

## 2023-04-20 PROCEDURE — 90472 IMMUNIZATION ADMIN EACH ADD: CPT | Performed by: FAMILY MEDICINE

## 2023-04-20 PROCEDURE — 90750 HZV VACC RECOMBINANT IM: CPT | Performed by: FAMILY MEDICINE

## 2023-04-20 PROCEDURE — 99214 OFFICE O/P EST MOD 30 MIN: CPT | Mod: 25 | Performed by: FAMILY MEDICINE

## 2023-04-20 PROCEDURE — 90715 TDAP VACCINE 7 YRS/> IM: CPT | Performed by: FAMILY MEDICINE

## 2023-04-20 RX ORDER — LUBIPROSTONE 24 UG/1
24 CAPSULE ORAL 2 TIMES DAILY WITH MEALS
Qty: 60 CAPSULE | Refills: 1 | Status: SHIPPED | OUTPATIENT
Start: 2023-04-20 | End: 2023-04-21

## 2023-04-20 ASSESSMENT — PATIENT HEALTH QUESTIONNAIRE - PHQ9
10. IF YOU CHECKED OFF ANY PROBLEMS, HOW DIFFICULT HAVE THESE PROBLEMS MADE IT FOR YOU TO DO YOUR WORK, TAKE CARE OF THINGS AT HOME, OR GET ALONG WITH OTHER PEOPLE: VERY DIFFICULT
SUM OF ALL RESPONSES TO PHQ QUESTIONS 1-9: 17
SUM OF ALL RESPONSES TO PHQ QUESTIONS 1-9: 17

## 2023-04-20 ASSESSMENT — PAIN SCALES - GENERAL: PAINLEVEL: MODERATE PAIN (5)

## 2023-04-20 NOTE — NURSING NOTE
.Prior to immunization administration, verified patients identity using patient s name and date of birth. Please see Immunization Activity for additional information.     Screening Questionnaire for Adult Immunization    Are you sick today?   No   Do you have allergies to medications, food, a vaccine component or latex?   No   Have you ever had a serious reaction after receiving a vaccination?   No   Do you have a long-term health problem with heart, lung, kidney, or metabolic disease (e.g., diabetes), asthma, a blood disorder, no spleen, complement component deficiency, a cochlear implant, or a spinal fluid leak?  Are you on long-term aspirin therapy?   No   Do you have cancer, leukemia, HIV/AIDS, or any other immune system problem?   No   Do you have a parent, brother, or sister with an immune system problem?   No   In the past 3 months, have you taken medications that affect  your immune system, such as prednisone, other steroids, or anticancer drugs; drugs for the treatment of rheumatoid arthritis, Crohn s disease, or psoriasis; or have you had radiation treatments?   No   Have you had a seizure, or a brain or other nervous system problem?   No   During the past year, have you received a transfusion of blood or blood    products, or been given immune (gamma) globulin or antiviral drug?   No   For women: Are you pregnant or is there a chance you could become       pregnant during the next month?   No   Have you received any vaccinations in the past 4 weeks?   No     Immunization questionnaire answers were all negative.      Injection of Zoster (shingles) and Tdap  given by Arielle Gant MA. Patient instructed to remain in clinic for 15 minutes afterwards, and to report any adverse reactions.     Screening performed by Arielle Gant MA on 4/20/2023 at 10:49 AM.

## 2023-04-20 NOTE — PROGRESS NOTES
"  Assessment & Plan     Chronic constipation  Is due for follow-up colonoscopy but with her chronic constipation referral is made for general GI evaluation.  I did originally give her a prescription at her visit for lubiprostone (AMITIZA).  This was not covered by insurance and required a trial of Linzess first.  This prescription was then sent to the pharmacy for her.  She will schedule the GI evaluation and trial this medication prior to that.  She will continue the Dulcolax at the present time.  I did encourage her that if vomiting, worsening pain, or inability to pass stool encouraged she will follow-up prior to the gastroenterology appointment.  Continue good hydration.  - Adult GI  Referral - Consult Only; Future  - linaclotide (LINZESS) 145 MCG capsule; Take 1 capsule (145 mcg) by mouth every morning (before breakfast)    Need for Tdap vaccination  Booster given  - TDAP VACCINE (Adacel, Boostrix)  [[id number]]    Need for shingles vaccine  Complete series  - ZOSTER VACCINE RECOMBINANT ADJUVANTED (SHINGRIX)    Review of prior external note(s) from - [facility] information from [facility], and [facility] reviewed  Review of the result(s) of each unique test - Colonoscopy, Barium Enema  Prescription drug management         BMI:   Estimated body mass index is 27.41 kg/m  as calculated from the following:    Height as of this encounter: 1.702 m (5' 7\").    Weight as of this encounter: 79.4 kg (175 lb).   Weight management plan: Discussed healthy diet and exercise guidelines    Depression Screening Follow Up        4/20/2023     9:30 AM   PHQ   PHQ-9 Total Score 17   Q9: Thoughts of better off dead/self-harm past 2 weeks Not at all         Follow Up Actions Taken  Crisis resource information provided in After Visit Summary  Referred patient back to PCP     Patient Instructions     Referral to Gastroenterology - they will call you to set up appointment.    Continue dulcolax as previous  Add "   lubiprostone (AMITIZA) 24 MCG capsule 60 capsule 1 4/20/2023  --   Sig - Route: Take 1 capsule (24 mcg)    Twice daily.    Follow up if persistent vomiting occurs.      Vaccines today         Yesika Gomez MD  [facility] MATTHEW Knox is a 58 year old, presenting for the following health issues:  Abdominal Pain (Stomach pain/ vomiting)         View : No data to display.              History of Present Illness       Reason for visit:  Stomach problems for months or longer  Symptom onset:  More than a month  Symptoms include:  Constipation also diarhea with vomiting at times  Symptom intensity:  Severe  Symptom progression:  Worsening  Had these symptoms before:  Yes  Has tried/received treatment for these symptoms:  No    She eats 2-3 servings of fruits and vegetables daily.She consumes 0 sweetened beverage(s) daily.She exercises with enough effort to increase her heart rate 20 to 29 minutes per day.  She exercises with enough effort to increase her heart rate 3 or less days per week.   She is taking medications regularly.    Today's PHQ-9         PHQ-9 Total Score: 17    PHQ-9 Q9 Thoughts of better off dead/self-harm past 2 weeks :   Not at all    How difficult have these problems made it for you to do your work, take care of things at home, or get along with other people: Very difficult     dulcolax 2-3 pills - helps have BM the next day.  Has previously used combinations of Metamucil and MiraLAX without benefit.  She finds that if she takes the Dulcolax as noted above that is knowing where she is able to really have a bowel movement.  Reports she can go a fair amount in the morning but often it is diarrhea.    Colonoscopy 2005- mentioned tough navigation.  Barium xray after the colonoscopy revealed moderate stool remained.  Was able to find records for colonoscopy on August 27, 2007 performed with [facility] that revealed excellent prep and normal appearing colonoscopy with  "follow-up recommended in 10 years from that time.    Reports when she is struggling with more constipation symptoms she will often times have episode of vomiting.  Reports that she had emesis x1  last night.  Has not eaten anything yet this morning.  No diarrhea.  Reports that she felt like she had a hard abdomen yesterday but that is improved now.  Constant - \"solid pain\" that is mild radiating from center to sides. Not uma symptoms.  Can go through to back.                Review of Systems   Constitutional, HEENT, cardiovascular, pulmonary, gi and gu systems are negative, except as otherwise noted.      Objective    /73 (BP Location: Right arm, Patient Position: Sitting, Cuff Size: Adult Large)   Pulse 67   Temp 98.7  F (37.1  C) (Oral)   Resp 16   Ht 1.702 m (5' 7\")   Wt 79.4 kg (175 lb)   LMP 05/15/2014 (Approximate)   SpO2 98%   BMI 27.41 kg/m    Body mass index is 27.41 kg/m .  Physical Exam   GENERAL: alert, no distress and over weight  NECK: no adenopathy, no asymmetry, masses, or scars and thyroid normal to palpation  RESP: lungs clear to auscultation - no rales, rhonchi or wheezes  CV: regular rate and rhythm, normal S1 S2, no S3 or S4, no murmur, click or rub, no peripheral edema and peripheral pulses strong  ABDOMEN: Soft, diffuse tenderness without rebound or guarding, no distention noted, no organomegaly or masses and bowel sounds normal  MS: no gross musculoskeletal defects noted, no edema  BACK: no CVA tenderness, no paralumbar tenderness  PSYCH: mentation appears normal, affect normal/bright              This chart was documented by provider using a voice activated software called Dragon in addition to manual typing. There may be vocabulary errors or other grammatical errors due to this.               "

## 2023-04-20 NOTE — TELEPHONE ENCOUNTER
PRIOR AUTHORIZATION DENIED    Medication: lubiprostone (AMITIZA) 24 MCG capsule - EPA DENIED    Denial Date: 4/20/2023    Denial Rational: PT NEEDS TO TRY/FAIL LINZESS OR HAVE CONTRAINDICATION TO      Appeal Information:

## 2023-04-20 NOTE — PATIENT INSTRUCTIONS
Referral to Gastroenterology - they will call you to set up appointment.    Continue dulcolax as previous  Add   lubiprostone (AMITIZA) 24 MCG capsule 60 capsule 1 4/20/2023  --   Sig - Route: Take 1 capsule (24 mcg)    Twice daily.    Follow up if persistent vomiting occurs.      Vaccines today

## 2023-05-06 NOTE — TELEPHONE ENCOUNTER
REFERRAL INFORMATION:    Referring Provider:  Dr. Gillian Gomez     Referring Clinic:  [facility]     Reason for Visit/Diagnosis: Chronic constipation      FUTURE VISIT INFORMATION:    Appointment Date: 5/17/2023    Appointment Time: 11:45 AM      NOTES STATUS DETAILS   OFFICE NOTE from Referring Provider Internal 4/20/2023 Office visit with Dr. Gomez      OFFICE NOTE from Other Specialist Internal 4/15/16 Office visit with Kolton Yin PA-C ([facility])      HOSPITAL DISCHARGE SUMMARY/  ED VISITS N/A    OPERATIVE REPORT N/A    MEDICATION LIST Internal         ENDOSCOPY  Care Everywhere 9/27/07 ([facility])    COLONOSCOPY Internal/ Care everywhere 10/15/2020  8/27/07 ([facility])      ERCP N/A    EUS N/A    STOOL TESTING N/A    PERTINENT LABS Internal C-diff: 08-10-22, 09-27-18, 06-21-16, 04-15-16   PATHOLOGY REPORTS (RELATED) Internal 10/15/2020   IMAGING (CT, MRI, EGD, MRCP, Small Bowel Follow Through/SBT, MR/CT Enterography) Internal US Abdomen: 3/16/17     5/6/2023 5:02pm Fax request sent to [facility] for med recs. -Brad

## 2023-05-17 ENCOUNTER — PRE VISIT (OUTPATIENT)
Dept: GASTROENTEROLOGY | Facility: CLINIC | Age: 58
End: 2023-05-17

## 2023-05-17 ENCOUNTER — VIRTUAL VISIT (OUTPATIENT)
Dept: GASTROENTEROLOGY | Facility: CLINIC | Age: 58
End: 2023-05-17
Attending: FAMILY MEDICINE
Payer: COMMERCIAL

## 2023-05-17 DIAGNOSIS — K59.09 CHRONIC CONSTIPATION: ICD-10-CM

## 2023-05-17 PROCEDURE — 99205 OFFICE O/P NEW HI 60 MIN: CPT | Mod: VID | Performed by: PHYSICIAN ASSISTANT

## 2023-05-17 ASSESSMENT — PATIENT HEALTH QUESTIONNAIRE - PHQ9: SUM OF ALL RESPONSES TO PHQ QUESTIONS 1-9: 16

## 2023-05-17 NOTE — PROGRESS NOTES
Virtual Visit Details    Type of service:  Video Visit     Originating Location (pt. Location): Home    Distant Location (provider location):  Off-site  Platform used for Video Visit: [employer]    GI CLINIC VISIT    CC/REFERRING PROVIDER: Yesika Gomez  REASON FOR CONSULTATION: constipation    HPI: 58 year old female with PMH of endometriosis, depression, asthma, type II diabetes,  presenting to GI clinic for chronic constipation    Lisette describes constipation ongoing since childhood, with worsening within the last year. At that time, she would feel like stool was just sitting in the rectal vault, but she was not able to push it out. She may have had stools 1-2 times weekly, with significant sense of incomplete evacuation. She then started Dulocolax 3 capsules daily - not clear is this is purely docusate or docusate+senna. With this, she had improvement in symptoms, often with clustered formed to soft stools in the morning, but still had abdomainl pain and intermittent emesis. About once per week, she would notice that she would get this pain involving the L to R upper abdomen, followed by emesis. She was seeing urology for bladder issues, and they did some plain films which she states showed a significant stool burden. Following this, she was started on Linzess 145 mcg daily by per PCP. She has been on this for only about two weeks. With Linzess, she is having bowel movements in the morning, clustered until feeling empty, and can be loose. The pain has improved, and has not had any further episodes of linzess.    She has been to pelvic floor PT in the last.    Hgb A1c 6.2  Normal TSH one year ago    Colonoscopy 10/2020 - two 1 to 2 mm polyps removed, both TAs.   ROS: 10pt ROS performed and otherwise negative.    PAST MEDICAL HISTORY:  Past Medical History:   Diagnosis Date     Allergic rhinitis due to animal dander      Amblyopia     LT     Arthritis      Colon polyp      Depressive disorder      Endometriosis       Heart attack (H)     2016     House dust mite allergy      Hypertension      Moderate persistent asthma      Rhinitis, allergic to other allergen      Seasonal allergic rhinitis 7/25/05 skin tests pos. for: cat/dog/horse/DM/M/T/G/RW per Dr. Granado    pt. did IT from 7/06 to 11/5/07 to: cat/dog/DM/M/T/G/W dc'd per self.      Type 2 diabetes mellitus without complication, without long-term current use of insulin (H) 03/13/2017       PREVIOUS ABDOMINAL/GYNECOLOGIC SURGERIES:  Past Surgical History:   Procedure Laterality Date     COLONOSCOPY  10/2020     EXCISE EXOSTOSIS FOOT Right 10/02/2018    Procedure: EXCISE EXOSTOSIS FOOT;  Right foot removal of first tarsometatarsal joint dorsal bossing;  Surgeon: Will Barraza DPM;  Location: [facility] OR     GYN SURGERY  06/24/2016    Hysterectomy complete     HYSTEROSCOPY           PERTINENT MEDICATIONS:  Current Outpatient Medications   Medication Sig Dispense Refill     albuterol (PROAIR HFA/PROVENTIL HFA/VENTOLIN HFA) 108 (90 Base) MCG/ACT inhaler INHALE 2 PUFFS BY MOUTH EVERY 6 HOURS 18 g 4     albuterol (PROVENTIL) (2.5 MG/3ML) 0.083% neb solution INHALE 1 VIAL VIA NEBULIZER EVERY 4 HOURS AS NEEDED FOR SHORTNESS OF BREATH 90 mL 0     aspirin  MG EC tablet Take 1 tablet (325 mg) by mouth daily 90 tablet 3     budesonide (PULMICORT) 0.5 MG/2ML neb solution Empty contents of ampule into 240mL of saline solution and rinse both nasal cavities as instructed twice daily. 240 mL 6     busPIRone (BUSPAR) 15 MG tablet Take 1 tablet (15 mg) by mouth 3 times daily 270 tablet 1     Cholecalciferol (VITAMIN D3 PO) Take by mouth daily       esomeprazole (NEXIUM) 40 MG DR capsule TAKE 1 CAPSULE BY MOUTH EVERY MORNING 30 TO 60 MINUTES BEFORE BREAKFAST 90 capsule 3     FLUoxetine (PROZAC) 40 MG capsule TAKE 1 CAPSULE BY MOUTH ONCE DAILY 90 capsule 0     linaclotide (LINZESS) 145 MCG capsule Take 1 capsule (145 mcg) by mouth every morning (before breakfast) 90 capsule 0     losartan  (COZAAR) 25 MG tablet TAKE 1 TABLET BY MOUTH ONCE DAILY 90 tablet 0     metFORMIN (GLUCOPHAGE XR) 500 MG 24 hr tablet Take 2 tablets (1,000 mg) by mouth 2 times daily (with meals) 360 tablet 0     nitroGLYcerin (NITROSTAT) 0.4 MG sublingual tablet For chest pain place 1 tablet under the tongue every 5 minutes for 3 doses. If symptoms persist 5 minutes after 1st dose call 911. 25 tablet 4     rosuvastatin (CRESTOR) 5 MG tablet TAKE 1 TABLET BY MOUTH ONCE DAILY 90 tablet 0     TRELEGY ELLIPTA 200-62.5-25 MCG/ACT oral inhaler INHALE 1 PUFF BY MOUTH ONCE DAILY. RINSE MOUTH AND GARGLE AFTER  each 0       SOCIAL HISTORY:  Social History     Socioeconomic History     Marital status:      Spouse name: Not on file     Number of children: Not on file     Years of education: Not on file     Highest education level: Not on file   Occupational History     Not on file   Tobacco Use     Smoking status: Never     Smokeless tobacco: Never   Vaping Use     Vaping status: Never Used   Substance and Sexual Activity     Alcohol use: Yes     Comment: Rarely     Drug use: No     Sexual activity: Yes     Partners: Male     Birth control/protection: None     Comment: N/A   Other Topics Concern     Parent/sibling w/ CABG, MI or angioplasty before 65F 55M? No   Social History Narrative     Not on file     Social Determinants of Health     Financial Resource Strain: Not on file   Food Insecurity: Not on file   Transportation Needs: Not on file   Physical Activity: Not on file   Stress: Not on file   Social Connections: Not on file   Intimate Partner Violence: Not on file   Housing Stability: Not on file       FAMILY HISTORY:  Family History   Problem Relation Age of Onset     Hypertension Mother      Alzheimer Disease Mother      Diabetes Mother      Tremor Mother      Eye Surgery Father         cataracts     Macular Degeneration Father      Hypertension Father      Skin Cancer Father      Breast Cancer Maternal Grandmother       Tremor Maternal Grandfather      Alzheimer Disease Paternal Grandmother      Psychotic Disorder Paternal Grandmother         bipolar     Heart Disease Paternal Grandfather      Thyroid Disease Brother         Tumors removed     Tremor Brother      Mental Illness Brother         Bi-Polar     Thyroid Disease Sister      Diabetes Sister      Skin Cancer Sister      Cerebrovascular Disease No family hx of      Glaucoma No family hx of        PHYSICAL EXAMINATION:  Vitals reviewed  LMP 05/15/2014 (Approximate)   Video physical exam  General: Patient appears well in no acute distress.   Skin: No visualized rash or lesions on visualized skin  Eyes: EOMI, no erythema, sclera icterus or discharge noted  Resp: Appears to be breathing comfortably without accessory muscle usage, speaking in full sentences, no cough  MSK: Appears to have normal range of motion based on visualized movements  Neurologic: No apparent tremors, facial movements symmetric  Psych: affect normal, alert and oriented    The rest of a comprehensive physical examination is deferred due to PHE (public health emergency) video restrictions      PERTINENT STUDIES Reviewed in EMR    ASSESSMENT/PLAN:    # Chronic constipation  # Abdominal pain  # Emesis  Long history of constipation ongoing since childhood, with progressive worsening within the last year, with associated abdominal pain and emesis. Her symptoms are most suggestive of CIC with underlying pelvic floor dysfunction.  No alarm features at this time. She was recently started on Linzess with improved symptoms, and no further episodes of emesis, though she has only been on this for two weeks.    We discussed that constipation can contribute to symptoms of nausea, vomiting, emesis and abdominal pain, and that it is reassuring that improvements in constipation have resulted in improvements in these associated symptoms. We reviewed a strategy to continue to support bowel habits. Should she have ongoing  abdominal pain and vomiting despite improved bowel habits, would consider further work-uup with imaging and or endoscopy.    -- Continue Linzess at 145 mcg daily, can consider adjusting this dose pending symptoms  -- Add in soluble fiber supplement  -- Lisette will check with her Dulcolax supplement to see if it contains senna; if so, will try to gradually titrate off of this and reserve only for as needed use  -- Will check into colonoscopy screening, last performed 2020 without clear guidance on recall    RTC 3 months  Thank you for this consultation. It was a pleasure to participate in the care of this patient; please contact us with any further questions.    Silvia Garnica PA-C    62 minutes spent on the date of the encounter doing chart review, review of outside records, review of test results, patient visit and documentation

## 2023-05-17 NOTE — LETTER
5/17/2023         RE: Lisette Owens  [address] [place] [address]        Dear Colleague,    Thank you for referring your patient, Lisette Owens, to the [facility] [place]. Please see a copy of my visit note below.      GI CLINIC VISIT    CC/REFERRING PROVIDER: Yesika Gomez  REASON FOR CONSULTATION: constipation    HPI: 58 year old female with PMH of endometriosis, depression, asthma, type II diabetes,  presenting to GI clinic for chronic constipation    Lisette describes constipation ongoing since childhood, with worsening within the last year. At that time, she would feel like stool was just sitting in the rectal vault, but she was not able to push it out. She may have had stools 1-2 times weekly, with significant sense of incomplete evacuation. She then started Dulocolax 3 capsules daily - not clear is this is purely docusate or docusate+senna. With this, she had improvement in symptoms, often with clustered formed to soft stools in the morning, but still had abdomainl pain and intermittent emesis. About once per week, she would notice that she would get this pain involving the L to R upper abdomen, followed by emesis. She was seeing urology for bladder issues, and they did some plain films which she states showed a significant stool burden. Following this, she was started on Linzess 145 mcg daily by per PCP. She has been on this for only about two weeks. With Linzess, she is having bowel movements in the morning, clustered until feeling empty, and can be loose. The pain has improved, and has not had any further episodes of linzess.    She has been to pelvic floor PT in the last.    Hgb A1c 6.2  Normal TSH one year ago    Colonoscopy 10/2020 - two 1 to 2 mm polyps removed, both TAs.   ROS: 10pt ROS performed and otherwise negative.    PAST MEDICAL HISTORY:  Past Medical History:   Diagnosis Date    Allergic rhinitis due to animal dander     Amblyopia     LT     Arthritis     Colon polyp     Depressive disorder     Endometriosis     Heart attack (H)     2016    House dust mite allergy     Hypertension     Moderate persistent asthma     Rhinitis, allergic to other allergen     Seasonal allergic rhinitis 7/25/05 skin tests pos. for: cat/dog/horse/DM/M/T/G/RW per Dr. Granado    pt. did IT from 7/06 to 11/5/07 to: cat/dog/DM/M/T/G/W dc'd per self.     Type 2 diabetes mellitus without complication, without long-term current use of insulin (H) 03/13/2017       PREVIOUS ABDOMINAL/GYNECOLOGIC SURGERIES:  Past Surgical History:   Procedure Laterality Date    COLONOSCOPY  10/2020    EXCISE EXOSTOSIS FOOT Right 10/02/2018    Procedure: EXCISE EXOSTOSIS FOOT;  Right foot removal of first tarsometatarsal joint dorsal bossing;  Surgeon: Will Barraza DPM;  Location: [facility] OR    GYN SURGERY  06/24/2016    Hysterectomy complete    HYSTEROSCOPY           PERTINENT MEDICATIONS:  Current Outpatient Medications   Medication Sig Dispense Refill    albuterol (PROAIR HFA/PROVENTIL HFA/VENTOLIN HFA) 108 (90 Base) MCG/ACT inhaler INHALE 2 PUFFS BY MOUTH EVERY 6 HOURS 18 g 4    albuterol (PROVENTIL) (2.5 MG/3ML) 0.083% neb solution INHALE 1 VIAL VIA NEBULIZER EVERY 4 HOURS AS NEEDED FOR SHORTNESS OF BREATH 90 mL 0    aspirin  MG EC tablet Take 1 tablet (325 mg) by mouth daily 90 tablet 3    budesonide (PULMICORT) 0.5 MG/2ML neb solution Empty contents of ampule into 240mL of saline solution and rinse both nasal cavities as instructed twice daily. 240 mL 6    busPIRone (BUSPAR) 15 MG tablet Take 1 tablet (15 mg) by mouth 3 times daily 270 tablet 1    Cholecalciferol (VITAMIN D3 PO) Take by mouth daily      esomeprazole (NEXIUM) 40 MG DR capsule TAKE 1 CAPSULE BY MOUTH EVERY MORNING 30 TO 60 MINUTES BEFORE BREAKFAST 90 capsule 3    FLUoxetine (PROZAC) 40 MG capsule TAKE 1 CAPSULE BY MOUTH ONCE DAILY 90 capsule 0    linaclotide (LINZESS) 145 MCG capsule Take 1 capsule (145 mcg) by mouth every  morning (before breakfast) 90 capsule 0    losartan (COZAAR) 25 MG tablet TAKE 1 TABLET BY MOUTH ONCE DAILY 90 tablet 0    metFORMIN (GLUCOPHAGE XR) 500 MG 24 hr tablet Take 2 tablets (1,000 mg) by mouth 2 times daily (with meals) 360 tablet 0    nitroGLYcerin (NITROSTAT) 0.4 MG sublingual tablet For chest pain place 1 tablet under the tongue every 5 minutes for 3 doses. If symptoms persist 5 minutes after 1st dose call 911. 25 tablet 4    rosuvastatin (CRESTOR) 5 MG tablet TAKE 1 TABLET BY MOUTH ONCE DAILY 90 tablet 0    TRELEGY ELLIPTA 200-62.5-25 MCG/ACT oral inhaler INHALE 1 PUFF BY MOUTH ONCE DAILY. RINSE MOUTH AND GARGLE AFTER  each 0       SOCIAL HISTORY:  Social History     Socioeconomic History    Marital status:      Spouse name: Not on file    Number of children: Not on file    Years of education: Not on file    Highest education level: Not on file   Occupational History    Not on file   Tobacco Use    Smoking status: Never    Smokeless tobacco: Never   Vaping Use    Vaping status: Never Used   Substance and Sexual Activity    Alcohol use: Yes     Comment: Rarely    Drug use: No    Sexual activity: Yes     Partners: Male     Birth control/protection: None     Comment: N/A   Other Topics Concern    Parent/sibling w/ CABG, MI or angioplasty before 65F 55M? No   Social History Narrative    Not on file     Social Determinants of Health     Financial Resource Strain: Not on file   Food Insecurity: Not on file   Transportation Needs: Not on file   Physical Activity: Not on file   Stress: Not on file   Social Connections: Not on file   Intimate Partner Violence: Not on file   Housing Stability: Not on file       FAMILY HISTORY:  Family History   Problem Relation Age of Onset    Hypertension Mother     Alzheimer Disease Mother     Diabetes Mother     Tremor Mother     Eye Surgery Father         cataracts    Macular Degeneration Father     Hypertension Father     Skin Cancer Father     Breast Cancer  Maternal Grandmother     Tremor Maternal Grandfather     Alzheimer Disease Paternal Grandmother     Psychotic Disorder Paternal Grandmother         bipolar    Heart Disease Paternal Grandfather     Thyroid Disease Brother         Tumors removed    Tremor Brother     Mental Illness Brother         Bi-Polar    Thyroid Disease Sister     Diabetes Sister     Skin Cancer Sister     Cerebrovascular Disease No family hx of     Glaucoma No family hx of        PHYSICAL EXAMINATION:  Vitals reviewed  LMP 05/15/2014 (Approximate)   Video physical exam  General: Patient appears well in no acute distress.   Skin: No visualized rash or lesions on visualized skin  Eyes: EOMI, no erythema, sclera icterus or discharge noted  Resp: Appears to be breathing comfortably without accessory muscle usage, speaking in full sentences, no cough  MSK: Appears to have normal range of motion based on visualized movements  Neurologic: No apparent tremors, facial movements symmetric  Psych: affect normal, alert and oriented    The rest of a comprehensive physical examination is deferred due to PHE (public health emergency) video restrictions      PERTINENT STUDIES Reviewed in EMR    ASSESSMENT/PLAN:    # Chronic constipation  # Abdominal pain  # Emesis  Long history of constipation ongoing since childhood, with progressive worsening within the last year, with associated abdominal pain and emesis. Her symptoms are most suggestive of CIC with underlying pelvic floor dysfunction.  No alarm features at this time. She was recently started on Linzess with improved symptoms, and no further episodes of emesis, though she has only been on this for two weeks.    We discussed that constipation can contribute to symptoms of nausea, vomiting, emesis and abdominal pain, and that it is reassuring that improvements in constipation have resulted in improvements in these associated symptoms. We reviewed a strategy to continue to support bowel habits. Should she have  ongoing abdominal pain and vomiting despite improved bowel habits, would consider further work-uup with imaging and or endoscopy.    -- Continue Linzess at 145 mcg daily, can consider adjusting this dose pending symptoms  -- Add in soluble fiber supplement  -- Lisette will check with her Dulcolax supplement to see if it contains senna; if so, will try to gradually titrate off of this and reserve only for as needed use  -- Will check into colonoscopy screening, last performed 2020 without clear guidance on recall    RTC 3 months  Thank you for this consultation. It was a pleasure to participate in the care of this patient; please contact us with any further questions.      62 minutes spent on the date of the encounter doing chart review, review of outside records, review of test results, patient visit and documentation          Again, thank you for allowing me to participate in the care of your patient.      Sincerely,    Silvia Garnica PA-C

## 2023-05-17 NOTE — PATIENT INSTRUCTIONS
"It was a pleasure taking care of you today.  I've included a brief summary of our discussion and care plan from today's visit below.  Please review this information with your primary care provider.  _______________________________________________________________________    My recommendations are summarized as follows:    GOAL - improvement in bowel habits, with hopeful improvement in abdominal pain, vomiting, and bladder symptoms.    1) Continue Linzess 145 mcg daily. We can continue to revisit this dose and adjust up or down if needed!  2) Add in Fiber supplement. Instructions are below.  3) Check with your Dulcolax supplement to see what the active ingredient is. If it is just docusate, you can continue this if needed. If it contains senna, I would recommend gradually decreasing this if able. If you feel like you cannot decrease this, we could consider either adding in a gentle laxative, like magnesium, or increasing the Linzess.    FIBER GUIDE  -- Recommend starting supplementation with a powdered soluble fiber. When used on a daily basis, this can help regulate the consistency of your stools. Generally, the powdered variations often work best. There are many different varieties out there, with the following general recommendations:  1) Metamucil (psyllium) and Citrucel. These are \"gel-forming\" fibers, which make a gel-like substance when mixed water. Make sure to mix well and drink promptly. You can start with 1-2 teaspoons per day, with goal to gradually increase to 1 tablespoon daily. You can increase up to 1 tablespoon three times daily if needed. It is important to stay well-hydrated with use of fiber supplementation and to make sure that the fiber powder is well mixed with water as directed on the label. You may experience some bloating with initiation of fiber, which will improve over the first few weeks. A good trial to evaluate the effect is 3-6 months. Of note, many of the fiber products contain " artificial sweeteners, which can cause bloating, gas, and diarrhea in those who may be sensitive to artificial sweeteners. If this is the case, recommend trying Metamucil Premium Blend (with Stevia), Metamucil 4-in-1 without Added Sweeteners, or Bellway (sweetened with Monk fruit extract).  2)  If you cannot tolerate the gel-forming fibers, or would prefer to use a product without added sweeteners, you can try Benefiber. The dosing is the same, with the recommendation to start with 1-2 teaspoons daily and gradually increase to 1 tablespoon daily, up to three times daily if needed.  3)You can also use plain, pure psyllium husk powder. This has no additives. For this, start with 1/2 teaspoon daily at first, then gradually increase to 1-2 teaspoons daily.    Return to GI Clinic in 3 months to review your progress.    ______________________________________________________________________    How do I schedule labs, imaging studies, or procedures that were ordered in clinic today?     Labs: To schedule lab appointment at the [facility], use my chart or call [phone]. If you have a [place] lab closer to home where you are regularly seen you can give them a call.     Procedures: If a colonoscopy, upper endoscopy, breath test, esophageal manometry, or pH impedence was ordered today, our endoscopy team will call you to schedule this. If you have not heard from our endoscopy team within a week, please call [phone] option 2 to schedule.     Imaging Studies: If you were scheduled for a CT scan, X-ray, MRI, ultrasound, HIDA scan or other imaging study, please call [phone] to have this scheduled.     Referral: If a referral to another specialty was ordered, expect a phone call or follow instructions above. If you have not heard from anyone regarding your referral in a week, please call our clinic to check the status.     Who do I call with any questions after my visit?  Please be in touch if there  are any further questions that arise following today's visit.  There are multiple ways to contact your gastroenterology care team.      During business hours, you may reach a Gastroenterology nurse at [phone]    To schedule or reschedule an appointment, please call [phone].     You can always send a secure message through [employer].  [employer] messages are answered by your nurse or doctor typically within 24 hours.  Please allow extra time on weekends and holidays.      For urgent/emergent questions after business hours, you may reach the on-call GI Fellow by contacting the [facility]  at [phone].     How will I get the results of any tests ordered?    You will receive all of your results.  If you have signed up for [employer], any tests ordered at your visit will be available to you after your physician reviews them.  Typically this takes 1-2 weeks.  If there are urgent results that require a change in your care plan, your physician or nurse will call you to discuss the next steps.      What is [employer]?  [employer] is a secure way for you to access all of your healthcare records from the [facility].  It is a web based computer program, so you can sign on to it from any location.  It also allows you to send secure messages to your care team.  I recommend signing up for [employer] access if you have not already done so and are comfortable with using a computer.      How to I schedule a follow-up visit?  If you did not schedule a follow-up visit today, please call [phone] to schedule a follow-up office visit.      Sincerely,    Silvia Garnica PA-C  Division of Gastroenterology, Hepatology & Nutrition  [facility]

## 2023-05-17 NOTE — NURSING NOTE
Is the patient currently in the state of [place]? YES    Visit mode:VIDEO    If the visit is dropped, the patient can be reconnected by: VIDEO VISIT: Text to cell phone: [phone]    Will anyone else be joining the visit? NO      How would you like to obtain your AVS? MyChart    Are changes needed to the allergy or medication list? NO    Reason for visit: New Patient

## 2023-05-18 ENCOUNTER — VIRTUAL VISIT (OUTPATIENT)
Dept: FAMILY MEDICINE | Facility: CLINIC | Age: 58
End: 2023-05-18
Payer: COMMERCIAL

## 2023-05-18 DIAGNOSIS — J45.40 MODERATE PERSISTENT ASTHMA, UNCOMPLICATED: ICD-10-CM

## 2023-05-18 DIAGNOSIS — E53.8 VITAMIN B12 DEFICIENCY (NON ANEMIC): ICD-10-CM

## 2023-05-18 DIAGNOSIS — R53.83 OTHER FATIGUE: ICD-10-CM

## 2023-05-18 DIAGNOSIS — E11.9 TYPE 2 DIABETES MELLITUS WITHOUT COMPLICATION, WITHOUT LONG-TERM CURRENT USE OF INSULIN (H): Primary | ICD-10-CM

## 2023-05-18 DIAGNOSIS — E55.9 VITAMIN D DEFICIENCY: ICD-10-CM

## 2023-05-18 DIAGNOSIS — R40.0 DAYTIME SOMNOLENCE: ICD-10-CM

## 2023-05-18 PROCEDURE — 99214 OFFICE O/P EST MOD 30 MIN: CPT | Mod: VID | Performed by: PHYSICIAN ASSISTANT

## 2023-05-18 RX ORDER — MODAFINIL 100 MG/1
100 TABLET ORAL DAILY
Qty: 30 TABLET | Refills: 0 | Status: SHIPPED | OUTPATIENT
Start: 2023-05-18 | End: 2023-07-08

## 2023-05-18 RX ORDER — METFORMIN HCL 500 MG
1000 TABLET, EXTENDED RELEASE 24 HR ORAL 2 TIMES DAILY WITH MEALS
Qty: 360 TABLET | Refills: 1 | Status: SHIPPED | OUTPATIENT
Start: 2023-05-18 | End: 2023-11-13

## 2023-05-18 NOTE — PROGRESS NOTES
Lisette is a 58 year old who is being evaluated via a billable video visit.      How would you like to obtain your AVS? MyChart  If the video visit is dropped, the invitation should be resent by: Text to cell phone: [phone]  Will anyone else be joining your video visit? No            Subjective   Lisette is a 58 year old, presenting for the following health issues:  No chief complaint on file.        5/18/2023    11:34 AM   Additional Questions   Roomed by Keri Camargo CMA   Accompanied by None     History of Present Illness     Asthma:  She presents for follow up of asthma.  She has some cough, no wheezing, and no shortness of breath. She is using a relief medication a few times a month. She does not miss any doses of her controller medication throughout the week.Patient is aware of the following triggers: mold, strong odors and fumes and upper respiratory infections. The patient has not had a visit to the Emergency Room, Urgent Care or Hospital due to asthma since the last clinic visit.     Diabetes:   She presents for follow up of diabetes.  She is checking home blood glucose a few times a week. She checks blood glucose before meals.  Blood glucose is never over 200 and never under 70. She is aware of hypoglycemia symptoms including shakiness, dizziness and weakness. She has no concerns regarding her diabetes at this time.  She is not experiencing numbness or burning in feet, excessive thirst, blurry vision, weight changes or redness, sores or blisters on feet.         She eats 2-3 servings of fruits and vegetables daily.She consumes 0 sweetened beverage(s) daily.She exercises with enough effort to increase her heart rate 10 to 19 minutes per day.  She exercises with enough effort to increase her heart rate 3 or less days per week.   She is taking medications regularly.       Breathing: good and bad days. No profound wheezing.   No chest pain/sob/palpitations/dizziness/ha's  Home sugars are running in the   range.    Still noting fatigue.   Taking b12 supplements.          Review of Systems   Constitutional, HEENT, cardiovascular, pulmonary, GI, , musculoskeletal, neuro, skin, endocrine and psych systems are negative, except as otherwise noted.          Objective           Vitals:  No vitals were obtained today due to virtual visit.    Physical Exam   GENERAL: Healthy, alert and no distress  EYES: Eyes grossly normal to inspection.  No discharge or erythema, or obvious scleral/conjunctival abnormalities.  RESP: No audible wheeze, cough, or visible cyanosis.  No visible retractions or increased work of breathing.    SKIN: Visible skin clear. No significant rash, abnormal pigmentation or lesions.  NEURO: Cranial nerves grossly intact.  Mentation and speech appropriate for age.  PSYCH: Mentation appears normal, affect normal/bright, judgement and insight intact, normal speech and appearance well-groomed.    Lisette was seen today for asthma and diabetes.    Diagnoses and all orders for this visit:    Type 2 diabetes mellitus without complication, without long-term current use of insulin (H)  -     Cancel: FOOT EXAM  -     blood glucose (NO BRAND SPECIFIED) test strip; Use to test blood sugar 1 times daily or as directed.  -     blood glucose (NO BRAND SPECIFIED) lancets standard; Use to test blood sugar 1 times daily or as directed.  -     Hemoglobin A1c; Future    Moderate persistent asthma, uncomplicated    Other fatigue  -     TSH with free T4 reflex; Future  -     CBC with platelets and differential; Future    Vitamin B12 deficiency (non anemic)  -     Vitamin B12; Future    Vitamin D deficiency  -     Vitamin D Deficiency; Future      May consider provigil/other stimulant if work up for fatigue is negative. No history of jason based on previous testing.     Continue all current meds.  Exercise.   Lower sugar/fat diet.     Video-Visit Details    Type of service:  Video Visit     Originating Location (pt.  Location): Home    Distant Location (provider location):  On-site  Platform used for Video Visit: Dario

## 2023-05-19 ENCOUNTER — TELEPHONE (OUTPATIENT)
Dept: GASTROENTEROLOGY | Facility: CLINIC | Age: 58
End: 2023-05-19
Payer: COMMERCIAL

## 2023-05-30 ENCOUNTER — TELEPHONE (OUTPATIENT)
Dept: GASTROENTEROLOGY | Facility: CLINIC | Age: 58
End: 2023-05-30
Payer: COMMERCIAL

## 2023-05-30 NOTE — TELEPHONE ENCOUNTER
"LVM and sent mychart for patient to schedule:    \"RETURN GI\" video visit with Silvia for 2-3 mo follow up (around July/August 2023). Per DAYANA Coleman okay to use 'new/cdiff' slot so patient is seen in requested timeframe   "

## 2023-06-16 ENCOUNTER — TELEPHONE (OUTPATIENT)
Dept: PULMONOLOGY | Facility: CLINIC | Age: 58
End: 2023-06-16
Payer: COMMERCIAL

## 2023-06-16 DIAGNOSIS — J45.40 MODERATE PERSISTENT ASTHMA WITHOUT COMPLICATION: ICD-10-CM

## 2023-06-16 RX ORDER — FLUTICASONE FUROATE, UMECLIDINIUM BROMIDE AND VILANTEROL TRIFENATATE 200; 62.5; 25 UG/1; UG/1; UG/1
POWDER RESPIRATORY (INHALATION)
Qty: 180 EACH | Refills: 0 | Status: SHIPPED | OUTPATIENT
Start: 2023-06-16 | End: 2023-09-22

## 2023-06-16 NOTE — TELEPHONE ENCOUNTER
Spoke with patient to make return apt with Dr Ley as last apt 6/15/22. Refilled Jose Juan. Gave her scheduling number. She will make apt.

## 2023-06-23 ENCOUNTER — LAB (OUTPATIENT)
Dept: LAB | Facility: CLINIC | Age: 58
End: 2023-06-23
Payer: COMMERCIAL

## 2023-06-23 DIAGNOSIS — E55.9 VITAMIN D DEFICIENCY: ICD-10-CM

## 2023-06-23 DIAGNOSIS — E11.9 TYPE 2 DIABETES MELLITUS WITHOUT COMPLICATION, WITHOUT LONG-TERM CURRENT USE OF INSULIN (H): ICD-10-CM

## 2023-06-23 DIAGNOSIS — R53.83 OTHER FATIGUE: ICD-10-CM

## 2023-06-23 DIAGNOSIS — E53.8 VITAMIN B12 DEFICIENCY (NON ANEMIC): ICD-10-CM

## 2023-06-23 LAB
BASOPHILS # BLD AUTO: 0.1 10E3/UL (ref 0–0.2)
BASOPHILS NFR BLD AUTO: 1 %
DEPRECATED CALCIDIOL+CALCIFEROL SERPL-MC: 44 UG/L (ref 20–75)
EOSINOPHIL # BLD AUTO: 0.8 10E3/UL (ref 0–0.7)
EOSINOPHIL NFR BLD AUTO: 13 %
ERYTHROCYTE [DISTWIDTH] IN BLOOD BY AUTOMATED COUNT: 13.2 % (ref 10–15)
HBA1C MFR BLD: 6.2 % (ref 0–5.6)
HCT VFR BLD AUTO: 37.6 % (ref 35–47)
HGB BLD-MCNC: 12.4 G/DL (ref 11.7–15.7)
IMM GRANULOCYTES # BLD: 0 10E3/UL
IMM GRANULOCYTES NFR BLD: 0 %
LYMPHOCYTES # BLD AUTO: 2 10E3/UL (ref 0.8–5.3)
LYMPHOCYTES NFR BLD AUTO: 35 %
MCH RBC QN AUTO: 29.4 PG (ref 26.5–33)
MCHC RBC AUTO-ENTMCNC: 33 G/DL (ref 31.5–36.5)
MCV RBC AUTO: 89 FL (ref 78–100)
MONOCYTES # BLD AUTO: 0.4 10E3/UL (ref 0–1.3)
MONOCYTES NFR BLD AUTO: 6 %
NEUTROPHILS # BLD AUTO: 2.6 10E3/UL (ref 1.6–8.3)
NEUTROPHILS NFR BLD AUTO: 44 %
PLATELET # BLD AUTO: 274 10E3/UL (ref 150–450)
RBC # BLD AUTO: 4.22 10E6/UL (ref 3.8–5.2)
TSH SERPL DL<=0.005 MIU/L-ACNC: 1.17 UIU/ML (ref 0.3–4.2)
VIT B12 SERPL-MCNC: 1791 PG/ML (ref 232–1245)
WBC # BLD AUTO: 5.8 10E3/UL (ref 4–11)

## 2023-06-23 PROCEDURE — 83036 HEMOGLOBIN GLYCOSYLATED A1C: CPT

## 2023-06-23 PROCEDURE — 36415 COLL VENOUS BLD VENIPUNCTURE: CPT

## 2023-06-23 PROCEDURE — 85025 COMPLETE CBC W/AUTO DIFF WBC: CPT

## 2023-06-23 PROCEDURE — 84443 ASSAY THYROID STIM HORMONE: CPT

## 2023-06-23 PROCEDURE — 82607 VITAMIN B-12: CPT

## 2023-06-23 PROCEDURE — 82306 VITAMIN D 25 HYDROXY: CPT

## 2023-06-30 ENCOUNTER — OFFICE VISIT (OUTPATIENT)
Dept: OTOLARYNGOLOGY | Facility: CLINIC | Age: 58
End: 2023-06-30
Payer: COMMERCIAL

## 2023-06-30 VITALS
SYSTOLIC BLOOD PRESSURE: 121 MMHG | BODY MASS INDEX: 28.41 KG/M2 | WEIGHT: 181 LBS | DIASTOLIC BLOOD PRESSURE: 59 MMHG | HEIGHT: 67 IN | HEART RATE: 64 BPM | OXYGEN SATURATION: 98 %

## 2023-06-30 DIAGNOSIS — J01.41 ACUTE RECURRENT PANSINUSITIS: Primary | ICD-10-CM

## 2023-06-30 PROCEDURE — 99214 OFFICE O/P EST MOD 30 MIN: CPT | Performed by: OTOLARYNGOLOGY

## 2023-06-30 ASSESSMENT — PAIN SCALES - GENERAL: PAINLEVEL: MODERATE PAIN (5)

## 2023-06-30 NOTE — LETTER
6/30/2023       RE: Lisette Owens  [address] [place] [address]     Dear Colleague,    Thank you for referring your patient, Lisette Owens, to the [facility] [place] [facility]. Please see a copy of my visit note below.      [facility]  Return Visit  Encounter date:   June 30, 2023    Chief Complaint:   Post Nasal Drip, Cough    ID:  Chronic cough  CRSsNP  Comorbid asthma    Interval History:   Lisette Owens is a 57 year old female with history of persistent asthma who presents for follow up chronic congestion.and chronic sinusitis.     Recently saw Dr. Ochoa, who obtained CT (reviewed below) which showed smoldering sinus disease, LT>RT.     She has ongoing symptoms of persistent nasal congestion, post-nasal drip and cough despite, singulair, claritin, Nucala, and allergy shots in the past. She was recently on a course of azithromycin and prednisone which did not provide any benefit.         Sino-Nasal Outcome Test (SNOT - 22)  DNT    [place] Operative History  No sinonasal surgery    Review of systems: A 14-point review of systems has been conducted and is negative for any notable symptoms, except as dictated in the history of present illness.     Physical Exam:  Vital signs: LMP 05/15/2014 (Approximate)    General Appearance: No acute distress, appropriate demeanor, conversant  Eyes: moist conjunctivae; EOMI; pupils symmetric; visual acuity grossly intact; no proptosis  Head: normocephalic; overall symmetric appearance without deformity  Face: overall symmetric without deformity; HB I/VI  Nose: No external deformity; see endoscopy  Lungs: symmetric chest rise; no wheezing  CV: Good distal perfusion; normal hear rate  Extremities: No deformity  Neurologic Exam: Cranial nerves II-XII are grossly intact; no focal deficit       Procedure Note (previous visit- 7/13/2022)  Procedure performed: Rigid  nasal endoscopy  Indication: To evaluate for sinonasal pathology not visualized on routine anterior rhinoscopy  Anesthesia: 4% topical lidocaine with 0.05 % oxymetazoline  Description of procedure: A 30 degree, 3 mm rigid endoscope was inserted into bilateral nasal cavities and the nasal valves, nasal cavity, middle meatus, sphenoethmoid recess, nasopharynx were evaluated for evidence of obstruction, edema, purulence, polyps and/or mass/lesion.     [place] Endoscopic Scoring System  Endoscopic observation Right Left   Polyps in middle meatus (0 = absent, 1 = restricted to middle meatus, 2 = Beyond middle meatus) 0 0   Discharge (0 = absent, 1 = thin and clear, 2 = thick, purulent) 0 0   Edema (0 = absent, 1 = mild-moderate, 2 = moderate-severe) 1 1   Crusting (0 = absent, 1 = mild-moderate, 2 = moderate-severe) 0 0   Scarring (0= absent, 1 = mild-moderate, 2 = moderate-severe) 0 0   Total 1 1     Findings  RT: significant IT hypertrophy; MM is stably edematous  LT: significant IT hypertrophy; MM is stably edematous    The patient tolerated the procedure well without complication.     Laboratory Review:  n/a     Imaging Review:  CTA 3/15/22   1.  Widely patent coronary arteries without evidence of  atherosclerosis or stenosis.   2.  Total Agatston score 0 placing the patient in the lowest  percentile when compared to age and gender matched control group.  3.  Please review the separate Radiology report for incidental  noncardiac findings.      CT sinus 2/1/2023:  IMPRESSION:  1.  Generally mild mucosal thickening throughout the sinuses.  2.  Dependent left sphenoid sinus secretions may indicate a component  of acute sinusitis.  3.  The drainage pathways are narrowed by mucosal thickening but  appear patent.     MILA GREER MD     Pathology Review:  n/a     Assessment/Medical Decision Making:  Asthma  Recurrent acute sinusitis  GERD, on PPI  Chronic cough    Plan:  Reviewed CT today which shows  mild-moderate disease in a central compartment pattern. We discussed her symptoms of cough and post-nasal drip which potentially could be explained by sinus inflammation. Given her persistent symptoms despite multiple medical therapies, including biologics, allergy shots, inhalers, prednisone, antibiotics, surgery could be an option. Given her central compartment inflammation pattern involving the inferior turbinates, middle turbinates (LT>RT) and OMC, I would consider bilateral endoscopic maxillary antrostomy, total ethmoidectomy, partial inferior turbinate resection (lower 1/2), and IT submucous resection.  We discussed risks associated with surgery. She is interested in proceeding, but since I am leaving and will likely not be able to accommodate a surgery to ensure adequate recovery, I recommend she see one of my colleagues. She was agreeable and understanding that they may not agree with my opinion to move forward with surgery.  She will follow up with me as needed.     Bandar Villa MD    [facility]  Rhinology, Endoscopic Skull Base Surgery  [facility]  Department of Otolaryngology - Head & Neck Surgery    The above documentation was completed with the aid of voice recognition dictation software, and as a result, unexpected dictation errors may occur. Please don't hesitate to contact me for any clarification needed regarding this note.     ~~~~~~~~~~~~~~~~~~~~~~~~~~~~~~~~~~~~~~~~~~~~~~~~~~~~~~~~~~~~~~~~~~~~~~~~~~~~~~~~~~~~~~~~~~~~~~~~~~~~~~~~~~~~~~~~~~~~~~~~~~~~~~~~~~~~~~~    Past Medical History:   Diagnosis Date    Allergic rhinitis due to animal dander     Amblyopia     LT    Arthritis     Colon polyp     Depressive disorder     Endometriosis     Heart attack (H)     2016    House dust mite allergy     Hypertension     Moderate persistent asthma     Rhinitis, allergic to other allergen     Seasonal allergic rhinitis 7/25/05 skin tests pos. for:  cat/dog/horse/DM/M/T/G/RW per Dr. Granado    pt. did IT from 7/06 to 11/5/07 to: cat/dog/DM/M/T/G/W dc'd per self.     Type 2 diabetes mellitus without complication, without long-term current use of insulin (H) 03/13/2017        Past Surgical History:   Procedure Laterality Date    COLONOSCOPY  10/2020    EXCISE EXOSTOSIS FOOT Right 10/02/2018    Procedure: EXCISE EXOSTOSIS FOOT;  Right foot removal of first tarsometatarsal joint dorsal bossing;  Surgeon: Will Barraza DPM;  Location: [facility] OR    GYN SURGERY  06/24/2016    Hysterectomy complete    HYSTEROSCOPY          Family History   Problem Relation Age of Onset    Hypertension Mother     Alzheimer Disease Mother     Diabetes Mother     Tremor Mother     Eye Surgery Father         cataracts    Macular Degeneration Father     Hypertension Father     Skin Cancer Father     Breast Cancer Maternal Grandmother     Tremor Maternal Grandfather     Alzheimer Disease Paternal Grandmother     Psychotic Disorder Paternal Grandmother         bipolar    Heart Disease Paternal Grandfather     Thyroid Disease Brother         Tumors removed    Tremor Brother     Mental Illness Brother         Bi-Polar    Thyroid Disease Sister     Diabetes Sister     Skin Cancer Sister     Cerebrovascular Disease No family hx of     Glaucoma No family hx of         Social History     Socioeconomic History    Marital status:    Tobacco Use    Smoking status: Never    Smokeless tobacco: Never   Vaping Use    Vaping Use: Never used   Substance and Sexual Activity    Alcohol use: Yes     Comment: Rarely    Drug use: No    Sexual activity: Yes     Partners: Male     Birth control/protection: None     Comment: N/A   Other Topics Concern    Parent/sibling w/ CABG, MI or angioplasty before 65F 55M? No              Again, thank you for allowing me to participate in the care of your patient.      Sincerely,    Bandar Villa MD

## 2023-06-30 NOTE — PATIENT INSTRUCTIONS
You were seen in the ENT Clinic today by Dr. Villa. If you have any questions or concerns after your appointment, please contact us (see below)       2.   We will reach out to one of our other rhinology providers to get you scheduled for a consult for surgery.           How to Contact Us:  Send a [employer] message to your provider. Our team will respond to you via [employer]. Occasionally, we will need to call you to get further information.  For urgent matters (Monday-Friday), call the ENT Clinic: [phone] and speak with a call center team member - they will route your call appropriately.   If you'd like to speak directly with a nurse, please find our contact information below. We do our best to check voicemail frequently throughout the day, and will work to call you back within 1-2 days. For urgent matters, please use the general clinic phone numbers listed above.        Henrietta LINCOLN RN  ENT RN Care Coordinator  Direct: [phone]  Kim MONDRAGON LPN  Direct: [phone]         [facility]  Department of Otolaryngology

## 2023-06-30 NOTE — PROGRESS NOTES
C    [facility]  Return Visit  Encounter date:   June 30, 2023    Chief Complaint:   Post Nasal Drip, Cough    ID:  Chronic cough  CRSsNP  Comorbid asthma    Interval History:   Lisette Owens is a 57 year old female with history of persistent asthma who presents for follow up chronic congestion.and chronic sinusitis.     Recently saw Dr. Ochoa, who obtained CT (reviewed below) which showed smoldering sinus disease, LT>RT.     She has ongoing symptoms of persistent nasal congestion, post-nasal drip and cough despite, singulair, claritin, Nucala, and allergy shots in the past. She was recently on a course of azithromycin and prednisone which did not provide any benefit.         Sino-Nasal Outcome Test (SNOT - 22)  DNT    [place] Operative History  No sinonasal surgery    Review of systems: A 14-point review of systems has been conducted and is negative for any notable symptoms, except as dictated in the history of present illness.     Physical Exam:  Vital signs: [facility] 05/15/2014 (Approximate)    General Appearance: No acute distress, appropriate demeanor, conversant  Eyes: moist conjunctivae; EOMI; pupils symmetric; visual acuity grossly intact; no proptosis  Head: normocephalic; overall symmetric appearance without deformity  Face: overall symmetric without deformity; HB I/VI  Nose: No external deformity; see endoscopy  Lungs: symmetric chest rise; no wheezing  CV: Good distal perfusion; normal hear rate  Extremities: No deformity  Neurologic Exam: Cranial nerves II-XII are grossly intact; no focal deficit       Procedure Note (previous visit- 7/13/2022)  Procedure performed: Rigid nasal endoscopy  Indication: To evaluate for sinonasal pathology not visualized on routine anterior rhinoscopy  Anesthesia: 4% topical lidocaine with 0.05 % oxymetazoline  Description of procedure: A 30 degree, 3 mm rigid endoscope was inserted into bilateral nasal cavities and the nasal valves, nasal cavity, middle meatus,  sphenoethmoid recess, nasopharynx were evaluated for evidence of obstruction, edema, purulence, polyps and/or mass/lesion.     [place] Endoscopic Scoring System  Endoscopic observation Right Left   Polyps in middle meatus (0 = absent, 1 = restricted to middle meatus, 2 = Beyond middle meatus) 0 0   Discharge (0 = absent, 1 = thin and clear, 2 = thick, purulent) 0 0   Edema (0 = absent, 1 = mild-moderate, 2 = moderate-severe) 1 1   Crusting (0 = absent, 1 = mild-moderate, 2 = moderate-severe) 0 0   Scarring (0= absent, 1 = mild-moderate, 2 = moderate-severe) 0 0   Total 1 1     Findings  RT: significant IT hypertrophy; MM is stably edematous  LT: significant IT hypertrophy; MM is stably edematous    The patient tolerated the procedure well without complication.     Laboratory Review:  n/a     Imaging Review:  CTA 3/15/22   1.  Widely patent coronary arteries without evidence of  atherosclerosis or stenosis.   2.  Total Agatston score 0 placing the patient in the lowest  percentile when compared to age and gender matched control group.  3.  Please review the separate Radiology report for incidental  noncardiac findings.      CT sinus 2/1/2023:  IMPRESSION:  1.  Generally mild mucosal thickening throughout the sinuses.  2.  Dependent left sphenoid sinus secretions may indicate a component  of acute sinusitis.  3.  The drainage pathways are narrowed by mucosal thickening but  appear patent.     MILA GREER MD     Pathology Review:  n/a     Assessment/Medical Decision Making:  Asthma  Recurrent acute sinusitis  GERD, on PPI  Chronic cough    Plan:  Reviewed CT today which shows mild-moderate disease in a central compartment pattern. We discussed her symptoms of cough and post-nasal drip which potentially could be explained by sinus inflammation. Given her persistent symptoms despite multiple medical therapies, including biologics, allergy shots, inhalers, prednisone, antibiotics, surgery could be an option.  Given her central compartment inflammation pattern involving the inferior turbinates, middle turbinates (LT>RT) and OMC, I would consider bilateral endoscopic maxillary antrostomy, total ethmoidectomy, partial inferior turbinate resection (lower 1/2), and IT submucous resection.  We discussed risks associated with surgery. She is interested in proceeding, but since I am leaving and will likely not be able to accommodate a surgery to ensure adequate recovery, I recommend she see one of my colleagues. She was agreeable and understanding that they may not agree with my opinion to move forward with surgery.  She will follow up with me as needed.     Bandar Villa MD    [facility]  Rhinology, Endoscopic Skull Base Surgery  [facility]  Department of Otolaryngology - Head & Neck Surgery    The above documentation was completed with the aid of voice recognition dictation software, and as a result, unexpected dictation errors may occur. Please don't hesitate to contact me for any clarification needed regarding this note.     ~~~~~~~~~~~~~~~~~~~~~~~~~~~~~~~~~~~~~~~~~~~~~~~~~~~~~~~~~~~~~~~~~~~~~~~~~~~~~~~~~~~~~~~~~~~~~~~~~~~~~~~~~~~~~~~~~~~~~~~~~~~~~~~~~~~~~~~    Past Medical History:   Diagnosis Date     Allergic rhinitis due to animal dander      Amblyopia     LT     Arthritis      Colon polyp      Depressive disorder      Endometriosis      Heart attack (H)     2016     House dust mite allergy      Hypertension      Moderate persistent asthma      Rhinitis, allergic to other allergen      Seasonal allergic rhinitis 7/25/05 skin tests pos. for: cat/dog/horse/DM/M/T/G/RW per Dr. Granado    pt. did IT from 7/06 to 11/5/07 to: cat/dog/DM/M/T/G/W dc'd per self.      Type 2 diabetes mellitus without complication, without long-term current use of insulin (H) 03/13/2017        Past Surgical History:   Procedure Laterality Date     COLONOSCOPY  10/2020     EXCISE EXOSTOSIS FOOT Right  10/02/2018    Procedure: EXCISE EXOSTOSIS FOOT;  Right foot removal of first tarsometatarsal joint dorsal bossing;  Surgeon: Will Barraza DPM;  Location: [facility] OR     GYN SURGERY  06/24/2016    Hysterectomy complete     HYSTEROSCOPY          Family History   Problem Relation Age of Onset     Hypertension Mother      Alzheimer Disease Mother      Diabetes Mother      Tremor Mother      Eye Surgery Father         cataracts     Macular Degeneration Father      Hypertension Father      Skin Cancer Father      Breast Cancer Maternal Grandmother      Tremor Maternal Grandfather      Alzheimer Disease Paternal Grandmother      Psychotic Disorder Paternal Grandmother         bipolar     Heart Disease Paternal Grandfather      Thyroid Disease Brother         Tumors removed     Tremor Brother      Mental Illness Brother         Bi-Polar     Thyroid Disease Sister      Diabetes Sister      Skin Cancer Sister      Cerebrovascular Disease No family hx of      Glaucoma No family hx of         Social History     Socioeconomic History     Marital status:    Tobacco Use     Smoking status: Never     Smokeless tobacco: Never   Vaping Use     Vaping Use: Never used   Substance and Sexual Activity     Alcohol use: Yes     Comment: Rarely     Drug use: No     Sexual activity: Yes     Partners: Male     Birth control/protection: None     Comment: N/A   Other Topics Concern     Parent/sibling w/ CABG, MI or angioplasty before 65F 55M? No

## 2023-06-30 NOTE — TELEPHONE ENCOUNTER
FUTURE VISIT INFORMATION      FUTURE VISIT INFORMATION:    Date: 7/28/23    Time: 11AM    [place] [facility] [place]  REFERRAL INFORMATION:    Referring provider:  Dr. Villa    Referring providers clinic:   ENT     Reason for visit/diagnosis  Surgery consutl- Referred by Dr. Villa    RECORDS REQUESTED FROM:       Clinic name Comments Records Status Imaging Status    ENT  6/30/23, 7/13/22, 3/30/22 - OV [facility] Epic     IMAGING 2/1/23- CT SINUS   10/20/22- CT CHEST   6/16/21- MR BRAIN   3/13/17- XR SINUS  Epic

## 2023-07-07 ENCOUNTER — OFFICE VISIT (OUTPATIENT)
Dept: ALLERGY | Facility: CLINIC | Age: 58
End: 2023-07-07
Payer: COMMERCIAL

## 2023-07-07 VITALS
SYSTOLIC BLOOD PRESSURE: 144 MMHG | OXYGEN SATURATION: 100 % | HEIGHT: 67 IN | DIASTOLIC BLOOD PRESSURE: 78 MMHG | HEART RATE: 80 BPM | BODY MASS INDEX: 28.43 KG/M2 | WEIGHT: 181.1 LBS

## 2023-07-07 DIAGNOSIS — J30.1 SEASONAL ALLERGIC RHINITIS DUE TO POLLEN: ICD-10-CM

## 2023-07-07 DIAGNOSIS — J32.9 CHRONIC RHINOSINUSITIS: ICD-10-CM

## 2023-07-07 DIAGNOSIS — J30.81 ALLERGIC RHINITIS DUE TO ANIMALS: ICD-10-CM

## 2023-07-07 DIAGNOSIS — R05.3 CHRONIC COUGH: Primary | ICD-10-CM

## 2023-07-07 DIAGNOSIS — J45.40 MODERATE PERSISTENT ASTHMA WITHOUT COMPLICATION: ICD-10-CM

## 2023-07-07 PROCEDURE — 99214 OFFICE O/P EST MOD 30 MIN: CPT | Performed by: INTERNAL MEDICINE

## 2023-07-07 NOTE — PROGRESS NOTES
Lisette Owens was seen in the Allergy Clinic at [facility].    Lisette Owens is a 58 year old female being seen today for ongoing evaluation of Moderate persistent asthma, chronic cough, as well as mild to moderate chronic sphenoid sinusitis by CT scan as well as allergic rhinitis.  It is the cough that bothers her the most.    Since the last visit the patient has been feeling better.  Her cough has improved.  She continues to have allergy symptoms.  She has had some recent vocal changes.  She is known to have allergies to cat, dog, dust mite, tree, grass and weed.  She has been on allergy shots on 3 different occasions in the past.    She has been able to stop the Singulair, Claritin and Nucala without any significant change in her symptoms.      In the past we did do a course of prednisone and azithromycin for 14 days which did not provide any benefit.  The sinus CT scan did show evidence of sphenoid sinus disease with ostiomeatal unit obstruction on the left.      Treatment for her asthma and cough includes Trelegy 1 puff daily, Zyrtec twice daily, budesonide rinses.    She saw Dr. Villa recently however he is moving out of town and referred to Dr. Allen for evaluation on July 28.  Her eosinophil count was 800 recently.    Past Medical History:   Diagnosis Date     Allergic rhinitis due to animal dander      Amblyopia     LT     Arthritis      Colon polyp      Depressive disorder      Endometriosis      Heart attack (H)     2016     House dust mite allergy      Hypertension      Moderate persistent asthma      Rhinitis, allergic to other allergen      Seasonal allergic rhinitis 7/25/05 skin tests pos. for: cat/dog/horse/DM/M/T/G/RW per Dr. Granado    pt. did IT from 7/06 to 11/5/07 to: cat/dog/DM/M/T/G/W dc'd per self.      Type 2 diabetes mellitus without complication, without long-term current use of insulin (H) 03/13/2017     Family History   Problem Relation Age of Onset      Hypertension Mother      Alzheimer Disease Mother      Diabetes Mother      Tremor Mother      Eye Surgery Father         cataracts     Macular Degeneration Father      Hypertension Father      Skin Cancer Father      Breast Cancer Maternal Grandmother      Tremor Maternal Grandfather      Alzheimer Disease Paternal Grandmother      Psychotic Disorder Paternal Grandmother         bipolar     Heart Disease Paternal Grandfather      Thyroid Disease Brother         Tumors removed     Tremor Brother      Mental Illness Brother         Bi-Polar     Thyroid Disease Sister      Diabetes Sister      Skin Cancer Sister      Cerebrovascular Disease No family hx of      Glaucoma No family hx of      Past Surgical History:   Procedure Laterality Date     COLONOSCOPY  10/2020     EXCISE EXOSTOSIS FOOT Right 10/02/2018    Procedure: EXCISE EXOSTOSIS FOOT;  Right foot removal of first tarsometatarsal joint dorsal bossing;  Surgeon: Will Barraza DPM;  Location: [facility] OR     GYN SURGERY  06/24/2016    Hysterectomy complete     HYSTEROSCOPY           Current Outpatient Medications:      albuterol (PROAIR HFA/PROVENTIL HFA/VENTOLIN HFA) 108 (90 Base) MCG/ACT inhaler, INHALE 2 PUFFS BY MOUTH EVERY 6 HOURS, Disp: 18 g, Rfl: 4     albuterol (PROVENTIL) (2.5 MG/3ML) 0.083% neb solution, INHALE 1 VIAL VIA NEBULIZER EVERY 4 HOURS AS NEEDED FOR SHORTNESS OF BREATH, Disp: 90 mL, Rfl: 0     aspirin  MG EC tablet, Take 1 tablet (325 mg) by mouth daily, Disp: 90 tablet, Rfl: 3     budesonide (PULMICORT) 0.5 MG/2ML neb solution, Empty contents of ampule into 240mL of saline solution and rinse both nasal cavities as instructed twice daily., Disp: 240 mL, Rfl: 6     busPIRone (BUSPAR) 15 MG tablet, TAKE 1 TABLET BY MOUTH 3 TIMES DAILY, Disp: 270 tablet, Rfl: 0     Cholecalciferol (VITAMIN D3 PO), Take by mouth daily, Disp: , Rfl:      esomeprazole (NEXIUM) 40 MG DR capsule, TAKE 1 CAPSULE BY MOUTH EVERY MORNING 30 TO 60 MINUTES BEFORE  "BREAKFAST, Disp: 90 capsule, Rfl: 3     FLUoxetine (PROZAC) 40 MG capsule, TAKE 1 CAPSULE BY MOUTH ONCE DAILY, Disp: 90 capsule, Rfl: 0     linaclotide (LINZESS) 145 MCG capsule, Take 1 capsule (145 mcg) by mouth every morning (before breakfast), Disp: 90 capsule, Rfl: 0     losartan (COZAAR) 25 MG tablet, TAKE 1 TABLET BY MOUTH ONCE DAILY, Disp: 90 tablet, Rfl: 0     metFORMIN (GLUCOPHAGE XR) 500 MG 24 hr tablet, Take 2 tablets (1,000 mg) by mouth 2 times daily (with meals), Disp: 360 tablet, Rfl: 1     modafinil (PROVIGIL) 100 MG tablet, Take 1 tablet (100 mg) by mouth daily, Disp: 30 tablet, Rfl: 0     nitroGLYcerin (NITROSTAT) 0.4 MG sublingual tablet, For chest pain place 1 tablet under the tongue every 5 minutes for 3 doses. If symptoms persist 5 minutes after 1st dose call 911., Disp: 25 tablet, Rfl: 4     rosuvastatin (CRESTOR) 5 MG tablet, TAKE 1 TABLET BY MOUTH ONCE DAILY, Disp: 90 tablet, Rfl: 1     TRELEGY ELLIPTA 200-62.5-25 MCG/ACT oral inhaler, INHALE 1 PUFF BY MOUTH ONCE DAILY - RINSE MOUTH AND GARGLE AFTER USE, Disp: 180 each, Rfl: 0     blood glucose (NO BRAND SPECIFIED) lancets standard, Use to test blood sugar 1 times daily or as directed., Disp: 100 Lancet, Rfl: 3     blood glucose (NO BRAND SPECIFIED) test strip, Use to test blood sugar 1 times daily or as directed., Disp: 100 strip, Rfl: 3  No current facility-administered medications for this visit.    Facility-Administered Medications Ordered in Other Visits:      sodium chloride (PF) 0.9% PF flush 10 mL, 10 mL, Intravenous, Once, Olamide Rodney MD  Allergies   Allergen Reactions     Ampicillin Rash     Cipro [Quinolones] Anaphylaxis     Macrobid [Nitrofuran Derivatives] GI Disturbance     Nitrofurantoin      Other reaction(s): Vomiting         EXAM:   BP (!) 144/78   Pulse 80   Ht 1.702 m (5' 7\")   Wt 82.1 kg (181 lb 1.6 oz)   LMP 05/15/2014 (Approximate)   SpO2 100%   BMI 28.36 kg/m      Constitutional:       General: She is not in " acute distress.     Appearance: Normal appearance. She is not ill-appearing.   HENT:      Head: Normocephalic and atraumatic.      Nose: Left 3+ inferior turbinate hypertrophy     Mouth/Throat:      Mouth: Mucous membranes are moist.      Pharynx: Oropharynx is clear. No posterior oropharyngeal erythema.   Eyes:      General:         Right eye: No discharge.         Left eye: No discharge.   Cardiovascular:      Rate and Rhythm: Normal rate and regular rhythm.      Heart sounds: Normal heart sounds.   Pulmonary:      Effort: Pulmonary effort is normal.      Breath sounds: Normal breath sounds. No wheezing or rhonchi.   Skin:     General: Skin is warm.      Findings: No erythema or rash.   Neurological:      General: No focal deficit present.      Mental Status: She is alert. Mental status is at baseline.   Psychiatric:         Mood and Affect: Mood normal.         Behavior: Behavior normal.       ASSESSMENT/PLAN:  Lisette Owens is a 58 year old female seen today for ongoing evaluation of asthma as well as allergic rhinitis and mild to moderate sinus disease.  She has had persistent symptoms for at least a couple years after a infection.  She has seen multiple providers.    1. Will consider Dupixent depending on any recommendations per Dr. Allen for the sphenoid sinus disease.  2. Continue Zyrtec twice daily  3. Remain on Budesonide rinse  4. Zaditor eyedrops  5. May also consider Dupixent if not improving  6. Allergy shots on 3 occasions in past, would hold off at this time.    Follow-up in 3 months      Thank you for allowing me to participate in the care of Lisette Owens.      I spent 35 minutes on the date of the encounter doing chart review, history and exam, documentation and further coordination as noted above exclusive of separately reported interpretations    Reji Ochoa MD  Allergy/Immunology  [facility]

## 2023-07-07 NOTE — LETTER
7/7/2023         RE: Lisette Owens  [address] [place] [address]        Dear Colleague,    Thank you for referring your patient, Lisette Owens, to the [facility] [place]. Please see a copy of my visit note below.    Lisette Owens was seen in the Allergy Clinic at [facility].    Lisette Owens is a 58 year old female being seen today for ongoing evaluation of Moderate persistent asthma, chronic cough, as well as mild to moderate chronic sphenoid sinusitis by CT scan as well as allergic rhinitis.  It is the cough that bothers her the most.    Since the last visit the patient has been feeling better.  Her cough has improved.  She continues to have allergy symptoms.  She has had some recent vocal changes.  She is known to have allergies to cat, dog, dust mite, tree, grass and weed.  She has been on allergy shots on 3 different occasions in the past.    She has been able to stop the Singulair, Claritin and Nucala without any significant change in her symptoms.      In the past we did do a course of prednisone and azithromycin for 14 days which did not provide any benefit.  The sinus CT scan did show evidence of sphenoid sinus disease with ostiomeatal unit obstruction on the left.      Treatment for her asthma and cough includes Trelegy 1 puff daily, Zyrtec twice daily, budesonide rinses.    She saw Dr. Villa recently however he is moving out of town and referred to Dr. Allen for evaluation on July 28.  Her eosinophil count was 800 recently.    Past Medical History:   Diagnosis Date     Allergic rhinitis due to animal dander      Amblyopia     LT     Arthritis      Colon polyp      Depressive disorder      Endometriosis      Heart attack (H)     2016     House dust mite allergy      Hypertension      Moderate persistent asthma      Rhinitis, allergic to other allergen      Seasonal allergic rhinitis 7/25/05 skin tests pos. for: cat/dog/horse/DM/M/T/G/RW  per Dr. Granado    pt. did IT from 7/06 to 11/5/07 to: cat/dog/DM/M/T/G/W dc'd per self.      Type 2 diabetes mellitus without complication, without long-term current use of insulin (H) 03/13/2017     Family History   Problem Relation Age of Onset     Hypertension Mother      Alzheimer Disease Mother      Diabetes Mother      Tremor Mother      Eye Surgery Father         cataracts     Macular Degeneration Father      Hypertension Father      Skin Cancer Father      Breast Cancer Maternal Grandmother      Tremor Maternal Grandfather      Alzheimer Disease Paternal Grandmother      Psychotic Disorder Paternal Grandmother         bipolar     Heart Disease Paternal Grandfather      Thyroid Disease Brother         Tumors removed     Tremor Brother      Mental Illness Brother         Bi-Polar     Thyroid Disease Sister      Diabetes Sister      Skin Cancer Sister      Cerebrovascular Disease No family hx of      Glaucoma No family hx of      Past Surgical History:   Procedure Laterality Date     COLONOSCOPY  10/2020     EXCISE EXOSTOSIS FOOT Right 10/02/2018    Procedure: EXCISE EXOSTOSIS FOOT;  Right foot removal of first tarsometatarsal joint dorsal bossing;  Surgeon: Will Barraza DPM;  Location: [facility] OR     GYN SURGERY  06/24/2016    Hysterectomy complete     HYSTEROSCOPY           Current Outpatient Medications:      albuterol (PROAIR HFA/PROVENTIL HFA/VENTOLIN HFA) 108 (90 Base) MCG/ACT inhaler, INHALE 2 PUFFS BY MOUTH EVERY 6 HOURS, Disp: 18 g, Rfl: 4     albuterol (PROVENTIL) (2.5 MG/3ML) 0.083% neb solution, INHALE 1 VIAL VIA NEBULIZER EVERY 4 HOURS AS NEEDED FOR SHORTNESS OF BREATH, Disp: 90 mL, Rfl: 0     aspirin  MG EC tablet, Take 1 tablet (325 mg) by mouth daily, Disp: 90 tablet, Rfl: 3     budesonide (PULMICORT) 0.5 MG/2ML neb solution, Empty contents of ampule into 240mL of saline solution and rinse both nasal cavities as instructed twice daily., Disp: 240 mL, Rfl: 6     busPIRone (BUSPAR) 15 MG  tablet, TAKE 1 TABLET BY MOUTH 3 TIMES DAILY, Disp: 270 tablet, Rfl: 0     Cholecalciferol (VITAMIN D3 PO), Take by mouth daily, Disp: , Rfl:      esomeprazole (NEXIUM) 40 MG DR capsule, TAKE 1 CAPSULE BY MOUTH EVERY MORNING 30 TO 60 MINUTES BEFORE BREAKFAST, Disp: 90 capsule, Rfl: 3     FLUoxetine (PROZAC) 40 MG capsule, TAKE 1 CAPSULE BY MOUTH ONCE DAILY, Disp: 90 capsule, Rfl: 0     linaclotide (LINZESS) 145 MCG capsule, Take 1 capsule (145 mcg) by mouth every morning (before breakfast), Disp: 90 capsule, Rfl: 0     losartan (COZAAR) 25 MG tablet, TAKE 1 TABLET BY MOUTH ONCE DAILY, Disp: 90 tablet, Rfl: 0     metFORMIN (GLUCOPHAGE XR) 500 MG 24 hr tablet, Take 2 tablets (1,000 mg) by mouth 2 times daily (with meals), Disp: 360 tablet, Rfl: 1     modafinil (PROVIGIL) 100 MG tablet, Take 1 tablet (100 mg) by mouth daily, Disp: 30 tablet, Rfl: 0     nitroGLYcerin (NITROSTAT) 0.4 MG sublingual tablet, For chest pain place 1 tablet under the tongue every 5 minutes for 3 doses. If symptoms persist 5 minutes after 1st dose call 911., Disp: 25 tablet, Rfl: 4     rosuvastatin (CRESTOR) 5 MG tablet, TAKE 1 TABLET BY MOUTH ONCE DAILY, Disp: 90 tablet, Rfl: 1     TRELEGY ELLIPTA 200-62.5-25 MCG/ACT oral inhaler, INHALE 1 PUFF BY MOUTH ONCE DAILY - RINSE MOUTH AND GARGLE AFTER USE, Disp: 180 each, Rfl: 0     blood glucose (NO BRAND SPECIFIED) lancets standard, Use to test blood sugar 1 times daily or as directed., Disp: 100 Lancet, Rfl: 3     blood glucose (NO BRAND SPECIFIED) test strip, Use to test blood sugar 1 times daily or as directed., Disp: 100 strip, Rfl: 3  No current facility-administered medications for this visit.    Facility-Administered Medications Ordered in Other Visits:      sodium chloride (PF) 0.9% PF flush 10 mL, 10 mL, Intravenous, Once, Olamide Rodney MD  Allergies   Allergen Reactions     Ampicillin Rash     Cipro [Quinolones] Anaphylaxis     Macrobid [Nitrofuran Derivatives] GI Disturbance      "Nitrofurantoin      Other reaction(s): Vomiting         EXAM:   BP (!) 144/78   Pulse 80   Ht 1.702 m (5' 7\")   Wt 82.1 kg (181 lb 1.6 oz)   LMP 05/15/2014 (Approximate)   SpO2 100%   BMI 28.36 kg/m      Constitutional:       General: She is not in acute distress.     Appearance: Normal appearance. She is not ill-appearing.   HENT:      Head: Normocephalic and atraumatic.      Nose: Left 3+ inferior turbinate hypertrophy     Mouth/Throat:      Mouth: Mucous membranes are moist.      Pharynx: Oropharynx is clear. No posterior oropharyngeal erythema.   Eyes:      General:         Right eye: No discharge.         Left eye: No discharge.   Cardiovascular:      Rate and Rhythm: Normal rate and regular rhythm.      Heart sounds: Normal heart sounds.   Pulmonary:      Effort: Pulmonary effort is normal.      Breath sounds: Normal breath sounds. No wheezing or rhonchi.   Skin:     General: Skin is warm.      Findings: No erythema or rash.   Neurological:      General: No focal deficit present.      Mental Status: She is alert. Mental status is at baseline.   Psychiatric:         Mood and Affect: Mood normal.         Behavior: Behavior normal.       ASSESSMENT/PLAN:  Lisette Owens is a 58 year old female seen today for ongoing evaluation of asthma as well as allergic rhinitis and mild to moderate sinus disease.  She has had persistent symptoms for at least a couple years after a infection.  She has seen multiple providers.    1. Will consider Dupixent depending on any recommendations per Dr. Allen for the sphenoid sinus disease.  2. Continue Zyrtec twice daily  3. Remain on Budesonide rinse  4. Zaditor eyedrops  5. May also consider Dupixent if not improving  6. Allergy shots on 3 occasions in past, would hold off at this time.    Follow-up in 3 months      Thank you for allowing me to participate in the care of Lisette Owens.      I spent 35 minutes on the date of the encounter doing chart review, history and exam, " documentation and further coordination as noted above exclusive of separately reported interpretations    Reji Ochoa MD  Allergy/Immunology  [facility]        Again, thank you for allowing me to participate in the care of your patient.        Sincerely,        Reji Ochoa MD

## 2023-07-07 NOTE — PATIENT INSTRUCTIONS
Will consider Dupixent depending on any recommendations per Dr. Allen for the sphenoid sinus disease.  Continue Zyrtec twice daily  Remain on Budesonide rinse  Zaditor eyedrops  May also consider Dupixent if not improving  Allergy shots on 3 occasions in past, would hold off at this time.

## 2023-07-12 ENCOUNTER — VIRTUAL VISIT (OUTPATIENT)
Dept: GASTROENTEROLOGY | Facility: CLINIC | Age: 58
End: 2023-07-12
Payer: COMMERCIAL

## 2023-07-12 VITALS — BODY MASS INDEX: 28.19 KG/M2 | WEIGHT: 180 LBS

## 2023-07-12 DIAGNOSIS — R11.2 NAUSEA AND VOMITING, UNSPECIFIED VOMITING TYPE: ICD-10-CM

## 2023-07-12 DIAGNOSIS — K59.09 CHRONIC CONSTIPATION: Primary | ICD-10-CM

## 2023-07-12 DIAGNOSIS — R10.13 EPIGASTRIC PAIN: ICD-10-CM

## 2023-07-12 DIAGNOSIS — R10.32 ABDOMINAL PAIN, LEFT LOWER QUADRANT: ICD-10-CM

## 2023-07-12 PROCEDURE — 99214 OFFICE O/P EST MOD 30 MIN: CPT | Mod: VID | Performed by: PHYSICIAN ASSISTANT

## 2023-07-12 ASSESSMENT — PAIN SCALES - GENERAL: PAINLEVEL: MODERATE PAIN (5)

## 2023-07-12 NOTE — PROGRESS NOTES
GASTROENTEROLOGY Follow-up VIDEO VISIT    CC/REFERRING MD:    Kolton Yin  No ref. provider found    REASON FOR CONSULTATION:   No ref. provider found for   Chief Complaint   Patient presents with     Video Visit     Recheck       HISTORY OF PRESENT ILLNESS:    Lisette Owens is a 58 year old female who is being evaluated via a billable video visit for follow-up regarding symptoms of recurrent nausea, upper abdominal pain, left lower quadrant abdominal pain, and altered bowel habits.  To review, patient initially was seen by one of my partners, Silvia Garnica PA-C, about 2 months ago.  She had been experiencing some issues with bladder pain and pressure and has been seen by urology for some time now.  During the course of their work-up, it sounds like she had some imaging that was suggestive of significant stool burden, so she was started on Dulcolax and referred to GI.  At her first visit with us, she had been started on Linzess just prior to that visit by her PCP.  This was improving her stool output to some degree, and she had maybe experienced a little bit of improvement in some of the lower abdominal pain.  She was finding that with more improved stool output, a lot of her urinary symptoms were improving as well.    She follows up today stating that her bowel movements have become more variable, going between constipation and diarrhea.  With the Linzess daily and 2-3 Dulcolax, she will frequently have multiple loose stools.  If she does not use enough Dulcolax, then and can trend towards constipation.  With the increased passage of stools, there really has not been much improvement in the upper abdominal pain or recurrent vomiting.  There has been some improvement in the lower abdominal pain.  There was hope that she would be able to taper off of the Dulcolax and replace with fiber supplement like Metamucil, but she tried the Metamucil and it caused vomiting.    Most recent colonoscopy was in 2020,  noted to have a pretty tortuous colon, polyps x2, 1 of which was tiny TA.  Has not had EGD before.  Does take Nexium daily for GERD.  There is reference to a CT scan in with urology over the past year that was reportedly negative from a urologic standpoint, though I do not have the report to review.  It was this test that apparently identified the significant stool burden.  Recent laboratory testing with normal CBC, TSH, A1c.      I have reviewed and updated the patient's Past Medical History, Social History, Family History and Medication List.    Exam:    General appearance:  Healthy appearing adult, in no acute distress  Eyes:  Sclera anicteric, Pupils round and reactive to light  Ears, nose, mouth and throat:  No obvious external lesions of ears and nose.  Hearing intact  Neck:  Symmetric, No obvious external lesions  Respiratory:  Normal respiration, no use of accessory muscles   MSK:  No visual upper extremity, neck or facial muscle atrophy  ABD:  No visual abdominal distention, no audible borborygmi  Skin:  No rashes or jaundice   Psychiatric:  Oriented to person, place and time, Appropriate mood and affect.   Neurologic:  Peripheral muscle function and dexterity appear to be intact      PERTINENT STUDIES have been reviewed.    ASSESSMENT/PLAN:    Lisette Owens is a 58 year old female who presents for follow up of symptoms of recurrent nausea, upper abdominal pain, lower abdominal pain, and fluctuating bowel movements in the setting of chronic constipation.  The addition of Linzess and continuous use of bisacodyl has improved stool output, now fluctuating between constipation and diarrhea.  There has been some mild improvement in some of the lower abdominal pain, but the upper abdominal pain and recurrent nausea and vomiting have not resolved.  We spent some time discussing additional next steps.  I think pursuing endoscopic evaluation would be reasonable, given her symptoms.  An EGD can help assess for any  ongoing esophagitis, gastroduodenal disease, anatomical issues etc. colonoscopy can be done to obtain random biopsies and assess for inflammation.  There is also been some concern about underlying pelvic floor dysfunction, could consider pursuing testing with the pelvic floor center as well.  For now, she can continue on Linzess and use of bisacodyl to maintain stool output.  Follow-up with me after endoscopic evaluation.    There are no diagnoses linked to this encounter.    Video-Visit Details    Video Visit Time: 23 minutes    Type of service:  Video Visit    Originating Location (pt. Location): Home    Distant Location (provider location):  Off-site    Platform used for Video Visit: Anatoliy    A total of 36 minutes was spent with reviewing the chart, discussing with the patient, documentation and coordination of care.    Valeriano Sheets PA-C  Division of Gastroenterology, Hepatology, and Nutrition  [facility]  [phone]    RTC 2 months

## 2023-07-12 NOTE — NURSING NOTE
Is the patient currently in the state of [place]? YES    Visit mode:VIDEO    If the visit is dropped, the patient can be reconnected by: TELEPHONE VISIT: Phone number: [phone]    Will anyone else be joining the visit? NO      How would you like to obtain your AVS? MyChart    Are changes needed to the allergy or medication list? NO    Reason for visit: Video Visit (Recheck)

## 2023-07-18 ENCOUNTER — TELEPHONE (OUTPATIENT)
Dept: GASTROENTEROLOGY | Facility: CLINIC | Age: 58
End: 2023-07-18
Payer: COMMERCIAL

## 2023-07-18 NOTE — TELEPHONE ENCOUNTER
"Endoscopy Scheduling Screen    Have you had a positive Covid test in the last 14 days?  No    Are you active on MyChart?   Yes    What insurance is in the chart?  Other:      Ordering/Referring Provider: Valeriano Sheets PA-C   (If ordering provider performs procedure, schedule with ordering provider unless otherwise instructed. )    BMI: Estimated body mass index is 28.19 kg/m  as calculated from the following:    Height as of 7/7/23: 1.702 m (5' 7\").    Weight as of 7/12/23: 81.6 kg (180 lb).     Sedation Ordered  MAC   If patient BMI > 50 do not schedule in ASC.    Are you taking any prescription medications for pain?   No    Are you taking methadone or Suboxone?  No    Do you have a history of malignant hyperthermia or adverse reaction to anesthesia?  No    (Females) Are you currently pregnant?   No     Have you been diagnosed or told you have pulmonary hypertension?   No    Do you have an LVAD?  No    Have you been told you have moderate to severe sleep apnea?  No    Have you been told you have COPD, asthma, or any other lung disease?  Yes     What breathing problems do you have?  Asthma     Do you use home oxygen?  No    Have your breathing problems required an ED visit or hospitalization in the last year?  No    Do you have any heart conditions?  No     Have you ever had or are you awaiting a heart or lung transplant?   No    Have you had a stroke or transient ischemic attack (TIA aka \"mini stroke\" in the last 6 months?   No    Have you been diagnosed with or been told you have cirrhosis of the liver?   No    Are you currently on dialysis?   No    Do you need assistance transferring?   No    BMI: Estimated body mass index is 28.19 kg/m  as calculated from the following:    Height as of 7/7/23: 1.702 m (5' 7\").    Weight as of 7/12/23: 81.6 kg (180 lb).     Is patients BMI > 40 and scheduling location [facility]?  No    Do you take the medication Phentermine, Ozempic or Wegovy?   No    Do you take the medication " Naltrexone?  No    Do you take blood thinners?  No      Prep   Are you currently on dialysis or do you have chronic kidney disease?  No    Do you have a diagnosis of diabetes?  Yes (Golytely Prep)    Do you have a diagnosis of cystic fibrosis (CF)?  No    On a regular basis do you go 3 -5 days between bowel movements?  Yes (Extended Prep)    BMI > 40?  No    Preferred Pharmacy:    [id number] - [place], [place] - [address] [address]  [id number] [address]  [place] [facility] [address]  Phone: [phone] Fax: [phone]      Final Scheduling Details   Colonoscopy prep sent?  Golytely Extended Prep    Procedure scheduled  Colonoscopy / Upper endoscopy (EGD)    Surgeon:  haley     Date of procedure:  10/10/2023    Schedule PAC:   No    Location  [facility] - ASC    Sedation   MAC/Deep Sedation - per recommendations from last colonoscopy    PT WAS IN THE CAR DURING SCHEDULING AND I COULD BARELY HEAR HER. SHE MAY HAVE SOME QUESTIONS I WAS NOT ABLE TO ANSWER BECAUSE I COULDN'T HEAR HER.    Patient Reminders:    You will receive a call from a Nurse to review instructions and health history.  This assessment must be completed prior to your procedure.  Failure to complete the Nurse assessment may result in the procedure being cancelled.       On the day of your procedure, please designate an adult(s) who can drive you home stay with you for the next 24 hours. The medicines used in the exam will make you sleepy. You will not be able to drive.       You cannot take public transportation, ride share services, or non-medical taxi service without a responsible caregiver.  Medical transport services are allowed with the requirement that a responsible caregiver will receive you at your destination.  We require that drivers and caregivers are confirmed prior to your procedure.

## 2023-07-26 ENCOUNTER — TELEPHONE (OUTPATIENT)
Dept: PULMONOLOGY | Facility: CLINIC | Age: 58
End: 2023-07-26
Payer: COMMERCIAL

## 2023-07-26 DIAGNOSIS — K21.9 GASTROESOPHAGEAL REFLUX DISEASE, UNSPECIFIED WHETHER ESOPHAGITIS PRESENT: Primary | ICD-10-CM

## 2023-07-26 DIAGNOSIS — R05.3 CHRONIC COUGH: ICD-10-CM

## 2023-07-26 NOTE — PROGRESS NOTES
Otolaryngology Clinic Note  7/28/23    Lisette Owens is a 59 yo F with history of chronic congestion, chronic sinusitis and persistent asthma who recently saw Dr. Eric Villa on 6/30/2023 for ongoing symptoms despite multiple medical therapies, including biologics, allergy shots, inhalers, prednisone, antibiotics, surgery could be an option. Given her central compartment inflammation pattern involving the inferior turbinates, middle turbinates (LT>RT) and OMC they discussed surgical intervention however Dr Villa would not be able to perform the surgery prior to his last day and was referred for evaluation and surgery. Please see Dr Villa's note from 6/30/23 for additional details.      Plan:  Etiologies of nasal congestion were reviewed including anatomical (ie: turbinate hypertrophy, etc) and physiologic (mucosal responses to environmental triggers, allergies, etc). Medical management includes diligent daily use of intranasal steroids (two sprays once a day to each nostril) to help reduce the reactivity of nasal mucosa and requires a trial of at minimum 4-6 weeks. Surgical management includes addressing anatomic abnormalities; however, even with surgical intervention the body will continue to produce mucous and the patient will continue to have a physiologic response to the environment. Given she has exhausted several prior medical treatments we will plan on proceeding with bilateral endoscopic maxillary antrostomy, total ethmoidectomy, partial inferior turbinate resection (lower 1/2), and IT submucous resection as discussed. Reviewed the risks of the procedure (including bleeding, infection, orbital injury, CSF leak, meningitis). We reviewed that this is an outpatient procedure and will initially have increased congestion post-operatively and take 3-4 wks to heal and require light activity restrictions the first week after surgery and diligent nasal saline post-operatively.  She can use her intranasal steroids up to  surgery and we will hopefully be able to resume them shortly after surgery. She is eager to get this done prior to school starts as she is a . Ms Roman also mentioned that she may proceed with dupixent with her allergist and we reviewed that there is no limitation from the ENT perspective and ok to start at any time.        10 minutes spent by me on the date of the encounter doing chart review, history and exam, documentation and further activities per the note

## 2023-07-26 NOTE — CONFIDENTIAL NOTE
Writer called pharmacy regarding esomeprazole. Pharmacist stated that patient is paying out of pocket for medication as it is not covered by insurance. Patient is interested in changing to a medication covered by her insurance. Pharmacist stated medications listed below would be covered:  Omeprazole  Pantoprazole  Rabeprazole    Message forwarded to Dr. Ley to advise.    Per Dr. Ley: Order placed for Omeprazole 20 1-2 x /day and sent electronically to [facility] listed in her record. 1 month supply with 11 refills.  [facility]     Writer called patient and left message explaining the plan listed above.

## 2023-07-26 NOTE — TELEPHONE ENCOUNTER
[facility] Call Center    Phone Message    May a detailed message be left on voicemail: yes     Reason for Call: Medication Question or concern regarding medication   Prescription Clarification  Name of Medication: esomeprazole (NEXIUM) 40 MG DR capsule   Prescribing Provider:     Pharmacy:   [id number] - [facility] [address] [address]      What on the order needs clarification?       Per pharmacy would like to ask if there is an alternative that the team would like for the pt to take. Pt insurance is not covering the brand name. Please call the pharmacy back. thank you!      Action Taken: Message routed to:  [facility] PULM    Travel Screening: Not Applicable

## 2023-07-28 ENCOUNTER — PREP FOR PROCEDURE (OUTPATIENT)
Dept: OTOLARYNGOLOGY | Facility: CLINIC | Age: 58
End: 2023-07-28

## 2023-07-28 ENCOUNTER — PRE VISIT (OUTPATIENT)
Dept: OTOLARYNGOLOGY | Facility: CLINIC | Age: 58
End: 2023-07-28

## 2023-07-28 ENCOUNTER — OFFICE VISIT (OUTPATIENT)
Dept: OTOLARYNGOLOGY | Facility: CLINIC | Age: 58
End: 2023-07-28
Payer: COMMERCIAL

## 2023-07-28 VITALS
SYSTOLIC BLOOD PRESSURE: 125 MMHG | WEIGHT: 185.4 LBS | OXYGEN SATURATION: 96 % | TEMPERATURE: 97.3 F | HEART RATE: 75 BPM | HEIGHT: 67 IN | DIASTOLIC BLOOD PRESSURE: 74 MMHG | BODY MASS INDEX: 29.1 KG/M2

## 2023-07-28 DIAGNOSIS — J01.41 ACUTE RECURRENT PANSINUSITIS: Primary | ICD-10-CM

## 2023-07-28 DIAGNOSIS — J32.9 SINUSITIS, CHRONIC: ICD-10-CM

## 2023-07-28 DIAGNOSIS — J34.89 NASAL OBSTRUCTION: Primary | ICD-10-CM

## 2023-07-28 PROCEDURE — 99212 OFFICE O/P EST SF 10 MIN: CPT | Performed by: OTOLARYNGOLOGY

## 2023-07-28 RX ORDER — DEXAMETHASONE SODIUM PHOSPHATE 4 MG/ML
10 INJECTION, SOLUTION INTRA-ARTICULAR; INTRALESIONAL; INTRAMUSCULAR; INTRAVENOUS; SOFT TISSUE ONCE
Status: CANCELLED | OUTPATIENT
Start: 2023-07-28 | End: 2023-07-28

## 2023-07-28 ASSESSMENT — PAIN SCALES - GENERAL: PAINLEVEL: MILD PAIN (3)

## 2023-07-28 NOTE — LETTER
7/28/2023       RE: Lisette Owens  [address] [place] [address]     Dear Colleague,    Thank you for referring your patient, Lisette Owens, to the [facility] [place] [facility]. Please see a copy of my visit note below.    Otolaryngology Clinic Note  7/28/23    Lisette Owens is a 57 yo F with history of chronic congestion, chronic sinusitis and persistent asthma who recently saw Dr. Eric Villa on 6/30/2023 for ongoing symptoms despite multiple medical therapies, including biologics, allergy shots, inhalers, prednisone, antibiotics, surgery could be an option. Given her central compartment inflammation pattern involving the inferior turbinates, middle turbinates (LT>RT) and OMC they discussed surgical intervention however Dr Villa would not be able to perform the surgery prior to his last day and was referred for evaluation and surgery. Please see Dr Villa's note from 6/30/23 for additional details.      Plan:  Etiologies of nasal congestion were reviewed including anatomical (ie: turbinate hypertrophy, etc) and physiologic (mucosal responses to environmental triggers, allergies, etc). Medical management includes diligent daily use of intranasal steroids (two sprays once a day to each nostril) to help reduce the reactivity of nasal mucosa and requires a trial of at minimum 4-6 weeks. Surgical management includes addressing anatomic abnormalities; however, even with surgical intervention the body will continue to produce mucous and the patient will continue to have a physiologic response to the environment. Given she has exhausted several prior medical treatments we will plan on proceeding with bilateral endoscopic maxillary antrostomy, total ethmoidectomy, partial inferior turbinate resection (lower 1/2), and IT submucous resection as discussed. Reviewed the risks of the procedure (including bleeding, infection,  orbital injury, CSF leak, meningitis). We reviewed that this is an outpatient procedure and will initially have increased congestion post-operatively and take 3-4 wks to heal and require light activity restrictions the first week after surgery and diligent nasal saline post-operatively.  She can use her intranasal steroids up to surgery and we will hopefully be able to resume them shortly after surgery. She is eager to get this done prior to school starts as she is a . Ms Owens also mentioned that she may proceed with dupixent with her allergist and we reviewed that there is no limitation from the ENT perspective and ok to start at any time.        10 minutes spent by me on the date of the encounter doing chart review, history and exam, documentation and further activities per the note        Again, thank you for allowing me to participate in the care of your patient.      Sincerely,    Keri Allen MD

## 2023-07-28 NOTE — PATIENT INSTRUCTIONS
1. Please follow-up in clinic after surgery   2. Please call the ENT clinic with any questions,concerns, new or worsening symptoms.    -Clinic number is [phone]   - Henrietta's direct line (Dr. Allen's nurse) [phone]   Surgery Teaching    1. Someone from our scheduling department will call you within approximately one week to get you scheduled with your provider for surgery. If no one has called you in one week, please notify us.    2. You must have a physical exam (called  history and physical ) within 30 days of surgery. You may complete this with your primary care provider.    In some cases we may have you see our Preoperative Assessment Center. If we have expressed this to you, our  will set up your appointment with them when they call to set up your surgery.    3. For same-day surgery, you must arrange for an adult to take you home from the Center. An adult must stay with you for the first 24 hours after surgery. You cannot drive for 24 hours.     4. Ask your doctor what medicines are safe before surgery. For over the counter medications and supplements it is advised that you do NOT TAKE MOTRIN, IBUPROFEN, ASPIRIN, ALEVE, GARLIC SUPPLEMENTS or FISH OIL x 7 days prior to surgery (to prevent excess bleeding and bruising at time of surgery). Tylenol is ok.    6. Call the surgical team if there's any change in your health prior to surgery within 2 weeks of surgery. Fever, body aches, chills. Flu and covid like symptoms.    7. If you drink alcohol, stop drinking alcohol at least 24 hours before surgery.    8. If you smoke, stop or at least cut down on smoking 24 hours before surgery.    9.Take a bath or shower the night before and the morning of surgery (as told by your surgeon). Use an antiseptic soap. If your doctor does not give you special soap, buy Hibiclens or Carolyne-Stat at the drug store or ask the pharmacist to suggest a brand. You will wash with this from the neck down, washing your hair and  face as you would normally.   A. When you are done with your shower please be sure to use clean towels to dry with, have clean linens on your bed, and put on clean clothes each time.   B. DO NOT put on lotion, powder, perfume, deodorant or make-up after bathing.    10. You can eat a normal meal the night before surgery. Do not eat any solid foods or consume any dairy products 8 hours before surgery.     11. You may drink clear liquids up until 2 hours before surgery. Example: water, Gatorade, apple juice and liquids you can see through.    12. At the 2 hour point prior to surgery you should not be ingesting anything more. Your post op team will review any diet limitations you might have and when you can start eating and drinking again after surgery.    After surgery:    DO NOT blow your nose until you are seen back in clinic for your first post op appointment.     If you have to sneeze please do so with your mouth open.    You will feel very congested and find it hard to breath through your nose until your first post op appointment.       If you have any questions before or after surgery please call:    GILSON Rushing  [facility]  Department of Otolaryngology  [phone]

## 2023-07-28 NOTE — NURSING NOTE
"Blood pressure 125/74, pulse 75, temperature 97.3  F (36.3  C), temperature source Temporal, height 1.702 m (5' 7\"), weight 84.1 kg (185 lb 6.4 oz), last menstrual period 05/15/2014, SpO2 96 %, not currently breastfeeding.  Marcy Hobbs LPN    "

## 2023-07-31 ENCOUNTER — TELEPHONE (OUTPATIENT)
Dept: OTOLARYNGOLOGY | Facility: CLINIC | Age: 58
End: 2023-07-31
Payer: COMMERCIAL

## 2023-07-31 NOTE — TELEPHONE ENCOUNTER
Patient was contacted & scheduled surgery with Dr. Allen on 8/7    Surgery is located at [facility]    Patient will be seen for their H&P by:    PCP Kolton Yin PA-C within 30 days of surgery    Patient confirmed their PCP on file is up to date    Anesthesia type: General    Requested Imaging required for surgery: CT from February okay for surgery    Patient needs scheduled for their 1 week post op    Patient will receive their packet via [employer] per their preference    Additional comments: RENETTA Car on 7/31/2023 at 9:51 AM

## 2023-08-02 ENCOUNTER — TELEPHONE (OUTPATIENT)
Dept: OTOLARYNGOLOGY | Facility: CLINIC | Age: 58
End: 2023-08-02
Payer: COMMERCIAL

## 2023-08-02 NOTE — TELEPHONE ENCOUNTER
Writer called patient and LM requesting a call back to confirm if she has set up a preop before her surgery on Monday.     Henrietta Morris RN on 8/2/2023 at 2:36 PM

## 2023-08-03 ENCOUNTER — OFFICE VISIT (OUTPATIENT)
Dept: FAMILY MEDICINE | Facility: CLINIC | Age: 58
End: 2023-08-03
Payer: COMMERCIAL

## 2023-08-03 VITALS
SYSTOLIC BLOOD PRESSURE: 104 MMHG | BODY MASS INDEX: 28.93 KG/M2 | HEIGHT: 66 IN | TEMPERATURE: 96 F | DIASTOLIC BLOOD PRESSURE: 66 MMHG | RESPIRATION RATE: 16 BRPM | WEIGHT: 180 LBS | HEART RATE: 64 BPM | OXYGEN SATURATION: 98 %

## 2023-08-03 DIAGNOSIS — J34.89 NASAL OBSTRUCTION: ICD-10-CM

## 2023-08-03 DIAGNOSIS — J45.20 MILD INTERMITTENT ASTHMA WITHOUT COMPLICATION: ICD-10-CM

## 2023-08-03 DIAGNOSIS — Z01.818 PREOP GENERAL PHYSICAL EXAM: Primary | ICD-10-CM

## 2023-08-03 DIAGNOSIS — I21.4 NSTEMI (NON-ST ELEVATED MYOCARDIAL INFARCTION) (H): ICD-10-CM

## 2023-08-03 DIAGNOSIS — E11.9 TYPE 2 DIABETES MELLITUS WITHOUT COMPLICATION, WITHOUT LONG-TERM CURRENT USE OF INSULIN (H): ICD-10-CM

## 2023-08-03 DIAGNOSIS — J32.9 CHRONIC SINUSITIS, UNSPECIFIED LOCATION: ICD-10-CM

## 2023-08-03 PROBLEM — J45.909 ASTHMA, MILD: Status: ACTIVE | Noted: 2023-08-03

## 2023-08-03 LAB
ERYTHROCYTE [DISTWIDTH] IN BLOOD BY AUTOMATED COUNT: 13.3 % (ref 10–15)
HCT VFR BLD AUTO: 39.5 % (ref 35–47)
HGB BLD-MCNC: 12.9 G/DL (ref 11.7–15.7)
MCH RBC QN AUTO: 29.7 PG (ref 26.5–33)
MCHC RBC AUTO-ENTMCNC: 32.7 G/DL (ref 31.5–36.5)
MCV RBC AUTO: 91 FL (ref 78–100)
PLATELET # BLD AUTO: 296 10E3/UL (ref 150–450)
RBC # BLD AUTO: 4.35 10E6/UL (ref 3.8–5.2)
WBC # BLD AUTO: 5.4 10E3/UL (ref 4–11)

## 2023-08-03 PROCEDURE — 80053 COMPREHEN METABOLIC PANEL: CPT | Performed by: INTERNAL MEDICINE

## 2023-08-03 PROCEDURE — 99214 OFFICE O/P EST MOD 30 MIN: CPT | Performed by: INTERNAL MEDICINE

## 2023-08-03 PROCEDURE — 93000 ELECTROCARDIOGRAM COMPLETE: CPT | Performed by: INTERNAL MEDICINE

## 2023-08-03 PROCEDURE — 36415 COLL VENOUS BLD VENIPUNCTURE: CPT | Performed by: INTERNAL MEDICINE

## 2023-08-03 PROCEDURE — 85027 COMPLETE CBC AUTOMATED: CPT | Performed by: INTERNAL MEDICINE

## 2023-08-03 ASSESSMENT — ANXIETY QUESTIONNAIRES
3. WORRYING TOO MUCH ABOUT DIFFERENT THINGS: MORE THAN HALF THE DAYS
IF YOU CHECKED OFF ANY PROBLEMS ON THIS QUESTIONNAIRE, HOW DIFFICULT HAVE THESE PROBLEMS MADE IT FOR YOU TO DO YOUR WORK, TAKE CARE OF THINGS AT HOME, OR GET ALONG WITH OTHER PEOPLE: SOMEWHAT DIFFICULT
7. FEELING AFRAID AS IF SOMETHING AWFUL MIGHT HAPPEN: SEVERAL DAYS
1. FEELING NERVOUS, ANXIOUS, OR ON EDGE: MORE THAN HALF THE DAYS
4. TROUBLE RELAXING: SEVERAL DAYS
GAD7 TOTAL SCORE: 11
6. BECOMING EASILY ANNOYED OR IRRITABLE: SEVERAL DAYS
2. NOT BEING ABLE TO STOP OR CONTROL WORRYING: MORE THAN HALF THE DAYS
GAD7 TOTAL SCORE: 11
5. BEING SO RESTLESS THAT IT IS HARD TO SIT STILL: MORE THAN HALF THE DAYS

## 2023-08-03 ASSESSMENT — PAIN SCALES - GENERAL: PAINLEVEL: NO PAIN (0)

## 2023-08-03 NOTE — PROGRESS NOTES
Answers submitted by the patient for this visit:  SRAVNA-7 (Submitted on 8/3/2023)  SRAVAN 7 TOTAL SCORE: 11  [address] [address]  Phone: [phone]  Primary Provider: Kolton Yin  Pre-op Performing Provider: FAISAL SALAS      PREOPERATIVE EVALUATION:  Today's date: 8/3/2023    Lisette Owens is a 58 year old female who presents for a preoperative evaluation.      8/3/2023     3:25 PM   Additional Questions   Roomed by Omayra HERZOG       Surgical Information:  Surgery/Procedure:   stealth guided bilateral turbinate reduction with maxillary antrostomy and bilateral ethmoidectomy.       Surgery Location: [facility] [place]   Surgeon: Keri Allen MD   Surgery Date: 8/7/23  Time of Surgery: 9:55  Where patient plans to recover: At home with family  Fax number for surgical facility: Note does not need to be faxed, will be available electronically in Epic.    Assessment & Plan     The proposed surgical procedure is considered LOW risk.    Assessment and Plan  1. Preop general physical exam  2. Nasal obstruction  Pt is here for preoperative clearance of sinus surgery under general anesthesia.,  For the procedure mentioned in the HPI above.  Patient does have risk factors of diabetes and NSTEMI, will check all the pertinent labs and do work-up to make sure she is cleared for surgery.  Denies any cardiopulmonary symptoms.  - CBC with platelets; Future  - Comprehensive metabolic panel (BMP + Alb, Alk Phos, ALT, AST, Total. Bili, TP); Future  - EKG 12-lead complete w/read - Clinics  - CBC with platelets  - Comprehensive metabolic panel (BMP + Alb, Alk Phos, ALT, AST, Total. Bili, TP)      4. NSTEMI (non-ST elevated myocardial infarction) (H)  Chronic problem but patient states it was only 1 episode when she had lost her lower once with possible broken heart syndrome at that time.  Given the risk factors and age  of 58 years shared decision to check for an x-ray given the general anesthesia.  EKG done today shows positive for T wave inversion in one of the anterior lead but no symptoms, no concerns at this time    5. Type 2 diabetes mellitus without complication, without long-term current use of insulin (H)  Well-controlled at A1c 6.2 on recent check, not due for an A1c check at this time.  Continue current metformin but hold on the day of surgery as she will be n.p.o.  Please see AVS below for details.         Please note that this note consists of symbols derived from keyboarding, dictation and/or voice recognition software. As a result, there may be errors in the script that have gone undetected. Please consider this when interpreting information found in this chart.    Patient Instructions   As discussed , will do pertinent work up for preop clearance. Given the risk factors.   Please hole ASA ; OK to hold losartan on the morning of surgery given low BP already ; Then hold Metformin as you are NPO    ==================================================  For informational purposes only. Not to replace the advice of your health care provider. Copyright   2003, 2019 [facility]. All rights reserved. Clinically reviewed by Erika Wan MD. [employer] [id number] - REV 12/22.  Preparing for Your Surgery  Getting started  A nurse will call you to review your health history and instructions. They will give you an arrival time based on your scheduled surgery time. Please be ready to share:  Your doctor's clinic name and phone number  Your medical, surgical, and anesthesia history  A list of allergies and sensitivities  A list of medicines, including herbal treatments and over-the-counter drugs  Whether the patient has a legal guardian (ask how to send us the papers in advance)  Please tell us if you're pregnant--or if there's any chance you might be pregnant. Some surgeries may injure a fetus (unborn baby), so they require  a pregnancy test. Surgeries that are safe for a fetus don't always need a test, and you can choose whether to have one.   If you have a child who's having surgery, please ask for a copy of Preparing for Your Child's Surgery.    Preparing for surgery  Within 10 to 30 days of surgery: Have a pre-op exam (sometimes called an H&P, or History and Physical). This can be done at a clinic or pre-operative center.  If you're having a , you may not need this exam. Talk to your care team.  At your pre-op exam, talk to your care team about all medicines you take. If you need to stop any medicines before surgery, ask when to start taking them again.  We do this for your safety. Many medicines can make you bleed too much during surgery. Some change how well surgery (anesthesia) drugs work.  Call your insurance company to let them know you're having surgery. (If you don't have insurance, call [phone].)  Call your clinic if there's any change in your health. This includes signs of a cold or flu (sore throat, runny nose, cough, rash, fever). It also includes a scrape or scratch near the surgery site.  If you have questions on the day of surgery, call your hospital or surgery center.  Eating and drinking guidelines  For your safety: Unless your surgeon tells you otherwise, follow the guidelines below.  Eat and drink as usual until 8 hours before you arrive for surgery. After that, no food or milk.  Drink clear liquids until 2 hours before you arrive. These are liquids you can see through, like water, Gatorade, and Propel Water. They also include plain black coffee and tea (no cream or milk), candy, and breath mints. You can spit out gum when you arrive.  If you drink alcohol: Stop drinking it the night before surgery.  If your care team tells you to take medicine on the morning of surgery, it's okay to take it with a sip of water.  Preventing infection  Shower or bathe the night before and morning of your surgery.  Follow the instructions your clinic gave you. (If no instructions, use regular soap.)  Don't shave or clip hair near your surgery site. We'll remove the hair if needed.  Don't smoke or vape the morning of surgery. You may chew nicotine gum up to 2 hours before surgery. A nicotine patch is okay.  Note: Some surgeries require you to completely quit smoking and nicotine. Check with your surgeon.  Your care team will make every effort to keep you safe from infection. We will:  Clean our hands often with soap and water (or an alcohol-based hand rub).  Clean the skin at your surgery site with a special soap that kills germs.  Give you a special gown to keep you warm. (Cold raises the risk of infection.)  Wear special hair covers, masks, gowns and gloves during surgery.  Give antibiotic medicine, if prescribed. Not all surgeries need antibiotics.  What to bring on the day of surgery  Photo ID and insurance card  Copy of your health care directive, if you have one  Glasses and hearing aids (bring cases)  You can't wear contacts during surgery  Inhaler and eye drops, if you use them (tell us about these when you arrive)  CPAP machine or breathing device, if you use them  A few personal items, if spending the night  If you have . . .  A pacemaker, ICD (cardiac defibrillator) or other implant: Bring the ID card.  An implanted stimulator: Bring the remote control.  A legal guardian: Bring a copy of the certified (court-stamped) guardianship papers.  Please remove any jewelry, including body piercings. Leave jewelry and other valuables at home.  If you're going home the day of surgery  You must have a responsible adult drive you home. They should stay with you overnight as well.  If you don't have someone to stay with you, and you aren't safe to go home alone, we may keep you overnight. Insurance often won't pay for this.  After surgery  If it's hard to control your pain or you need more pain medicine, please call your surgeon's  office.  Questions?   If you have any questions for your care team, list them here: _________________________________________________________________________________________________________________________________________________________________________ ____________________________________ ____________________________________ ____________________________________    How to Take Your Medication Before Surgery      Return in about 2 months (around 10/3/2023), or if symptoms worsen or fail to improve, for Preventative Visit.    Nalini Lopez MD  [facility]           Risks and Recommendations:  The patient has the following additional risks and recommendations for perioperative complications:  Cardiovascular:   - History of NSTEMI, EKG done and aspirin holding parameters placed.  Pulmonary:    - Incentive spirometry post-op   - Consider Respiratory Therapy (Respiratory Care IP Consult) post-op   - Patient does have asthma history on scheduled and as needed inhalers per chart review.    Antiplatelet or Anticoagulation Medication Instructions:   - aspirin: Discontinue aspirin 7-10 days prior to procedure to reduce bleeding risk. It should be resumed postoperatively.     Additional Medication Instructions:   - ACE/ARB: HOLD on day of surgery (minimum 11 hours for general anesthesia).   - metformin: HOLD day of surgery.    RECOMMENDATION:  APPROVAL GIVEN to proceed with proposed procedure pending review of diagnostic evaluation.    Review of external notes as documented elsewhere in note  30 minutes spent by me on the date of the encounter doing chart review, review of outside records, review of test results, interpretation of tests, patient visit, and documentation       Subjective       HPI related to upcoming procedure:     Patient is new to me, last seen by Charmaine Orellana for diabetes follow-up.  She is here for preoperative clearance        8/3/2023     2:28 PM   Preop Questions   1. Have  you ever had a heart attack or stroke? YES    2. Have you ever had surgery on your heart or blood vessels, such as a stent placement, a coronary artery bypass, or surgery on an artery in your head, neck, heart, or legs? No   3. Do you have chest pain with activity? No   4. Do you have a history of  heart failure? No   5. Do you currently have a cold, bronchitis or symptoms of other infection? No   6. Do you have a cough, shortness of breath, or wheezing? No   7. Do you or anyone in your family have previous history of blood clots? No   8. Do you or does anyone in your family have a serious bleeding problem such as prolonged bleeding following surgeries or cuts? No   9. Have you ever had problems with anemia or been told to take iron pills? YES    10. Have you had any abnormal blood loss such as black, tarry or bloody stools, or abnormal vaginal bleeding? No   11. Have you ever had a blood transfusion? No   12. Are you willing to have a blood transfusion if it is medically needed before, during, or after your surgery? Yes   13. Have you or any of your relatives ever had problems with anesthesia? No   14. Do you have sleep apnea, excessive snoring or daytime drowsiness? No   15. Do you have any artifical heart valves or other implanted medical devices like a pacemaker, defibrillator, or continuous glucose monitor? No   16. Do you have artificial joints? No   17. Are you allergic to latex? No   18. Is there any chance that you may be pregnant? No       Health Care Directive:  Patient does not have a Health Care Directive or Living Will: N/A    Preoperative Review of :   reviewed - controlled substances prescribed by other outside provider(s).      Status of Chronic Conditions:  See problem list for active medical problems.  Problems all longstanding and stable, except as noted/documented.  See ROS for pertinent symptoms related to these conditions.    Review of Systems  CONSTITUTIONAL: NEGATIVE for fever, chills,  change in weight  INTEGUMENTARY/SKIN: NEGATIVE for worrisome rashes, moles or lesions  EYES: NEGATIVE for vision changes or irritation  ENT/MOUTH: NEGATIVE for ear, mouth and throat problems  RESP: NEGATIVE for significant cough or SOB  CV: NEGATIVE for chest pain, palpitations or peripheral edema  GI: NEGATIVE for nausea, abdominal pain, heartburn, or change in bowel habits  : NEGATIVE for frequency, dysuria, or hematuria  MUSCULOSKELETAL: NEGATIVE for significant arthralgias or myalgia  NEURO: NEGATIVE for weakness, dizziness or paresthesias  ENDOCRINE: NEGATIVE for temperature intolerance, skin/hair changes  HEME: NEGATIVE for bleeding problems  PSYCHIATRIC: NEGATIVE for changes in mood or affect    Patient Active Problem List    Diagnosis Date Noted    Asthma, mild 08/03/2023     Priority: Medium    Nasal obstruction 07/28/2023     Priority: Medium    Sinusitis, chronic 07/28/2023     Priority: Medium    Type 2 diabetes mellitus without complication, without long-term current use of insulin (H) 03/13/2017     Priority: Medium    Acute cystitis with hematuria 12/08/2016     Priority: Medium    Pulmonary nodule 09/13/2016     Priority: Medium    NSTEMI (non-ST elevated myocardial infarction) (H) 09/10/2016     Priority: Medium    Mild intermittent asthma without complication 09/10/2016     Priority: Medium    Postmenopausal bleeding 06/24/2016     Priority: Medium    Pelvic pain in female 06/24/2016     Priority: Medium    Depression with anxiety 04/28/2015     Priority: Medium    Esophageal reflux (GERD) 11/14/2014     Priority: Medium    Vitamin D deficiency 01/07/2014     Priority: Medium     Problem list name updated by automated process. Provider to review      PTSD (post-traumatic stress disorder) 01/06/2014     Priority: Medium    Moderate persistent asthma 06/30/2011     Priority: Medium    Seasonal allergic rhinitis      Priority: Medium    Allergic rhinitis due to animal dander      Priority: Medium     Rhinitis, allergic to other allergen      Priority: Medium     IMO update changed this record. Please review for accuracy      House dust mite allergy      Priority: Medium    CARDIOVASCULAR SCREENING; LDL GOAL LESS THAN 160 10/31/2010     Priority: Medium    Dry eye syndrome 10/26/2010     Priority: Medium    Anemia 11/04/2008     Priority: Medium    Dyspareunia (CODE) 11/04/2008     Priority: Medium      Past Medical History:   Diagnosis Date    Allergic rhinitis due to animal dander     Amblyopia     LT    Arthritis     Colon polyp     Depressive disorder     Endometriosis     Heart attack (H)     2016    House dust mite allergy     Hypertension     Moderate persistent asthma     Rhinitis, allergic to other allergen     Seasonal allergic rhinitis 7/25/05 skin tests pos. for: cat/dog/horse/DM/M/T/G/RW per Dr. Granado    pt. did IT from 7/06 to 11/5/07 to: cat/dog/DM/M/T/G/W dc'd per self.     Type 2 diabetes mellitus without complication, without long-term current use of insulin (H) 03/13/2017     Past Surgical History:   Procedure Laterality Date    COLONOSCOPY  10/2020    EXCISE EXOSTOSIS FOOT Right 10/02/2018    Procedure: EXCISE EXOSTOSIS FOOT;  Right foot removal of first tarsometatarsal joint dorsal bossing;  Surgeon: Will Barraza DPM;  Location: [facility] OR    GYN SURGERY  06/24/2016    Hysterectomy complete    HYSTEROSCOPY       Current Outpatient Medications   Medication Sig Dispense Refill    albuterol (PROAIR HFA/PROVENTIL HFA/VENTOLIN HFA) 108 (90 Base) MCG/ACT inhaler INHALE 2 PUFFS BY MOUTH EVERY 6 HOURS 18 g 4    albuterol (PROVENTIL) (2.5 MG/3ML) 0.083% neb solution INHALE 1 VIAL VIA NEBULIZER EVERY 4 HOURS AS NEEDED FOR SHORTNESS OF BREATH 90 mL 0    aspirin  MG EC tablet Take 1 tablet (325 mg) by mouth daily 90 tablet 3    blood glucose (NO BRAND SPECIFIED) lancets standard Use to test blood sugar 1 times daily or as directed. 100 Lancet 3    blood glucose (NO BRAND SPECIFIED) test strip Use  to test blood sugar 1 times daily or as directed. 100 strip 3    budesonide (PULMICORT) 0.5 MG/2ML neb solution Empty contents of ampule into 240mL of saline solution and rinse both nasal cavities as instructed twice daily. 240 mL 6    busPIRone (BUSPAR) 15 MG tablet TAKE 1 TABLET BY MOUTH 3 TIMES DAILY 270 tablet 0    Cholecalciferol (VITAMIN D3 PO) Take by mouth daily      FLUoxetine (PROZAC) 40 MG capsule TAKE 1 CAPSULE BY MOUTH ONCE DAILY 90 capsule 0    LINZESS 145 MCG capsule TAKE 1 CAPSULE BY MOUTH EVERY MORNING BEFORE BREAKFAST. 90 capsule 0    losartan (COZAAR) 25 MG tablet TAKE 1 TABLET BY MOUTH ONCE DAILY 90 tablet 0    metFORMIN (GLUCOPHAGE XR) 500 MG 24 hr tablet Take 2 tablets (1,000 mg) by mouth 2 times daily (with meals) 360 tablet 1    modafinil (PROVIGIL) 100 MG tablet TAKE 1 TABLET BY MOUTH ONCE DAILY 30 tablet 0    nitroGLYcerin (NITROSTAT) 0.4 MG sublingual tablet For chest pain place 1 tablet under the tongue every 5 minutes for 3 doses. If symptoms persist 5 minutes after 1st dose call 911. 25 tablet 4    omeprazole (PRILOSEC) 20 MG DR capsule Take 1 capsule (20 mg) by mouth 2 times daily . If syjmptomatically improved then can reduce to once daily. 60 capsule 11    rosuvastatin (CRESTOR) 5 MG tablet TAKE 1 TABLET BY MOUTH ONCE DAILY 90 tablet 1    TRELEGY ELLIPTA 200-62.5-25 MCG/ACT oral inhaler INHALE 1 PUFF BY MOUTH ONCE DAILY - RINSE MOUTH AND GARGLE AFTER  each 0       Allergies   Allergen Reactions    Ampicillin Rash    Cipro [Quinolones] Anaphylaxis    Macrobid [Nitrofuran Derivatives] GI Disturbance    Nitrofurantoin      Other reaction(s): Vomiting    Ciprofloxacin Itching and Rash        Social History     Tobacco Use    Smoking status: Never    Smokeless tobacco: Never   Substance Use Topics    Alcohol use: Yes     Comment: Rarely     Family History   Problem Relation Age of Onset    Hypertension Mother     Alzheimer Disease Mother     Diabetes Mother     Tremor Mother      "Eye Surgery Father         cataracts    Macular Degeneration Father     Hypertension Father     Skin Cancer Father     Breast Cancer Maternal Grandmother     Tremor Maternal Grandfather     Alzheimer Disease Paternal Grandmother     Psychotic Disorder Paternal Grandmother         bipolar    Heart Disease Paternal Grandfather     Thyroid Disease Brother         Tumors removed    Tremor Brother     Mental Illness Brother         Bi-Polar    Thyroid Disease Sister     Diabetes Sister     Skin Cancer Sister     Cerebrovascular Disease No family hx of     Glaucoma No family hx of      History   Drug Use No         Objective     /66 (BP Location: Right arm, Patient Position: Sitting, Cuff Size: Adult Regular)   Pulse 64   Temp (!) 96  F (35.6  C) (Tympanic)   Resp 16   Ht 1.676 m (5' 6\")   Wt 81.6 kg (180 lb)   LMP 05/15/2014 (Approximate)   SpO2 98%   BMI 29.05 kg/m      Physical Exam    GENERAL APPEARANCE: healthy, alert and no distress     EYES: EOMI, PERRL     HENT: ear canals and TM's normal and nose and mouth without ulcers or lesions     NECK: no adenopathy, no asymmetry, masses, or scars and thyroid normal to palpation     RESP: lungs clear to auscultation - no rales, rhonchi or wheezes     CV: regular rates and rhythm, normal S1 S2, no S3 or S4 and no murmur, click or rub     ABDOMEN:  soft, nontender, no HSM or masses and bowel sounds normal     MS: extremities normal- no gross deformities noted, no evidence of inflammation in joints, FROM in all extremities.     SKIN: no suspicious lesions or rashes     NEURO: Normal strength and tone, sensory exam grossly normal, mentation intact and speech normal     PSYCH: mentation appears normal. and affect normal/bright     LYMPHATICS: No cervical adenopathy    Recent Labs   Lab Test 06/23/23  0957 01/28/23  1209 08/10/22  0751   HGB 12.4  --  13.1     --  252   NA  --   --  138   POTASSIUM  --   --  3.9   CR  --   --  0.83   A1C 6.2* 6.2* 5.4    "     Diagnostics:  Labs pending at this time.  Results will be reviewed when available.  Recent Results (from the past 720 hour(s))   CBC with platelets    Collection Time: 08/03/23  4:29 PM   Result Value Ref Range    WBC Count 5.4 4.0 - 11.0 10e3/uL    RBC Count 4.35 3.80 - 5.20 10e6/uL    Hemoglobin 12.9 11.7 - 15.7 g/dL    Hematocrit 39.5 35.0 - 47.0 %    MCV 91 78 - 100 fL    MCH 29.7 26.5 - 33.0 pg    MCHC 32.7 31.5 - 36.5 g/dL    RDW 13.3 10.0 - 15.0 %    Platelet Count 296 150 - 450 10e3/uL      EKG required for known coronary heart disease and not completed in the last 90 days.   EKG: appears normal, NSR, normal axis, normal intervals, no acute ST/T changes c/w ischemia, no LVH by voltage criteria, nonspecific ST-T changes, unchanged from previous tracings    Revised Cardiac Risk Index (RCRI):  The patient has the following serious cardiovascular risks for perioperative complications:   - Coronary Artery Disease (MI, positive stress test, angina, Qs on EKG) = 1 point     RCRI Interpretation: 1 point: Class II (low risk - 0.9% complication rate)         Signed Electronically by: Nalini Lopez MD  Copy of this evaluation report is provided to requesting physician.

## 2023-08-03 NOTE — PATIENT INSTRUCTIONS
As discussed , will do pertinent work up for preop clearance. Given the risk factors.   Please hole ASA ; OK to hold losartan on the morning of surgery given low BP already ; Then hold Metformin as you are NPO    ==================================================  For informational purposes only. Not to replace the advice of your health care provider. Copyright   ,  [employer]. All rights reserved. Clinically reviewed by Erika Wan MD. [employer] Signature [id number] - REV .  Preparing for Your Surgery  Getting started  A nurse will call you to review your health history and instructions. They will give you an arrival time based on your scheduled surgery time. Please be ready to share:  Your doctor's clinic name and phone number  Your medical, surgical, and anesthesia history  A list of allergies and sensitivities  A list of medicines, including herbal treatments and over-the-counter drugs  Whether the patient has a legal guardian (ask how to send us the papers in advance)  Please tell us if you're pregnant--or if there's any chance you might be pregnant. Some surgeries may injure a fetus (unborn baby), so they require a pregnancy test. Surgeries that are safe for a fetus don't always need a test, and you can choose whether to have one.   If you have a child who's having surgery, please ask for a copy of Preparing for Your Child's Surgery.    Preparing for surgery  Within 10 to 30 days of surgery: Have a pre-op exam (sometimes called an H&P, or History and Physical). This can be done at a clinic or pre-operative center.  If you're having a , you may not need this exam. Talk to your care team.  At your pre-op exam, talk to your care team about all medicines you take. If you need to stop any medicines before surgery, ask when to start taking them again.  We do this for your safety. Many medicines can make you bleed too much during surgery. Some change how well surgery (anesthesia) drugs  work.  Call your insurance company to let them know you're having surgery. (If you don't have insurance, call [phone].)  Call your clinic if there's any change in your health. This includes signs of a cold or flu (sore throat, runny nose, cough, rash, fever). It also includes a scrape or scratch near the surgery site.  If you have questions on the day of surgery, call your hospital or surgery center.  Eating and drinking guidelines  For your safety: Unless your surgeon tells you otherwise, follow the guidelines below.  Eat and drink as usual until 8 hours before you arrive for surgery. After that, no food or milk.  Drink clear liquids until 2 hours before you arrive. These are liquids you can see through, like water, Gatorade, and Propel Water. They also include plain black coffee and tea (no cream or milk), candy, and breath mints. You can spit out gum when you arrive.  If you drink alcohol: Stop drinking it the night before surgery.  If your care team tells you to take medicine on the morning of surgery, it's okay to take it with a sip of water.  Preventing infection  Shower or bathe the night before and morning of your surgery. Follow the instructions your clinic gave you. (If no instructions, use regular soap.)  Don't shave or clip hair near your surgery site. We'll remove the hair if needed.  Don't smoke or vape the morning of surgery. You may chew nicotine gum up to 2 hours before surgery. A nicotine patch is okay.  Note: Some surgeries require you to completely quit smoking and nicotine. Check with your surgeon.  Your care team will make every effort to keep you safe from infection. We will:  Clean our hands often with soap and water (or an alcohol-based hand rub).  Clean the skin at your surgery site with a special soap that kills germs.  Give you a special gown to keep you warm. (Cold raises the risk of infection.)  Wear special hair covers, masks, gowns and gloves during surgery.  Give antibiotic  medicine, if prescribed. Not all surgeries need antibiotics.  What to bring on the day of surgery  Photo ID and insurance card  Copy of your health care directive, if you have one  Glasses and hearing aids (bring cases)  You can't wear contacts during surgery  Inhaler and eye drops, if you use them (tell us about these when you arrive)  CPAP machine or breathing device, if you use them  A few personal items, if spending the night  If you have . . .  A pacemaker, ICD (cardiac defibrillator) or other implant: Bring the ID card.  An implanted stimulator: Bring the remote control.  A legal guardian: Bring a copy of the certified (court-stamped) guardianship papers.  Please remove any jewelry, including body piercings. Leave jewelry and other valuables at home.  If you're going home the day of surgery  You must have a responsible adult drive you home. They should stay with you overnight as well.  If you don't have someone to stay with you, and you aren't safe to go home alone, we may keep you overnight. Insurance often won't pay for this.  After surgery  If it's hard to control your pain or you need more pain medicine, please call your surgeon's office.  Questions?   If you have any questions for your care team, list them here: _________________________________________________________________________________________________________________________________________________________________________ ____________________________________ ____________________________________ ____________________________________    How to Take Your Medication Before Surgery

## 2023-08-04 ENCOUNTER — ANESTHESIA EVENT (OUTPATIENT)
Dept: SURGERY | Facility: AMBULATORY SURGERY CENTER | Age: 58
End: 2023-08-04
Payer: COMMERCIAL

## 2023-08-04 LAB
ALBUMIN SERPL BCG-MCNC: 4.8 G/DL (ref 3.5–5.2)
ALP SERPL-CCNC: 42 U/L (ref 35–104)
ALT SERPL W P-5'-P-CCNC: 20 U/L (ref 0–50)
ANION GAP SERPL CALCULATED.3IONS-SCNC: 12 MMOL/L (ref 7–15)
AST SERPL W P-5'-P-CCNC: 28 U/L (ref 0–45)
BILIRUB SERPL-MCNC: 0.3 MG/DL
BUN SERPL-MCNC: 14.4 MG/DL (ref 6–20)
CALCIUM SERPL-MCNC: 10 MG/DL (ref 8.6–10)
CHLORIDE SERPL-SCNC: 106 MMOL/L (ref 98–107)
CREAT SERPL-MCNC: 0.87 MG/DL (ref 0.51–0.95)
DEPRECATED HCO3 PLAS-SCNC: 20 MMOL/L (ref 22–29)
GFR SERPL CREATININE-BSD FRML MDRD: 77 ML/MIN/1.73M2
GLUCOSE SERPL-MCNC: 94 MG/DL (ref 70–99)
POTASSIUM SERPL-SCNC: 4.4 MMOL/L (ref 3.4–5.3)
PROT SERPL-MCNC: 7.4 G/DL (ref 6.4–8.3)
SODIUM SERPL-SCNC: 138 MMOL/L (ref 136–145)

## 2023-08-04 RX ORDER — OXYCODONE HYDROCHLORIDE 5 MG/1
10 TABLET ORAL
Status: CANCELLED | OUTPATIENT
Start: 2023-08-04

## 2023-08-04 RX ORDER — ONDANSETRON 4 MG/1
4 TABLET, ORALLY DISINTEGRATING ORAL EVERY 30 MIN PRN
Status: CANCELLED | OUTPATIENT
Start: 2023-08-04

## 2023-08-04 RX ORDER — ONDANSETRON 2 MG/ML
4 INJECTION INTRAMUSCULAR; INTRAVENOUS EVERY 30 MIN PRN
Status: CANCELLED | OUTPATIENT
Start: 2023-08-04

## 2023-08-04 RX ORDER — OXYCODONE HYDROCHLORIDE 5 MG/1
5 TABLET ORAL
Status: CANCELLED | OUTPATIENT
Start: 2023-08-04

## 2023-08-07 ENCOUNTER — HOSPITAL ENCOUNTER (OUTPATIENT)
Facility: AMBULATORY SURGERY CENTER | Age: 58
Discharge: HOME | End: 2023-08-07
Attending: OTOLARYNGOLOGY
Payer: COMMERCIAL

## 2023-08-07 ENCOUNTER — ANESTHESIA (OUTPATIENT)
Dept: SURGERY | Facility: AMBULATORY SURGERY CENTER | Age: 58
End: 2023-08-07
Payer: COMMERCIAL

## 2023-08-07 VITALS
RESPIRATION RATE: 16 BRPM | OXYGEN SATURATION: 99 % | DIASTOLIC BLOOD PRESSURE: 72 MMHG | BODY MASS INDEX: 28.25 KG/M2 | SYSTOLIC BLOOD PRESSURE: 148 MMHG | TEMPERATURE: 98.6 F | HEIGHT: 67 IN | HEART RATE: 71 BPM | WEIGHT: 180 LBS

## 2023-08-07 DIAGNOSIS — J32.4 CHRONIC PANSINUSITIS: Primary | ICD-10-CM

## 2023-08-07 LAB
GLUCOSE BLDC GLUCOMTR-MCNC: 105 MG/DL (ref 70–99)
GLUCOSE BLDC GLUCOMTR-MCNC: 98 MG/DL (ref 70–99)

## 2023-08-07 PROCEDURE — 61782 SCAN PROC CRANIAL EXTRA: CPT | Mod: GC | Performed by: OTOLARYNGOLOGY

## 2023-08-07 PROCEDURE — 31256 EXPLORATION MAXILLARY SINUS: CPT | Mod: 50

## 2023-08-07 PROCEDURE — 31254 NSL/SINS NDSC W/PRTL ETHMDCT: CPT | Mod: 50

## 2023-08-07 PROCEDURE — 30130 EXCISE INFERIOR TURBINATE: CPT | Mod: 50

## 2023-08-07 PROCEDURE — 31254 NSL/SINS NDSC W/PRTL ETHMDCT: CPT | Mod: 50 | Performed by: OTOLARYNGOLOGY

## 2023-08-07 PROCEDURE — 31256 EXPLORATION MAXILLARY SINUS: CPT | Mod: 50 | Performed by: OTOLARYNGOLOGY

## 2023-08-07 PROCEDURE — 82962 GLUCOSE BLOOD TEST: CPT | Performed by: PATHOLOGY

## 2023-08-07 PROCEDURE — 30130 EXCISE INFERIOR TURBINATE: CPT | Mod: 50 | Performed by: OTOLARYNGOLOGY

## 2023-08-07 RX ORDER — HYDROMORPHONE HYDROCHLORIDE 1 MG/ML
0.2 INJECTION, SOLUTION INTRAMUSCULAR; INTRAVENOUS; SUBCUTANEOUS EVERY 5 MIN PRN
Status: DISCONTINUED | OUTPATIENT
Start: 2023-08-07 | End: 2023-08-08 | Stop reason: HOSPADM

## 2023-08-07 RX ORDER — LIDOCAINE HYDROCHLORIDE 20 MG/ML
INJECTION, SOLUTION INFILTRATION; PERINEURAL PRN
Status: DISCONTINUED | OUTPATIENT
Start: 2023-08-07 | End: 2023-08-07

## 2023-08-07 RX ORDER — FENTANYL CITRATE 50 UG/ML
25 INJECTION, SOLUTION INTRAMUSCULAR; INTRAVENOUS EVERY 5 MIN PRN
Status: DISCONTINUED | OUTPATIENT
Start: 2023-08-07 | End: 2023-08-08 | Stop reason: HOSPADM

## 2023-08-07 RX ORDER — DEXAMETHASONE SODIUM PHOSPHATE 4 MG/ML
INJECTION, SOLUTION INTRA-ARTICULAR; INTRALESIONAL; INTRAMUSCULAR; INTRAVENOUS; SOFT TISSUE PRN
Status: DISCONTINUED | OUTPATIENT
Start: 2023-08-07 | End: 2023-08-07

## 2023-08-07 RX ORDER — OXYMETAZOLINE HYDROCHLORIDE 0.05 G/100ML
SPRAY NASAL
Qty: 15 ML | Refills: 0 | Status: SHIPPED | OUTPATIENT
Start: 2023-08-07 | End: 2024-03-04

## 2023-08-07 RX ORDER — OXYMETAZOLINE HYDROCHLORIDE 0.05 G/100ML
SPRAY NASAL PRN
Status: DISCONTINUED | OUTPATIENT
Start: 2023-08-07 | End: 2023-08-07 | Stop reason: HOSPADM

## 2023-08-07 RX ORDER — FENTANYL CITRATE 50 UG/ML
50 INJECTION, SOLUTION INTRAMUSCULAR; INTRAVENOUS EVERY 5 MIN PRN
Status: DISCONTINUED | OUTPATIENT
Start: 2023-08-07 | End: 2023-08-08 | Stop reason: HOSPADM

## 2023-08-07 RX ORDER — DEXAMETHASONE SODIUM PHOSPHATE 10 MG/ML
10 INJECTION, SOLUTION INTRAMUSCULAR; INTRAVENOUS ONCE
Status: DISCONTINUED | OUTPATIENT
Start: 2023-08-07 | End: 2023-08-07 | Stop reason: HOSPADM

## 2023-08-07 RX ORDER — SODIUM CHLORIDE, SODIUM LACTATE, POTASSIUM CHLORIDE, CALCIUM CHLORIDE 600; 310; 30; 20 MG/100ML; MG/100ML; MG/100ML; MG/100ML
INJECTION, SOLUTION INTRAVENOUS CONTINUOUS
Status: DISCONTINUED | OUTPATIENT
Start: 2023-08-07 | End: 2023-08-08 | Stop reason: HOSPADM

## 2023-08-07 RX ORDER — LIDOCAINE HYDROCHLORIDE AND EPINEPHRINE 10; 10 MG/ML; UG/ML
INJECTION, SOLUTION INFILTRATION; PERINEURAL PRN
Status: DISCONTINUED | OUTPATIENT
Start: 2023-08-07 | End: 2023-08-07 | Stop reason: HOSPADM

## 2023-08-07 RX ORDER — LIDOCAINE 40 MG/G
CREAM TOPICAL
Status: DISCONTINUED | OUTPATIENT
Start: 2023-08-07 | End: 2023-08-07 | Stop reason: HOSPADM

## 2023-08-07 RX ORDER — ONDANSETRON 2 MG/ML
4 INJECTION INTRAMUSCULAR; INTRAVENOUS EVERY 30 MIN PRN
Status: DISCONTINUED | OUTPATIENT
Start: 2023-08-07 | End: 2023-08-08 | Stop reason: HOSPADM

## 2023-08-07 RX ORDER — HYDROCODONE BITARTRATE AND ACETAMINOPHEN 5; 325 MG/1; MG/1
1 TABLET ORAL EVERY 4 HOURS PRN
Qty: 10 TABLET | Refills: 0 | Status: SHIPPED | OUTPATIENT
Start: 2023-08-07 | End: 2024-03-04

## 2023-08-07 RX ORDER — ONDANSETRON 2 MG/ML
INJECTION INTRAMUSCULAR; INTRAVENOUS PRN
Status: DISCONTINUED | OUTPATIENT
Start: 2023-08-07 | End: 2023-08-07

## 2023-08-07 RX ORDER — GABAPENTIN 300 MG/1
300 CAPSULE ORAL
Status: COMPLETED | OUTPATIENT
Start: 2023-08-07 | End: 2023-08-07

## 2023-08-07 RX ORDER — ONDANSETRON 4 MG/1
4 TABLET, ORALLY DISINTEGRATING ORAL EVERY 30 MIN PRN
Status: DISCONTINUED | OUTPATIENT
Start: 2023-08-07 | End: 2023-08-08 | Stop reason: HOSPADM

## 2023-08-07 RX ORDER — FENTANYL CITRATE 50 UG/ML
INJECTION, SOLUTION INTRAMUSCULAR; INTRAVENOUS PRN
Status: DISCONTINUED | OUTPATIENT
Start: 2023-08-07 | End: 2023-08-07

## 2023-08-07 RX ORDER — HYDROMORPHONE HYDROCHLORIDE 1 MG/ML
0.4 INJECTION, SOLUTION INTRAMUSCULAR; INTRAVENOUS; SUBCUTANEOUS EVERY 5 MIN PRN
Status: DISCONTINUED | OUTPATIENT
Start: 2023-08-07 | End: 2023-08-08 | Stop reason: HOSPADM

## 2023-08-07 RX ORDER — ACETAMINOPHEN 325 MG/1
975 TABLET ORAL ONCE
Status: COMPLETED | OUTPATIENT
Start: 2023-08-07 | End: 2023-08-07

## 2023-08-07 RX ORDER — SODIUM CHLORIDE, SODIUM LACTATE, POTASSIUM CHLORIDE, CALCIUM CHLORIDE 600; 310; 30; 20 MG/100ML; MG/100ML; MG/100ML; MG/100ML
INJECTION, SOLUTION INTRAVENOUS CONTINUOUS
Status: DISCONTINUED | OUTPATIENT
Start: 2023-08-07 | End: 2023-08-07 | Stop reason: HOSPADM

## 2023-08-07 RX ORDER — PROPOFOL 10 MG/ML
INJECTION, EMULSION INTRAVENOUS CONTINUOUS PRN
Status: DISCONTINUED | OUTPATIENT
Start: 2023-08-07 | End: 2023-08-07

## 2023-08-07 RX ORDER — PROPOFOL 10 MG/ML
INJECTION, EMULSION INTRAVENOUS PRN
Status: DISCONTINUED | OUTPATIENT
Start: 2023-08-07 | End: 2023-08-07

## 2023-08-07 RX ORDER — HYDROCODONE BITARTRATE AND ACETAMINOPHEN 5; 325 MG/1; MG/1
1-2 TABLET ORAL ONCE
Status: CANCELLED | OUTPATIENT
Start: 2023-08-07 | End: 2023-08-07

## 2023-08-07 RX ADMIN — FENTANYL CITRATE 50 MCG: 50 INJECTION, SOLUTION INTRAMUSCULAR; INTRAVENOUS at 11:11

## 2023-08-07 RX ADMIN — PROPOFOL 150 MG: 10 INJECTION, EMULSION INTRAVENOUS at 10:53

## 2023-08-07 RX ADMIN — Medication 50 MG: at 10:53

## 2023-08-07 RX ADMIN — SODIUM CHLORIDE, SODIUM LACTATE, POTASSIUM CHLORIDE, CALCIUM CHLORIDE: 600; 310; 30; 20 INJECTION, SOLUTION INTRAVENOUS at 09:15

## 2023-08-07 RX ADMIN — ACETAMINOPHEN 975 MG: 325 TABLET ORAL at 09:14

## 2023-08-07 RX ADMIN — FENTANYL CITRATE 50 MCG: 50 INJECTION, SOLUTION INTRAMUSCULAR; INTRAVENOUS at 10:53

## 2023-08-07 RX ADMIN — DEXAMETHASONE SODIUM PHOSPHATE 10 MG: 4 INJECTION, SOLUTION INTRA-ARTICULAR; INTRALESIONAL; INTRAMUSCULAR; INTRAVENOUS; SOFT TISSUE at 10:53

## 2023-08-07 RX ADMIN — GABAPENTIN 300 MG: 300 CAPSULE ORAL at 09:14

## 2023-08-07 RX ADMIN — PROPOFOL 200 MCG/KG/MIN: 10 INJECTION, EMULSION INTRAVENOUS at 10:53

## 2023-08-07 RX ADMIN — FENTANYL CITRATE 25 MCG: 50 INJECTION, SOLUTION INTRAMUSCULAR; INTRAVENOUS at 12:54

## 2023-08-07 RX ADMIN — FENTANYL CITRATE 25 MCG: 50 INJECTION, SOLUTION INTRAMUSCULAR; INTRAVENOUS at 12:34

## 2023-08-07 RX ADMIN — FENTANYL CITRATE 25 MCG: 50 INJECTION, SOLUTION INTRAMUSCULAR; INTRAVENOUS at 12:49

## 2023-08-07 RX ADMIN — Medication 0.5 MG: at 11:35

## 2023-08-07 RX ADMIN — ONDANSETRON 4 MG: 2 INJECTION INTRAMUSCULAR; INTRAVENOUS at 11:53

## 2023-08-07 RX ADMIN — LIDOCAINE HYDROCHLORIDE 80 MG: 20 INJECTION, SOLUTION INFILTRATION; PERINEURAL at 10:53

## 2023-08-07 NOTE — ANESTHESIA PREPROCEDURE EVALUATION
Anesthesia Pre-Procedure Evaluation    Patient: Lisette LINCOLN Ukaegbu   MRN: [id number] : 1965        Procedure : Procedure(s):  stealth guided bilateral turbinate reduction with maxillary antrostomy and bilateral ethmoidectomy.          Past Medical History:   Diagnosis Date    Allergic rhinitis due to animal dander     Amblyopia     LT    Arthritis     Colon polyp     Depressive disorder     Endometriosis     Heart attack (H)         House dust mite allergy     Hypertension     Moderate persistent asthma     Rhinitis, allergic to other allergen     Seasonal allergic rhinitis 05 skin tests pos. for: cat/dog/horse/DM/M/T/G/RW per Dr. Granado    pt. did IT from  to 07 to: cat/dog/DM/M/T/G/W dc'd per self.     Type 2 diabetes mellitus without complication, without long-term current use of insulin (H) 2017      Past Surgical History:   Procedure Laterality Date    COLONOSCOPY  10/2020    EXCISE EXOSTOSIS FOOT Right 10/02/2018    Procedure: EXCISE EXOSTOSIS FOOT;  Right foot removal of first tarsometatarsal joint dorsal bossing;  Surgeon: Will Barraza DPM;  Location: [facility]    GYN SURGERY  2016    Hysterectomy complete    HYSTEROSCOPY        Allergies   Allergen Reactions    Ampicillin Rash    Cipro [Quinolones] Anaphylaxis    Macrobid [Nitrofuran Derivatives] GI Disturbance    Nitrofurantoin      Other reaction(s): Vomiting    Ciprofloxacin Itching and Rash      Social History     Tobacco Use    Smoking status: Never    Smokeless tobacco: Never   Substance Use Topics    Alcohol use: Yes     Comment: Rarely      Wt Readings from Last 1 Encounters:   23 81.6 kg (180 lb)        Anesthesia Evaluation            ROS/MED HX  ENT/Pulmonary:     (+)           allergic rhinitis,          asthma                  Neurologic:  - neg neurologic ROS     Cardiovascular:     (+)  hypertension- -  CAD - past MI (NSTEMI) - -                                   (-) murmur   METS/Exercise Tolerance:  >4 METS    Hematologic:  - neg hematologic  ROS     Musculoskeletal:  - neg musculoskeletal ROS     GI/Hepatic:     (+) GERD,                   Renal/Genitourinary:  - neg Renal ROS     Endo:  - neg endo ROS   (+)  type II DM,                    Psychiatric/Substance Use:  - neg psychiatric ROS     Infectious Disease:  - neg infectious disease ROS     Malignancy:  - neg malignancy ROS     Other:  - neg other ROS          Physical Exam    Airway        Mallampati: II   TM distance: > 3 FB   Neck ROM: full   Mouth opening: > 3 cm    Respiratory Devices and Support         Dental     Comment: Teeth examined without any significant abnormality, patient denies loose, missing or chipped teeth or anything removable.         Cardiovascular          Rhythm and rate: regular and normal (-) no murmur    Pulmonary   pulmonary exam normal        breath sounds clear to auscultation           OUTSIDE LABS:  CBC:   Lab Results   Component Value Date    WBC 5.4 08/03/2023    WBC 5.8 06/23/2023    HGB 12.9 08/03/2023    HGB 12.4 06/23/2023    HCT 39.5 08/03/2023    HCT 37.6 06/23/2023     08/03/2023     06/23/2023     BMP:   Lab Results   Component Value Date     08/03/2023     08/10/2022    POTASSIUM 4.4 08/03/2023    POTASSIUM 3.9 08/10/2022    CHLORIDE 106 08/03/2023    CHLORIDE 105 08/10/2022    CO2 20 (L) 08/03/2023    CO2 24 08/10/2022    BUN 14.4 08/03/2023    BUN 13 08/10/2022    CR 0.87 08/03/2023    CR 0.83 08/10/2022    GLC 94 08/03/2023    GLC 97 08/10/2022     COAGS: No results found for: PTT, INR, FIBR  POC:   Lab Results   Component Value Date     (H) 10/15/2020     HEPATIC:   Lab Results   Component Value Date    ALBUMIN 4.8 08/03/2023    PROTTOTAL 7.4 08/03/2023    ALT 20 08/03/2023    AST 28 08/03/2023    ALKPHOS 42 08/03/2023    BILITOTAL 0.3 08/03/2023     OTHER:   Lab Results   Component Value Date    A1C 6.2 (H) 06/23/2023    ARIANA 10.0 08/03/2023    LIPASE 162 04/15/2016    TSH  1.17 06/23/2023       Anesthesia Plan    ASA Status:  2    NPO Status:  NPO Appropriate    Anesthesia Type: General.     - Airway: ETT   Induction: Intravenous, Propofol.   Maintenance: Balanced.        Consents    Anesthesia Plan(s) and associated risks, benefits, and realistic alternatives discussed. Questions answered and patient/representative(s) expressed understanding.     - Discussed:     - Discussed with:  Patient      - Extended Intubation/Ventilatory Support Discussed: No.      - Patient is DNR/DNI Status: No     Use of blood products discussed: No .     Postoperative Care    Pain management: IV analgesics, Oral pain medications, Multi-modal analgesia.   PONV prophylaxis: Ondansetron (or other 5HT-3), Dexamethasone or Solumedrol, Background Propofol Infusion     Comments:    Other Comments: Discussed plan for general anesthetic with Oral ETT. Discussed risks of sore throat, post op pain/nausea, oropharyngeal damage, rare major complications.         H&P reviewed: Unable to attach H&P to encounter due to EHR limitations. H&P Update: appropriate H&P reviewed, patient examined. No interval changes since H&P (within 30 days).         Altaf Chavez MD

## 2023-08-07 NOTE — OP NOTE
Otolaryngology Operative Report   Date of Operation: 2023  Patient Name: Lisette Owens  Patient : 1965   Patient MRN: [id number]    Staff Surgeon: Keri Allen MD  Resident Surgeon: Safia Jessica MD & Tanmay Esparza MD  Preoperative Diagnosis:   1. Nasal Obstruction  2. Inferior turbinate hypertrophy  3. Left brian bullosa  Postoperative Diagnosis: Same  Procedure:   1. Bilateral maxillary antrostomy   2. Bilateral anterior ethmoidectomy  3. Bilateral Inferior Turbinate Reduction  4. Image guided functional endoscopic sinus surgery  5. Left brian bullosa resection  Anesthesia: General  Findings: Significant inferior turbinate hypertrophy bilaterally, left brian bullosa, healthy appearing mucosa- no significant polypoid disease, no purulence noted.   EBL: 10 cc  Complications: None  Specimens: None    Clinical Indications: Lisette Owens is a 58 year old female with history of nasal obstruction and was recommended to undergo aforementioned procedure. All risks and benefits were discussed with the consenting party and they elected to proceed with the procedure.  Description of Procedure: The patient was brought to the operating room and placed in supine position on the operating table. General anesthesia was induced and all appropriate monitoring lines were placed. The table was turned 90-degrees and a timeout was performed with all operating room members in agreement to the patient name and procedure being performed. Afrin-soaked pledgits were placed in the patient nose for topical decongestion.  The computer-assisted navigation device was brought into the field. This was registered and confirmed for accuracy. Using a 0-degree endoscope bilateral nasal cavities were evaluated, a total of 5cc of 1% lidocaine with 1: 100,000 epinephrine was injected to the root of the middle turbinate, body of the middle turbinate and inferior turbinate.   We then proceeded with a right-sided sinus surgery.  The  inferior turbinate was gently lateralized, the middle turbinate was gently medialized. We identified the uncinate process, the vertical component was removed using a microdebrider.  We then visualized the natural os of the maxillary sinus and dilated this posteriorly and anteriorly.  We then incorporated this into a wide antrostomy with a microdebrider.  We then identified the ethmoid bulla. We entered into this inferiorly and medially in its natural os and fractured this forward.  We then removed this with combination of curette and debrider instrumentation.  Given the healthy appearing mucosa and absence of polypoid disease we elected to perform an isolated anterior ethmoidectomy,.   We then proceeded with the inferior turbinate reduction; the turbinate was gently medialized and a 0-degree scope was brought beneath the inferior turbinate; hassner's valve was identified. We then used a backbiter to the right to begin excising the bony component of the inferior turbinate and the redundant lateral curl of the inferior turbinate. Once the bone was removed we then used a microdebrider to shave the lateral aspect. Then hemostasis was ensured and we lateralized the inferior turbinate. A coblator was used to enter the posterior-inferior turbinate and passed in a submucosal plane posteriorly and coblated while visualizing the appropriate shrinkage with the endoscope. A similar entrance was made along the mid-inferior turbinate and the head of the inferior turbinate. An afrin pledgit was placed in the middle meatus and we proceeded with left sided-surgery.  We then proceeded with a left-sided sinus surgery in the exact same fashion. The inferior turbinate was gently lateralized, the middle turbinate was gently medialized. Due to the brian bullosa on the left the uncinate process had curled medially. We used a ball tip seeker to gently outfracture the superior uncinate process followed by a microdebrider. At this point we  decided to take down the brian bullosa to improve visualization; the brian was entered with a microdebrider and gently opened to ensure all bony compartments were removed and the medial aspect of the middle turbinate was left untouched. Once addressed we could visualize the remainder of the uncinate process and the ethmoid bulla. The remainder of the uncinate was removed with a microdebrider. We then visualized the natural os of the maxillary sinus and dilated this posteriorly and anteriorly.  We then incorporated this into a wide antrostomy with a straight thru cut and microdebrider.   We then addressed the ethmoid bulla. We entered into this inferiorly and medially in its natural os and fractured this forward.  We then removed this with combination of curette and debrider instrumentation.  Given the healthy appearing mucosa and absence of polypoid tissue we elected to stop after anterior ethmoidectomy.    We then proceeded with the inferior turbinate reduction; the turbinate was gently medialized and a 0-degree scope was brought beneath the inferior turbinate; hassner's valve was identified. We then used a backbiter to the right to begin excising the bony component of the inferior turbinate and the redundant lateral curl of the inferior turbinate. Once the bone was removed we then used a microdebrider to shave the lateral aspect. There was a small pumper noted along the posterior aspect of the lateral inferior turbinate resection and thus hemostasis was obtained with suction-cautery. Then we lateralized the inferior turbinate. A coblator was used to enter the posterior-inferior turbinate and passed in a submucosal plane posteriorly and coblated while visualizing the appropriate shrinkage with the endoscope. A similar entrance was made along the mid-inferior turbinate and the head of the inferior turbinate. The turbinate was then lateralized with a boise elevator.  The entirety of the nasal cavity was irrigated  out. An orogastric tube was placed to suction gastric contents. The patient was then turned over to our anesthesia colleagues and was extubated and taken to the PACU in satisfactory and stable condition.   Dr. Allen was present for the procedure    I was present with the resident during the entire procedure and participated in the critical aspects.  Keri Allen MD

## 2023-08-07 NOTE — DISCHARGE INSTRUCTIONS
[facility]  Home Care Following Anesthesia  For 24 hours after surgery:  Get plenty of rest.  A responsible adult must stay with you for at least 24 hours after you leave the surgery center.  Do not drive or use heavy equipment.  If you have weakness or tingling, don't drive or use heavy equipment until this feeling goes away.   Do not drink alcohol.   Avoid strenuous or risky activities.  Ask for help when climbing stairs.  You may feel lightheaded.  IF so, sit for a few minutes before standing.  Have someone help you get up.   If you have nausea (feel sick to your stomach): Drink only clear liquids such as apple juice, ginger ale, broth or 7-Up.  Rest may also help.  Be sure to drink enough fluids.  Move to a regular diet as you feel able.   You may have a slight fever.  Call the doctor if your fever is over 100 F (37.7 C) (taken under the tongue) or lasts longer than 24 hours.  You may have a dry mouth, a sore throat, muscle aches or trouble sleeping. These should go away after 24 hours.  Do not make important or legal decisions.   It is recommended to avoid smoking.               Tips for taking pain medications  To get the best pain relief possible, remember these points:  Take pain medications as directed, before pain becomes severe.  Pain medication can upset your stomach: taking it with food may help.  Constipation is a common side effect of pain medication. Drink plenty of  fluids.  Eat foods high in fiber. Take a stool softener if recommended by your doctor or pharmacist.  Do not drink alcohol, drive or operate machinery while taking pain medications.  Ask about other ways to control pain, such as with heat, ice or relaxation.    Tylenol/Acetaminophen Consumption    If you feel your pain relief is insufficient, you may take Tylenol/Acetaminophen in addition to your narcotic pain medication.   Be careful not to exceed 4,000 mg of Tylenol/Acetaminophen in a 24 hour  period from all sources.  If you are taking extra strength Tylenol/acetaminophen (500 mg), the maximum dose is 8 tablets in 24 hours.  If you are taking regular strength acetaminophen (325 mg), the maximum dose is 12 tablets in 24 hours.    Call a doctor for any of the following:  Signs of infection (fever, growing tenderness at the surgery site, a large amount of drainage or bleeding, severe pain, foul-smelling drainage, redness, swelling).  It has been over 8 to 10 hours since surgery and you are still not able to urinate (pass water).  Headache for over 24 hours.  Numbness, tingling or weakness the day after surgery (if you had spinal anesthesia).  Signs of Covid-19 infection (temperature over 100 degrees, shortness of breath, cough, loss of taste/smell, generalized body aches, persistent headache, chills, sore throat, nausea/vomiting/diarrhea)  Your doctor is:  Dr. Keri Allen, ENT Otolaryngology: [phone]                    Or dial [phone] and ask for the resident on call for:  ENT Otolaryngology  For emergency care, call the:  [facility] Emergency Department: [phone] (TTY for hearing impaired: [phone])

## 2023-08-07 NOTE — BRIEF OP NOTE
[facility]    Brief Operative Note    Pre-operative diagnosis: Nasal obstruction [J34.89]  Sinusitis, chronic [J32.9]  Post-operative diagnosis Same as pre-operative diagnosis    Procedure: Procedure(s):  stealth guided bilateral turbinate reduction with maxillary antrostomy and bilateral anterior ethmoidectomy.  Surgeon: Surgeon(s) and Role:     * Keri Allen MD - Primary     * Safia Jessica MD - Resident - Assisting     * Tanmay Esparza MD - Resident - Assisting  Anesthesia: General   Estimated Blood Loss: 10 mL    Drains: None  Specimens: * No specimens in log *  Findings:   None.  Complications: None.  Implants: * No implants in log *

## 2023-08-07 NOTE — ANESTHESIA CARE TRANSFER NOTE
Patient: Lisette LINCOLN Ukaegbu    Procedure: Procedure(s):  stealth guided bilateral turbinate reduction with maxillary antrostomy and bilateral anterior ethmoidectomy.       Diagnosis: Nasal obstruction [J34.89]  Sinusitis, chronic [J32.9]  Diagnosis Additional Information: No value filed.    Anesthesia Type:   General     Note:    Oropharynx: oropharynx clear of all foreign objects and spontaneously breathing  Level of Consciousness: drowsy and awake  Oxygen Supplementation: face mask  Level of Supplemental Oxygen (L/min / FiO2): 8  Independent Airway: airway patency satisfactory and stable  Dentition: dentition unchanged  Vital Signs Stable: post-procedure vital signs reviewed and stable    Patient transferred to: PACU    Handoff Report: Identifed the Patient, Identified the Reponsible Provider, Reviewed the pertinent medical history, Discussed the surgical course, Reviewed Intra-OP anesthesia mangement and issues during anesthesia, Set expectations for post-procedure period and Allowed opportunity for questions and acknowledgement of understanding      Vitals:  Vitals Value Taken Time   /77 08/07/23 1215   Temp 37  C (98.6  F) 08/07/23 1215   Pulse 71 08/07/23 1217   Resp 11 08/07/23 1217   SpO2 99 % 08/07/23 1217   Vitals shown include unvalidated device data.    Electronically Signed By: ELIZA Goldberg CRNA  August 7, 2023  12:18 PM

## 2023-08-07 NOTE — ANESTHESIA POSTPROCEDURE EVALUATION
Patient: Lisette LINCOLN Ukaegbu    Procedure: Procedure(s):  stealth guided bilateral turbinate reduction with maxillary antrostomy and bilateral anterior ethmoidectomy.       Anesthesia Type:  General    Note:  Disposition: Outpatient   Postop Pain Control: Uneventful            Sign Out: Well controlled pain   PONV: No   Neuro/Psych: Uneventful            Sign Out: Acceptable/Baseline neuro status   Airway/Respiratory: Uneventful            Sign Out: Acceptable/Baseline resp. status   CV/Hemodynamics: Uneventful            Sign Out: Acceptable CV status; No obvious hypovolemia; No obvious fluid overload   Other NRE:    DID A NON-ROUTINE EVENT OCCUR? No           Last vitals:  Vitals Value Taken Time   /78 08/07/23 1315   Temp 37.1  C (98.8  F) 08/07/23 1317   Pulse 63 08/07/23 1317   Resp 16 08/07/23 1317   SpO2 100 % 08/07/23 1316   Vitals shown include unvalidated device data.    Electronically Signed By: Altaf Chavez MD  August 7, 2023  2:59 PM

## 2023-08-09 ENCOUNTER — MYC MEDICAL ADVICE (OUTPATIENT)
Dept: OTOLARYNGOLOGY | Facility: CLINIC | Age: 58
End: 2023-08-09
Payer: COMMERCIAL

## 2023-08-09 ENCOUNTER — TELEPHONE (OUTPATIENT)
Dept: OTOLARYNGOLOGY | Facility: CLINIC | Age: 58
End: 2023-08-09
Payer: COMMERCIAL

## 2023-08-09 NOTE — TELEPHONE ENCOUNTER
This patient called our call center and was transferred to the priority line to discuss post-operative signs/symptoms of infection.  This writer took detailed notes of the patient's symptoms and sent an inbox message to Dr. Allen and Henrietta Morris RN seeking a plan of care for the patient.  This writer is awaiting further instruction before taking further action.  This writer assured the patient that someone from our clinic will reach out to her as soon as a plan of care is established.  The patient was agreeable to the plan and had no further questions at the time of the call.  Marcy Hobbs LPN

## 2023-08-11 NOTE — TELEPHONE ENCOUNTER
Writer called and spoke with patient for post op check in.     Patient states she has really turned a corner and wanted to double check the process of healing. I advised patient that she should allow for two weeks healing time and let her know that she can come in to clinic on 8/25 for a quick check up if she needs it.     Patient verbalized understanding and will call if needed.     Henrietta Morris RN on 8/11/2023 at 10:08 AM

## 2023-09-22 ENCOUNTER — TELEPHONE (OUTPATIENT)
Dept: PULMONOLOGY | Facility: CLINIC | Age: 58
End: 2023-09-22
Payer: COMMERCIAL

## 2023-09-22 DIAGNOSIS — J45.40 MODERATE PERSISTENT ASTHMA WITHOUT COMPLICATION: ICD-10-CM

## 2023-09-22 RX ORDER — FLUTICASONE FUROATE, UMECLIDINIUM BROMIDE AND VILANTEROL TRIFENATATE 200; 62.5; 25 UG/1; UG/1; UG/1
POWDER RESPIRATORY (INHALATION)
Qty: 180 EACH | Refills: 1 | Status: SHIPPED | OUTPATIENT
Start: 2023-09-22 | End: 2024-03-25

## 2023-09-22 NOTE — TELEPHONE ENCOUNTER
Patient Contacted for the patient to call back and schedule the following:    Appointment type: RTN  Provider: FAVIAN  Return date: 03/22/24  Specialty phone number: [phone]  Additional appointment(s) needed: N/A   Additonal Notes: N/A

## 2023-09-26 ENCOUNTER — TELEPHONE (OUTPATIENT)
Dept: GASTROENTEROLOGY | Facility: CLINIC | Age: 58
End: 2023-09-26

## 2023-09-26 DIAGNOSIS — R11.2 NAUSEA AND VOMITING, UNSPECIFIED VOMITING TYPE: ICD-10-CM

## 2023-09-26 DIAGNOSIS — K59.09 CHRONIC CONSTIPATION: Primary | ICD-10-CM

## 2023-09-26 RX ORDER — BISACODYL 5 MG/1
TABLET, DELAYED RELEASE ORAL
Qty: 4 TABLET | Refills: 0 | Status: SHIPPED | OUTPATIENT
Start: 2023-09-26 | End: 2023-11-15

## 2023-09-26 NOTE — TELEPHONE ENCOUNTER
Attempted to contact patient in order to complete pre assessment questions.     No answer. Left message to return call to [phone] option 4      Procedure details:    Patient scheduled for Colonoscopy/Upper endoscopy (EGD) on 10/10/23.     Arrival time: 1145. Procedure time 1245    Pre op exam needed? N/A    [facility] [address] [place] [address], 5[address] Floor, [place] [address]    Sedation type: MAC    Indication for procedure:   Chronic constipation [K59.09]  - Primary      Epigastric pain [R10.13]      Abdominal pain, left lower quadrant [R10.32]      Nausea and vomiting, unspecified vomiting type            Chart review:     Electronic implanted devices? No    Diabetic? Yes. See medication holding recommendations     Diabetic medication HOLDING recommendations: (if applicable)  Oral diabetic medications: Yes:  Metformin (glucophage): HOLD day of procedure.  Diabetic injectables: No  Insulin: No      Medication review:    Anticoagulants? No    NSAIDS?  ASA - no hold time    Other medication HOLDING recommendations:  Verify Nitro  use and frequency      Prep for procedure:     Bowel prep recommendation: Extended Golytely   Due to: constipation noted or reported.  and diabetes.     Prep instructions sent via [employer].     Bowel prep script sent to    [id number] - [place] - [address]      Rocio Koroma RN  Endoscopy Procedure Pre Assessment RN

## 2023-09-29 NOTE — TELEPHONE ENCOUNTER
Pre assessment completed for upcoming procedure.   (Please see previous telephone encounter notes for complete details)    Patient  returned call.       Procedure details:    Arrival time and facility location reviewed.    Pre op exam needed? N/A    Designated  policy reviewed. Instructed to have someone stay 24 hours post procedure.     COVID policy reviewed.      Medication review:    Medications reviewed. Please see supporting documentation below. Holding recommendations discussed (if applicable).   Oral diabetic medication(s): Metformin (glucophage): HOLD day of procedure.      Prep for procedure:     Procedure prep instructions reviewed.        Additional information needed?  Patient has not been taking Nitro      Patient  verbalized understanding and had no questions or concerns at this time.      Rocio Thomson RN  Endoscopy Procedure Pre Assessment RN  [phone] option 4

## 2023-10-10 ENCOUNTER — ANESTHESIA EVENT (OUTPATIENT)
Dept: SURGERY | Facility: AMBULATORY SURGERY CENTER | Age: 58
End: 2023-10-10
Payer: COMMERCIAL

## 2023-10-10 ENCOUNTER — HOSPITAL ENCOUNTER (OUTPATIENT)
Facility: AMBULATORY SURGERY CENTER | Age: 58
Discharge: HOME | End: 2023-10-10
Attending: INTERNAL MEDICINE
Payer: COMMERCIAL

## 2023-10-10 ENCOUNTER — ANESTHESIA (OUTPATIENT)
Dept: SURGERY | Facility: AMBULATORY SURGERY CENTER | Age: 58
End: 2023-10-10
Payer: COMMERCIAL

## 2023-10-10 VITALS
WEIGHT: 178 LBS | OXYGEN SATURATION: 99 % | DIASTOLIC BLOOD PRESSURE: 76 MMHG | SYSTOLIC BLOOD PRESSURE: 137 MMHG | HEIGHT: 67 IN | TEMPERATURE: 97.7 F | HEART RATE: 57 BPM | RESPIRATION RATE: 20 BRPM | BODY MASS INDEX: 27.94 KG/M2

## 2023-10-10 VITALS — HEART RATE: 70 BPM

## 2023-10-10 LAB
COLONOSCOPY: NORMAL
GLUCOSE BLDC GLUCOMTR-MCNC: 85 MG/DL (ref 70–99)
UPPER GI ENDOSCOPY: NORMAL

## 2023-10-10 PROCEDURE — 43239 EGD BIOPSY SINGLE/MULTIPLE: CPT | Performed by: INTERNAL MEDICINE

## 2023-10-10 PROCEDURE — 82962 GLUCOSE BLOOD TEST: CPT | Performed by: PATHOLOGY

## 2023-10-10 PROCEDURE — 88342 IMHCHEM/IMCYTCHM 1ST ANTB: CPT | Mod: 26 | Performed by: PATHOLOGY

## 2023-10-10 PROCEDURE — 88342 IMHCHEM/IMCYTCHM 1ST ANTB: CPT | Mod: TC | Performed by: INTERNAL MEDICINE

## 2023-10-10 PROCEDURE — 45380 COLONOSCOPY AND BIOPSY: CPT | Performed by: INTERNAL MEDICINE

## 2023-10-10 PROCEDURE — 88305 TISSUE EXAM BY PATHOLOGIST: CPT | Mod: 26 | Performed by: PATHOLOGY

## 2023-10-10 RX ORDER — ONDANSETRON 2 MG/ML
4 INJECTION INTRAMUSCULAR; INTRAVENOUS EVERY 6 HOURS PRN
Status: DISCONTINUED | OUTPATIENT
Start: 2023-10-10 | End: 2023-10-11 | Stop reason: HOSPADM

## 2023-10-10 RX ORDER — FLUMAZENIL 0.1 MG/ML
0.2 INJECTION, SOLUTION INTRAVENOUS
Status: DISCONTINUED | OUTPATIENT
Start: 2023-10-10 | End: 2023-10-11 | Stop reason: HOSPADM

## 2023-10-10 RX ORDER — LIDOCAINE 40 MG/G
CREAM TOPICAL
Status: DISCONTINUED | OUTPATIENT
Start: 2023-10-10 | End: 2023-10-10 | Stop reason: HOSPADM

## 2023-10-10 RX ORDER — NALOXONE HYDROCHLORIDE 0.4 MG/ML
0.4 INJECTION, SOLUTION INTRAMUSCULAR; INTRAVENOUS; SUBCUTANEOUS
Status: DISCONTINUED | OUTPATIENT
Start: 2023-10-10 | End: 2023-10-11 | Stop reason: HOSPADM

## 2023-10-10 RX ORDER — ONDANSETRON 4 MG/1
4 TABLET, ORALLY DISINTEGRATING ORAL EVERY 6 HOURS PRN
Status: DISCONTINUED | OUTPATIENT
Start: 2023-10-10 | End: 2023-10-11 | Stop reason: HOSPADM

## 2023-10-10 RX ORDER — ONDANSETRON 2 MG/ML
4 INJECTION INTRAMUSCULAR; INTRAVENOUS
Status: DISCONTINUED | OUTPATIENT
Start: 2023-10-10 | End: 2023-10-10 | Stop reason: HOSPADM

## 2023-10-10 RX ORDER — ACETAMINOPHEN 325 MG/1
975 TABLET ORAL ONCE
Status: COMPLETED | OUTPATIENT
Start: 2023-10-10 | End: 2023-10-10

## 2023-10-10 RX ORDER — PROPOFOL 10 MG/ML
INJECTION, EMULSION INTRAVENOUS PRN
Status: DISCONTINUED | OUTPATIENT
Start: 2023-10-10 | End: 2023-10-10

## 2023-10-10 RX ORDER — LIDOCAINE HYDROCHLORIDE 20 MG/ML
INJECTION, SOLUTION INFILTRATION; PERINEURAL PRN
Status: DISCONTINUED | OUTPATIENT
Start: 2023-10-10 | End: 2023-10-10

## 2023-10-10 RX ORDER — NALOXONE HYDROCHLORIDE 0.4 MG/ML
0.2 INJECTION, SOLUTION INTRAMUSCULAR; INTRAVENOUS; SUBCUTANEOUS
Status: DISCONTINUED | OUTPATIENT
Start: 2023-10-10 | End: 2023-10-11 | Stop reason: HOSPADM

## 2023-10-10 RX ORDER — GLYCOPYRROLATE 0.2 MG/ML
INJECTION, SOLUTION INTRAMUSCULAR; INTRAVENOUS PRN
Status: DISCONTINUED | OUTPATIENT
Start: 2023-10-10 | End: 2023-10-10

## 2023-10-10 RX ORDER — SODIUM CHLORIDE, SODIUM LACTATE, POTASSIUM CHLORIDE, CALCIUM CHLORIDE 600; 310; 30; 20 MG/100ML; MG/100ML; MG/100ML; MG/100ML
INJECTION, SOLUTION INTRAVENOUS CONTINUOUS PRN
Status: DISCONTINUED | OUTPATIENT
Start: 2023-10-10 | End: 2023-10-10

## 2023-10-10 RX ORDER — PROCHLORPERAZINE MALEATE 10 MG
10 TABLET ORAL EVERY 6 HOURS PRN
Status: DISCONTINUED | OUTPATIENT
Start: 2023-10-10 | End: 2023-10-11 | Stop reason: HOSPADM

## 2023-10-10 RX ORDER — PROPOFOL 10 MG/ML
INJECTION, EMULSION INTRAVENOUS CONTINUOUS PRN
Status: DISCONTINUED | OUTPATIENT
Start: 2023-10-10 | End: 2023-10-10

## 2023-10-10 RX ADMIN — PROPOFOL 150 MCG/KG/MIN: 10 INJECTION, EMULSION INTRAVENOUS at 13:05

## 2023-10-10 RX ADMIN — GLYCOPYRROLATE 0.2 MG: 0.2 INJECTION, SOLUTION INTRAMUSCULAR; INTRAVENOUS at 13:05

## 2023-10-10 RX ADMIN — LIDOCAINE HYDROCHLORIDE 80 MG: 20 INJECTION, SOLUTION INFILTRATION; PERINEURAL at 13:05

## 2023-10-10 RX ADMIN — PROPOFOL 50 MG: 10 INJECTION, EMULSION INTRAVENOUS at 13:05

## 2023-10-10 RX ADMIN — SODIUM CHLORIDE, SODIUM LACTATE, POTASSIUM CHLORIDE, CALCIUM CHLORIDE: 600; 310; 30; 20 INJECTION, SOLUTION INTRAVENOUS at 12:37

## 2023-10-10 RX ADMIN — ACETAMINOPHEN 975 MG: 325 TABLET ORAL at 14:29

## 2023-10-10 RX ADMIN — PROPOFOL 50 MG: 10 INJECTION, EMULSION INTRAVENOUS at 13:37

## 2023-10-10 RX ADMIN — PROPOFOL 50 MG: 10 INJECTION, EMULSION INTRAVENOUS at 13:12

## 2023-10-10 NOTE — H&P
Lisette LINCOLN [facility]  [id number]  female  58 year old      Reason for procedure/surgery: abd pain, n/v, constipation with intervening loose stools on laxatives    Patient Active Problem List   Diagnosis    Dry eye syndrome    CARDIOVASCULAR SCREENING; LDL GOAL LESS THAN 160    Seasonal allergic rhinitis    Allergic rhinitis due to animal dander    Rhinitis, allergic to other allergen    House dust mite allergy    Moderate persistent asthma    PTSD (post-traumatic stress disorder)    Vitamin D deficiency    Esophageal reflux (GERD)    Depression with anxiety    Acute cystitis with hematuria    Type 2 diabetes mellitus without complication, without long-term current use of insulin (H)    Anemia    Dyspareunia (CODE)    Nasal obstruction    Sinusitis, chronic    Pulmonary nodule    Postmenopausal bleeding    Pelvic pain in female    NSTEMI (non-ST elevated myocardial infarction) (H)    Mild intermittent asthma without complication    Asthma, mild       Past Surgical History:    Past Surgical History:   Procedure Laterality Date    COLONOSCOPY  10/2020    EXCISE EXOSTOSIS FOOT Right 10/02/2018    Procedure: EXCISE EXOSTOSIS FOOT;  Right foot removal of first tarsometatarsal joint dorsal bossing;  Surgeon: Will Barraza DPM;  Location:  OR    GYN SURGERY  06/24/2016    Hysterectomy complete    HYSTEROSCOPY      OPTICAL TRACKING SYSTEM ENDOSCOPIC SINUS SURGERY Bilateral 8/7/2023    Procedure: stealth guided bilateral turbinate reduction with maxillary antrostomy and bilateral anterior ethmoidectomy.;  Surgeon: Keri Allen MD;  Location: [facility] [place]       Past Medical History:   Past Medical History:   Diagnosis Date    Allergic rhinitis due to animal dander     Amblyopia     LT    Arthritis     Colon polyp     Depressive disorder     Endometriosis     Heart attack (H)     2016    House dust mite allergy     Hypertension     Moderate persistent asthma     Rhinitis, allergic to other allergen     Seasonal allergic rhinitis  7/25/05 skin tests pos. for: cat/dog/horse/DM/M/T/G/RW per Dr. Granado    pt. did IT from 7/06 to 11/5/07 to: cat/dog/DM/M/T/G/W dc'd per self.     Type 2 diabetes mellitus without complication, without long-term current use of insulin (H) 03/13/2017       Social History:   Social History     Tobacco Use    Smoking status: Never    Smokeless tobacco: Never   Substance Use Topics    Alcohol use: Yes     Comment: Rarely       Family History:   Family History   Problem Relation Age of Onset    Hypertension Mother     Alzheimer Disease Mother     Diabetes Mother     Tremor Mother     Eye Surgery Father         cataracts    Macular Degeneration Father     Hypertension Father     Skin Cancer Father     Breast Cancer Maternal Grandmother     Tremor Maternal Grandfather     Alzheimer Disease Paternal Grandmother     Psychotic Disorder Paternal Grandmother         bipolar    Heart Disease Paternal Grandfather     Thyroid Disease Brother         Tumors removed    Tremor Brother     Mental Illness Brother         Bi-Polar    Thyroid Disease Sister     Diabetes Sister     Skin Cancer Sister     Cerebrovascular Disease No family hx of     Glaucoma No family hx of        Allergies:   Allergies   Allergen Reactions    Ampicillin Rash    Cipro [Quinolones] Anaphylaxis    Macrobid [Nitrofuran Derivatives] GI Disturbance    Ciprofloxacin Itching and Rash       Active Medications:   Current Outpatient Medications   Medication Sig Dispense Refill    aspirin  MG EC tablet Take 1 tablet (325 mg) by mouth daily 90 tablet 3    busPIRone (BUSPAR) 15 MG tablet TAKE 1 TABLET BY MOUTH 3 TIMES DAILY 270 tablet 0    Cholecalciferol (VITAMIN D3 PO) Take by mouth daily      FLUoxetine (PROZAC) 40 MG capsule TAKE 1 CAPSULE BY MOUTH ONCE DAILY 90 capsule 0    Fluticasone-Umeclidin-Vilanterol (TRELEGY ELLIPTA) 200-62.5-25 MCG/ACT oral inhaler INHALE 1 PUFF BY MOUTH ONCE DAILY, RINSE MOUTH AND GARGLE AFTER  each 1     HYDROcodone-acetaminophen (NORCO) 5-325 MG tablet Take 1 tablet by mouth every 4 hours as needed (Moderate to Severe Pain) 10 tablet 0    LINZESS 145 MCG capsule TAKE 1 CAPSULE BY MOUTH EVERY MORNING before breakfast 90 capsule 0    losartan (COZAAR) 25 MG tablet TAKE 1 TABLET BY MOUTH ONCE DAILY 90 tablet 0    metFORMIN (GLUCOPHAGE XR) 500 MG 24 hr tablet Take 2 tablets (1,000 mg) by mouth 2 times daily (with meals) 360 tablet 1    modafinil (PROVIGIL) 100 MG tablet TAKE 1 TABLET BY MOUTH ONCE DAILY 30 tablet 0    omeprazole (PRILOSEC) 20 MG DR capsule Take 1 capsule (20 mg) by mouth 2 times daily . If syjmptomatically improved then can reduce to once daily. 60 capsule 11    rosuvastatin (CRESTOR) 5 MG tablet TAKE 1 TABLET BY MOUTH ONCE DAILY 90 tablet 1    albuterol (PROAIR HFA/PROVENTIL HFA/VENTOLIN HFA) 108 (90 Base) MCG/ACT inhaler INHALE 2 PUFFS BY MOUTH EVERY 6 HOURS 18 g 4    albuterol (PROVENTIL) (2.5 MG/3ML) 0.083% neb solution INHALE 1 VIAL VIA NEBULIZER EVERY 4 HOURS AS NEEDED FOR SHORTNESS OF BREATH 90 mL 0    bisacodyl (DULCOLAX) 5 MG EC tablet Two days prior to exam take two (2) tablets at 4pm. One day prior to exam take two (2) tablets at 4pm 4 tablet 0    blood glucose (NO BRAND SPECIFIED) lancets standard Use to test blood sugar 1 times daily or as directed. 100 Lancet 3    blood glucose (NO BRAND SPECIFIED) test strip Use to test blood sugar 1 times daily or as directed. 100 strip 3    budesonide (PULMICORT) 0.5 MG/2ML neb solution Empty contents of ampule into 240mL of saline solution and rinse both nasal cavities as instructed twice daily. 240 mL 6    nitroGLYcerin (NITROSTAT) 0.4 MG sublingual tablet For chest pain place 1 tablet under the tongue every 5 minutes for 3 doses. If symptoms persist 5 minutes after 1st dose call 911. 25 tablet 4    oxymetazoline (AFRIN NASAL SPRAY) 0.05 % nasal spray Use 2 sprays to each nare two times daily for up to 3 days. 15 mL 0    polyethylene glycol  "(GOLYTELY) 236 g suspension Take as directed. Two days before your exam fill the first container with water. Cover and shake until mixed well. At 5:00pm drink one 8oz glass every 10-15 minutes until half (1/2) of the first container is empty. Store the remainder in the refrigerator. One day before your exam at 5:00pm drink the second half of the first container until it is gone. Before you go to bed mix the second container with water and put in refrigerator. Six hours before your check in time drink one 8oz glass every 10-15 minutes until half of container is empty. Discard the remainder of solution. 8000 mL 0    sodium chloride (OCEAN) 0.65 % nasal spray Spray 1-2 sprays in nostril every hour as needed for congestion (nasal dryness) Use in EACH nostril. 44 mL 1       Systemic Review:   ROS otherwise negative    Physical Examination:   Vital Signs: BP (!) 145/48 (BP Location: Right arm)   Pulse 60   Temp 97  F (36.1  C) (Temporal)   Resp 18   Ht 1.689 m (5' 6.5\")   Wt 80.7 kg (178 lb)   LMP 05/15/2014 (Approximate)   SpO2 97%   BMI 28.30 kg/m    GENERAL: healthy, alert and no distress  HENT: oropharynx clear and oral mucous membranes moist, Mallampati II  NECK: Normal ROM  RESP: lungs clear to auscultation - no rales, rhonchi or wheezes  CV: regular rate and rhythm, normal S1 S2,   ABDOMEN: soft, nontender, and bowel sounds normal  MS: no gross musculoskeletal defects noted, no edema    Plan: Appropriate to proceed as scheduled.      Lulu Fields MD  10/10/2023    PCP:  Kolton Yin    "

## 2023-10-10 NOTE — ANESTHESIA POSTPROCEDURE EVALUATION
Patient: Lisette LINCOLN Ukaegbu    Procedure: Procedure(s):  Esophagoscopy, gastroscopy, duodenoscopy (EGD), combined with Biopsies  Colonoscopy - with Biopsy       Anesthesia Type:  MAC    Note:  Disposition: Outpatient   Postop Pain Control: Uneventful            Sign Out: Well controlled pain   PONV: No   Neuro/Psych: Uneventful            Sign Out: Acceptable/Baseline neuro status   Airway/Respiratory: Uneventful            Sign Out: Acceptable/Baseline resp. status   CV/Hemodynamics: Uneventful            Sign Out: Acceptable CV status; No obvious hypovolemia; No obvious fluid overload   Other NRE: NONE   DID A NON-ROUTINE EVENT OCCUR? No           Last vitals:  Vitals Value Taken Time   /76 10/10/23 1430   Temp 36.5  C (97.7  F) 10/10/23 1430   Pulse 57 10/10/23 1430   Resp 20 10/10/23 1430   SpO2 99 % 10/10/23 1430       Electronically Signed By: Herman Hernandez MD  October 10, 2023  3:25 PM

## 2023-10-10 NOTE — ANESTHESIA CARE TRANSFER NOTE
Patient: Lisette LINCOLN Ukaegbu    Procedure: Procedure(s):  Esophagoscopy, gastroscopy, duodenoscopy (EGD), combined with Biopsies  Colonoscopy - with Biopsy       Diagnosis: Chronic constipation [K59.09]  Epigastric pain [R10.13]  Abdominal pain, left lower quadrant [R10.32]  Nausea and vomiting, unspecified vomiting type [R11.2]  Diagnosis Additional Information: No value filed.    Anesthesia Type:   MAC     Note:    Oropharynx: oropharynx clear of all foreign objects and spontaneously breathing  Level of Consciousness: awake  Oxygen Supplementation: room air    Independent Airway: airway patency satisfactory and stable  Dentition: dentition unchanged  Vital Signs Stable: post-procedure vital signs reviewed and stable  Report to RN Given: handoff report given  Patient transferred to: Phase II    Handoff Report: Identifed the Patient, Identified the Reponsible Provider, Reviewed the pertinent medical history, Discussed the surgical course, Reviewed Intra-OP anesthesia mangement and issues during anesthesia, Set expectations for post-procedure period and Allowed opportunity for questions and acknowledgement of understanding      Vitals:  Vitals Value Taken Time   /73 10/10/23 1357   Temp 36.5  C (97.7  F) 10/10/23 1357   Pulse 67 10/10/23 1357   Resp 18 10/10/23 1357   SpO2 99 % 10/10/23 1357       Electronically Signed By: ZIGGY MORRISON  October 10, 2023  2:00 PM

## 2023-10-10 NOTE — ANESTHESIA PREPROCEDURE EVALUATION
Anesthesia Pre-Procedure Evaluation    Patient: Lisette LINCOLN Ukaegbu   MRN: [id number] : 1965        Procedure : Procedure(s):  Esophagoscopy, gastroscopy, duodenoscopy (EGD), combined  Colonoscopy          Past Medical History:   Diagnosis Date    Allergic rhinitis due to animal dander     Amblyopia     LT    Arthritis     Colon polyp     Depressive disorder     Endometriosis     Heart attack (H)         House dust mite allergy     Hypertension     Moderate persistent asthma     Rhinitis, allergic to other allergen     Seasonal allergic rhinitis 05 skin tests pos. for: cat/dog/horse/DM/M/T/G/RW per Dr. Granado    pt. did IT from  to 07 to: cat/dog/DM/M/T/G/W dc'd per self.     Type 2 diabetes mellitus without complication, without long-term current use of insulin (H) 2017      Past Surgical History:   Procedure Laterality Date    COLONOSCOPY  10/2020    EXCISE EXOSTOSIS FOOT Right 10/02/2018    Procedure: EXCISE EXOSTOSIS FOOT;  Right foot removal of first tarsometatarsal joint dorsal bossing;  Surgeon: Will Barraza DPM;  Location: [facility] OR    GYN SURGERY  2016    Hysterectomy complete    HYSTEROSCOPY      OPTICAL TRACKING SYSTEM ENDOSCOPIC SINUS SURGERY Bilateral 2023    Procedure: stealth guided bilateral turbinate reduction with maxillary antrostomy and bilateral anterior ethmoidectomy.;  Surgeon: Keri Allen MD;  Location: [facility]      Allergies   Allergen Reactions    Ampicillin Rash    Cipro [Quinolones] Anaphylaxis    Macrobid [Nitrofuran Derivatives] GI Disturbance    Ciprofloxacin Itching and Rash      Social History     Tobacco Use    Smoking status: Never    Smokeless tobacco: Never   Substance Use Topics    Alcohol use: Yes     Comment: Rarely      Wt Readings from Last 1 Encounters:   10/10/23 80.7 kg (178 lb)        Anesthesia Evaluation            ROS/MED HX  ENT/Pulmonary:     (+)           allergic rhinitis,         Moderate Persistent, asthma  Treatment:  Inhaler daily and Inhaler prn,                 Neurologic:  - neg neurologic ROS     Cardiovascular:     (+)  hypertension- -  CAD -  - -                                      METS/Exercise Tolerance: >4 METS    Hematologic:  - neg hematologic  ROS     Musculoskeletal:   (+)  arthritis,             GI/Hepatic:  - neg GI/hepatic ROS   (+) GERD,                   Renal/Genitourinary:  - neg Renal ROS     Endo:  - neg endo ROS   (+)  type II DM,   Not using insulin,                 Psychiatric/Substance Use:     (+) psychiatric history 1 and depression       Infectious Disease:  - neg infectious disease ROS     Malignancy:  - neg malignancy ROS     Other:  - neg other ROS          Physical Exam    Airway  airway exam normal      Mallampati: I   TM distance: > 3 FB   Neck ROM: full   Mouth opening: > 3 cm    Respiratory Devices and Support         Dental       (+) Completely normal teeth      Cardiovascular   cardiovascular exam normal       Rhythm and rate: regular and normal     Pulmonary   pulmonary exam normal        breath sounds clear to auscultation           OUTSIDE LABS:  CBC:   Lab Results   Component Value Date    WBC 5.4 08/03/2023    WBC 5.8 06/23/2023    HGB 12.9 08/03/2023    HGB 12.4 06/23/2023    HCT 39.5 08/03/2023    HCT 37.6 06/23/2023     08/03/2023     06/23/2023     BMP:   Lab Results   Component Value Date     08/03/2023     08/10/2022    POTASSIUM 4.4 08/03/2023    POTASSIUM 3.9 08/10/2022    CHLORIDE 106 08/03/2023    CHLORIDE 105 08/10/2022    CO2 20 (L) 08/03/2023    CO2 24 08/10/2022    BUN 14.4 08/03/2023    BUN 13 08/10/2022    CR 0.87 08/03/2023    CR 0.83 08/10/2022    GLC 85 10/10/2023     (H) 08/07/2023     COAGS: No results found for: PTT, INR, FIBR  POC:   Lab Results   Component Value Date     (H) 10/15/2020     HEPATIC:   Lab Results   Component Value Date    ALBUMIN 4.8 08/03/2023    PROTTOTAL 7.4 08/03/2023    ALT 20 08/03/2023    AST 28  08/03/2023    ALKPHOS 42 08/03/2023    BILITOTAL 0.3 08/03/2023     OTHER:   Lab Results   Component Value Date    A1C 6.2 (H) 06/23/2023    ARIANA 10.0 08/03/2023    LIPASE 162 04/15/2016    TSH 1.17 06/23/2023       Anesthesia Plan    ASA Status:  3    NPO Status:  NPO Appropriate    Anesthesia Type: MAC.     - Reason for MAC: Deep or markedly invasive procedure (G8)   Induction: Propofol.   Maintenance: N/A.        Consents    Anesthesia Plan(s) and associated risks, benefits, and realistic alternatives discussed. Questions answered and patient/representative(s) expressed understanding.     - Discussed:     - Discussed with:  Patient       Use of blood products discussed: No .     Postoperative Care            Comments:           H&P reviewed: Unable to attach H&P to encounter due to EHR limitations. H&P Update: appropriate H&P reviewed, patient examined. No interval changes since H&P (within 30 days).         Roger Mack, DO

## 2023-10-11 LAB
PATH REPORT.COMMENTS IMP SPEC: NORMAL
PATH REPORT.COMMENTS IMP SPEC: NORMAL
PATH REPORT.FINAL DX SPEC: NORMAL
PATH REPORT.GROSS SPEC: NORMAL
PATH REPORT.MICROSCOPIC SPEC OTHER STN: NORMAL
PATH REPORT.RELEVANT HX SPEC: NORMAL
PHOTO IMAGE: NORMAL

## 2023-10-12 NOTE — RESULT ENCOUNTER NOTE
Quinn Knox,    Reports and subsequent biopsies from your recent endoscopic exams are available for you to review.  In short, everything looks normal.  There were no abnormal findings within the lining of the esophagus, stomach, small intestine, or colon.  Biopsies from these areas do not show any abnormalities.    How have you been feeling since your last visit with me?    Sincerely,  Valeriano HERZOG [facility] GI, Hepatology, and Nutrition

## 2023-11-08 ENCOUNTER — VIRTUAL VISIT (OUTPATIENT)
Dept: GASTROENTEROLOGY | Facility: CLINIC | Age: 58
End: 2023-11-08
Attending: PHYSICIAN ASSISTANT
Payer: COMMERCIAL

## 2023-11-08 ENCOUNTER — TELEPHONE (OUTPATIENT)
Dept: FAMILY MEDICINE | Facility: CLINIC | Age: 58
End: 2023-11-08

## 2023-11-08 VITALS — HEIGHT: 67 IN | WEIGHT: 178 LBS | BODY MASS INDEX: 27.94 KG/M2

## 2023-11-08 DIAGNOSIS — R11.2 NAUSEA AND VOMITING, UNSPECIFIED VOMITING TYPE: ICD-10-CM

## 2023-11-08 DIAGNOSIS — K59.09 CHRONIC CONSTIPATION: Primary | ICD-10-CM

## 2023-11-08 PROCEDURE — 99214 OFFICE O/P EST MOD 30 MIN: CPT | Mod: VID | Performed by: PHYSICIAN ASSISTANT

## 2023-11-08 ASSESSMENT — PAIN SCALES - GENERAL: PAINLEVEL: NO PAIN (0)

## 2023-11-08 NOTE — PROGRESS NOTES
GASTROENTEROLOGY Follow-up VIDEO VISIT    CC/REFERRING MD:    Kolton Yin    REASON FOR CONSULTATION:   Valeriano Sheets for   Chief Complaint   Patient presents with    RECHECK       HISTORY OF PRESENT ILLNESS:    Lisette Owens is a 58 year old female who is being evaluated via a billable video visit for follow-up.  To review, I initially met with patient about 3 months ago for symptoms of recurrent nausea, vomiting, lower abdominal pain, and chronic constipation.  She had been treated with Linzess and Dulcolax with some improvement in her stool output, but was still largely having her symptoms.  Previous colonoscopy had been notable for a tortuous colon, had not had EGD before.    Based on her symptoms, I recommended EGD and colonoscopy to further investigate for possible causes of her symptoms.  Her EGD was essentially normal and colonoscopy was essentially normal appearing, but she did have some retained stool.  This was despite a 2-day prep with GoLytely.    She follows up today stating that her symptoms are about the same.  Using Linzess daily along with Dulcolax and getting some variable stool output, but still feels like she is retaining a lot of stool, which leads to nausea.      I have reviewed and updated the patient's Past Medical History, Social History, Family History and Medication List.    Exam:    General appearance:  Healthy appearing adult, in no acute distress  Eyes:  Sclera anicteric, Pupils round and reactive to light  Ears, nose, mouth and throat:  No obvious external lesions of ears and nose.  Hearing intact  Neck:  Symmetric, No obvious external lesions  Respiratory:  Normal respiration, no use of accessory muscles   MSK:  No visual upper extremity, neck or facial muscle atrophy  ABD:  No visual abdominal distention, no audible borborygmi  Skin:  No rashes or jaundice   Psychiatric:  Oriented to person, place and time, Appropriate mood and affect.   Neurologic:   Peripheral muscle function and dexterity appear to be intact      PERTINENT STUDIES have been reviewed.    ASSESSMENT/PLAN:    Lisette Owens is a 58 year old female who presents for follow up of symptoms of nausea, vomiting, lower abdominal pain and constipation.  Reviewing her recent endoscopic exams, I think her symptoms are largely related to her slow transit constipation.  We are going to try to improve her stool output by increasing Linzess to 290 mcg daily.  She will hold the Dulcolax for a few days and if not getting adequate stools, she can have the Dulcolax back in.  I will call her in a month to get an update on her symptoms.  If this is not effective, plan to transition to either Amitiza or consider Motegrity.    1. Chronic constipation  - linaclotide (LINZESS) 290 MCG capsule; Take 1 capsule (290 mcg) by mouth every morning (before breakfast)  Dispense: 30 capsule; Refill: 1    2. Nausea and vomiting, unspecified vomiting type      Video-Visit Details    Video Visit Time: 15 minutes    Type of service:  Video Visit    Originating Location (pt. Location): Home    Distant Location (provider location):  On-site    Platform used for Video Visit: Anatoliy    A total of 20 minutes was spent with reviewing the chart, discussing with the patient, documentation and coordination of care.    Valeriano Sheets PA-C  Division of Gastroenterology, Hepatology, and Nutrition  [facility]  [phone]

## 2023-11-08 NOTE — NURSING NOTE
Is the patient currently in the state of [place]? YES    Visit mode:VIDEO    If the visit is dropped, the patient can be reconnected by: VIDEO VISIT: Text to cell phone:   Telephone Information:   Mobile [phone]       Will anyone else be joining the visit? NO  (If patient encounters technical issues they should call [phone]    How would you like to obtain your AVS? MyChart    Are changes needed to the allergy or medication list? No, Pt stated no changes to allergies, and Pt stated no med changes    Reason for visit: CHASITY AGUIAR

## 2023-11-08 NOTE — TELEPHONE ENCOUNTER
Reason for Call:  Other call back    Detailed comments:  Patient has virtual with you Wednesday 11/15 wondering if you could call her struggling with depression & anxiety     Phone Number Patient can be reached at: Home number on file [phone] (home)    Best Time:  Any    Can we leave a detailed message on this number? YES    Call taken on 11/8/2023 at 12:00 PM by Taylor Raygoza

## 2023-11-12 DIAGNOSIS — E11.9 TYPE 2 DIABETES MELLITUS WITHOUT COMPLICATION, WITHOUT LONG-TERM CURRENT USE OF INSULIN (H): ICD-10-CM

## 2023-11-13 RX ORDER — METFORMIN HCL 500 MG
1000 TABLET, EXTENDED RELEASE 24 HR ORAL 2 TIMES DAILY WITH MEALS
Qty: 360 TABLET | Refills: 0 | Status: SHIPPED | OUTPATIENT
Start: 2023-11-13 | End: 2024-03-04

## 2023-11-15 ENCOUNTER — VIRTUAL VISIT (OUTPATIENT)
Dept: FAMILY MEDICINE | Facility: CLINIC | Age: 58
End: 2023-11-15
Payer: COMMERCIAL

## 2023-11-15 DIAGNOSIS — F41.8 DEPRESSION WITH ANXIETY: Primary | ICD-10-CM

## 2023-11-15 PROCEDURE — 99442 PR PHYSICIAN TELEPHONE EVALUATION 11-20 MIN: CPT | Performed by: PHYSICIAN ASSISTANT

## 2023-11-15 ASSESSMENT — ANXIETY QUESTIONNAIRES
5. BEING SO RESTLESS THAT IT IS HARD TO SIT STILL: NEARLY EVERY DAY
2. NOT BEING ABLE TO STOP OR CONTROL WORRYING: NEARLY EVERY DAY
IF YOU CHECKED OFF ANY PROBLEMS ON THIS QUESTIONNAIRE, HOW DIFFICULT HAVE THESE PROBLEMS MADE IT FOR YOU TO DO YOUR WORK, TAKE CARE OF THINGS AT HOME, OR GET ALONG WITH OTHER PEOPLE: VERY DIFFICULT
6. BECOMING EASILY ANNOYED OR IRRITABLE: NEARLY EVERY DAY
GAD7 TOTAL SCORE: 21
3. WORRYING TOO MUCH ABOUT DIFFERENT THINGS: NEARLY EVERY DAY
7. FEELING AFRAID AS IF SOMETHING AWFUL MIGHT HAPPEN: NEARLY EVERY DAY
4. TROUBLE RELAXING: NEARLY EVERY DAY
GAD7 TOTAL SCORE: 21
1. FEELING NERVOUS, ANXIOUS, OR ON EDGE: NEARLY EVERY DAY

## 2023-11-15 ASSESSMENT — ASTHMA QUESTIONNAIRES: ACT_TOTALSCORE: 18

## 2023-11-15 NOTE — PROGRESS NOTES
Lisette is a 58 year old who is being evaluated via a billable telephone visit.      What phone number would you like to be contacted at? [phone]  How would you like to obtain your AVS? [employer]    Distant Location (provider location):  on site      Subjective   Lisette is a 58 year old, presenting for the following health issues:  Mental Health Problem      11/15/2023     8:33 AM   Additional Questions   Roomed by completed through [employer]   Accompanied by None         11/15/2023     8:33 AM   Patient Reported Additional Medications   Patient reports taking the following new medications none       Mental Health Problem    History of Present Illness       Mental Health Follow-up:  Patient presents to follow-up on Depression & Anxiety.Patient's depression since last visit has been:  Worse  The patient is having other symptoms associated with depression.  Patient's anxiety since last visit has been:  Worse  The patient is having other symptoms associated with anxiety.  Any significant life events: job concerns and grief or loss  Patient is feeling anxious or having panic attacks.  Patient has no concerns about alcohol or drug use.    She eats 0-1 servings of fruits and vegetables daily.She consumes 0 sweetened beverage(s) daily.She exercises with enough effort to increase her heart rate 9 or less minutes per day.  She exercises with enough effort to increase her heart rate 3 or less days per week.   She is taking medications regularly.       Noting more  anxiety. Some depression .sleeping ok. Some irritability   Review of Systems   Constitutional, HEENT, cardiovascular, pulmonary, GI, , musculoskeletal, neuro, skin, endocrine and psych systems are negative, except as otherwise noted.      Objective           Vitals:  No vitals were obtained today due to virtual visit.    Physical Exam   healthy, alert, and no distress  PSYCH: Alert and oriented times 3; coherent speech, normal   rate and volume, able to articulate  logical thoughts, able   to abstract reason, no tangential thoughts, no hallucinations   or delusions  Her affect is normal  RESP: No cough, no audible wheezing, able to talk in full sentences  Remainder of exam unable to be completed due to telephone visits  Lisette was seen today for mental health problem.    Diagnoses and all orders for this visit:    Depression with anxiety      Inc buspar to 15 mg tid.   If not helpful will trial an increase of prozac to 60 mg daily.          Phone call duration: 12 minutes

## 2023-12-01 ENCOUNTER — MYC MEDICAL ADVICE (OUTPATIENT)
Dept: GASTROENTEROLOGY | Facility: CLINIC | Age: 58
End: 2023-12-01
Payer: COMMERCIAL

## 2023-12-01 DIAGNOSIS — K59.09 CHRONIC CONSTIPATION: Primary | ICD-10-CM

## 2023-12-01 RX ORDER — LUBIPROSTONE 24 UG/1
24 CAPSULE ORAL 2 TIMES DAILY WITH MEALS
Qty: 60 CAPSULE | Refills: 1 | Status: SHIPPED | OUTPATIENT
Start: 2023-12-01 | End: 2024-03-19

## 2023-12-13 DIAGNOSIS — F41.8 DEPRESSION WITH ANXIETY: ICD-10-CM

## 2023-12-13 RX ORDER — FLUOXETINE 40 MG/1
CAPSULE ORAL
Qty: 30 CAPSULE | Refills: 0 | OUTPATIENT
Start: 2023-12-13

## 2024-01-28 ENCOUNTER — HEALTH MAINTENANCE LETTER (OUTPATIENT)
Age: 59
End: 2024-01-28

## 2024-02-14 DIAGNOSIS — E11.9 TYPE 2 DIABETES MELLITUS WITHOUT COMPLICATION, WITHOUT LONG-TERM CURRENT USE OF INSULIN (H): ICD-10-CM

## 2024-02-14 RX ORDER — LOSARTAN POTASSIUM 25 MG/1
TABLET ORAL
Qty: 90 TABLET | Refills: 2 | Status: SHIPPED | OUTPATIENT
Start: 2024-02-14

## 2024-03-04 ENCOUNTER — OFFICE VISIT (OUTPATIENT)
Dept: FAMILY MEDICINE | Facility: CLINIC | Age: 59
End: 2024-03-04
Payer: COMMERCIAL

## 2024-03-04 VITALS
WEIGHT: 187.6 LBS | SYSTOLIC BLOOD PRESSURE: 136 MMHG | TEMPERATURE: 97 F | DIASTOLIC BLOOD PRESSURE: 84 MMHG | OXYGEN SATURATION: 98 % | RESPIRATION RATE: 20 BRPM | HEIGHT: 67 IN | BODY MASS INDEX: 29.44 KG/M2 | HEART RATE: 77 BPM

## 2024-03-04 DIAGNOSIS — R53.82 CHRONIC FATIGUE: ICD-10-CM

## 2024-03-04 DIAGNOSIS — D50.9 IRON DEFICIENCY ANEMIA, UNSPECIFIED IRON DEFICIENCY ANEMIA TYPE: ICD-10-CM

## 2024-03-04 DIAGNOSIS — E55.9 VITAMIN D DEFICIENCY: ICD-10-CM

## 2024-03-04 DIAGNOSIS — E53.8 VITAMIN B12 DEFICIENCY (NON ANEMIC): ICD-10-CM

## 2024-03-04 DIAGNOSIS — E78.5 HYPERLIPIDEMIA LDL GOAL <100: ICD-10-CM

## 2024-03-04 DIAGNOSIS — R63.5 WEIGHT GAIN: ICD-10-CM

## 2024-03-04 DIAGNOSIS — E11.9 TYPE 2 DIABETES MELLITUS WITHOUT COMPLICATION, WITHOUT LONG-TERM CURRENT USE OF INSULIN (H): Primary | ICD-10-CM

## 2024-03-04 DIAGNOSIS — F41.8 DEPRESSION WITH ANXIETY: ICD-10-CM

## 2024-03-04 DIAGNOSIS — R40.0 DAYTIME SOMNOLENCE: ICD-10-CM

## 2024-03-04 LAB
BASOPHILS # BLD AUTO: 0.1 10E3/UL (ref 0–0.2)
BASOPHILS NFR BLD AUTO: 1 %
EOSINOPHIL # BLD AUTO: 0.5 10E3/UL (ref 0–0.7)
EOSINOPHIL NFR BLD AUTO: 7 %
ERYTHROCYTE [DISTWIDTH] IN BLOOD BY AUTOMATED COUNT: 13.8 % (ref 10–15)
HBA1C MFR BLD: 6.4 % (ref 0–5.6)
HCT VFR BLD AUTO: 36.7 % (ref 35–47)
HGB BLD-MCNC: 11.6 G/DL (ref 11.7–15.7)
IMM GRANULOCYTES # BLD: 0 10E3/UL
IMM GRANULOCYTES NFR BLD: 0 %
LYMPHOCYTES # BLD AUTO: 2.3 10E3/UL (ref 0.8–5.3)
LYMPHOCYTES NFR BLD AUTO: 38 %
MCH RBC QN AUTO: 29.4 PG (ref 26.5–33)
MCHC RBC AUTO-ENTMCNC: 31.6 G/DL (ref 31.5–36.5)
MCV RBC AUTO: 93 FL (ref 78–100)
MONOCYTES # BLD AUTO: 0.5 10E3/UL (ref 0–1.3)
MONOCYTES NFR BLD AUTO: 7 %
NEUTROPHILS # BLD AUTO: 2.8 10E3/UL (ref 1.6–8.3)
NEUTROPHILS NFR BLD AUTO: 46 %
PLATELET # BLD AUTO: 289 10E3/UL (ref 150–450)
RBC # BLD AUTO: 3.95 10E6/UL (ref 3.8–5.2)
WBC # BLD AUTO: 6.1 10E3/UL (ref 4–11)

## 2024-03-04 PROCEDURE — 83036 HEMOGLOBIN GLYCOSYLATED A1C: CPT | Performed by: PHYSICIAN ASSISTANT

## 2024-03-04 PROCEDURE — 85025 COMPLETE CBC W/AUTO DIFF WBC: CPT | Performed by: PHYSICIAN ASSISTANT

## 2024-03-04 PROCEDURE — 82607 VITAMIN B-12: CPT | Performed by: PHYSICIAN ASSISTANT

## 2024-03-04 PROCEDURE — 36415 COLL VENOUS BLD VENIPUNCTURE: CPT | Performed by: PHYSICIAN ASSISTANT

## 2024-03-04 PROCEDURE — 83921 ORGANIC ACID SINGLE QUANT: CPT | Performed by: PHYSICIAN ASSISTANT

## 2024-03-04 PROCEDURE — 83550 IRON BINDING TEST: CPT | Performed by: PHYSICIAN ASSISTANT

## 2024-03-04 PROCEDURE — 82043 UR ALBUMIN QUANTITATIVE: CPT | Performed by: PHYSICIAN ASSISTANT

## 2024-03-04 PROCEDURE — 82728 ASSAY OF FERRITIN: CPT | Performed by: PHYSICIAN ASSISTANT

## 2024-03-04 PROCEDURE — 82306 VITAMIN D 25 HYDROXY: CPT | Performed by: PHYSICIAN ASSISTANT

## 2024-03-04 PROCEDURE — 83540 ASSAY OF IRON: CPT | Performed by: PHYSICIAN ASSISTANT

## 2024-03-04 PROCEDURE — 82570 ASSAY OF URINE CREATININE: CPT | Performed by: PHYSICIAN ASSISTANT

## 2024-03-04 PROCEDURE — 80061 LIPID PANEL: CPT | Performed by: PHYSICIAN ASSISTANT

## 2024-03-04 PROCEDURE — 84443 ASSAY THYROID STIM HORMONE: CPT | Performed by: PHYSICIAN ASSISTANT

## 2024-03-04 PROCEDURE — 99214 OFFICE O/P EST MOD 30 MIN: CPT | Performed by: PHYSICIAN ASSISTANT

## 2024-03-04 RX ORDER — MODAFINIL 200 MG/1
200 TABLET ORAL DAILY
Qty: 30 TABLET | Refills: 0 | Status: SHIPPED | OUTPATIENT
Start: 2024-03-04 | End: 2024-04-29

## 2024-03-04 RX ORDER — METFORMIN HCL 500 MG
1000 TABLET, EXTENDED RELEASE 24 HR ORAL 2 TIMES DAILY WITH MEALS
Qty: 360 TABLET | Refills: 0 | Status: SHIPPED | OUTPATIENT
Start: 2024-03-04 | End: 2024-06-07

## 2024-03-04 RX ORDER — FLUOXETINE 40 MG/1
40 CAPSULE ORAL DAILY
Qty: 90 CAPSULE | Refills: 1 | Status: SHIPPED | OUTPATIENT
Start: 2024-03-04 | End: 2024-09-04

## 2024-03-04 RX ORDER — ROSUVASTATIN CALCIUM 5 MG/1
5 TABLET, COATED ORAL DAILY
Qty: 90 TABLET | Refills: 1 | Status: SHIPPED | OUTPATIENT
Start: 2024-03-04 | End: 2024-08-07

## 2024-03-04 RX ORDER — TIRZEPATIDE 2.5 MG/.5ML
2.5 INJECTION, SOLUTION SUBCUTANEOUS
Qty: 2 ML | Refills: 0 | Status: SHIPPED | OUTPATIENT
Start: 2024-03-04 | End: 2024-08-07

## 2024-03-04 RX ORDER — BUSPIRONE HYDROCHLORIDE 15 MG/1
15 TABLET ORAL 3 TIMES DAILY
Qty: 270 TABLET | Refills: 1 | Status: SHIPPED | OUTPATIENT
Start: 2024-03-04 | End: 2024-09-04

## 2024-03-04 ASSESSMENT — ANXIETY QUESTIONNAIRES
GAD7 TOTAL SCORE: 7
8. IF YOU CHECKED OFF ANY PROBLEMS, HOW DIFFICULT HAVE THESE MADE IT FOR YOU TO DO YOUR WORK, TAKE CARE OF THINGS AT HOME, OR GET ALONG WITH OTHER PEOPLE?: SOMEWHAT DIFFICULT
GAD7 TOTAL SCORE: 7
3. WORRYING TOO MUCH ABOUT DIFFERENT THINGS: SEVERAL DAYS
5. BEING SO RESTLESS THAT IT IS HARD TO SIT STILL: SEVERAL DAYS
GAD7 TOTAL SCORE: 7
6. BECOMING EASILY ANNOYED OR IRRITABLE: SEVERAL DAYS
7. FEELING AFRAID AS IF SOMETHING AWFUL MIGHT HAPPEN: SEVERAL DAYS
4. TROUBLE RELAXING: SEVERAL DAYS
7. FEELING AFRAID AS IF SOMETHING AWFUL MIGHT HAPPEN: SEVERAL DAYS
1. FEELING NERVOUS, ANXIOUS, OR ON EDGE: SEVERAL DAYS
IF YOU CHECKED OFF ANY PROBLEMS ON THIS QUESTIONNAIRE, HOW DIFFICULT HAVE THESE PROBLEMS MADE IT FOR YOU TO DO YOUR WORK, TAKE CARE OF THINGS AT HOME, OR GET ALONG WITH OTHER PEOPLE: SOMEWHAT DIFFICULT
2. NOT BEING ABLE TO STOP OR CONTROL WORRYING: SEVERAL DAYS

## 2024-03-04 ASSESSMENT — PATIENT HEALTH QUESTIONNAIRE - PHQ9
10. IF YOU CHECKED OFF ANY PROBLEMS, HOW DIFFICULT HAVE THESE PROBLEMS MADE IT FOR YOU TO DO YOUR WORK, TAKE CARE OF THINGS AT HOME, OR GET ALONG WITH OTHER PEOPLE: VERY DIFFICULT
SUM OF ALL RESPONSES TO PHQ QUESTIONS 1-9: 15
SUM OF ALL RESPONSES TO PHQ QUESTIONS 1-9: 15

## 2024-03-04 ASSESSMENT — ASTHMA QUESTIONNAIRES
ACT_TOTALSCORE: 23
QUESTION_4 LAST FOUR WEEKS HOW OFTEN HAVE YOU USED YOUR RESCUE INHALER OR NEBULIZER MEDICATION (SUCH AS ALBUTEROL): NOT AT ALL
ACT_TOTALSCORE: 23
QUESTION_1 LAST FOUR WEEKS HOW MUCH OF THE TIME DID YOUR ASTHMA KEEP YOU FROM GETTING AS MUCH DONE AT WORK, SCHOOL OR AT HOME: NONE OF THE TIME
QUESTION_2 LAST FOUR WEEKS HOW OFTEN HAVE YOU HAD SHORTNESS OF BREATH: ONCE OR TWICE A WEEK
QUESTION_3 LAST FOUR WEEKS HOW OFTEN DID YOUR ASTHMA SYMPTOMS (WHEEZING, COUGHING, SHORTNESS OF BREATH, CHEST TIGHTNESS OR PAIN) WAKE YOU UP AT NIGHT OR EARLIER THAN USUAL IN THE MORNING: NOT AT ALL
QUESTION_5 LAST FOUR WEEKS HOW WOULD YOU RATE YOUR ASTHMA CONTROL: WELL CONTROLLED

## 2024-03-04 ASSESSMENT — PAIN SCALES - GENERAL: PAINLEVEL: NO PAIN (0)

## 2024-03-04 NOTE — PROGRESS NOTES
Luis Knox is a 59 year old, presenting for the following health issues:  Recheck Medication and Health Maintenance (Patient is not fasting/Patient declines vaccines today)        3/4/2024     4:39 PM   Additional Questions   Roomed by Keri Camargo CMA   Accompanied by None         3/4/2024     4:39 PM   Patient Reported Additional Medications   Patient reports taking the following new medications none     History of Present Illness     Asthma:  She presents for follow up of asthma.  She has some cough, no wheezing, and no shortness of breath.  She is using a relief medication a few times a month. She does not miss any doses of her controller medication throughout the week. Patient is aware of the following triggers: same as previous visit, animal dander, cold air, exercise or sports, mold, pollens, smoke, strong odors and fumes and upper respiratory infections. The patient has not had a visit to the Emergency Room, Urgent Care or Hospital due to asthma since the last clinic visit.     Mental Health Follow-up:  Patient presents to follow-up on Depression & Anxiety.Patient's depression since last visit has been:  Medium  The patient is not having other symptoms associated with depression.  Patient's anxiety since last visit has been:  Better  The patient is not having other symptoms associated with anxiety.  Any significant life events: job concerns and grief or loss  Patient is not feeling anxious or having panic attacks.  Patient has no concerns about alcohol or drug use.    Diabetes:   She presents for follow up of diabetes.  She is checking home blood glucose a few times a month.   She checks blood glucose before meals.  Blood glucose is never over 200 and never under 70. She is aware of hypoglycemia symptoms including shakiness, dizziness and weakness.   She is concerned about other.   She is having weight gain.  The patient has not had a diabetic eye exam in the last 12 months.   "        Hyperlipidemia:  She presents for follow up of hyperlipidemia.   She is taking medication to lower cholesterol. She is not having myalgia or other side effects to statin medications.    Hypertension: She presents for follow up of hypertension.  She does not check blood pressure  regularly outside of the clinic. Outpatient blood pressures have not been over 140/90. She follows a low salt diet.     She eats 2-3 servings of fruits and vegetables daily.She consumes 0 sweetened beverage(s) daily.She exercises with enough effort to increase her heart rate 10 to 19 minutes per day.  She exercises with enough effort to increase her heart rate 3 or less days per week.   She is taking medications regularly.     No chest pain/sob/palpitations/dizziness/ha's  Fatigue and weight gain.  Mood has improved with recent med changes.        Review of Systems  Constitutional, neuro, ENT, endocrine, pulmonary, cardiac, gastrointestinal, genitourinary, musculoskeletal, integument and psychiatric systems are negative, except as otherwise noted.      Objective    /84   Pulse 77   Temp 97  F (36.1  C) (Temporal)   Resp 20   Ht 1.695 m (5' 6.75\")   Wt 85.1 kg (187 lb 9.6 oz)   LMP 05/15/2014 (Approximate)   SpO2 98%   BMI 29.60 kg/m    Body mass index is 29.6 kg/m .  Physical Exam   Eye exam - right eye normal lid, conjunctiva, cornea, pupil and fundus, left eye normal lid, conjunctiva, cornea, pupil and fundus.  Thyroid not palpable, not enlarged, no nodules detected.  CHEST:chest clear to IPPA, no tachypnea, retractions or cyanosis, and S1, S2 normal, no murmur, no gallop, rate regular.  No edema  Lisette was seen today for recheck medication and health maintenance.    Diagnoses and all orders for this visit:    Type 2 diabetes mellitus without complication, without long-term current use of insulin (H)  -     HEMOGLOBIN A1C; Future  -     Lipid panel reflex to direct LDL Non-fasting; Future  -     Albumin Random Urine " Quantitative with Creat Ratio; Future  -     Adult Eye  Referral; Future  -     HEMOGLOBIN A1C  -     Lipid panel reflex to direct LDL Non-fasting  -     Albumin Random Urine Quantitative with Creat Ratio  -     tirzepatide (MOUNJARO) 2.5 MG/0.5ML pen; Inject 2.5 mg Subcutaneous every 7 days  -     metFORMIN (GLUCOPHAGE XR) 500 MG 24 hr tablet; Take 2 tablets (1,000 mg) by mouth 2 times daily (with meals)    Weight gain  -     TSH with free T4 reflex; Future  -     TSH with free T4 reflex    Chronic fatigue  -     Ferritin; Future  -     Iron and iron binding capacity; Future  -     CBC with platelets and differential; Future  -     TSH with free T4 reflex; Future  -     Ferritin  -     Iron and iron binding capacity  -     CBC with platelets and differential  -     TSH with free T4 reflex    Vitamin D deficiency  -     Vitamin D Deficiency; Future  -     Vitamin D Deficiency    Vitamin B12 deficiency (non anemic)  -     CBC with platelets and differential; Future  -     Vitamin B12; Future  -     Methylmalonic Acid; Future  -     CBC with platelets and differential  -     Vitamin B12  -     Methylmalonic Acid    Depression with anxiety  -     busPIRone (BUSPAR) 15 MG tablet; Take 1 tablet (15 mg) by mouth 3 times daily  -     FLUoxetine (PROZAC) 40 MG capsule; Take 1 capsule (40 mg) by mouth daily    Daytime somnolence  -     modafinil (PROVIGIL) 200 MG tablet; Take 1 tablet (200 mg) by mouth daily    Hyperlipidemia LDL goal <100  -     rosuvastatin (CRESTOR) 5 MG tablet; Take 1 tablet (5 mg) by mouth daily    Other orders  -     REVIEW OF HEALTH MAINTENANCE PROTOCOL ORDERS      Add in mounjaro.  work on lifestyle modification  Rest.  Inc modafinil   Signed Electronically by: Kolton Yin PA-C

## 2024-03-05 LAB
CHOLEST SERPL-MCNC: 173 MG/DL
CREAT UR-MCNC: 291 MG/DL
FASTING STATUS PATIENT QL REPORTED: NORMAL
FERRITIN SERPL-MCNC: 9 NG/ML (ref 11–328)
HDLC SERPL-MCNC: 60 MG/DL
IRON BINDING CAPACITY (ROCHE): 353 UG/DL (ref 240–430)
IRON SATN MFR SERPL: 8 % (ref 15–46)
IRON SERPL-MCNC: 28 UG/DL (ref 37–145)
LDLC SERPL CALC-MCNC: 85 MG/DL
MICROALBUMIN UR-MCNC: 14 MG/L
MICROALBUMIN/CREAT UR: 4.81 MG/G CR (ref 0–25)
NONHDLC SERPL-MCNC: 113 MG/DL
TRIGL SERPL-MCNC: 140 MG/DL
TSH SERPL DL<=0.005 MIU/L-ACNC: 1.72 UIU/ML (ref 0.3–4.2)
VIT B12 SERPL-MCNC: 425 PG/ML (ref 232–1245)
VIT D+METAB SERPL-MCNC: 65 NG/ML (ref 20–50)

## 2024-03-06 LAB — METHYLMALONATE SERPL-SCNC: 0.14 UMOL/L (ref 0–0.4)

## 2024-03-11 RX ORDER — FERROUS SULFATE 325(65) MG
325 TABLET ORAL 2 TIMES DAILY
Qty: 180 TABLET | Refills: 3 | Status: SHIPPED | OUTPATIENT
Start: 2024-03-11

## 2024-03-18 DIAGNOSIS — K59.09 CHRONIC CONSTIPATION: ICD-10-CM

## 2024-03-23 DIAGNOSIS — J45.40 MODERATE PERSISTENT ASTHMA WITHOUT COMPLICATION: ICD-10-CM

## 2024-03-25 ENCOUNTER — TELEPHONE (OUTPATIENT)
Dept: PULMONOLOGY | Facility: CLINIC | Age: 59
End: 2024-03-25
Payer: COMMERCIAL

## 2024-03-25 RX ORDER — FLUTICASONE FUROATE, UMECLIDINIUM BROMIDE AND VILANTEROL TRIFENATATE 200; 62.5; 25 UG/1; UG/1; UG/1
POWDER RESPIRATORY (INHALATION)
Qty: 180 EACH | Refills: 0 | Status: SHIPPED | OUTPATIENT
Start: 2024-03-25 | End: 2024-06-21

## 2024-03-25 NOTE — TELEPHONE ENCOUNTER
lubiprostone (AMITIZA) 24 MCG   Last Written Prescription Date:  12/1/23  Last Fill Quantity: 60,   # refills: 1  Last Office Visit : 11/18/23  Future Office visit:  NONE  Routing refill request to provider for review/approval because:  Drug not on the refill protocol

## 2024-03-25 NOTE — TELEPHONE ENCOUNTER
Patient was last seen in clinic on 6/15/22. Patient was given courtesy refill on 9/22/23 forTrelegy to cover until her appt with Dr. Ley on 3/22/23. Patient canceled appt.     Routed and emailed to Dr. Ley to review.     -DAYANA Lacy

## 2024-03-25 NOTE — TELEPHONE ENCOUNTER
Left Voicemail (1st Attempt) for the patient to call back and schedule the following:    Appointment type: AIDAN  Provider: FAVIAN  Return date: NEXT AVAILABLE  Specialty phone number: [phone]  Additional appointment(s) needed: N/A  Additonal Notes: N/A

## 2024-03-26 RX ORDER — LUBIPROSTONE 24 UG/1
24 CAPSULE ORAL 2 TIMES DAILY WITH MEALS
Qty: 180 CAPSULE | Refills: 1 | Status: SHIPPED | OUTPATIENT
Start: 2024-03-26

## 2024-03-29 ENCOUNTER — OFFICE VISIT (OUTPATIENT)
Dept: PULMONOLOGY | Facility: CLINIC | Age: 59
End: 2024-03-29
Attending: INTERNAL MEDICINE
Payer: COMMERCIAL

## 2024-03-29 VITALS
HEART RATE: 75 BPM | HEIGHT: 67 IN | SYSTOLIC BLOOD PRESSURE: 125 MMHG | DIASTOLIC BLOOD PRESSURE: 73 MMHG | WEIGHT: 187.6 LBS | BODY MASS INDEX: 29.44 KG/M2 | OXYGEN SATURATION: 99 %

## 2024-03-29 DIAGNOSIS — J32.9 CHRONIC RHINOSINUSITIS: ICD-10-CM

## 2024-03-29 DIAGNOSIS — R05.3 CHRONIC COUGH: ICD-10-CM

## 2024-03-29 DIAGNOSIS — J45.40 MODERATE PERSISTENT ASTHMA, UNCOMPLICATED: Primary | ICD-10-CM

## 2024-03-29 DIAGNOSIS — K21.9 GASTROESOPHAGEAL REFLUX DISEASE, UNSPECIFIED WHETHER ESOPHAGITIS PRESENT: ICD-10-CM

## 2024-03-29 PROCEDURE — 99215 OFFICE O/P EST HI 40 MIN: CPT | Mod: GC | Performed by: INTERNAL MEDICINE

## 2024-03-29 PROCEDURE — 99213 OFFICE O/P EST LOW 20 MIN: CPT | Performed by: INTERNAL MEDICINE

## 2024-03-29 ASSESSMENT — PAIN SCALES - GENERAL: PAINLEVEL: NO PAIN (0)

## 2024-03-29 NOTE — PROGRESS NOTES
"Reason for Visit  Lisette Owens is a 57 year old year old female who is being seen for RECHECK (Return Pulmonary )  Pulmonary HPI    The patient was seen and examined by Eyad Ley MD     Last seen by me for her moderate persistent asthma on 1/28/2022 and I switched her from Breo to Trelegy Ellipta along with continuing Singulair.  Trelegy reduced cough significantly     Developed COVID 5/16/222 and had worsening of cough since then and has not gotten back to pre-COVID coughing level.     Still having some coughing fits \"pulling up my lungs\"  She has lost 40 pounds and wondering if she is overmedicated on BP meds and DM meds. She complains of persistent fatigue since COVID    Also she had recent UTI and developed rash after first Abx and was switched.  Has completed 3 days of Abx total    Saw ENT and sinus rinse with steroids was recommended with sgnificant improvement    Hasn't vigorously exercised but she is OK with exercise tolerance.  Main issue is coughing.    Quantitative estimate:  Breo cough 6/10  Trelegy 3/10; Now 4-5/10  Cough usually is non-productive, but occasionally productive. No hemoptysis  No fevers, chills, sweats    Under significant stress due to adopted son from [place] being hospitalized with difficulty MS/Psych issues      Interval history 3/29/2024:   Today patient states that overall things are going better than the last time she was seen. Her cough is still present, but improved. She sometimes has sputum production, which is generally white/cloudy in color, no blood. Sometimes will get shortness of breath, if out in cold or going up stairs.  She is able to do her job as a  well. 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 She feels like medications are working well. She " is taking trelegy 1 puff daily, and albuterol which she uses as needed about once every 2 weeks. She also takes omeprazole 20mg BID. She will notice reflux symptoms if she stops the omeprazole for a couple days. She will elevate the head of the bed a bit to help with acid reflux as well. She thinks acid reflux is mostly controlled but does wonder if she would get additional benefit from increasing the dose. She is also on some medication for weight loss/pre-diabetes as well and has lost some weight but is not sure how much. She had a turbinate and ethmoid bone reduction from ENT in Aug of last year and states this has helped with sinus congestion. She also takes Flonase and Zyrtec for this and this helps as well. She does not really want to make any changes today as she feels like her asthma is well controlled.       Current Outpatient Medications   Medication    albuterol (PROAIR HFA/PROVENTIL HFA/VENTOLIN HFA) 108 (90 Base) MCG/ACT inhaler    albuterol (PROVENTIL) (2.5 MG/3ML) 0.083% neb solution    aspirin  MG EC tablet    blood glucose (NO BRAND SPECIFIED) lancets standard    blood glucose (NO BRAND SPECIFIED) test strip    budesonide (PULMICORT) 0.5 MG/2ML neb solution    busPIRone (BUSPAR) 15 MG tablet    Cholecalciferol (VITAMIN D3 PO)    ferrous sulfate (FEROSUL) 325 (65 Fe) MG tablet    FLUoxetine (PROZAC) 20 MG capsule    FLUoxetine (PROZAC) 40 MG capsule    losartan (COZAAR) 25 MG tablet    lubiprostone (AMITIZA) 24 MCG capsule    metFORMIN (GLUCOPHAGE XR) 500 MG 24 hr tablet    modafinil (PROVIGIL) 100 MG tablet    modafinil (PROVIGIL) 200 MG tablet    nitroGLYcerin (NITROSTAT) 0.4 MG sublingual tablet    omeprazole (PRILOSEC) 20 MG DR capsule    rosuvastatin (CRESTOR) 5 MG tablet    sodium chloride (OCEAN) 0.65 % nasal spray    tirzepatide (MOUNJARO) 2.5 MG/0.5ML pen    TRELEGY ELLIPTA 200-62.5-25 MCG/ACT oral inhaler    tirzepatide (MOUNJARO) 5 MG/0.5ML pen     No current facility-administered  medications for this visit.     Facility-Administered Medications Ordered in Other Visits   Medication    sodium chloride (PF) 0.9% PF flush 10 mL     Allergies   Allergen Reactions    Ampicillin Rash    Cipro [Quinolones] Anaphylaxis    Macrobid [Nitrofuran Derivatives] GI Disturbance    Ciprofloxacin Itching and Rash     Past Medical History:   Diagnosis Date    Allergic rhinitis due to animal dander     Amblyopia     LT    Arthritis     Colon polyp     Depressive disorder     Endometriosis     Heart attack (H)     2016    House dust mite allergy     Hypertension     Moderate persistent asthma     Rhinitis, allergic to other allergen     Seasonal allergic rhinitis 7/25/05 skin tests pos. for: cat/dog/horse/DM/M/T/G/RW per Dr. Granado    pt. did IT from 7/06 to 11/5/07 to: cat/dog/DM/M/T/G/W dc'd per self.     Type 2 diabetes mellitus without complication, without long-term current use of insulin (H) 03/13/2017       Past Surgical History:   Procedure Laterality Date    COLONOSCOPY  10/2020    COLONOSCOPY N/A 10/10/2023    Procedure: Colonoscopy - with Biopsy;  Surgeon: Lulu Fields MD;  [place] [facility] [place]    ESOPHAGOSCOPY, GASTROSCOPY, DUODENOSCOPY (EGD), COMBINED N/A 10/10/2023    Procedure: Esophagoscopy, gastroscopy, duodenoscopy (EGD), combined with Biopsies;  Surgeon: Lulu Fields MD;  [place] [facility] [place]    EXCISE EXOSTOSIS FOOT Right 10/02/2018    Procedure: EXCISE EXOSTOSIS FOOT;  Right foot removal of first tarsometatarsal joint dorsal bossing;  Surgeon: Will Barraza DPM;  Location:  OR    GYN SURGERY  06/24/2016    Hysterectomy complete    HYSTEROSCOPY      OPTICAL TRACKING SYSTEM ENDOSCOPIC SINUS SURGERY Bilateral 8/7/2023    Procedure: stealth guided bilateral turbinate reduction with maxillary antrostomy and bilateral anterior ethmoidectomy.;  Surgeon: Keri Allen MD;  [place] [facility] [place]       Social History     Socioeconomic History    Marital status:       Spouse name: Not on file    Number of children: Not on file    Years of education: Not on file    Highest education level: Not on file   Occupational History    Not on file   Tobacco Use    Smoking status: Never    Smokeless tobacco: Never   Vaping Use    Vaping Use: Never used   Substance and Sexual Activity    Alcohol use: Yes     Comment: Rarely    Drug use: No    Sexual activity: Yes     Partners: Male     Birth control/protection: None     Comment: N/A   Other Topics Concern    Parent/sibling w/ CABG, MI or angioplasty before 65F 55M? No   Social History Narrative    Not on file     Social Determinants of Health     Financial Resource Strain: Low Risk  (11/15/2023)    Financial Resource Strain     Within the past 12 months, have you or your family members you live with been unable to get utilities (heat, electricity) when it was really needed?: No   Food Insecurity: Low Risk  (11/15/2023)    Food Insecurity     Within the past 12 months, did you worry that your food would run out before you got money to buy more?: No     Within the past 12 months, did the food you bought just not last and you didn t have money to get more?: No   Transportation Needs: Low Risk  (11/15/2023)    Transportation Needs     Within the past 12 months, has lack of transportation kept you from medical appointments, getting your medicines, non-medical meetings or appointments, work, or from getting things that you need?: No   Physical Activity: Not on file   Stress: Not on file   Social Connections: Not on file   Interpersonal Safety: Low Risk  (3/4/2024)    Interpersonal Safety     Do you feel physically and emotionally safe where you currently live?: Yes     Within the past 12 months, have you been hit, slapped, kicked or otherwise physically hurt by someone?: No     Within the past 12 months, have you been humiliated or emotionally abused in other ways by your partner or ex-partner?: No   Housing Stability: Low Risk  (11/15/2023)  "   Housing Stability     Do you have housing? : Yes     Are you worried about losing your housing?: No       ROS Pulmonary  A complete ROS was otherwise negative except as noted in the HPI.  /73   Pulse 75   Ht 1.695 m (5' 6.75\")   Wt 85.1 kg (187 lb 9.6 oz)   LMP 05/15/2014 (Approximate)   SpO2 99%   BMI 29.60 kg/m    Exam:   GENERAL APPEARANCE: Well developed, well nourished, alert, and in no apparent distress.  EYES: PERRL, EOMI  CHEST: No cough wheeze stridor or tachypnea, regular heart rate and rhythm, no murmurs rubs or gallops  NEURO: Mentation intact, speech normal, normal strength and tone,  PSYCH: mentation appears normal. and affect normal  Results:  No results found for this or any previous visit (from the past 168 hour(s)).      Assessment and plan:   Moderate Persistent Asthma  Chronic cough  Lisette continues to have good control of her asthma and chronic cough on Trelegy, and seems to have recovered from her previous setback from COVID-19. Her GERD also sounds like it is well controlled on her current dose of omeprazole, so will plan to continue this. She also sees GI for some constipation/motility issues. Her turbinate/ethmoid reduction performed by ENT also seems to have improved her chronic rhinitis/sinusitis as well.  Plan:  Continue high strength Trelegy  Continue sinus rinses per ENT  Continue Nexium  Remain up to date on all vaccines, including COVID-19  Return visit in 15 months  Call our nurses if problems or questions in between    Julius Feliz MD  Occupational Medicine PGY-3  Patient seen with attending, Dr. Ley    Pulmonary Attending Attestation    I saw and examined the patient with Dr. Feliz, confirming haji aspects of the history and exam.  I personally reviewed the recent Xrays and other labs.  The residents note reflects our detailed discussion of the findings, assessment and plan.    I am very pleased that Ms Owens's difficulty with chronic cough GERD and ENT issues " has improved significantly.  Some of this likely is due to her turbinate surgery.  Her use of Trelegy daily also seems to be contributing.  She now is needing rare rarely to use rescue albuterol inhaler.  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  She has not tried the [place] horn again recently, however.  Plan is for me to see her back in approximately 15 months time when it is not in the regular school season.  She will contact our lung nurses for any questions or problems arise in the interim and she has their phone number.    I spent a total of 40 minutes devoted to her care today, including review of her chart and past medical records, review of prior chest imaging (reports and images) and PFTs, time with the patient and time and documentation.    Eyad Lye MD

## 2024-03-29 NOTE — PATIENT INSTRUCTIONS
Asthma  Chronic Cough  GERD  Sinus / ENT issues    Much improved post sinus surgery and with Trelegy daily.  Little need for albuterol.    Plan  Continue current meds  Return to clinic 15 months  Call our lung nurses for questions or problems [phone]

## 2024-03-29 NOTE — LETTER
"    3/29/2024         RE: Lisette Owens  [address] [place] [address]        Dear Colleague,    Thank you for referring your patient, Lisette Owens, to the [facility] [place]. Please see a copy of my visit note below.    Reason for Visit  Lisette Owens is a 57 year old year old female who is being seen for RECHECK (Return Pulmonary )  Pulmonary HPI    The patient was seen and examined by Eyad Ley MD     Last seen by me for her moderate persistent asthma on 1/28/2022 and I switched her from Breo to Trelegy Ellipta along with continuing Singulair.  Trelegy reduced cough significantly     Developed COVID 5/16/222 and had worsening of cough since then and has not gotten back to pre-COVID coughing level.     Still having some coughing fits \"pulling up my lungs\"  She has lost 40 pounds and wondering if she is overmedicated on BP meds and DM meds. She complains of persistent fatigue since COVID    Also she had recent UTI and developed rash after first Abx and was switched.  Has completed 3 days of Abx total    Saw ENT and sinus rinse with steroids was recommended with sgnificant improvement    Hasn't vigorously exercised but she is OK with exercise tolerance.  Main issue is coughing.    Quantitative estimate:  Breo cough 6/10  Trelegy 3/10; Now 4-5/10  Cough usually is non-productive, but occasionally productive. No hemoptysis  No fevers, chills, sweats    Under significant stress due to adopted son from [place] being hospitalized with difficulty MS/Psych issues      Interval history 3/29/2024:   Today patient states that overall things are going better than the last time she was seen. Her cough is still present, but improved. She sometimes has sputum production, which is generally white/cloudy in color, no blood. Sometimes will get shortness of breath, if out in cold or going up stairs.  She is able to do her job as a  well. 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 She feels like medications are working well. She is taking trelegy 1 puff daily, and albuterol which she uses as needed about once every 2 weeks. She also takes omeprazole 20mg BID. She will notice reflux symptoms if she stops the omeprazole for a couple days. She will elevate the head of the bed a bit to help with acid reflux as well. She thinks acid reflux is mostly controlled but does wonder if she would get additional benefit from increasing the dose. She is also on some medication for weight loss/pre-diabetes as well and has lost some weight but is not sure how much. She had a turbinate and ethmoid bone reduction from ENT in Aug of last year and states this has helped with sinus congestion. She also takes Flonase and Zyrtec for this and this helps as well. She does not really want to make any changes today as she feels like her asthma is well controlled.       Current Outpatient Medications   Medication    albuterol (PROAIR HFA/PROVENTIL HFA/VENTOLIN HFA) 108 (90 Base) MCG/ACT inhaler    albuterol (PROVENTIL) (2.5 MG/3ML) 0.083% neb solution    aspirin  MG EC tablet    blood glucose (NO BRAND SPECIFIED) lancets standard    blood glucose (NO BRAND SPECIFIED) test strip    budesonide (PULMICORT) 0.5 MG/2ML neb solution    busPIRone (BUSPAR) 15 MG tablet    Cholecalciferol (VITAMIN D3 PO)    ferrous sulfate (FEROSUL) 325 (65 Fe) MG tablet    FLUoxetine (PROZAC) 20 MG capsule    FLUoxetine (PROZAC) 40 MG capsule    losartan (COZAAR) 25 MG tablet    lubiprostone (AMITIZA) 24 MCG capsule    metFORMIN (GLUCOPHAGE XR) 500 MG 24 hr tablet    modafinil (PROVIGIL) 100 MG tablet    modafinil (PROVIGIL) 200 MG tablet    nitroGLYcerin (NITROSTAT) 0.4 MG sublingual tablet     omeprazole (PRILOSEC) 20 MG DR capsule    rosuvastatin (CRESTOR) 5 MG tablet    sodium chloride (OCEAN) 0.65 % nasal spray    tirzepatide (MOUNJARO) 2.5 MG/0.5ML pen    TRELEGY ELLIPTA 200-62.5-25 MCG/ACT oral inhaler    tirzepatide (MOUNJARO) 5 MG/0.5ML pen     No current facility-administered medications for this visit.     Facility-Administered Medications Ordered in Other Visits   Medication    sodium chloride (PF) 0.9% PF flush 10 mL     Allergies   Allergen Reactions    Ampicillin Rash    Cipro [Quinolones] Anaphylaxis    Macrobid [Nitrofuran Derivatives] GI Disturbance    Ciprofloxacin Itching and Rash     Past Medical History:   Diagnosis Date    Allergic rhinitis due to animal dander     Amblyopia     LT    Arthritis     Colon polyp     Depressive disorder     Endometriosis     Heart attack (H)     2016    House dust mite allergy     Hypertension     Moderate persistent asthma     Rhinitis, allergic to other allergen     Seasonal allergic rhinitis 7/25/05 skin tests pos. for: cat/dog/horse/DM/M/T/G/RW per Dr. Granado    pt. did IT from 7/06 to 11/5/07 to: cat/dog/DM/M/T/G/W dc'd per self.     Type 2 diabetes mellitus without complication, without long-term current use of insulin (H) 03/13/2017       Past Surgical History:   Procedure Laterality Date    COLONOSCOPY  10/2020    COLONOSCOPY N/A 10/10/2023    Procedure: Colonoscopy - with Biopsy;  Surgeon: Lulu Fields MD;  [place] [facility] [place]    ESOPHAGOSCOPY, GASTROSCOPY, DUODENOSCOPY (EGD), COMBINED N/A 10/10/2023    Procedure: Esophagoscopy, gastroscopy, duodenoscopy (EGD), combined with Biopsies;  Surgeon: Lulu Fields MD;  [place] [facility] [place]    EXCISE EXOSTOSIS FOOT Right 10/02/2018    Procedure: EXCISE EXOSTOSIS FOOT;  Right foot removal of first tarsometatarsal joint dorsal bossing;  Surgeon: Will Barraza DPM;  Location:  OR    GYN SURGERY  06/24/2016    Hysterectomy complete    HYSTEROSCOPY      OPTICAL TRACKING  SYSTEM ENDOSCOPIC SINUS SURGERY Bilateral 8/7/2023    Procedure: stealth guided bilateral turbinate reduction with maxillary antrostomy and bilateral anterior ethmoidectomy.;  Surgeon: Keri Allen MD;  [place] [facility] [place]       Social History     Socioeconomic History    Marital status:      Spouse name: Not on file    Number of children: Not on file    Years of education: Not on file    Highest education level: Not on file   Occupational History    Not on file   Tobacco Use    Smoking status: Never    Smokeless tobacco: Never   Vaping Use    Vaping Use: Never used   Substance and Sexual Activity    Alcohol use: Yes     Comment: Rarely    Drug use: No    Sexual activity: Yes     Partners: Male     Birth control/protection: None     Comment: N/A   Other Topics Concern    Parent/sibling w/ CABG, MI or angioplasty before 65F 55M? No   Social History Narrative    Not on file     Social Determinants of Health     Financial Resource Strain: Low Risk  (11/15/2023)    Financial Resource Strain     Within the past 12 months, have you or your family members you live with been unable to get utilities (heat, electricity) when it was really needed?: No   Food Insecurity: Low Risk  (11/15/2023)    Food Insecurity     Within the past 12 months, did you worry that your food would run out before you got money to buy more?: No     Within the past 12 months, did the food you bought just not last and you didn t have money to get more?: No   Transportation Needs: Low Risk  (11/15/2023)    Transportation Needs     Within the past 12 months, has lack of transportation kept you from medical appointments, getting your medicines, non-medical meetings or appointments, work, or from getting things that you need?: No   Physical Activity: Not on file   Stress: Not on file   Social Connections: Not on file   Interpersonal Safety: Low Risk  (3/4/2024)    Interpersonal Safety     Do you feel physically and emotionally safe where you  "currently live?: Yes     Within the past 12 months, have you been hit, slapped, kicked or otherwise physically hurt by someone?: No     Within the past 12 months, have you been humiliated or emotionally abused in other ways by your partner or ex-partner?: No   Housing Stability: Low Risk  (11/15/2023)    Housing Stability     Do you have housing? : Yes     Are you worried about losing your housing?: No       ROS Pulmonary  A complete ROS was otherwise negative except as noted in the HPI.  /73   Pulse 75   Ht 1.695 m (5' 6.75\")   Wt 85.1 kg (187 lb 9.6 oz)   LMP 05/15/2014 (Approximate)   SpO2 99%   BMI 29.60 kg/m    Exam:   GENERAL APPEARANCE: Well developed, well nourished, alert, and in no apparent distress.  EYES: PERRL, EOMI  CHEST: No cough wheeze stridor or tachypnea, regular heart rate and rhythm, no murmurs rubs or gallops  NEURO: Mentation intact, speech normal, normal strength and tone,  PSYCH: mentation appears normal. and affect normal  Results:  No results found for this or any previous visit (from the past 168 hour(s)).      Assessment and plan:   Moderate Persistent Asthma  Chronic cough  Lisette continues to have good control of her asthma and chronic cough on Trelegy, and seems to have recovered from her previous setback from COVID-19. Her GERD also sounds like it is well controlled on her current dose of omeprazole, so will plan to continue this. She also sees GI for some constipation/motility issues. Her turbinate/ethmoid reduction performed by ENT also seems to have improved her chronic rhinitis/sinusitis as well.  Plan:  Continue high strength Trelegy  Continue sinus rinses per ENT  Continue Nexium  Remain up to date on all vaccines, including COVID-19  Return visit in 15 months  Call our nurses if problems or questions in between    Julius Feliz MD  Occupational Medicine PGY-3  Patient seen with attending, Dr. Ley    Pulmonary Attending Attestation    I saw and examined the patient " with Dr. Feliz, confirming haji aspects of the history and exam.  I personally reviewed the recent Xrays and other labs.  The residents note reflects our detailed discussion of the findings, assessment and plan.    I am very pleased that Ms Owens's difficulty with chronic cough GERD and ENT issues has improved significantly.  Some of this likely is due to her turbinate surgery.  Her use of Trelegy daily also seems to be contributing.  She now is needing rare rarely to use rescue albuterol inhaler.  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  She has not tried the Croatian horn again recently, however.  Plan is for me to see her back in approximately 15 months time when it is not in the regular school season.  She will contact our lung nurses for any questions or problems arise in the interim and she has their phone number.    I spent a total of 40 minutes devoted to her care today, including review of her chart and past medical records, review of prior chest imaging (reports and images) and PFTs, time with the patient and time and documentation.    Eyad Ley MD

## 2024-03-29 NOTE — NURSING NOTE
Chief Complaint   Patient presents with    RECHECK     Return Pulmonary     Medications reviewed and vital signs taken.   Jessica Singh, YANETH

## 2024-04-23 DIAGNOSIS — E11.9 TYPE 2 DIABETES MELLITUS WITHOUT COMPLICATION, WITHOUT LONG-TERM CURRENT USE OF INSULIN (H): ICD-10-CM

## 2024-04-23 RX ORDER — TIRZEPATIDE 5 MG/.5ML
INJECTION, SOLUTION SUBCUTANEOUS
Qty: 2 ML | Refills: 0 | Status: SHIPPED | OUTPATIENT
Start: 2024-04-23 | End: 2024-08-07

## 2024-05-11 ENCOUNTER — E-VISIT (OUTPATIENT)
Dept: URGENT CARE | Facility: CLINIC | Age: 59
End: 2024-05-11
Payer: COMMERCIAL

## 2024-05-11 DIAGNOSIS — N39.0 ACUTE UTI (URINARY TRACT INFECTION): Primary | ICD-10-CM

## 2024-05-11 PROCEDURE — 99421 OL DIG E/M SVC 5-10 MIN: CPT | Performed by: NURSE PRACTITIONER

## 2024-05-11 RX ORDER — SULFAMETHOXAZOLE/TRIMETHOPRIM 800-160 MG
1 TABLET ORAL 2 TIMES DAILY
Qty: 6 TABLET | Refills: 0 | Status: SHIPPED | OUTPATIENT
Start: 2024-05-11 | End: 2024-05-14

## 2024-05-11 NOTE — PATIENT INSTRUCTIONS
Dear Lisette Owens    After reviewing your responses, I've been able to diagnose you with a urinary tract infection, which is a common infection of the bladder with bacteria.  This is not a sexually transmitted infection, though urinating immediately after intercourse can help prevent infections.  Drinking lots of fluids is also helpful to clear your current infection and prevent the next one.      I have sent a prescription for antibiotics to your pharmacy to treat this infection.    It is important that you take all of your prescribed medication even if your symptoms are improving after a few doses.  Taking all of your medicine helps prevent the symptoms from returning.     If your symptoms worsen, you develop pain in your back or stomach, develop fevers, or are not improving in 5 days, please contact your primary care provider for an appointment or visit any of our convenient Walk-in or Urgent Care Centers to be seen, which can be found on our website here.    Thanks again for choosing us as your health care partner,    ELIZA Cook CNP

## 2024-06-06 DIAGNOSIS — E11.9 TYPE 2 DIABETES MELLITUS WITHOUT COMPLICATION, WITHOUT LONG-TERM CURRENT USE OF INSULIN (H): ICD-10-CM

## 2024-06-07 ENCOUNTER — MYC MEDICAL ADVICE (OUTPATIENT)
Dept: FAMILY MEDICINE | Facility: CLINIC | Age: 59
End: 2024-06-07
Payer: COMMERCIAL

## 2024-06-07 DIAGNOSIS — E11.9 TYPE 2 DIABETES MELLITUS WITHOUT COMPLICATION, WITHOUT LONG-TERM CURRENT USE OF INSULIN (H): Primary | ICD-10-CM

## 2024-06-07 RX ORDER — METFORMIN HCL 500 MG
TABLET, EXTENDED RELEASE 24 HR ORAL
Qty: 360 TABLET | Refills: 0 | Status: SHIPPED | OUTPATIENT
Start: 2024-06-07

## 2024-06-07 NOTE — TELEPHONE ENCOUNTER
Routing [employer] message to provider.    Patient requesting 7.5 mg Mounjaro dose be sent to the [facility] pharmacy.    Willing to prescribe?    Christine M Klisch, RN

## 2024-06-11 NOTE — TELEPHONE ENCOUNTER
Patient calling back, she says the [facility] pharmacy no longer has the 7.5 mg mounjaro in stock so wants this sent to the [place] pharmacy.       Is maikel'd by other RN.    Kolton has not yet responded to the secure chat message sent at 11 am.    I will go talk to him personally now.    Jossy LEONARDO RN  [facility] Triage

## 2024-06-11 NOTE — TELEPHONE ENCOUNTER
7.5 mg Mounjaro not yet sent.j    Secure chat message sent to PCP now to address.    Jossy LEONARDO RN  [facility] Triage

## 2024-06-11 NOTE — TELEPHONE ENCOUNTER
Patient called to follow-up on this. She is heading out of town tomorrow, so she would like this sent through today if possible.     Routing as high priority to please address.    *Please send [employer] message to patient stating this has been addressed once med is sent through*    KANU Mcdowell RN  [facility]

## 2024-06-11 NOTE — TELEPHONE ENCOUNTER
Pt wants at [facility]- updated pharmacy      Jenn, RN    Triage Nurse  [facility]

## 2024-06-11 NOTE — TELEPHONE ENCOUNTER
Spoke to Kolton Yni, he sent Rx for 7.5 mg mounjaro now.  I called patient and advised her Rx sent, appears was received.   She is on her way there now.    Jossy LEONARDO RN  [facility] Triage

## 2024-06-21 DIAGNOSIS — J45.40 MODERATE PERSISTENT ASTHMA WITHOUT COMPLICATION: ICD-10-CM

## 2024-06-21 RX ORDER — FLUTICASONE FUROATE, UMECLIDINIUM BROMIDE AND VILANTEROL TRIFENATATE 200; 62.5; 25 UG/1; UG/1; UG/1
POWDER RESPIRATORY (INHALATION)
Qty: 180 EACH | Refills: 2 | Status: SHIPPED | OUTPATIENT
Start: 2024-06-21

## 2024-07-08 ENCOUNTER — MYC MEDICAL ADVICE (OUTPATIENT)
Dept: FAMILY MEDICINE | Facility: CLINIC | Age: 59
End: 2024-07-08
Payer: COMMERCIAL

## 2024-07-08 DIAGNOSIS — E11.9 TYPE 2 DIABETES MELLITUS WITHOUT COMPLICATION, WITHOUT LONG-TERM CURRENT USE OF INSULIN (H): ICD-10-CM

## 2024-07-09 ENCOUNTER — ANCILLARY PROCEDURE (OUTPATIENT)
Dept: MAMMOGRAPHY | Facility: CLINIC | Age: 59
End: 2024-07-09
Attending: PHYSICIAN ASSISTANT
Payer: COMMERCIAL

## 2024-07-09 DIAGNOSIS — Z12.31 VISIT FOR SCREENING MAMMOGRAM: ICD-10-CM

## 2024-07-09 PROCEDURE — 77067 SCR MAMMO BI INCL CAD: CPT | Mod: GC | Performed by: RADIOLOGY

## 2024-07-09 PROCEDURE — 77063 BREAST TOMOSYNTHESIS BI: CPT | Mod: GC | Performed by: RADIOLOGY

## 2024-08-02 SDOH — HEALTH STABILITY: PHYSICAL HEALTH: ON AVERAGE, HOW MANY MINUTES DO YOU ENGAGE IN EXERCISE AT THIS LEVEL?: 20 MIN

## 2024-08-02 SDOH — HEALTH STABILITY: PHYSICAL HEALTH: ON AVERAGE, HOW MANY DAYS PER WEEK DO YOU ENGAGE IN MODERATE TO STRENUOUS EXERCISE (LIKE A BRISK WALK)?: 1 DAY

## 2024-08-02 ASSESSMENT — SOCIAL DETERMINANTS OF HEALTH (SDOH): HOW OFTEN DO YOU GET TOGETHER WITH FRIENDS OR RELATIVES?: ONCE A WEEK

## 2024-08-07 ENCOUNTER — OFFICE VISIT (OUTPATIENT)
Dept: FAMILY MEDICINE | Facility: CLINIC | Age: 59
End: 2024-08-07
Payer: COMMERCIAL

## 2024-08-07 VITALS
HEART RATE: 85 BPM | SYSTOLIC BLOOD PRESSURE: 114 MMHG | OXYGEN SATURATION: 99 % | RESPIRATION RATE: 16 BRPM | BODY MASS INDEX: 23.23 KG/M2 | HEIGHT: 67 IN | WEIGHT: 148 LBS | DIASTOLIC BLOOD PRESSURE: 70 MMHG | TEMPERATURE: 97.4 F

## 2024-08-07 DIAGNOSIS — E53.8 VITAMIN B12 DEFICIENCY (NON ANEMIC): ICD-10-CM

## 2024-08-07 DIAGNOSIS — Z00.01 ENCOUNTER FOR ROUTINE ADULT HEALTH EXAMINATION WITH ABNORMAL FINDINGS: Primary | ICD-10-CM

## 2024-08-07 DIAGNOSIS — R40.0 DAYTIME SOMNOLENCE: ICD-10-CM

## 2024-08-07 DIAGNOSIS — E11.9 TYPE 2 DIABETES MELLITUS WITHOUT COMPLICATION, WITHOUT LONG-TERM CURRENT USE OF INSULIN (H): ICD-10-CM

## 2024-08-07 DIAGNOSIS — E55.9 VITAMIN D DEFICIENCY: ICD-10-CM

## 2024-08-07 DIAGNOSIS — D50.9 IRON DEFICIENCY ANEMIA, UNSPECIFIED IRON DEFICIENCY ANEMIA TYPE: ICD-10-CM

## 2024-08-07 DIAGNOSIS — E78.5 HYPERLIPIDEMIA LDL GOAL <100: ICD-10-CM

## 2024-08-07 LAB
BASOPHILS # BLD AUTO: 0.1 10E3/UL (ref 0–0.2)
BASOPHILS NFR BLD AUTO: 1 %
EOSINOPHIL # BLD AUTO: 0.3 10E3/UL (ref 0–0.7)
EOSINOPHIL NFR BLD AUTO: 7 %
ERYTHROCYTE [DISTWIDTH] IN BLOOD BY AUTOMATED COUNT: 12.8 % (ref 10–15)
HBA1C MFR BLD: 5.2 % (ref 0–5.6)
HCT VFR BLD AUTO: 36.9 % (ref 35–47)
HGB BLD-MCNC: 12.3 G/DL (ref 11.7–15.7)
IMM GRANULOCYTES # BLD: 0 10E3/UL
IMM GRANULOCYTES NFR BLD: 0 %
LYMPHOCYTES # BLD AUTO: 1.6 10E3/UL (ref 0.8–5.3)
LYMPHOCYTES NFR BLD AUTO: 33 %
MCH RBC QN AUTO: 31.5 PG (ref 26.5–33)
MCHC RBC AUTO-ENTMCNC: 33.3 G/DL (ref 31.5–36.5)
MCV RBC AUTO: 94 FL (ref 78–100)
MONOCYTES # BLD AUTO: 0.4 10E3/UL (ref 0–1.3)
MONOCYTES NFR BLD AUTO: 8 %
NEUTROPHILS # BLD AUTO: 2.5 10E3/UL (ref 1.6–8.3)
NEUTROPHILS NFR BLD AUTO: 52 %
PLATELET # BLD AUTO: 317 10E3/UL (ref 150–450)
RBC # BLD AUTO: 3.91 10E6/UL (ref 3.8–5.2)
WBC # BLD AUTO: 4.9 10E3/UL (ref 4–11)

## 2024-08-07 PROCEDURE — 90471 IMMUNIZATION ADMIN: CPT | Performed by: PHYSICIAN ASSISTANT

## 2024-08-07 PROCEDURE — 82728 ASSAY OF FERRITIN: CPT | Performed by: PHYSICIAN ASSISTANT

## 2024-08-07 PROCEDURE — 80061 LIPID PANEL: CPT | Performed by: PHYSICIAN ASSISTANT

## 2024-08-07 PROCEDURE — 99214 OFFICE O/P EST MOD 30 MIN: CPT | Mod: 25 | Performed by: PHYSICIAN ASSISTANT

## 2024-08-07 PROCEDURE — 85025 COMPLETE CBC W/AUTO DIFF WBC: CPT | Performed by: PHYSICIAN ASSISTANT

## 2024-08-07 PROCEDURE — 83036 HEMOGLOBIN GLYCOSYLATED A1C: CPT | Performed by: PHYSICIAN ASSISTANT

## 2024-08-07 PROCEDURE — 90746 HEPB VACCINE 3 DOSE ADULT IM: CPT | Performed by: PHYSICIAN ASSISTANT

## 2024-08-07 PROCEDURE — 36415 COLL VENOUS BLD VENIPUNCTURE: CPT | Performed by: PHYSICIAN ASSISTANT

## 2024-08-07 PROCEDURE — 82607 VITAMIN B-12: CPT | Performed by: PHYSICIAN ASSISTANT

## 2024-08-07 PROCEDURE — 82306 VITAMIN D 25 HYDROXY: CPT | Performed by: PHYSICIAN ASSISTANT

## 2024-08-07 PROCEDURE — 80048 BASIC METABOLIC PNL TOTAL CA: CPT | Performed by: PHYSICIAN ASSISTANT

## 2024-08-07 PROCEDURE — 99396 PREV VISIT EST AGE 40-64: CPT | Mod: 25 | Performed by: PHYSICIAN ASSISTANT

## 2024-08-07 PROCEDURE — 84443 ASSAY THYROID STIM HORMONE: CPT | Performed by: PHYSICIAN ASSISTANT

## 2024-08-07 RX ORDER — ROSUVASTATIN CALCIUM 5 MG/1
5 TABLET, COATED ORAL DAILY
Qty: 90 TABLET | Refills: 1 | Status: SHIPPED | OUTPATIENT
Start: 2024-08-07

## 2024-08-07 ASSESSMENT — ENCOUNTER SYMPTOMS: FATIGUE: 1

## 2024-08-07 NOTE — PROGRESS NOTES
Preventive Care Visit  [facility] SYBIL Yin PA-C, Family Medicine  Aug 7, 2024      Luis Knox is a 59 year old, presenting for the following:  Physical and Fatigue        8/7/2024    10:55 AM   Additional Questions   Roomed by Jeanette   Accompanied by self        Health Care Directive  Patient does not have a Health Care Directive or Living Will: Discussed advance care planning with patient; however, patient declined at this time.    Fatigue  Associated symptoms include fatigue.     Recheck of blood pressure and dm.  Doing well on mounjaro and has lost weight.     Patient has concerns of ongoing fatigue and would like some labs drawn today         8/2/2024   General Health   How would you rate your overall physical health? (!) FAIR   Feel stress (tense, anxious, or unable to sleep) To some extent      (!) STRESS CONCERN      8/2/2024   Nutrition   Three or more servings of calcium each day? (!) NO   Diet: Diabetic    Vegetarian/vegan   How many servings of fruit and vegetables per day? (!) 2-3   How many sweetened beverages each day? 0-1       Multiple values from one day are sorted in reverse-chronological order         8/2/2024   Exercise   Days per week of moderate/strenous exercise 1 day   Average minutes spent exercising at this level 20 min      (!) EXERCISE CONCERN      8/2/2024   Social Factors   Frequency of gathering with friends or relatives Once a week   Worry food won't last until get money to buy more No   Food not last or not have enough money for food? No   Do you have housing? (Housing is defined as stable permanent housing and does not include staying ouside in a car, in a tent, in an abandoned building, in an overnight shelter, or couch-surfing.) Yes   Are you worried about losing your housing? No   Lack of transportation? No   Unable to get utilities (heat,electricity)? No            8/2/2024   Fall Risk   Fallen 2 or more times in the past year? No   Trouble  with walking or balance? No             8/2/2024   Dental   Dentist two times every year? Yes            8/2/2024   TB Screening   Were you born outside of the [place]? No              Today's PHQ-2 Score:       3/4/2024    11:17 AM   PHQ-2 ( 1999 [employer])   Q1: Little interest or pleasure in doing things 3   Q2: Feeling down, depressed or hopeless 2   PHQ-2 Score 5   Q1: Little interest or pleasure in doing things Nearly every day   Q2: Feeling down, depressed or hopeless More than half the days   PHQ-2 Score 5         8/2/2024   Substance Use   Alcohol more than 3/day or more than 7/wk No   Do you use any other substances recreationally? No        Social History     Tobacco Use    Smoking status: Never     Passive exposure: Never    Smokeless tobacco: Never    Tobacco comments:     Not a smoker   Vaping Use    Vaping status: Never Used   Substance Use Topics    Alcohol use: Not Currently     Comment: Occasional social drinker (rarely)    Drug use: No           7/9/2024   LAST FHS-7 RESULTS   1st degree relative breast or ovarian cancer No   Any relative bilateral breast cancer No   Any male have breast cancer No   Any ONE woman have BOTH breast AND ovarian cancer No   Any woman with breast cancer before 50yrs No   2 or more relatives with breast AND/OR ovarian cancer No   2 or more relatives with breast AND/OR bowel cancer No           Mammogram Screening - Annual screen due to greater than 20% lifetime risk as estimated by Breast Cancer Risk Calculator        8/2/2024   STI Screening   New sexual partner(s) since last STI/HIV test? No        History of abnormal Pap smear: No - age 30-64 HPV with reflex Pap every 5 years recommended       ASCVD Risk   The ASCVD Risk score (Raman [place], et al., 2019) failed to calculate for the following reasons:    The patient has a prior MI or stroke diagnosis    Fracture Risk Assessment Tool  Link to Frax Calculator  Use the information below to complete the Frax  calculator  : 1965  Sex: female  Weight (kg): 67.1 kg (actual weight)  Height (cm): 169.5 cm  Previous Fragility Fracture:  No  History of parent with fractured hip:  No  Current Smoking:  No  Patient has been on glucocorticoids for more than 3 months (5mg/day or more): No  Rheumatoid Arthritis on Problem List:  No  Secondary Osteoporosis on Problem List:  No  Consumes 3 or more units of alcohol per day: No  Femoral Neck BMD (g/cm2)           Reviewed and updated as needed this visit by Provider                    Past Medical History:   Diagnosis Date    Allergic rhinitis due to animal dander     Amblyopia     LT    Arthritis     Colon polyp     Depressive disorder     Endometriosis     Heart attack (H)         House dust mite allergy     Hypertension     Moderate persistent asthma     Rhinitis, allergic to other allergen     Seasonal allergic rhinitis 05 skin tests pos. for: cat/dog/horse/DM/M/T/G/RW per Dr. Granado    pt. did IT from  to 07 to: cat/dog/DM/M/T/G/W dc'd per self.     Type 2 diabetes mellitus without complication, without long-term current use of insulin (H) 2017     Past Surgical History:   Procedure Laterality Date    COLONOSCOPY  10/2020    COLONOSCOPY N/A 10/10/2023    Procedure: Colonoscopy - with Biopsy;  Surgeon: Lulu Fields MD;  Location: [facility] [place] OR    ESOPHAGOSCOPY, GASTROSCOPY, DUODENOSCOPY (EGD), COMBINED N/A 10/10/2023    Procedure: Esophagoscopy, gastroscopy, duodenoscopy (EGD), combined with Biopsies;  Surgeon: Lulu Fields MD;  Location: [facility] [place] OR    EXCISE EXOSTOSIS FOOT Right 10/02/2018    Procedure: EXCISE EXOSTOSIS FOOT;  Right foot removal of first tarsometatarsal joint dorsal bossing;  Surgeon: Will Barraza DPM;  Location:  OR    GYN SURGERY  2016    Hysterectomy complete    HYSTEROSCOPY      OPTICAL TRACKING SYSTEM ENDOSCOPIC SINUS SURGERY Bilateral 2023    Procedure: stealth guided bilateral  turbinate reduction with maxillary antrostomy and bilateral anterior ethmoidectomy.;  Surgeon: Keri Allen MD;  Location: [facility] [place] OR     BP Readings from Last 3 Encounters:   08/07/24 114/70   03/29/24 125/73   03/04/24 136/84    Wt Readings from Last 3 Encounters:   08/07/24 67.1 kg (148 lb)   03/29/24 85.1 kg (187 lb 9.6 oz)   03/04/24 85.1 kg (187 lb 9.6 oz)                  Patient Active Problem List   Diagnosis    Dry eye syndrome    CARDIOVASCULAR SCREENING; LDL GOAL LESS THAN 160    Seasonal allergic rhinitis    Allergic rhinitis due to animal dander    Rhinitis, allergic to other allergen    House dust mite allergy    Moderate persistent asthma    PTSD (post-traumatic stress disorder)    Vitamin D deficiency    Esophageal reflux (GERD)    Depression with anxiety    Acute cystitis with hematuria    Type 2 diabetes mellitus without complication, without long-term current use of insulin (H)    Anemia    Dyspareunia (CODE)    Nasal obstruction    Sinusitis, chronic    Pulmonary nodule    Postmenopausal bleeding    Pelvic pain in female    NSTEMI (non-ST elevated myocardial infarction) (H)    Mild intermittent asthma without complication    Asthma, mild     Past Surgical History:   Procedure Laterality Date    COLONOSCOPY  10/2020    COLONOSCOPY N/A 10/10/2023    Procedure: Colonoscopy - with Biopsy;  Surgeon: Lulu Fields MD;  Location: [facility] [place] OR    ESOPHAGOSCOPY, GASTROSCOPY, DUODENOSCOPY (EGD), COMBINED N/A 10/10/2023    Procedure: Esophagoscopy, gastroscopy, duodenoscopy (EGD), combined with Biopsies;  Surgeon: Lulu Fields MD;  Location: [facility] [place] OR    EXCISE EXOSTOSIS FOOT Right 10/02/2018    Procedure: EXCISE EXOSTOSIS FOOT;  Right foot removal of first tarsometatarsal joint dorsal bossing;  Surgeon: Will Barraza DPM;  Location: [facility] OR    GYN SURGERY  06/24/2016    Hysterectomy complete    HYSTEROSCOPY      OPTICAL TRACKING SYSTEM ENDOSCOPIC SINUS SURGERY Bilateral  8/7/2023    Procedure: stealth guided bilateral turbinate reduction with maxillary antrostomy and bilateral anterior ethmoidectomy.;  Surgeon: Keri Allen MD;  Location: [facility] [place] OR       Social History     Tobacco Use    Smoking status: Never     Passive exposure: Never    Smokeless tobacco: Never    Tobacco comments:     Not a smoker   Substance Use Topics    Alcohol use: Not Currently     Comment: Occasional social drinker (rarely)     Family History   Problem Relation Age of Onset    Hypertension Mother     Alzheimer Disease Mother     Diabetes Mother     Tremor Mother     Osteoporosis Mother     Eye Surgery Father         cataracts    Macular Degeneration Father     Hypertension Father     Skin Cancer Father     Hyperlipidemia Father     Breast Cancer Maternal Grandmother     Tremor Maternal Grandfather     Alzheimer Disease Paternal Grandmother     Psychotic Disorder Paternal Grandmother         bipolar    Heart Disease Paternal Grandfather     Thyroid Disease Brother         Tumors removed    Tremor Brother     Mental Illness Brother         Bi-Polar    Thyroid Disease Sister     Diabetes Sister     Skin Cancer Sister     Cerebrovascular Disease No family hx of     Glaucoma No family hx of          Current Outpatient Medications   Medication Sig Dispense Refill    albuterol (PROAIR HFA/PROVENTIL HFA/VENTOLIN HFA) 108 (90 Base) MCG/ACT inhaler INHALE 2 PUFFS BY MOUTH EVERY 6 HOURS 18 g 4    albuterol (PROVENTIL) (2.5 MG/3ML) 0.083% neb solution INHALE 1 VIAL VIA NEBULIZER EVERY 4 HOURS AS NEEDED FOR SHORTNESS OF BREATH 90 mL 0    aspirin  MG EC tablet Take 1 tablet (325 mg) by mouth daily 90 tablet 3    blood glucose (NO BRAND SPECIFIED) lancets standard Use to test blood sugar 1 times daily or as directed. 100 Lancet 3    blood glucose (NO BRAND SPECIFIED) test strip Use to test blood sugar 1 times daily or as directed. 100 strip 3    budesonide (PULMICORT) 0.5 MG/2ML neb solution Empty contents of  ampule into 240mL of saline solution and rinse both nasal cavities as instructed twice daily. 240 mL 6    busPIRone (BUSPAR) 15 MG tablet Take 1 tablet (15 mg) by mouth 3 times daily 270 tablet 1    Cholecalciferol (VITAMIN D3 PO) Take by mouth daily      ferrous sulfate (FEROSUL) 325 (65 Fe) MG tablet Take 1 tablet (325 mg) by mouth 2 times daily 180 tablet 3    FLUoxetine (PROZAC) 20 MG capsule TAKE 1 CAPSULE BY MOUTH ONCE DAILY WITH 40 MG CAPSULE FOR A TOTAL DAILY DOSE OF 60MG 90 capsule 1    FLUoxetine (PROZAC) 40 MG capsule Take 1 capsule (40 mg) by mouth daily 90 capsule 1    Fluticasone-Umeclidin-Vilanterol (TRELEGY ELLIPTA) 200-62.5-25 MCG/ACT oral inhaler INHALE 1 PUFF BY MOUTH ONCE DAILY. RINSE MOUTH AND GARGLE AFTER  each 2    losartan (COZAAR) 25 MG tablet TAKE 1 TABLET BY MOUTH ONCE DAILY 90 tablet 2    lubiprostone (AMITIZA) 24 MCG capsule Take 1 capsule (24 mcg) by mouth 2 times daily (with meals) 180 capsule 1    metFORMIN (GLUCOPHAGE XR) 500 MG 24 hr tablet TAKE 2 TABLET BY MOUTH TWICE DAILY WITH MEALS 360 tablet 0    modafinil (PROVIGIL) 200 MG tablet TAKE 1 TABLET BY MOUTH ONCE DAILY 30 tablet 0    nitroGLYcerin (NITROSTAT) 0.4 MG sublingual tablet For chest pain place 1 tablet under the tongue every 5 minutes for 3 doses. If symptoms persist 5 minutes after 1st dose call 911. 25 tablet 4    omeprazole (PRILOSEC) 20 MG DR capsule Take 1 capsule (20 mg) by mouth 2 times daily . If syjmptomatically improved then can reduce to once daily. 60 capsule 11    rosuvastatin (CRESTOR) 5 MG tablet Take 1 tablet (5 mg) by mouth daily 90 tablet 1    sodium chloride (OCEAN) 0.65 % nasal spray Spray 1-2 sprays in nostril every hour as needed for congestion (nasal dryness) Use in EACH nostril. 44 mL 1    tirzepatide (MOUNJARO) 7.5 MG/0.5ML pen Inject 7.5 mg subcutaneously every 7 days 6 mL 3     Allergies   Allergen Reactions    Ampicillin Rash    Cipro [Quinolones] Anaphylaxis    Macrobid [Nitrofuran  "Derivatives] GI Disturbance    Ciprofloxacin Itching and Rash     Recent Labs   Lab Test 03/04/24  1712 08/03/23  1629 06/23/23  0957 01/28/23  1209 08/10/22  0751 11/20/21  0928 11/20/21  0928 05/18/21  1700 08/17/20  1011   A1C 6.4*  --  6.2* 6.2* 5.4   < > 6.6*  --  6.8*   LDL 85  --   --  66  --   --  127*  --  101*   HDL 60  --   --  75  --   --  41*  --  55   TRIG 140  --   --  87  --   --  187*  --  196*   ALT  --  20  --   --   --   --   --  31  --    CR  --  0.87  --   --  0.83  --   --  0.73 0.76   GFRESTIMATED  --  77  --   --  82  --   --  >90 89   GFRESTBLACK  --   --   --   --   --   --   --  >90 >90   POTASSIUM  --  4.4  --   --  3.9  --   --  4.0 4.0   TSH 1.72  --  1.17  --  2.16  --   --  1.26  --     < > = values in this interval not displayed.          Review of Systems  Constitutional, HEENT, cardiovascular, pulmonary, GI, , musculoskeletal, neuro, skin, endocrine and psych systems are negative, except as otherwise noted.     Objective    Exam  /70   Pulse 85   Temp 97.4  F (36.3  C) (Temporal)   Resp 16   Ht 1.695 m (5' 6.73\")   Wt 67.1 kg (148 lb)   LMP 05/15/2014 (Approximate)   SpO2 99%   BMI 23.37 kg/m     Estimated body mass index is 23.37 kg/m  as calculated from the following:    Height as of this encounter: 1.695 m (5' 6.73\").    Weight as of this encounter: 67.1 kg (148 lb).    Physical Exam  GENERAL: alert and no distress  EYES: Eyes grossly normal to inspection, PERRL and conjunctivae and sclerae normal  HENT: ear canals and TM's normal, nose and mouth without ulcers or lesions  NECK: no adenopathy, no asymmetry, masses, or scars  RESP: lungs clear to auscultation - no rales, rhonchi or wheezes  CV: regular rate and rhythm, normal S1 S2, no S3 or S4, no murmur, click or rub, no peripheral edema  ABDOMEN: soft, nontender, no hepatosplenomegaly, no masses and bowel sounds normal  MS: no gross musculoskeletal defects noted, no edema  SKIN: no suspicious lesions or " rashes  NEURO: Normal strength and tone, mentation intact and speech normal  PSYCH: mentation appears normal, affect normal/bright    Lisette was seen today for physical and fatigue.    Diagnoses and all orders for this visit:    Encounter for routine adult health examination with abnormal findings    Type 2 diabetes mellitus without complication, without long-term current use of insulin (H)  -     BASIC METABOLIC PANEL; Future  -     Discontinue: tirzepatide (MOUNJARO) 7.5 MG/0.5ML pen; Inject 7.5 mg subcutaneously every 7 days  -     Hemoglobin A1c; Future  -     Discontinue: tirzepatide (MOUNJARO) 7.5 MG/0.5ML pen; Inject 7.5 mg subcutaneously every 7 days  -     Discontinue: tirzepatide (MOUNJARO) 7.5 MG/0.5ML pen; Inject 7.5 mg subcutaneously every 7 days  -     tirzepatide (MOUNJARO) 7.5 MG/0.5ML pen; Inject 7.5 mg subcutaneously every 7 days    Iron deficiency anemia, unspecified iron deficiency anemia type  -     CBC with platelets and differential; Future  -     Ferritin; Future    Daytime somnolence  -     TSH with free T4 reflex; Future    Vitamin D deficiency  -     Vitamin D Deficiency; Future    Vitamin B12 deficiency (non anemic)  -     Vitamin B12; Future    Hyperlipidemia LDL goal <100  -     Lipid panel reflex to direct LDL Fasting; Future  -     rosuvastatin (CRESTOR) 5 MG tablet; Take 1 tablet (5 mg) by mouth daily      Start taking provigil daily , if still issues will bump dose to bid . If still issues with consider changing to a different stimulant.   work on lifestyle modification Continue to work on a lower sugar/starch diet and more exercise.      Lower fat, higher fiber diet and consistent exercise.    Signed Electronically by: Kolton Yin PA-C

## 2024-08-08 LAB
ANION GAP SERPL CALCULATED.3IONS-SCNC: 11 MMOL/L (ref 7–15)
BUN SERPL-MCNC: 10.5 MG/DL (ref 6–20)
CALCIUM SERPL-MCNC: 9.3 MG/DL (ref 8.8–10.4)
CHLORIDE SERPL-SCNC: 104 MMOL/L (ref 98–107)
CHOLEST SERPL-MCNC: 194 MG/DL
CREAT SERPL-MCNC: 0.74 MG/DL (ref 0.51–0.95)
EGFRCR SERPLBLD CKD-EPI 2021: >90 ML/MIN/1.73M2
FASTING STATUS PATIENT QL REPORTED: NO
FASTING STATUS PATIENT QL REPORTED: NO
FERRITIN SERPL-MCNC: 39 NG/ML (ref 11–328)
GLUCOSE SERPL-MCNC: 83 MG/DL (ref 70–99)
HCO3 SERPL-SCNC: 24 MMOL/L (ref 22–29)
HDLC SERPL-MCNC: 53 MG/DL
LDLC SERPL CALC-MCNC: 117 MG/DL
NONHDLC SERPL-MCNC: 141 MG/DL
POTASSIUM SERPL-SCNC: 4.1 MMOL/L (ref 3.4–5.3)
SODIUM SERPL-SCNC: 139 MMOL/L (ref 135–145)
TRIGL SERPL-MCNC: 119 MG/DL
TSH SERPL DL<=0.005 MIU/L-ACNC: 1.29 UIU/ML (ref 0.3–4.2)
VIT B12 SERPL-MCNC: 1891 PG/ML (ref 232–1245)
VIT D+METAB SERPL-MCNC: 52 NG/ML (ref 20–50)

## 2024-08-16 DIAGNOSIS — K21.9 GASTROESOPHAGEAL REFLUX DISEASE, UNSPECIFIED WHETHER ESOPHAGITIS PRESENT: ICD-10-CM

## 2024-09-18 DIAGNOSIS — K21.9 GASTROESOPHAGEAL REFLUX DISEASE, UNSPECIFIED WHETHER ESOPHAGITIS PRESENT: ICD-10-CM

## 2024-09-29 ENCOUNTER — E-VISIT (OUTPATIENT)
Dept: URGENT CARE | Facility: CLINIC | Age: 59
End: 2024-09-29
Payer: COMMERCIAL

## 2024-09-29 DIAGNOSIS — J01.90 ACUTE SINUSITIS WITH SYMPTOMS > 10 DAYS: Primary | ICD-10-CM

## 2024-09-29 PROCEDURE — 99421 OL DIG E/M SVC 5-10 MIN: CPT | Performed by: PREVENTIVE MEDICINE

## 2024-09-29 RX ORDER — DOXYCYCLINE HYCLATE 100 MG
100 TABLET ORAL 2 TIMES DAILY
Qty: 20 TABLET | Refills: 0 | Status: SHIPPED | OUTPATIENT
Start: 2024-09-29 | End: 2024-10-09

## 2024-09-29 NOTE — PATIENT INSTRUCTIONS
Acute Sinusitis: Care Instructions  Overview     Acute sinusitis is an inflammation of the mucous membranes inside the nose and sinuses. Sinuses are the hollow spaces in your skull around the eyes and nose. Acute sinusitis often follows a cold. Acute sinusitis causes thick, discolored mucus that drains from the nose or down the back of the throat. It also can cause pain and pressure in your head and face along with a stuffy or blocked nose.  In most cases, sinusitis gets better on its own in 1 to 2 weeks. But some mild symptoms may last for several weeks. Sometimes antibiotics are needed if there is a bacterial infection.  Follow-up care is a key part of your treatment and safety. Be sure to make and go to all appointments, and call your doctor if you are having problems. It's also a good idea to know your test results and keep a list of the medicines you take.  How can you care for yourself at home?  Use saline (saltwater) nasal washes. This can help keep your nasal passages open and wash out mucus and allergens.  You can buy saline nose washes at a grocery store or drugstore. Follow the instructions on the package.  You can make your own at home. Add 1 teaspoon of non-iodized salt and 1 teaspoon of baking soda to 2 cups of distilled or boiled and cooled water. Fill a squeeze bottle or a nasal cleansing pot (such as a neti pot) with the nasal wash. Then put the tip into your nostril, and lean over the sink. With your mouth open, gently squirt the liquid. Repeat on the other side.  Try a decongestant nasal spray like oxymetazoline (Afrin). Do not use it for more than 3 days in a row. Using it for more than 3 days can make your congestion worse.  If needed, take an over-the-counter pain medicine, such as acetaminophen (Tylenol), ibuprofen (Advil, Motrin), or naproxen (Aleve). Read and follow all instructions on the label.  If the doctor prescribed antibiotics, take them as directed. Do not stop taking them just  "because you feel better. You need to take the full course of antibiotics.  Be careful when taking over-the-counter cold or flu medicines and Tylenol at the same time. Many of these medicines have acetaminophen, which is Tylenol. Read the labels to make sure that you are not taking more than the recommended dose. Too much acetaminophen (Tylenol) can be harmful.  Try a steroid nasal spray. It may help with your symptoms.  Breathe warm, moist air. You can use a steamy shower, a hot bath, or a sink filled with hot water. Avoid cold, dry air. Using a humidifier in your home may help. Follow the directions for cleaning the machine.  When should you call for help?   Call your doctor now or seek immediate medical care if:    You have new or worse swelling, redness, or pain in your face or around one or both of your eyes.     You have double vision or a change in your vision.     You have a high fever.     You have a severe headache and a stiff neck.     You have mental changes, such as feeling confused or much less alert.   Watch closely for changes in your health, and be sure to contact your doctor if:    You are not getting better as expected.   Where can you learn more?  Go to https://[email or web address].net/patiented  Enter I933 in the search box to learn more about \"Acute Sinusitis: Care Instructions.\"  Current as of: September 27, 2023  Content Version: 14.2 2024 [facility] [employer].   Care instructions adapted under license by your healthcare professional. If you have questions about a medical condition or this instruction, always ask your healthcare professional. [employer] disclaims any warranty or liability for your use of this information.    You may want to try a nasal lavage (also known as nasal irrigation). You can find over-the-counter products, such as Neti-Pot, at retail locations or make your own at home. Instructions for homemade nasal lavage and more information on the process are " available online at http://www.aafp.org/afp/2009/1115/p1121.html.    Dear Lisette Owens    After reviewing your responses, I've been able to diagnose you with Acute sinusitis with symptoms > 10 days.      Based on your responses and diagnosis, I have prescribed   Orders Placed This Encounter   Medications     doxycycline hyclate (VIBRA-TABS) 100 MG tablet     Sig: Take 1 tablet (100 mg) by mouth 2 times daily for 10 days.     Dispense:  20 tablet     Refill:  0     May substitute any formulation of 100mg doxycycline available and best covered by insurance      to treat your symptoms. I have sent this to your pharmacy.?     It is also important to stay well hydrated, get lots of rest and take over-the-counter decongestants,?tylenol?or ibuprofen if you?are able to?take those medications per your primary care provider to help relieve discomfort.?     It is important that you take?all of?your prescribed medication even if your symptoms are improving after a few doses.? Taking?all of?your medicine helps prevent the symptoms from returning.?     If your symptoms worsen, you develop severe headache, vomiting, high fever (>102), or are not improving in 7 days, please contact your primary care provider for an appointment or visit any of our convenient Walk-in Care or Urgent Care Centers to be seen which can be found on our website?here.?     Thanks again for choosing?us?as your health care partner,?   ?  Tobias Calderon MD, MD?   Thank you for choosing us for your care. I have placed an order for a prescription so that you can start treatment. View your full visit summary for details by clicking on the link below. Your pharmacist will able to address any questions you may have about the medication.     If you're not feeling better within 5-7 days, please schedule an appointment.  You can schedule an appointment right here in [facility], or call [phone]  If the visit is for the same symptoms as your  eVisit, we'll refund the cost of your eVisit if seen within seven days.

## 2024-10-11 ENCOUNTER — ALLIED HEALTH/NURSE VISIT (OUTPATIENT)
Dept: FAMILY MEDICINE | Facility: CLINIC | Age: 59
End: 2024-10-11
Payer: COMMERCIAL

## 2024-10-11 DIAGNOSIS — Z23 ENCOUNTER FOR IMMUNIZATION: Primary | ICD-10-CM

## 2024-10-11 PROCEDURE — 90472 IMMUNIZATION ADMIN EACH ADD: CPT

## 2024-10-11 PROCEDURE — 99207 PR NO CHARGE NURSE ONLY: CPT

## 2024-10-11 PROCEDURE — 90746 HEPB VACCINE 3 DOSE ADULT IM: CPT

## 2024-10-11 PROCEDURE — 90471 IMMUNIZATION ADMIN: CPT

## 2024-10-11 PROCEDURE — 90673 RIV3 VACCINE NO PRESERV IM: CPT

## 2024-10-11 NOTE — PROGRESS NOTES
Prior to immunization administration, verified patients identity using patient s name and date of birth. Please see Immunization Activity for additional information.     Is the patient's temperature normal (100.5 or less)? Yes     Patient MEETS CRITERIA. PROCEED with vaccine administration.          10/11/2024   Hepatitis B   Have you had a serious reaction to a hepatitis B vaccine or to something in a hepatitis B vaccine, including yeast)? No   Are you getting kidney dialysis (either peritoneal or hemodialysis)? No   Do you have an allergy to latex? No            Patient MEETS CRITERIA. PROCEED with vaccine administration.            10/11/2024   INFLUENZA   Would you like to receive the flu shot or the nasal flu vaccine today? Flu Shot   Have you had a serious reaction to a flu vaccine or something in a flu vaccine? No   Have you had Guillain-Beyer syndrome within 6 weeks of getting a vaccine? No   Have you received a bone marrow transplant within the previous 6 months? No            Patient MEETS CRITERIA. PROCEED with vaccine administration.        Patient instructed to remain in clinic for 15 minutes afterwards, and to report any adverse reactions.      Link to Ancillary Visit Immunization Standing Orders SmartSet     Screening performed by MIKAYLA ACUÑA MA on 10/11/2024 at 3:35 PM.

## 2024-11-04 DIAGNOSIS — E11.9 TYPE 2 DIABETES MELLITUS WITHOUT COMPLICATION, WITHOUT LONG-TERM CURRENT USE OF INSULIN (H): ICD-10-CM

## 2024-11-04 RX ORDER — METFORMIN HYDROCHLORIDE 500 MG/1
TABLET, EXTENDED RELEASE ORAL
Qty: 360 TABLET | Refills: 0 | Status: SHIPPED | OUTPATIENT
Start: 2024-11-04

## 2024-11-12 ENCOUNTER — TELEPHONE (OUTPATIENT)
Dept: FAMILY MEDICINE | Facility: CLINIC | Age: 59
End: 2024-11-12
Payer: COMMERCIAL

## 2024-11-12 NOTE — LETTER
My Asthma Action Plan    Name: Lisette LINCOLN Ukaegbu   YOB: 1965  Date: 11/12/2024   My doctor: Kolton Yin PA-C   My clinic: [facility]        My Control Medicine:   My Rescue Medicine: Albuterol (Proair/Ventolin/Proventil HFA) 2-4 puffs EVERY 4 HOURS as needed. Use a spacer if recommended by your provider.   My Asthma Severity:   Moderate Persistent  Know your asthma triggers:   cold air  cats            GREEN ZONE   Good Control  I feel good  No cough or wheeze  Can work, sleep and play without asthma symptoms       Take your asthma control medicine every day.     If exercise triggers your asthma, take your rescue medication  15 minutes before exercise or sports, and  During exercise if you have asthma symptoms  Spacer to use with inhaler: If you have a spacer, make sure to use it with your inhaler             YELLOW ZONE Getting Worse  I have ANY of these:  I do not feel good  Cough or wheeze  Chest feels tight  Wake up at night   Keep taking your Green Zone medications  Start taking your rescue medicine:  every 20 minutes for up to 1 hour. Then every 4 hours for 24-48 hours.  If you stay in the Yellow Zone for more than 12-24 hours, contact your doctor.  If you do not return to the Green Zone in 12-24 hours or you get worse, start taking your oral steroid medicine if prescribed by your provider.           RED ZONE Medical Alert - Get Help  I have ANY of these:  I feel awful  Medicine is not helping  Breathing getting harder  Trouble walking or talking  Nose opens wide to breathe       Take your rescue medicine NOW  If your provider has prescribed an oral steroid medicine, start taking it NOW  Call your doctor NOW  If you are still in the Red Zone after 20 minutes and you have not reached your doctor:  Take your rescue medicine again and  Call 911 or go to the emergency room right away    See your regular doctor within 2 weeks of an Emergency Room or Urgent Care visit for  follow-up treatment.          Annual Reminders:  Meet with Asthma Educator,  Flu Shot in the Fall, consider Pneumonia Vaccination for patients with asthma (aged 19 and older).    Pharmacy:    [id number] - [place] - [address] [facility] [place] [address] [facility] [place] [address]  [id number] - [place], [place] - [address] [facility] [address]    Electronically signed by Kolton Yin PA-C   Date: 11/12/24                      Asthma Triggers  How To Control Things That Make Your Asthma Worse    Triggers are things that make your asthma worse.  Look at the list below to help you find your triggers and what you can do about them.  You can help prevent asthma flare-ups by staying away from your triggers.      Trigger                                                          What you can do   Cigarette Smoke  Tobacco smoke can make asthma worse. Do not allow smoking in your home, car or around you.  Be sure no one smokes at a child s day care or school.  If you smoke, ask your health care provider for ways to help you quit.  Ask family members to quit too.  Ask your health care provider for a referral to Quit Plan to help you quit smoking, or call 4-961-252-PLAN.     Colds, Flu, Bronchitis  These are common triggers of asthma. Wash your hands often.  Don t touch your eyes, nose or mouth.  Get a flu shot every year.     Dust Mites  These are tiny bugs that live in cloth or carpet. They are too small to see. Wash sheets and blankets in hot water every week.   Encase pillows and mattress in dust mite proof covers.  Avoid having carpet if you can. If you have carpet, vacuum weekly.   Use a dust mask and HEPA vacuum.   Pollen and Outdoor Mold  Some people are allergic to trees, grass, or weed pollen, or molds. Try to keep your windows closed.  Limit time out doors when pollen  count is high.   Ask you health care provider about taking medicine during allergy season.     Animal Dander  Some people are allergic to skin flakes, urine or saliva from pets with fur or feathers. Keep pets with fur or feathers out of your home.    If you can t keep the pet outdoors, then keep the pet out of your bedroom.  Keep the bedroom door closed.  Keep pets off cloth furniture and away from stuffed toys.     Mice, Rats, and Cockroaches   Some people are allergic to the waste from these pests.   Cover food and garbage.  Clean up spills and food crumbs.  Store grease in the refrigerator.   Keep food out of the bedroom.   Indoor Mold  This can be a trigger if your home has high moisture. Fix leaking faucets, pipes, or other sources of water.   Clean moldy surfaces.  Dehumidify basement if it is damp and smelly.   Smoke, Strong Odors, and Sprays  These can reduce air quality. Stay away from strong odors and sprays, such as perfume, powder, hair spray, paints, smoke incense, paint, cleaning products, candles and new carpet.   Exercise or Sports  Some people with asthma have this trigger. Be active!  Ask your doctor about taking medicine before sports or exercise to prevent symptoms.    Warm up for 5-10 minutes before and after sports or exercise.     Other Triggers of Asthma  Cold air:  Cover your nose and mouth with a scarf.  Sometimes laughing or crying can be a trigger.  Some medicines and food can trigger asthma.

## 2024-11-12 NOTE — TELEPHONE ENCOUNTER
Patient Quality Outreach    Patient is due for the following:   Asthma  -  ACT needed and AAP    Action(s) Taken:   No follow up needed at this time.    Type of outreach:    Sent OB10hart message. and Copy of ACT mailed to patient.    Questions for provider review:    None           Keri Camargo

## 2024-11-27 ENCOUNTER — MYC REFILL (OUTPATIENT)
Dept: GASTROENTEROLOGY | Facility: CLINIC | Age: 59
End: 2024-11-27
Payer: COMMERCIAL

## 2024-11-27 DIAGNOSIS — K59.09 CHRONIC CONSTIPATION: ICD-10-CM

## 2024-11-29 NOTE — TELEPHONE ENCOUNTER
lubiprostone (AMITIZA) 24 MCG capsule   kusum 1 capsule (24 mcg) by mouth 2 times daily (with meals)   Last Written Prescription Date:  3/26/24  Last Fill Quantity: 180,   # refills: 1  Last Office Visit : 11/8/23 [facility]  Future Office visit:  None    Routing refill request to provider for review/approval because:   lubiprostone (AMITIZA) 24 MCG capsule not on [protocol. Patient last seen in clinic 11/8/23 [facility]

## 2024-11-29 NOTE — TELEPHONE ENCOUNTER
[employer] message sent to patient to schedule a follow up appointment.    Kathleen Delgado RN

## 2024-12-05 RX ORDER — LUBIPROSTONE 24 UG/1
24 CAPSULE ORAL 2 TIMES DAILY WITH MEALS
Qty: 180 CAPSULE | Refills: 0 | Status: SHIPPED | OUTPATIENT
Start: 2024-12-05

## 2024-12-27 ENCOUNTER — TRANSFERRED RECORDS (OUTPATIENT)
Dept: MULTI SPECIALTY CLINIC | Facility: CLINIC | Age: 59
End: 2024-12-27

## 2024-12-27 LAB — RETINOPATHY: NORMAL

## 2025-01-29 ENCOUNTER — VIRTUAL VISIT (OUTPATIENT)
Dept: FAMILY MEDICINE | Facility: CLINIC | Age: 60
End: 2025-01-29
Payer: COMMERCIAL

## 2025-01-29 DIAGNOSIS — J45.41 MODERATE PERSISTENT ASTHMATIC BRONCHITIS WITH ACUTE EXACERBATION: ICD-10-CM

## 2025-01-29 DIAGNOSIS — E78.5 HYPERLIPIDEMIA LDL GOAL <100: ICD-10-CM

## 2025-01-29 DIAGNOSIS — F41.8 DEPRESSION WITH ANXIETY: Primary | ICD-10-CM

## 2025-01-29 DIAGNOSIS — I10 BENIGN ESSENTIAL HYPERTENSION: ICD-10-CM

## 2025-01-29 DIAGNOSIS — J01.00 ACUTE NON-RECURRENT MAXILLARY SINUSITIS: ICD-10-CM

## 2025-01-29 DIAGNOSIS — E11.9 TYPE 2 DIABETES MELLITUS WITHOUT COMPLICATION, WITHOUT LONG-TERM CURRENT USE OF INSULIN (H): ICD-10-CM

## 2025-01-29 DIAGNOSIS — J45.40 MODERATE PERSISTENT ASTHMA WITHOUT COMPLICATION: ICD-10-CM

## 2025-01-29 PROCEDURE — 98005 SYNCH AUDIO-VIDEO EST LOW 20: CPT | Performed by: PHYSICIAN ASSISTANT

## 2025-01-29 RX ORDER — METFORMIN HYDROCHLORIDE 500 MG/1
1000 TABLET, EXTENDED RELEASE ORAL
Qty: 360 TABLET | Refills: 1 | Status: SHIPPED | OUTPATIENT
Start: 2025-01-29

## 2025-01-29 RX ORDER — FLUTICASONE FUROATE, UMECLIDINIUM BROMIDE AND VILANTEROL TRIFENATATE 200; 62.5; 25 UG/1; UG/1; UG/1
1 POWDER RESPIRATORY (INHALATION) DAILY
Qty: 180 EACH | Refills: 3 | Status: SHIPPED | OUTPATIENT
Start: 2025-01-29

## 2025-01-29 RX ORDER — DOXYCYCLINE HYCLATE 100 MG
100 TABLET ORAL 2 TIMES DAILY
Qty: 20 TABLET | Refills: 0 | Status: SHIPPED | OUTPATIENT
Start: 2025-01-29 | End: 2025-02-08

## 2025-01-29 RX ORDER — ROSUVASTATIN CALCIUM 5 MG/1
5 TABLET, COATED ORAL DAILY
Qty: 90 TABLET | Refills: 1 | Status: SHIPPED | OUTPATIENT
Start: 2025-01-29

## 2025-01-29 RX ORDER — BUSPIRONE HYDROCHLORIDE 15 MG/1
15 TABLET ORAL 3 TIMES DAILY
Qty: 270 TABLET | Refills: 1 | Status: SHIPPED | OUTPATIENT
Start: 2025-01-29

## 2025-01-29 RX ORDER — PREDNISONE 20 MG/1
20 TABLET ORAL 2 TIMES DAILY
Qty: 14 TABLET | Refills: 0 | Status: SHIPPED | OUTPATIENT
Start: 2025-01-29 | End: 2025-02-05

## 2025-01-29 RX ORDER — LOSARTAN POTASSIUM 25 MG/1
25 TABLET ORAL DAILY
Qty: 90 TABLET | Refills: 1 | Status: SHIPPED | OUTPATIENT
Start: 2025-01-29

## 2025-01-29 RX ORDER — FLUOXETINE 40 MG/1
40 CAPSULE ORAL DAILY
Qty: 90 CAPSULE | Refills: 1 | Status: SHIPPED | OUTPATIENT
Start: 2025-01-29

## 2025-01-29 ASSESSMENT — ANXIETY QUESTIONNAIRES
7. FEELING AFRAID AS IF SOMETHING AWFUL MIGHT HAPPEN: MORE THAN HALF THE DAYS
7. FEELING AFRAID AS IF SOMETHING AWFUL MIGHT HAPPEN: MORE THAN HALF THE DAYS
2. NOT BEING ABLE TO STOP OR CONTROL WORRYING: SEVERAL DAYS
8. IF YOU CHECKED OFF ANY PROBLEMS, HOW DIFFICULT HAVE THESE MADE IT FOR YOU TO DO YOUR WORK, TAKE CARE OF THINGS AT HOME, OR GET ALONG WITH OTHER PEOPLE?: NOT DIFFICULT AT ALL
3. WORRYING TOO MUCH ABOUT DIFFERENT THINGS: SEVERAL DAYS
GAD7 TOTAL SCORE: 8
6. BECOMING EASILY ANNOYED OR IRRITABLE: SEVERAL DAYS
1. FEELING NERVOUS, ANXIOUS, OR ON EDGE: SEVERAL DAYS
4. TROUBLE RELAXING: SEVERAL DAYS
5. BEING SO RESTLESS THAT IT IS HARD TO SIT STILL: SEVERAL DAYS
GAD7 TOTAL SCORE: 8
IF YOU CHECKED OFF ANY PROBLEMS ON THIS QUESTIONNAIRE, HOW DIFFICULT HAVE THESE PROBLEMS MADE IT FOR YOU TO DO YOUR WORK, TAKE CARE OF THINGS AT HOME, OR GET ALONG WITH OTHER PEOPLE: NOT DIFFICULT AT ALL
GAD7 TOTAL SCORE: 8

## 2025-01-29 ASSESSMENT — PATIENT HEALTH QUESTIONNAIRE - PHQ9
SUM OF ALL RESPONSES TO PHQ QUESTIONS 1-9: 10
SUM OF ALL RESPONSES TO PHQ QUESTIONS 1-9: 10
10. IF YOU CHECKED OFF ANY PROBLEMS, HOW DIFFICULT HAVE THESE PROBLEMS MADE IT FOR YOU TO DO YOUR WORK, TAKE CARE OF THINGS AT HOME, OR GET ALONG WITH OTHER PEOPLE: SOMEWHAT DIFFICULT

## 2025-01-29 ASSESSMENT — ENCOUNTER SYMPTOMS: NERVOUS/ANXIOUS: 1

## 2025-01-29 NOTE — PROGRESS NOTES
Lisette is a 60 year old who is being evaluated via a billable video visit.    How would you like to obtain your AVS? MyChart  If the video visit is dropped, the invitation should be resent by: Text to cell phone: [phone]  Will anyone else be joining your video visit? No        Subjective   Lisette is a 60 year old, presenting for the following health issues:  Hypertension, Diabetes, Depression, and Anxiety      Via the Health Maintenance questionnaire, the patient has reported the following services have been completed -Eye Exam: [facility] 2024-12-27, this information has been sent to the abstraction team.  History of Present Illness       Mental Health Follow-up:  Patient presents to follow-up on Depression & Anxiety.Patient's depression since last visit has been:  No change  The patient is not having other symptoms associated with depression.  Patient's anxiety since last visit has been:  No change  The patient is not having other symptoms associated with anxiety.  Any significant life events: No  Patient is not feeling anxious or having panic attacks.  Patient has no concerns about alcohol or drug use.    Diabetes:   She presents for follow up of diabetes.    She is not checking blood glucose.         She has no concerns regarding her diabetes at this time.   She is not experiencing numbness or burning in feet, excessive thirst, blurry vision, weight changes or redness, sores or blisters on feet. The patient has had a diabetic eye exam in the last 12 months. Eye exam performed on 12/27/2024. Location of last eye exam [facility].        Hyperlipidemia:  She presents for follow up of hyperlipidemia.   She is taking medication to lower cholesterol. She is not having myalgia or other side effects to statin medications.    Hypertension: She presents for follow up of hypertension.  She does not check blood pressure  regularly outside of the clinic. Outpatient blood pressures have not been over 140/90. She does not follow a  low salt diet.     She eats 0-1 servings of fruits and vegetables daily.She consumes 0 sweetened beverage(s) daily.She exercises with enough effort to increase her heart rate 10 to 19 minutes per day.  She exercises with enough effort to increase her heart rate 3 or less days per week.   She is taking medications regularly.     No chest pain/palpitations/dizziness/ha's  Taking and tolerating meds.  Depression and anxiety are doing really well. Doing light therapy.   Cold symptoms and wheezing. Some facial pain and pressure.     Review of Systems  Constitutional, HEENT, cardiovascular, pulmonary, GI, , musculoskeletal, neuro, skin, endocrine and psych systems are negative, except as otherwise noted.      Objective           Vitals:  No vitals were obtained today due to virtual visit.    Physical Exam   GENERAL: alert and no distress  EYES: Eyes grossly normal to inspection.  No discharge or erythema, or obvious scleral/conjunctival abnormalities.  RESP: No audible wheeze, cough, or visible cyanosis.    SKIN: Visible skin clear. No significant rash, abnormal pigmentation or lesions.  NEURO: Cranial nerves grossly intact.  Mentation and speech appropriate for age.  PSYCH: Appropriate affect, tone, and pace of words    Lisette was seen today for hypertension, diabetes, depression and anxiety.    Diagnoses and all orders for this visit:    Depression with anxiety  -     busPIRone (BUSPAR) 15 MG tablet; Take 1 tablet (15 mg) by mouth 3 times daily.  -     FLUoxetine (PROZAC) 40 MG capsule; Take 1 capsule (40 mg) by mouth daily.  -     FLUoxetine (PROZAC) 20 MG capsule; TAKE 1 CAPSULE BY MOUTH ONCE DAILY WITH 40 MG CAPSULE FOR A TOTAL DAILY DOSE OF 60MG    Moderate persistent asthmatic bronchitis with acute exacerbation  -     doxycycline hyclate (VIBRA-TABS) 100 MG tablet; Take 1 tablet (100 mg) by mouth 2 times daily for 10 days.  -     predniSONE (DELTASONE) 20 MG tablet; Take 1 tablet (20 mg) by mouth 2 times daily for  7 days.    Acute non-recurrent maxillary sinusitis  -     doxycycline hyclate (VIBRA-TABS) 100 MG tablet; Take 1 tablet (100 mg) by mouth 2 times daily for 10 days.  -     predniSONE (DELTASONE) 20 MG tablet; Take 1 tablet (20 mg) by mouth 2 times daily for 7 days.    Benign essential hypertension    Moderate persistent asthma without complication  -     Fluticasone-Umeclidin-Vilanterol (TRELEGY ELLIPTA) 200-62.5-25 MCG/ACT oral inhaler; Inhale 1 puff into the lungs daily.    Hyperlipidemia LDL goal <100  -     rosuvastatin (CRESTOR) 5 MG tablet; Take 1 tablet (5 mg) by mouth daily.    Type 2 diabetes mellitus without complication, without long-term current use of insulin (H)  -     metFORMIN (GLUCOPHAGE XR) 500 MG 24 hr tablet; Take 2 tablets (1,000 mg) by mouth daily (with dinner).  -     losartan (COZAAR) 25 MG tablet; Take 1 tablet (25 mg) by mouth daily.    Other orders  -     REVIEW OF HEALTH MAINTENANCE PROTOCOL ORDERS      Lower fat, higher fiber diet and consistent exercise.  Continue to work on a lower sugar/starch diet and more exercise.        Video-Visit Details    Type of service:  Video Visit   Originating Location (pt. Location): Home    Distant Location (provider location):  On-site  Platform used for Video Visit: Anatoliy  Signed Electronically by: Kolton Yin PA-C

## 2025-02-11 ENCOUNTER — ALLIED HEALTH/NURSE VISIT (OUTPATIENT)
Dept: FAMILY MEDICINE | Facility: CLINIC | Age: 60
End: 2025-02-11
Payer: COMMERCIAL

## 2025-02-11 DIAGNOSIS — Z23 ENCOUNTER FOR IMMUNIZATION: Primary | ICD-10-CM

## 2025-02-11 PROCEDURE — 90746 HEPB VACCINE 3 DOSE ADULT IM: CPT

## 2025-02-11 PROCEDURE — 99207 PR NO CHARGE NURSE ONLY: CPT

## 2025-02-11 PROCEDURE — 90471 IMMUNIZATION ADMIN: CPT

## 2025-02-11 NOTE — PROGRESS NOTES
Prior to immunization administration, verified patients identity using patient s name and date of birth. Please see Immunization Activity for additional information.     Is the patient's temperature normal (100.5 or less)? Yes     Patient MEETS CRITERIA. PROCEED with vaccine administration.      Patient instructed to remain in clinic for 15 minutes afterwards, and to report any adverse reactions.      Link to Ancillary Visit Immunization Standing Orders SmartSet     Screening performed by Linda Yu CMA on 2/11/2025 at 3:40 PM.

## 2025-03-01 ENCOUNTER — HEALTH MAINTENANCE LETTER (OUTPATIENT)
Age: 60
End: 2025-03-01

## 2025-03-12 ENCOUNTER — TELEPHONE (OUTPATIENT)
Dept: FAMILY MEDICINE | Facility: CLINIC | Age: 60
End: 2025-03-12
Payer: COMMERCIAL

## 2025-03-15 NOTE — TELEPHONE ENCOUNTER
PA Initiation    Medication: MOUNJARO 7.5 MG/0.5ML SC SOAJ  Insurance Company: [employer] Non-Specialty PA's - Phone [phone] Fax [phone]  Pharmacy Filling the Rx: [facility] - [address] [facility] - [place], [place]  Filling Pharmacy Phone: [phone]  Filling Pharmacy Fax: [phone]  Start Date: 3/15/2025

## 2025-04-07 ENCOUNTER — TELEPHONE (OUTPATIENT)
Dept: FAMILY MEDICINE | Facility: CLINIC | Age: 60
End: 2025-04-07
Payer: COMMERCIAL

## 2025-04-07 DIAGNOSIS — G47.11 IDIOPATHIC HYPERSOMNIA: Primary | ICD-10-CM

## 2025-04-07 NOTE — TELEPHONE ENCOUNTER
PRIOR AUTHORIZATION DENIED    Medication: MODAFINIL 200 MG PO TABS  Insurance Company: [employer] Non-Specialty PA's - Phone [phone] Fax [phone]  Denial Date: 4/7/2025  Denial Reason(s): Diagnosis not covered for medication      Appeal Information:   Patient Notified: No

## 2025-04-08 NOTE — TELEPHONE ENCOUNTER
Per insurance team- With her insurance we are unable to resubmit- we would need a Letter of Medical Necessity.       Zhane Valle  Lead    [facility] Charmaine Orellana

## 2025-04-08 NOTE — TELEPHONE ENCOUNTER
We are using modafinil to as Adjunctive/augmentation treatment of depression in an adult. Can another PA be performed?

## 2025-04-14 RX ORDER — MODAFINIL 200 MG/1
200 TABLET ORAL DAILY
Qty: 30 TABLET | Refills: 0 | Status: SHIPPED | OUTPATIENT
Start: 2025-04-14

## 2025-05-05 ENCOUNTER — TELEPHONE (OUTPATIENT)
Dept: PULMONOLOGY | Facility: CLINIC | Age: 60
End: 2025-05-05

## 2025-05-05 ENCOUNTER — VIRTUAL VISIT (OUTPATIENT)
Dept: FAMILY MEDICINE | Facility: CLINIC | Age: 60
End: 2025-05-05
Payer: COMMERCIAL

## 2025-05-05 DIAGNOSIS — R19.7 DIARRHEA, UNSPECIFIED TYPE: ICD-10-CM

## 2025-05-05 DIAGNOSIS — R11.0 NAUSEA: ICD-10-CM

## 2025-05-05 DIAGNOSIS — R10.13 EPIGASTRIC PAIN: Primary | ICD-10-CM

## 2025-05-05 DIAGNOSIS — G47.11 IDIOPATHIC HYPERSOMNIA: ICD-10-CM

## 2025-05-05 DIAGNOSIS — K59.09 CHRONIC CONSTIPATION: ICD-10-CM

## 2025-05-05 PROCEDURE — 98006 SYNCH AUDIO-VIDEO EST MOD 30: CPT | Performed by: PHYSICIAN ASSISTANT

## 2025-05-05 RX ORDER — SUCRALFATE 1 G/1
1 TABLET ORAL 4 TIMES DAILY
Qty: 120 TABLET | Refills: 0 | Status: SHIPPED | OUTPATIENT
Start: 2025-05-05 | End: 2025-06-04

## 2025-05-05 RX ORDER — ONDANSETRON 4 MG/1
4 TABLET, ORALLY DISINTEGRATING ORAL EVERY 8 HOURS PRN
Qty: 30 TABLET | Refills: 2 | Status: SHIPPED | OUTPATIENT
Start: 2025-05-05

## 2025-05-05 RX ORDER — LUBIPROSTONE 24 UG/1
24 CAPSULE ORAL 2 TIMES DAILY WITH MEALS
Qty: 180 CAPSULE | Refills: 0 | Status: SHIPPED | OUTPATIENT
Start: 2025-05-05

## 2025-05-05 RX ORDER — MODAFINIL 200 MG/1
200 TABLET ORAL DAILY
Qty: 30 TABLET | Refills: 0 | Status: SHIPPED | OUTPATIENT
Start: 2025-05-05

## 2025-05-05 ASSESSMENT — ASTHMA QUESTIONNAIRES
QUESTION_2 LAST FOUR WEEKS HOW OFTEN HAVE YOU HAD SHORTNESS OF BREATH: NOT AT ALL
QUESTION_3 LAST FOUR WEEKS HOW OFTEN DID YOUR ASTHMA SYMPTOMS (WHEEZING, COUGHING, SHORTNESS OF BREATH, CHEST TIGHTNESS OR PAIN) WAKE YOU UP AT NIGHT OR EARLIER THAN USUAL IN THE MORNING: NOT AT ALL
ACT_TOTALSCORE: 25
QUESTION_4 LAST FOUR WEEKS HOW OFTEN HAVE YOU USED YOUR RESCUE INHALER OR NEBULIZER MEDICATION (SUCH AS ALBUTEROL): NOT AT ALL
QUESTION_5 LAST FOUR WEEKS HOW WOULD YOU RATE YOUR ASTHMA CONTROL: COMPLETELY CONTROLLED
QUESTION_1 LAST FOUR WEEKS HOW MUCH OF THE TIME DID YOUR ASTHMA KEEP YOU FROM GETTING AS MUCH DONE AT WORK, SCHOOL OR AT HOME: NONE OF THE TIME

## 2025-05-05 NOTE — TELEPHONE ENCOUNTER
Patient Contacted for the patient to call back and schedule the following:    Appointment type: RTN PULM  Provider: FAVIAN  Return date: 1/9/2026  Specialty phone number: [phone]  Additional appointment(s) needed: N/A  Additonal Notes: RESCHEDULING 8/22 APPT

## 2025-05-05 NOTE — PROGRESS NOTES
Lisette is a 60 year old who is being evaluated via a billable video visit.    How would you like to obtain your AVS? MyChart  If the video visit is dropped, the invitation should be resent by: Text to cell phone: [phone]  Will anyone else be joining your video visit? No        Subjective   Lisette is a 60 year old, presenting for the following health issues:  Gastric Problem (Chronic concerns, dog has hook worms)        5/5/2025     3:35 PM   Additional Questions   Roomed by Keri Camargo CMA   Accompanied by None         5/5/2025     3:35 PM   Patient Reported Additional Medications   Patient reports taking the following new medications none     History of Present Illness       Reason for visit:  Stomach    She eats 0-1 servings of fruits and vegetables daily.She consumes 0 sweetened beverage(s) daily.She exercises with enough effort to increase her heart rate 9 or less minutes per day.  She exercises with enough effort to increase her heart rate 3 or less days per week.   She is taking medications regularly.        History of gi issues for yrs. IBS like symptoms. Varies from constipation to even diarrhea. Some episodes of epigastric area. Associated with nausea.     Review of Systems  Constitutional, HEENT, cardiovascular, pulmonary, GI, , musculoskeletal, neuro, skin, endocrine and psych systems are negative, except as otherwise noted.      Objective           Vitals:  No vitals were obtained today due to virtual visit.    Physical Exam   GENERAL: alert and no distress  EYES: Eyes grossly normal to inspection.  No discharge or erythema, or obvious scleral/conjunctival abnormalities.  RESP: No audible wheeze, cough, or visible cyanosis.    SKIN: Visible skin clear. No significant rash, abnormal pigmentation or lesions.  NEURO: Cranial nerves grossly intact.  Mentation and speech appropriate for age.  PSYCH: Appropriate affect, tone, and pace of words    Lisette was seen today for gastric problem.    Diagnoses and  all orders for this visit:    Epigastric pain  -     sucralfate (CARAFATE) 1 GM tablet; Take 1 tablet (1 g) by mouth 4 times daily.  -     US Abdomen Limited; Future  -     Helicobacter pylori Antigen Stool; Future    Chronic constipation  -     lubiprostone (AMITIZA) 24 MCG capsule; Take 1 capsule (24 mcg) by mouth 2 times daily (with meals).    Diarrhea, unspecified type  -     Ova and Parasite Exam Routine; Future  -     Ova and Parasite Exam Routine; Future  -     Ova and Parasite Exam Routine; Future    Nausea  -     ondansetron (ZOFRAN ODT) 4 MG ODT tab; Take 1 tablet (4 mg) by mouth every 8 hours as needed for nausea.      Higher fiber diet.  Fodmaps diet.       Video-Visit Details    Type of service:  Video Visit   Originating Location (pt. Location): Home    Distant Location (provider location):  On-site  Platform used for Video Visit: Anatoliy  Signed Electronically by: Kolton Yin PA-C

## 2025-05-14 ENCOUNTER — ANCILLARY PROCEDURE (OUTPATIENT)
Dept: ULTRASOUND IMAGING | Facility: CLINIC | Age: 60
End: 2025-05-14
Attending: PHYSICIAN ASSISTANT
Payer: COMMERCIAL

## 2025-05-14 ENCOUNTER — LAB (OUTPATIENT)
Dept: LAB | Facility: CLINIC | Age: 60
End: 2025-05-14
Payer: COMMERCIAL

## 2025-05-14 DIAGNOSIS — R10.13 EPIGASTRIC PAIN: ICD-10-CM

## 2025-05-14 DIAGNOSIS — R19.7 DIARRHEA, UNSPECIFIED TYPE: ICD-10-CM

## 2025-05-14 PROCEDURE — 76705 ECHO EXAM OF ABDOMEN: CPT | Mod: TC | Performed by: RADIOLOGY

## 2025-05-20 LAB
H PYLORI AG STL QL IA: NEGATIVE
O+P STL MICRO: NEGATIVE
O+P STL MICRO: NEGATIVE
SPECIMEN TYPE: NORMAL
SPECIMEN TYPE: NORMAL

## 2025-05-22 ENCOUNTER — TELEPHONE (OUTPATIENT)
Dept: FAMILY MEDICINE | Facility: CLINIC | Age: 60
End: 2025-05-22
Payer: COMMERCIAL

## 2025-05-22 LAB
O+P STL MICRO: NEGATIVE
SPECIMEN TYPE: NORMAL

## 2025-05-22 NOTE — TELEPHONE ENCOUNTER
Patient Quality Outreach    Patient is due for the following:   Diabetes -  A1C, Microalbumin, and Foot Exam    Action(s) Taken:   Schedule a office visit for diabetes recheck    Type of outreach:    Sent CartMomo message.    Questions for provider review:    None         Keri Camargo  Chart routed to None.

## 2025-05-23 ENCOUNTER — RESULTS FOLLOW-UP (OUTPATIENT)
Dept: FAMILY MEDICINE | Facility: CLINIC | Age: 60
End: 2025-05-23

## 2025-05-26 ENCOUNTER — PATIENT OUTREACH (OUTPATIENT)
Dept: CARE COORDINATION | Facility: CLINIC | Age: 60
End: 2025-05-26
Payer: COMMERCIAL

## 2025-05-28 ENCOUNTER — PATIENT OUTREACH (OUTPATIENT)
Dept: CARE COORDINATION | Facility: CLINIC | Age: 60
End: 2025-05-28
Payer: COMMERCIAL

## 2025-06-01 DIAGNOSIS — K21.9 GASTROESOPHAGEAL REFLUX DISEASE, UNSPECIFIED WHETHER ESOPHAGITIS PRESENT: ICD-10-CM

## 2025-06-02 ENCOUNTER — PRE VISIT (OUTPATIENT)
Dept: SURGERY | Facility: CLINIC | Age: 60
End: 2025-06-02
Payer: COMMERCIAL

## 2025-06-02 RX ORDER — OMEPRAZOLE 20 MG/1
CAPSULE, DELAYED RELEASE ORAL
Qty: 60 CAPSULE | Refills: 8 | Status: SHIPPED | OUTPATIENT
Start: 2025-06-02

## 2025-06-02 NOTE — TELEPHONE ENCOUNTER
Called pt offer video visit. Gave call center number if she wanted to switch.     Enoc Burgos, EMT

## 2025-06-03 ENCOUNTER — OFFICE VISIT (OUTPATIENT)
Dept: SURGERY | Facility: CLINIC | Age: 60
End: 2025-06-03
Payer: COMMERCIAL

## 2025-06-03 VITALS
WEIGHT: 133.4 LBS | HEART RATE: 71 BPM | SYSTOLIC BLOOD PRESSURE: 97 MMHG | BODY MASS INDEX: 20.94 KG/M2 | DIASTOLIC BLOOD PRESSURE: 61 MMHG | HEIGHT: 67 IN | OXYGEN SATURATION: 98 %

## 2025-06-03 DIAGNOSIS — R10.13 EPIGASTRIC PAIN: ICD-10-CM

## 2025-06-03 DIAGNOSIS — K82.8 SLUDGE IN GALLBLADDER: ICD-10-CM

## 2025-06-03 PROCEDURE — 3074F SYST BP LT 130 MM HG: CPT | Performed by: SURGERY

## 2025-06-03 PROCEDURE — 3078F DIAST BP <80 MM HG: CPT | Performed by: SURGERY

## 2025-06-03 PROCEDURE — 99203 OFFICE O/P NEW LOW 30 MIN: CPT | Performed by: SURGERY

## 2025-06-03 RX ORDER — CEFAZOLIN SODIUM 2 G/50ML
2 SOLUTION INTRAVENOUS
OUTPATIENT
Start: 2025-06-03

## 2025-06-03 RX ORDER — INDOCYANINE GREEN AND WATER 25 MG
2.5 KIT INJECTION ONCE
OUTPATIENT
Start: 2025-06-03 | End: 2025-06-03

## 2025-06-03 RX ORDER — CEFAZOLIN SODIUM 2 G/50ML
2 SOLUTION INTRAVENOUS SEE ADMIN INSTRUCTIONS
OUTPATIENT
Start: 2025-06-03

## 2025-06-03 NOTE — NURSING NOTE
Pre and Post op Patient Education/Teaching Flowsheet  Relevant Diagnosis:  Cholelithiasis (K80.20)  Teaching Topic:  Pre and post op teaching  Person(s) Involved in teaching:  Patient     Motivation Level:  Asks Questions:  Yes  Eager to Learn:  Yes  Cooperative:  Yes  Receptive (willing/able to accept information):  Yes  Any cultural factors/Jehovah's witness beliefs that may influence understanding or compliance?  No    Patient/caregiver/family demonstrates understanding of the following:  Reason for the appointment, diagnosis, and treatment plan:  Yes  Patient demonstrates understanding of the following:  Pre-op bowel prep:  N/A  Post-op pain management recommendations (medications, ice compress, binder/athletic supporter (if applicable), etc.:  Yes  Inguinal hernia patients:  Post-op urinary retention- discussed signs/symptoms and visit to ER for Abbasi catheter placement and to stay in place for at least 48 hours:  NA  Restrictions:  Yes  Medications to take the day of surgery:  Per PCP  Blood thinner medications discussed and when to stop (if applicable):  Yes  Wound care:  Yes  Diabetes medication management (if applicable):  Per PCP  Which situations necessitate calling provider and whom to contact:  Discussed how to contact the hospital, nurse, and clinic scheduling staff if necessary    Infection Prevention: Patient demonstrates understanding of the following:  Patient instructed on hand hygiene:  Yes  Surgical procedure site care will be taught and will be reviewed at the time of discharge  Signs and symptoms of infection taught:  Yes  Wound care reviewed and will be taught at the time of discharge  Central venous catheter care will be taught at the time of discharge (if applicable)    Post-op follow-up:  Instructional materials used/given/mailed:  Surgical logistics, post op teaching sheet, and surgical scrub    Logistics:  Date and time of surgery:  TBD  Location of surgery: [facility]  Surgery Center- 5th Floor  History and Physical and any other testing necessary prior to surgery:  Yes  Required time line for completion of History and Physical and any pre-op testing:  Yes  Discuss need for someone to drive patient home and stay with them for 24 hours:  Yes  Pre-op showering/scrub information with Surgical Scrub:  Yes  NPO Guidelines:  NPO per Anesthesia Guidelines

## 2025-06-03 NOTE — PROGRESS NOTES
Lisette LINCOLN Ukaegbu is a 60 year old female with a  recent history of abdominal pain localized to the epigastric region.  Has had GI issues for some time.  Symptoms are not associated with fatty food intake.  Associated symptoms: none  Common duct symptoms:  None  Diet tolerance:  good  Patient was not seen in the Emergency Room for these symptoms.  LFT's:  normal    Image studies included:  Ultrasound  Findings:  IMPRESSION:  1.  Gallbladder sludge. No shadowing gallstones or other evidence for cholecystitis.  2.  Mild hepatic steatosis.  3.  A gallbladder polyp near the gallbladder neck measures 1 cm. A follow-up ultrasound in 6 months is recommended.    Past medical and surgical history, medications, allergies, family history, and social history were reviewed with the patient.    ROS: 10 point review of systems negative except noted in HPI  PHYSICAL EXAM  General appearance- healthy, alert, and in no distress.  Skin- Skin color and turgor normal.  No obvious rashes.  Lungs- Respiratory effort unlabored.  Gait- Normal.  Abdomen - soft non distended, non tender .  Impression:  Symptomatic biliary sludge/polyp  Recommendation:  Laparoscopic Cholecystectomy robot assisted.     Low to nonfat diet until the patient undergoes surgery.    A full discussion regarding the alternatives, risks, goals, and potential complications for this surgery was completed today.  The patient understood that the potential problems included but are not limited to:  Infection, bleeding, bile leak, injury to structures about the gallbladder, possible common duct stone requiring further procedures. Most current review of literature confirm the more common specific risks related to laparoscopic cholecystectomy include bile duct injury (3/1000), bile leak (10/1000), retained common bile duct stone (10/1000), postcholecystectomy diarrhea (1-2%) and these complications may require additional treatment.    The patient verbally expressed understanding,  was given the opportunity for questions, and gives full informed consent for the procedure.      Today the patient was instructed on the need for a preoperative H&P, NPO status prior to surgery, and the need to stop anticoagulants prior to surgery.  Additional educational material, soap, and instructions will be mailed out to the patients home.    The total time spent with this patient was 30 minutes.  The total time was spent on the date of encounter doing chart review, history and physical, dressing changes, documentation, patient education, and any further activity as noted above.

## 2025-06-03 NOTE — LETTER
6/3/2025       RE: Lisette Owens  [address] [place] [address]     Dear Colleague,    Thank you for referring your patient, Lisette Owens, to the [facility] [place] [facility]. Please see a copy of my visit note below.    Lisette Owens is a 60 year old female with a  recent history of abdominal pain localized to the epigastric region.  Has had GI issues for some time.  Symptoms are not associated with fatty food intake.  Associated symptoms: none  Common duct symptoms:  None  Diet tolerance:  good  Patient was not seen in the Emergency Room for these symptoms.  LFT's:  normal    Image studies included:  Ultrasound  Findings:  IMPRESSION:  1.  Gallbladder sludge. No shadowing gallstones or other evidence for cholecystitis.  2.  Mild hepatic steatosis.  3.  A gallbladder polyp near the gallbladder neck measures 1 cm. A follow-up ultrasound in 6 months is recommended.    Past medical and surgical history, medications, allergies, family history, and social history were reviewed with the patient.    ROS: 10 point review of systems negative except noted in HPI  PHYSICAL EXAM  General appearance- healthy, alert, and in no distress.  Skin- Skin color and turgor normal.  No obvious rashes.  Lungs- Respiratory effort unlabored.  Gait- Normal.  Abdomen - soft non distended, non tender .  Impression:  Symptomatic biliary sludge/polyp  Recommendation:  Laparoscopic Cholecystectomy robot assisted.     Low to nonfat diet until the patient undergoes surgery.    A full discussion regarding the alternatives, risks, goals, and potential complications for this surgery was completed today.  The patient understood that the potential problems included but are not limited to:  Infection, bleeding, bile leak, injury to structures about the gallbladder, possible common duct stone requiring further procedures. Most current review of  literature confirm the more common specific risks related to laparoscopic cholecystectomy include bile duct injury (3/1000), bile leak (10/1000), retained common bile duct stone (10/1000), postcholecystectomy diarrhea (1-2%) and these complications may require additional treatment.    The patient verbally expressed understanding, was given the opportunity for questions, and gives full informed consent for the procedure.      Today the patient was instructed on the need for a preoperative H&P, NPO status prior to surgery, and the need to stop anticoagulants prior to surgery.  Additional educational material, soap, and instructions will be mailed out to the patients home.    The total time spent with this patient was 30 minutes.  The total time was spent on the date of encounter doing chart review, history and physical, dressing changes, documentation, patient education, and any further activity as noted above.         Again, thank you for allowing me to participate in the care of your patient.      Sincerely,    Peter Charles MD

## 2025-06-03 NOTE — NURSING NOTE
"Chief Complaint   Patient presents with    New Patient     Sludge in gallbladder and polyp       Vitals:    06/03/25 0935   BP: 97/61   BP Location: Left arm   Patient Position: Sitting   Cuff Size: Adult Regular   Pulse: 71   SpO2: 98%   Weight: 60.5 kg (133 lb 6.4 oz)   Height: 1.689 m (5' 6.5\")       Body mass index is 21.21 kg/m .                          Enoc Burgos, EMT    "

## 2025-06-03 NOTE — PATIENT INSTRUCTIONS
You met with Dr. Peter Charles.      Today's visit instructions:    Reminder: Surgery Requirements  Your surgery will be at [facility]- 5th Floor.  You will need to arrive 1.5  hours early.  You will need someone to drive you home (over 18 years old) and stay with you for 24 hours after the procedure.  You will need a preop physical with your regular doctor within 30 days of surgery- closer is always better.  Stop any blood thinners, vitamins, minerals, or herbal supplements 5 days before surgery. You will need to hold Mounjaro for 7 days prior to surgery.  Fasting- a nurse from [facility] will call you 1-2 days before surgery to confirm your procedure and tell you when to stop eating and drinking. If you had you preoperative physical with the Anesthesia Team/PAC, you do not get this call as it is discussed at the time of your visit.  Wash with the soap given/mailed from the clinic the night before surgery and morning of surgery. See instructions in the Surgery Packet.  If you would like a procedure estimate please call Cost of Care at [phone] during the hours of 8 AM - 3 PM (option #2 for on-line request and option #3 for a representative).   Billing Customer Service:  [phone]       If you have questions please contact Nilda RN or Marsha RN during regular clinic hours, Monday through Friday 7:30 AM - 4:00 PM, or you can contact us via [employer] at anytime.       If you have urgent needs after-hours, weekends, or holidays please call the hospital at [phone] and ask to speak with our on-call General Surgery Team.    Appointment [phone]  Nurse Advice (Nilda or Marsha): [phone]   Surgery Scheduler (Maryellen): [phone]  Billing Customer Service:  [phone]  Fax: [phone]    After your Robotic Cholecystectomy          Incision care   You may take a shower the day after surgery. Carefully wash your incision with soap and water. Do not submerge  yourself in water (bath, whirlpool, hot tub, pool, lake) for 14 days after surgery.   Remove the bandage 1-2 days after surgery but leave the medical tape (Steri-Strips) or glue in place. These will loosen and fall off on their own 1-2 weeks after surgery.     Always wash your hands before touching your incisions or removing bandages.   It is not unusual to form a collection of fluid or blood under your incision that may feel firm or squishy- it can take several weeks to months for your body to reabsorb it.  At times, it may even drain.  If that should happen keep the area clean with soap, water,  and cover with a clean gauze dressing. You can change this daily or as needed.       Other medicines   Wait to start aspirin or blood thinners until the day after surgery. You can continue your regular medicines at your normal time the day after surgery.    Your pain medicine may cause constipation (hard, dry stools). To help with this, take the stool softener your doctor gave you or an over-the-counter stool softener or laxative. You can stop taking this when you are no longer taking pain medicine and your bowel movements are back to normal.      For pain or discomfort   Take the narcotic pain medicine your doctor gave you as needed and as instructed on the bottle. If you prefer to use over-the-counter medication, use acetaminophen (Tylenol) or ibuprofen (Advil, Motrin) as instructed on the box. Do not take Tylenol if it is in your narcotic pain medication.   Use an ice pack on your abdomen (belly) for 20 minutes at a time as needed for the first 24 hours. Be sure to protect your skin by putting a cloth between the ice pack and your skin.   After 24 hours you can switch to heat for 20 minutes as needed. Be sure to protect your skin by putting a cloth between the heat pack and your skin.   You may experience right shoulder pain after surgery which will go away 1-4 days after your procedure.  This is related to the gas that  was used to inflate your abdomen, it gets trapped between your liver and diaphragm.  Walk frequently and apply a heating pad (protecting your skin from the heating pad with a barrier such as a towel).       Activities   No driving until you feel it s safe to do so. Don t drive while taking narcotic pain medicine.   Don t lift anything heavier than 20 pounds for 3 to 4 weeks after surgery.      Special equipment   None     Diet   You can eat your regular meals after surgery.      When to call the doctor   Call your doctor if you have:   A fever above 101 F (38.3 C) (taken under the tongue), or a fever or chills lasting more than a day.   Redness at the incision site.   Any fluid or blood draining from the incision, especially if it smells bad.    Severe pain that doesn t improve with pain medicine.        We will call you 2 to 4 days after surgery to review this handout, answer questions and help arrange after-surgery care. If you have questions or concerns, please call [phone] during regular office hours. If you need to call after business hours, call [phone] and ask to page the surgeon on-call.     IMPORTANT:  Prior to your surgical procedure, a nurse will be contacting you to obtain a health history.   If they do not reach you by noon the day prior to your surgery, your surgery will be cancelled. If you have your Pre-Op Surgical Physical (H&P) with the Anesthesia Team (PAC), you are exempt from this call. Phone:  [phone] ([facility]) or [phone] ([place]).    As a research institution, we would like to inform you of some research available to take part in should you be interested.  Researchers are exploring fat and liver tissue, their inflammation, and interaction with our immune system and overall metabolic health.  The research is collecting small samples of fat and/or liver tissue in people undergoing abdominal surgery since these tissues are easily and safely accessible during that time.

## 2025-06-04 ENCOUNTER — TELEPHONE (OUTPATIENT)
Dept: SURGERY | Facility: CLINIC | Age: 60
End: 2025-06-04
Payer: COMMERCIAL

## 2025-06-04 PROBLEM — K82.8 SLUDGE IN GALLBLADDER: Status: ACTIVE | Noted: 2025-06-03

## 2025-06-04 NOTE — TELEPHONE ENCOUNTER
Called patient to schedule surgery with Dr. Charles    Date of Surgery: 6/30/25    Approximate arrival time given:  No    Location of surgery: [facility]    Pre-Op H&P: With PCP Dr. Yin within 30 days of the surgery date.    Post-Op Appt Date with Surgeon: RENETTA     Imaging needed:  Not Applicable    Discussed with patient pre-op RN will call 2-3 days prior to surgery with arrival time and instructions:  Yes     Standard Surgery Packet Sent: Yes 06/04/25  via [employer]    Additional Comments: NA      All patients questions were answered and was instructed to review surgical packet and call back with any questions or concerns.       Maryellen Hinds on 6/4/2025 at 9:12 AM

## 2025-06-17 ENCOUNTER — VIRTUAL VISIT (OUTPATIENT)
Dept: FAMILY MEDICINE | Facility: CLINIC | Age: 60
End: 2025-06-17
Payer: COMMERCIAL

## 2025-06-17 DIAGNOSIS — J20.9 ACUTE BRONCHITIS, UNSPECIFIED ORGANISM: ICD-10-CM

## 2025-06-17 DIAGNOSIS — J01.00 ACUTE NON-RECURRENT MAXILLARY SINUSITIS: Primary | ICD-10-CM

## 2025-06-17 DIAGNOSIS — G47.11 IDIOPATHIC HYPERSOMNIA: ICD-10-CM

## 2025-06-17 PROCEDURE — 98005 SYNCH AUDIO-VIDEO EST LOW 20: CPT | Performed by: PHYSICIAN ASSISTANT

## 2025-06-17 RX ORDER — DOXYCYCLINE HYCLATE 100 MG
100 TABLET ORAL 2 TIMES DAILY
Qty: 20 TABLET | Refills: 0 | Status: SHIPPED | OUTPATIENT
Start: 2025-06-17 | End: 2025-06-27

## 2025-06-17 RX ORDER — PREDNISONE 20 MG/1
20 TABLET ORAL 2 TIMES DAILY
Qty: 10 TABLET | Refills: 0 | Status: SHIPPED | OUTPATIENT
Start: 2025-06-17 | End: 2025-06-22

## 2025-06-17 RX ORDER — MODAFINIL 200 MG/1
200 TABLET ORAL DAILY
Qty: 30 TABLET | Refills: 0 | Status: SHIPPED | OUTPATIENT
Start: 2025-06-17

## 2025-06-17 NOTE — PROGRESS NOTES
"Lisette is a 60 year old who is being evaluated via a billable video visit.    What phone number would you like to be contacted at?   How would you like to obtain your AVS? Malcom Smith   Lisette is a 60 year old, presenting for the following health issues:  Sick    HPI      Acute Illness  Acute illness concerns: cough, drainage, concerns for sinus infection   Onset/Duration: over 1 month   Symptoms:  Fever: had had fevers in the beginning, has since resolved   Chills/Sweats: No  Headache (location?): YES  Sinus Pressure: YES  Conjunctivitis:  YES  Ear Pain: no  Rhinorrhea: YES  Congestion: YES  Sore Throat: YES due to drainage   Cough: YES-productive of yellow sputum  Wheeze: No  Decreased Appetite: No  Nausea: No  Vomiting: No  Diarrhea: No  Dysuria/Freq.: No  Dysuria or Hematuria: No  Fatigue/Achiness: YES  Sick/Strep Exposure: YES- exposed at work, she is a    Therapies tried and outcome: Mucinex, Misty [place], zinc lozenges - helps patient get thru the workday     Cold symptoms x 1 month.   Was a fever. That has resolved. Noting congestion and purulent phlegm. Coughing. Productive. Malaise. Noting sinus pain pain and pressure. No wheezing.      Review of Systems  Constitutional, HEENT, cardiovascular, pulmonary, GI, , musculoskeletal, neuro, skin, endocrine and psych systems are negative, except as otherwise noted.      Objective    Vitals - Patient Reported  Weight (Patient Reported): 60.3 kg (133 lb)  Height (Patient Reported): 168.9 cm (5' 6.5\")  BMI (Based on Pt Reported Ht/Wt): 21.14      Vitals:  No vitals were obtained today due to virtual visit.    Physical Exam   GENERAL: alert and no distress  EYES: Eyes grossly normal to inspection.  No discharge or erythema, or obvious scleral/conjunctival abnormalities.  RESP: No audible wheeze, cough, or visible cyanosis.    SKIN: Visible skin clear. No significant rash, abnormal pigmentation or lesions.  NEURO: Cranial nerves grossly " intact.  Mentation and speech appropriate for age.  PSYCH: Appropriate affect, tone, and pace of words    Lisette was seen today for sick.    Diagnoses and all orders for this visit:    Acute non-recurrent maxillary sinusitis  -     doxycycline hyclate (VIBRA-TABS) 100 MG tablet; Take 1 tablet (100 mg) by mouth 2 times daily for 10 days.  -     predniSONE (DELTASONE) 20 MG tablet; Take 1 tablet (20 mg) by mouth 2 times daily for 5 days.    Acute bronchitis, unspecified organism  -     predniSONE (DELTASONE) 20 MG tablet; Take 1 tablet (20 mg) by mouth 2 times daily for 5 days.    Idiopathic hypersomnia  -     modafinil (PROVIGIL) 200 MG tablet; Take 1 tablet (200 mg) by mouth daily.      Advised supportive and symptomatic treatment.  Follow up with Provider - if condition persists or worsens.         Video-Visit Details    Type of service:  Video Visit   Originating Location (pt. Location): Home    Distant Location (provider location):  On-site  Platform used for Video Visit: Anatoliy  Signed Electronically by: Kolton Yin PA-C

## 2025-06-23 ENCOUNTER — OFFICE VISIT (OUTPATIENT)
Dept: FAMILY MEDICINE | Facility: CLINIC | Age: 60
End: 2025-06-23
Payer: COMMERCIAL

## 2025-06-23 VITALS
WEIGHT: 130.2 LBS | RESPIRATION RATE: 16 BRPM | HEIGHT: 67 IN | SYSTOLIC BLOOD PRESSURE: 126 MMHG | OXYGEN SATURATION: 99 % | DIASTOLIC BLOOD PRESSURE: 70 MMHG | BODY MASS INDEX: 20.44 KG/M2 | HEART RATE: 74 BPM | TEMPERATURE: 98 F

## 2025-06-23 DIAGNOSIS — E53.8 VITAMIN B12 DEFICIENCY (NON ANEMIC): ICD-10-CM

## 2025-06-23 DIAGNOSIS — E11.9 TYPE 2 DIABETES MELLITUS WITHOUT COMPLICATION, WITHOUT LONG-TERM CURRENT USE OF INSULIN (H): ICD-10-CM

## 2025-06-23 DIAGNOSIS — E55.9 VITAMIN D DEFICIENCY: ICD-10-CM

## 2025-06-23 DIAGNOSIS — Z01.818 PREOP GENERAL PHYSICAL EXAM: Primary | ICD-10-CM

## 2025-06-23 DIAGNOSIS — Z86.2 HISTORY OF ANEMIA: ICD-10-CM

## 2025-06-23 DIAGNOSIS — K82.8 SLUDGE IN GALLBLADDER: ICD-10-CM

## 2025-06-23 LAB
ANION GAP SERPL CALCULATED.3IONS-SCNC: 11 MMOL/L (ref 7–15)
BUN SERPL-MCNC: 12.8 MG/DL (ref 8–23)
CALCIUM SERPL-MCNC: 9.8 MG/DL (ref 8.8–10.4)
CHLORIDE SERPL-SCNC: 105 MMOL/L (ref 98–107)
CREAT SERPL-MCNC: 0.75 MG/DL (ref 0.51–0.95)
CREAT UR-MCNC: 295 MG/DL
EGFRCR SERPLBLD CKD-EPI 2021: >90 ML/MIN/1.73M2
EST. AVERAGE GLUCOSE BLD GHB EST-MCNC: 108 MG/DL
FERRITIN SERPL-MCNC: 18 NG/ML (ref 11–328)
GLUCOSE SERPL-MCNC: 105 MG/DL (ref 70–99)
HBA1C MFR BLD: 5.4 % (ref 0–5.6)
HCO3 SERPL-SCNC: 26 MMOL/L (ref 22–29)
HGB BLD-MCNC: 12.6 G/DL (ref 11.7–15.7)
MCV RBC AUTO: 92 FL (ref 78–100)
MCV RBC AUTO: 92 FL (ref 78–100)
MICROALBUMIN UR-MCNC: 12.9 MG/L
MICROALBUMIN/CREAT UR: 4.37 MG/G CR (ref 0–25)
PLATELET # BLD AUTO: 313 10E3/UL (ref 150–450)
POTASSIUM SERPL-SCNC: 3.5 MMOL/L (ref 3.4–5.3)
SODIUM SERPL-SCNC: 142 MMOL/L (ref 135–145)
VIT B12 SERPL-MCNC: 1600 PG/ML (ref 232–1245)
VIT D+METAB SERPL-MCNC: 50 NG/ML (ref 20–50)

## 2025-06-23 PROCEDURE — 99214 OFFICE O/P EST MOD 30 MIN: CPT | Performed by: PHYSICIAN ASSISTANT

## 2025-06-23 PROCEDURE — 3074F SYST BP LT 130 MM HG: CPT | Performed by: PHYSICIAN ASSISTANT

## 2025-06-23 PROCEDURE — 82607 VITAMIN B-12: CPT | Performed by: PHYSICIAN ASSISTANT

## 2025-06-23 PROCEDURE — 82570 ASSAY OF URINE CREATININE: CPT | Performed by: PHYSICIAN ASSISTANT

## 2025-06-23 PROCEDURE — 36415 COLL VENOUS BLD VENIPUNCTURE: CPT | Performed by: PHYSICIAN ASSISTANT

## 2025-06-23 PROCEDURE — 85018 HEMOGLOBIN: CPT | Performed by: PHYSICIAN ASSISTANT

## 2025-06-23 PROCEDURE — 82043 UR ALBUMIN QUANTITATIVE: CPT | Performed by: PHYSICIAN ASSISTANT

## 2025-06-23 PROCEDURE — 83036 HEMOGLOBIN GLYCOSYLATED A1C: CPT | Performed by: PHYSICIAN ASSISTANT

## 2025-06-23 PROCEDURE — 3078F DIAST BP <80 MM HG: CPT | Performed by: PHYSICIAN ASSISTANT

## 2025-06-23 PROCEDURE — 85049 AUTOMATED PLATELET COUNT: CPT | Performed by: PHYSICIAN ASSISTANT

## 2025-06-23 PROCEDURE — 82728 ASSAY OF FERRITIN: CPT | Performed by: PHYSICIAN ASSISTANT

## 2025-06-23 PROCEDURE — 82306 VITAMIN D 25 HYDROXY: CPT | Performed by: PHYSICIAN ASSISTANT

## 2025-06-23 PROCEDURE — 80048 BASIC METABOLIC PNL TOTAL CA: CPT | Performed by: PHYSICIAN ASSISTANT

## 2025-06-23 PROCEDURE — 93000 ELECTROCARDIOGRAM COMPLETE: CPT | Performed by: PHYSICIAN ASSISTANT

## 2025-06-23 NOTE — PROGRESS NOTES
Preoperative Evaluation  [facility] [place]  [address] [facility]  [place] [address]  Phone: [phone]  Primary Provider: Kolton Yin PA-C  Pre-op Performing Provider: Kolton Yin PA-C  Jun 23, 2025 6/22/2025   Surgical Information   What procedure is being done? CHOLECYSTECTOMY, ROBOT-ASSISTED, LAPAROSCOPIC, WITHOUT CHOLANGIOGRAM   Facility or Hospital where procedure/surgery will be performed: [address] 5th Floor [facility] [address]   Who is doing the procedure / surgery? Dr. Peter Charles   Date of surgery / procedure: June 30th, 2025   Time of surgery / procedure: 8:30am   Where do you plan to recover after surgery? at home with family         Subjective   Lisette is a 60 year old, presenting for the following:  Pre-Op Exam (6/30/25/[facility]/Gallbladder/Dr. Peter Charlse/)          6/23/2025     8:58 AM   Additional Questions   Roomed by Keri Camargo CMA   Accompanied by None         6/23/2025     8:58 AM   Patient Reported Additional Medications   Patient reports taking the following new medications none     HPI: ongoing epigastric pain with US evidence of gb sludge and polyps           6/22/2025   Pre-Op Questionnaire   Have you ever had a heart attack or stroke? NO   Have you ever had surgery on your heart or blood vessels, such as a stent placement, a coronary artery bypass, or surgery on an artery in your head, neck, heart, or legs? No   Do you have chest pain with activity? No   Do you have a history of heart failure? No   Do you currently have a cold, bronchitis or symptoms of other infection? No   Do you have a cough, shortness of breath, or wheezing? No   Do you or anyone in your family have previous history of blood clots? No   Do you or does anyone in your family have a serious bleeding problem such as prolonged bleeding following surgeries or cuts? No   Have you ever had problems with  anemia or been told to take iron pills? (!) YES: has been stable.    Have you had any abnormal blood loss such as black, tarry or bloody stools, or abnormal vaginal bleeding? No   Have you ever had a blood transfusion? No   Are you willing to have a blood transfusion if it is medically needed before, during, or after your surgery? Yes   Have you or any of your relatives ever had problems with anesthesia? No   Do you have sleep apnea, excessive snoring or daytime drowsiness? No   Do you have any artifical heart valves or other implanted medical devices like a pacemaker, defibrillator, or continuous glucose monitor? No   Do you have artificial joints? No   Are you allergic to latex? No         Preoperative Review of    reviewed - controlled substances reflected in medication list.      Status of Chronic Conditions:  See problem list for active medical problems.  Problems all longstanding and stable, except as noted/documented.  See ROS for pertinent symptoms related to these conditions.    Patient Active Problem List    Diagnosis Date Noted    Sludge in gallbladder 06/03/2025     Priority: Medium    Asthma, mild 08/03/2023     Priority: Medium    Nasal obstruction 07/28/2023     Priority: Medium    Sinusitis, chronic 07/28/2023     Priority: Medium    Type 2 diabetes mellitus without complication, without long-term current use of insulin (H) 03/13/2017     Priority: Medium    Acute cystitis with hematuria 12/08/2016     Priority: Medium    Pulmonary nodule 09/13/2016     Priority: Medium    NSTEMI (non-ST elevated myocardial infarction) (H) 09/10/2016     Priority: Medium    Mild intermittent asthma without complication 09/10/2016     Priority: Medium    Postmenopausal bleeding 06/24/2016     Priority: Medium    Pelvic pain in female 06/24/2016     Priority: Medium    Depression with anxiety 04/28/2015     Priority: Medium    Esophageal reflux (GERD) 11/14/2014     Priority: Medium    Vitamin D deficiency  01/07/2014     Priority: Medium     Problem list name updated by automated process. Provider to review      PTSD (post-traumatic stress disorder) 01/06/2014     Priority: Medium    Moderate persistent asthma 06/30/2011     Priority: Medium    Seasonal allergic rhinitis      Priority: Medium    Allergic rhinitis due to animal dander      Priority: Medium    Rhinitis, allergic to other allergen      Priority: Medium     IMO update changed this record. Please review for accuracy      House dust mite allergy      Priority: Medium    CARDIOVASCULAR SCREENING; LDL GOAL LESS THAN 160 10/31/2010     Priority: Medium    Dry eye syndrome 10/26/2010     Priority: Medium    Anemia 11/04/2008     Priority: Medium    Dyspareunia (CODE) 11/04/2008     Priority: Medium      Past Medical History:   Diagnosis Date    Allergic rhinitis     Allergic rhinitis due to animal dander     Amblyopia     LT    Anxiety     Arthritis     Chronic sinusitis     Colon polyp     Depressive disorder     Endometriosis     Gastroesophageal reflux disease     Heart attack (H)     2016    Hoarseness On and off    I have had a history of vical fold nodules. Last noted 2005?    House dust mite allergy     Hypertension     Moderate persistent asthma     Pneumonia     Recurrent otitis media     Rhinitis, allergic to other allergen     Seasonal allergic rhinitis 7/25/05 skin tests pos. for: cat/dog/horse/DM/M/T/G/RW per Dr. Granado    pt. did IT from 7/06 to 11/5/07 to: cat/dog/DM/M/T/G/W dc'd per self.     Type 2 diabetes mellitus without complication, without long-term current use of insulin (H) 03/13/2017     Past Surgical History:   Procedure Laterality Date    COLONOSCOPY  10/2020    COLONOSCOPY N/A 10/10/2023    Procedure: Colonoscopy - with Biopsy;  Surgeon: Lulu Fields MD;  Location: [facility] OR    ESOPHAGOSCOPY, GASTROSCOPY, DUODENOSCOPY (EGD), COMBINED N/A 10/10/2023    Procedure: Esophagoscopy, gastroscopy, duodenoscopy (EGD),  combined with Biopsies;  Surgeon: Lulu Fields MD;  Location: [facility] OR    EXCISE EXOSTOSIS FOOT Right 10/02/2018    Procedure: EXCISE EXOSTOSIS FOOT;  Right foot removal of first tarsometatarsal joint dorsal bossing;  Surgeon: Will Barraza DPM;  Location:  OR    GYN SURGERY  06/24/2016    Hysterectomy complete    HYSTEROSCOPY      OPTICAL TRACKING SYSTEM ENDOSCOPIC SINUS SURGERY Bilateral 8/7/2023    Procedure: stealth guided bilateral turbinate reduction with maxillary antrostomy and bilateral anterior ethmoidectomy.;  Surgeon: Keri Allen MD;  Location: [facility] OR     Current Outpatient Medications   Medication Sig Dispense Refill    albuterol (PROAIR HFA/PROVENTIL HFA/VENTOLIN HFA) 108 (90 Base) MCG/ACT inhaler INHALE 2 PUFFS BY MOUTH EVERY 6 HOURS 18 g 4    albuterol (PROVENTIL) (2.5 MG/3ML) 0.083% neb solution INHALE 1 VIAL VIA NEBULIZER EVERY 4 HOURS AS NEEDED FOR SHORTNESS OF BREATH 90 mL 0    aspirin  MG EC tablet Take 1 tablet (325 mg) by mouth daily 90 tablet 3    blood glucose (NO BRAND SPECIFIED) lancets standard Use to test blood sugar 1 times daily or as directed. 100 Lancet 3    blood glucose (NO BRAND SPECIFIED) test strip Use to test blood sugar 1 times daily or as directed. 100 strip 3    budesonide (PULMICORT) 0.5 MG/2ML neb solution Empty contents of ampule into 240mL of saline solution and rinse both nasal cavities as instructed twice daily. 240 mL 6    busPIRone (BUSPAR) 15 MG tablet Take 1 tablet (15 mg) by mouth 3 times daily. 270 tablet 1    Cholecalciferol (VITAMIN D3 PO) Take by mouth daily      doxycycline hyclate (VIBRA-TABS) 100 MG tablet Take 1 tablet (100 mg) by mouth 2 times daily for 10 days. 20 tablet 0    ferrous sulfate (FEROSUL) 325 (65 Fe) MG tablet Take 1 tablet (325 mg) by mouth 2 times daily 180 tablet 3    FLUoxetine (PROZAC) 20 MG capsule TAKE 1 CAPSULE BY MOUTH ONCE DAILY WITH 40 MG CAPSULE FOR A TOTAL DAILY DOSE OF 60MG 90 capsule 1     FLUoxetine (PROZAC) 40 MG capsule Take 1 capsule (40 mg) by mouth daily. 90 capsule 1    Fluticasone-Umeclidin-Vilanterol (TRELEGY ELLIPTA) 200-62.5-25 MCG/ACT oral inhaler Inhale 1 puff into the lungs daily. 180 each 3    lubiprostone (AMITIZA) 24 MCG capsule Take 1 capsule (24 mcg) by mouth 2 times daily (with meals). 180 capsule 0    metFORMIN (GLUCOPHAGE XR) 500 MG 24 hr tablet Take 2 tablets (1,000 mg) by mouth daily (with dinner). 360 tablet 1    modafinil (PROVIGIL) 200 MG tablet Take 1 tablet (200 mg) by mouth daily. 30 tablet 0    nitroGLYcerin (NITROSTAT) 0.4 MG sublingual tablet For chest pain place 1 tablet under the tongue every 5 minutes for 3 doses. If symptoms persist 5 minutes after 1st dose call 911. 25 tablet 4    omeprazole (PRILOSEC) 20 MG DR capsule TAKE 1 CAPSULE BY MOUTH TWICE DAILY - IF IMPROVED THEN CAN REDUCE TO ONCE DAILY 60 capsule 8    ondansetron (ZOFRAN ODT) 4 MG ODT tab Take 1 tablet (4 mg) by mouth every 8 hours as needed for nausea. 30 tablet 2    rosuvastatin (CRESTOR) 5 MG tablet Take 1 tablet (5 mg) by mouth daily. 90 tablet 1    sodium chloride (OCEAN) 0.65 % nasal spray Spray 1-2 sprays in nostril every hour as needed for congestion (nasal dryness) Use in EACH nostril. 44 mL 1    sucralfate (CARAFATE) 1 GM tablet Take 1 tablet (1 g) by mouth 4 times daily. 120 tablet 0    tirzepatide (MOUNJARO) 7.5 MG/0.5ML pen Inject 7.5 mg subcutaneously every 7 days 6 mL 3       Allergies   Allergen Reactions    Ampicillin Rash    Cipro [Quinolones] Anaphylaxis    Macrobid [Nitrofuran Derivatives] GI Disturbance    Ciprofloxacin Itching and Rash        Social History     Tobacco Use    Smoking status: Never     Passive exposure: Never    Smokeless tobacco: Never    Tobacco comments:     Not a smoker   Substance Use Topics    Alcohol use: Not Currently     Comment: Occasional social drinker (rarely)     Family History   Problem Relation Age of Onset    Hypertension Mother     Alzheimer  "Disease Mother     Diabetes Mother     Tremor Mother     Osteoporosis Mother     Dementia Mother     Eye Surgery Father         cataracts    Macular Degeneration Father     Hypertension Father     Skin Cancer Father     Hyperlipidemia Father     Breast Cancer Maternal Grandmother     Tremor Maternal Grandfather     Alzheimer Disease Paternal Grandmother     Psychotic Disorder Paternal Grandmother         bipolar    Heart Disease Paternal Grandfather     Thyroid Disease Brother         Tumors removed    Tremor Brother     Mental Illness Brother         Bi-Polar    Thyroid Disease Sister     Diabetes Sister     Skin Cancer Sister     Mental Illness Brother         Bi-Polar    Thyroid Disease Sister         Tumor, part of thyroid removed    Cerebrovascular Disease No family hx of     Glaucoma No family hx of      History   Drug Use No             Review of Systems  Constitutional, HEENT, cardiovascular, pulmonary, GI, , musculoskeletal, neuro, skin, endocrine and psych systems are negative, except as otherwise noted.    Objective    /70   Pulse 74   Temp 98  F (36.7  C) (Oral)   Resp 16   Ht 1.695 m (5' 6.75\")   Wt 59.1 kg (130 lb 3.2 oz)   LMP 05/15/2014 (Approximate)   SpO2 99%   BMI 20.55 kg/m     Estimated body mass index is 20.55 kg/m  as calculated from the following:    Height as of this encounter: 1.695 m (5' 6.75\").    Weight as of this encounter: 59.1 kg (130 lb 3.2 oz).    Eye exam - right eye normal lid, conjunctiva, cornea, pupil and fundus, left eye normal lid, conjunctiva, cornea, pupil and fundus.  Thyroid not palpable, not enlarged, no nodules detected.  CHEST:chest clear to IPPA, no tachypnea, retractions or cyanosis, and S1, S2 normal, no murmur, no gallop, rate regular.  The abdomen is soft without tenderness, guarding, mass, rebound or organomegaly. Bowel sounds are normal. No CVA tenderness or inguinal adenopathy noted.    Lisette was seen today for pre-op exam.    Diagnoses and all " orders for this visit:    Preop general physical exam    Type 2 diabetes mellitus without complication, without long-term current use of insulin (H)  -     HEMOGLOBIN A1C; Future  -     Albumin Random Urine Quantitative with Creat Ratio; Future  -     Platelet count; Future  -     Hemoglobin; Future  -     Basic metabolic panel  (Ca, Cl, CO2, Creat, Gluc, K, Na, BUN); Future  -     EKG 12-lead complete w/read - Clinics    Sludge in gallbladder    History of anemia  -     Ferritin; Future  -     Vitamin B12; Future    Vitamin D deficiency  -     Vitamin D Deficiency; Future    Vitamin B12 deficiency (non anemic)  -     Vitamin B12; Future    Hold all herbal and vitamin supplements 2 weeks prior to surgery.    Hold aspirin and nsaids 7 days prior to his procedure/surgery.    Hold all meds on morning of surgery    Lisette is cleared for her surgery and appropriate anesthesia.

## 2025-06-27 ENCOUNTER — ANESTHESIA EVENT (OUTPATIENT)
Dept: SURGERY | Facility: AMBULATORY SURGERY CENTER | Age: 60
End: 2025-06-27
Payer: COMMERCIAL

## 2025-06-30 ENCOUNTER — HOSPITAL ENCOUNTER (OUTPATIENT)
Facility: AMBULATORY SURGERY CENTER | Age: 60
Discharge: HOME | End: 2025-06-30
Attending: SURGERY
Payer: COMMERCIAL

## 2025-06-30 ENCOUNTER — ANESTHESIA (OUTPATIENT)
Dept: SURGERY | Facility: AMBULATORY SURGERY CENTER | Age: 60
End: 2025-06-30
Payer: COMMERCIAL

## 2025-06-30 VITALS
SYSTOLIC BLOOD PRESSURE: 139 MMHG | TEMPERATURE: 98.5 F | OXYGEN SATURATION: 97 % | HEIGHT: 66 IN | BODY MASS INDEX: 20.89 KG/M2 | DIASTOLIC BLOOD PRESSURE: 77 MMHG | WEIGHT: 130 LBS | RESPIRATION RATE: 18 BRPM | HEART RATE: 77 BPM

## 2025-06-30 DIAGNOSIS — E11.9 TYPE 2 DIABETES MELLITUS WITHOUT COMPLICATION, WITHOUT LONG-TERM CURRENT USE OF INSULIN (H): ICD-10-CM

## 2025-06-30 DIAGNOSIS — K82.8 SLUDGE IN GALLBLADDER: Primary | ICD-10-CM

## 2025-06-30 LAB
GLUCOSE BLDC GLUCOMTR-MCNC: 89 MG/DL (ref 70–99)
GLUCOSE BLDC GLUCOMTR-MCNC: 92 MG/DL (ref 70–99)

## 2025-06-30 PROCEDURE — 88304 TISSUE EXAM BY PATHOLOGIST: CPT | Mod: TC | Performed by: SURGERY

## 2025-06-30 PROCEDURE — 88304 TISSUE EXAM BY PATHOLOGIST: CPT | Mod: 26 | Performed by: PATHOLOGY

## 2025-06-30 PROCEDURE — 82962 GLUCOSE BLOOD TEST: CPT

## 2025-06-30 RX ORDER — TIRZEPATIDE 7.5 MG/.5ML
INJECTION, SOLUTION SUBCUTANEOUS
Qty: 6 ML | Refills: 3 | Status: SHIPPED | OUTPATIENT
Start: 2025-06-30

## 2025-06-30 RX ORDER — DEXAMETHASONE SODIUM PHOSPHATE 4 MG/ML
INJECTION, SOLUTION INTRA-ARTICULAR; INTRALESIONAL; INTRAMUSCULAR; INTRAVENOUS; SOFT TISSUE PRN
Status: DISCONTINUED | OUTPATIENT
Start: 2025-06-30 | End: 2025-06-30

## 2025-06-30 RX ORDER — HYDROMORPHONE HYDROCHLORIDE 1 MG/ML
0.2 INJECTION, SOLUTION INTRAMUSCULAR; INTRAVENOUS; SUBCUTANEOUS EVERY 5 MIN PRN
Status: DISCONTINUED | OUTPATIENT
Start: 2025-06-30 | End: 2025-07-01 | Stop reason: HOSPADM

## 2025-06-30 RX ORDER — HYDROMORPHONE HYDROCHLORIDE 1 MG/ML
0.4 INJECTION, SOLUTION INTRAMUSCULAR; INTRAVENOUS; SUBCUTANEOUS EVERY 5 MIN PRN
Status: DISCONTINUED | OUTPATIENT
Start: 2025-06-30 | End: 2025-07-01 | Stop reason: HOSPADM

## 2025-06-30 RX ORDER — ONDANSETRON 4 MG/1
4 TABLET, ORALLY DISINTEGRATING ORAL EVERY 30 MIN PRN
Status: DISCONTINUED | OUTPATIENT
Start: 2025-06-30 | End: 2025-07-01 | Stop reason: HOSPADM

## 2025-06-30 RX ORDER — ONDANSETRON 2 MG/ML
4 INJECTION INTRAMUSCULAR; INTRAVENOUS EVERY 30 MIN PRN
Status: DISCONTINUED | OUTPATIENT
Start: 2025-06-30 | End: 2025-07-01 | Stop reason: HOSPADM

## 2025-06-30 RX ORDER — OXYCODONE HYDROCHLORIDE 5 MG/1
10 TABLET ORAL
Status: DISCONTINUED | OUTPATIENT
Start: 2025-06-30 | End: 2025-07-01 | Stop reason: HOSPADM

## 2025-06-30 RX ORDER — ONDANSETRON 2 MG/ML
INJECTION INTRAMUSCULAR; INTRAVENOUS PRN
Status: DISCONTINUED | OUTPATIENT
Start: 2025-06-30 | End: 2025-06-30

## 2025-06-30 RX ORDER — FENTANYL CITRATE 50 UG/ML
50 INJECTION, SOLUTION INTRAMUSCULAR; INTRAVENOUS EVERY 5 MIN PRN
Status: DISCONTINUED | OUTPATIENT
Start: 2025-06-30 | End: 2025-07-01 | Stop reason: HOSPADM

## 2025-06-30 RX ORDER — DEXAMETHASONE SODIUM PHOSPHATE 10 MG/ML
4 INJECTION, SOLUTION INTRAMUSCULAR; INTRAVENOUS
Status: DISCONTINUED | OUTPATIENT
Start: 2025-06-30 | End: 2025-07-01 | Stop reason: HOSPADM

## 2025-06-30 RX ORDER — PROPOFOL 10 MG/ML
INJECTION, EMULSION INTRAVENOUS CONTINUOUS PRN
Status: DISCONTINUED | OUTPATIENT
Start: 2025-06-30 | End: 2025-06-30

## 2025-06-30 RX ORDER — CEFAZOLIN SODIUM 2 G/50ML
2 SOLUTION INTRAVENOUS SEE ADMIN INSTRUCTIONS
Status: DISCONTINUED | OUTPATIENT
Start: 2025-06-30 | End: 2025-07-01 | Stop reason: HOSPADM

## 2025-06-30 RX ORDER — SODIUM CHLORIDE, SODIUM LACTATE, POTASSIUM CHLORIDE, CALCIUM CHLORIDE 600; 310; 30; 20 MG/100ML; MG/100ML; MG/100ML; MG/100ML
INJECTION, SOLUTION INTRAVENOUS CONTINUOUS
Status: DISCONTINUED | OUTPATIENT
Start: 2025-06-30 | End: 2025-07-01 | Stop reason: HOSPADM

## 2025-06-30 RX ORDER — FENTANYL CITRATE 50 UG/ML
25 INJECTION, SOLUTION INTRAMUSCULAR; INTRAVENOUS EVERY 5 MIN PRN
Status: DISCONTINUED | OUTPATIENT
Start: 2025-06-30 | End: 2025-07-01 | Stop reason: HOSPADM

## 2025-06-30 RX ORDER — FENTANYL CITRATE 50 UG/ML
INJECTION, SOLUTION INTRAMUSCULAR; INTRAVENOUS PRN
Status: DISCONTINUED | OUTPATIENT
Start: 2025-06-30 | End: 2025-06-30

## 2025-06-30 RX ORDER — LIDOCAINE HYDROCHLORIDE 20 MG/ML
INJECTION, SOLUTION INFILTRATION; PERINEURAL PRN
Status: DISCONTINUED | OUTPATIENT
Start: 2025-06-30 | End: 2025-06-30

## 2025-06-30 RX ORDER — INDOCYANINE GREEN AND WATER 25 MG
2.5 KIT INJECTION ONCE
Status: COMPLETED | OUTPATIENT
Start: 2025-06-30 | End: 2025-06-30

## 2025-06-30 RX ORDER — BUPIVACAINE HYDROCHLORIDE 5 MG/ML
INJECTION, SOLUTION EPIDURAL; INTRACAUDAL; PERINEURAL PRN
Status: DISCONTINUED | OUTPATIENT
Start: 2025-06-30 | End: 2025-06-30 | Stop reason: HOSPADM

## 2025-06-30 RX ORDER — LIDOCAINE 40 MG/G
CREAM TOPICAL
Status: DISCONTINUED | OUTPATIENT
Start: 2025-06-30 | End: 2025-07-01 | Stop reason: HOSPADM

## 2025-06-30 RX ORDER — SODIUM CHLORIDE, SODIUM LACTATE, POTASSIUM CHLORIDE, CALCIUM CHLORIDE 600; 310; 30; 20 MG/100ML; MG/100ML; MG/100ML; MG/100ML
INJECTION, SOLUTION INTRAVENOUS CONTINUOUS PRN
Status: DISCONTINUED | OUTPATIENT
Start: 2025-06-30 | End: 2025-06-30

## 2025-06-30 RX ORDER — AMOXICILLIN 250 MG
1-2 CAPSULE ORAL 2 TIMES DAILY
Qty: 15 TABLET | Refills: 0 | Status: SHIPPED | OUTPATIENT
Start: 2025-06-30

## 2025-06-30 RX ORDER — HYDROCODONE BITARTRATE AND ACETAMINOPHEN 5; 325 MG/1; MG/1
1-2 TABLET ORAL EVERY 4 HOURS PRN
Qty: 15 TABLET | Refills: 0 | Status: SHIPPED | OUTPATIENT
Start: 2025-06-30

## 2025-06-30 RX ORDER — ACETAMINOPHEN 325 MG/1
975 TABLET ORAL ONCE
Status: COMPLETED | OUTPATIENT
Start: 2025-06-30 | End: 2025-06-30

## 2025-06-30 RX ORDER — NALOXONE HYDROCHLORIDE 0.4 MG/ML
0.1 INJECTION, SOLUTION INTRAMUSCULAR; INTRAVENOUS; SUBCUTANEOUS
Status: DISCONTINUED | OUTPATIENT
Start: 2025-06-30 | End: 2025-07-01 | Stop reason: HOSPADM

## 2025-06-30 RX ORDER — CEFAZOLIN SODIUM 2 G/50ML
2 SOLUTION INTRAVENOUS
Status: COMPLETED | OUTPATIENT
Start: 2025-06-30 | End: 2025-06-30

## 2025-06-30 RX ORDER — KETOROLAC TROMETHAMINE 30 MG/ML
INJECTION, SOLUTION INTRAMUSCULAR; INTRAVENOUS PRN
Status: DISCONTINUED | OUTPATIENT
Start: 2025-06-30 | End: 2025-06-30

## 2025-06-30 RX ORDER — PROPOFOL 10 MG/ML
INJECTION, EMULSION INTRAVENOUS PRN
Status: DISCONTINUED | OUTPATIENT
Start: 2025-06-30 | End: 2025-06-30

## 2025-06-30 RX ORDER — OXYCODONE HYDROCHLORIDE 5 MG/1
5 TABLET ORAL
Status: COMPLETED | OUTPATIENT
Start: 2025-06-30 | End: 2025-06-30

## 2025-06-30 RX ADMIN — KETOROLAC TROMETHAMINE 30 MG: 30 INJECTION, SOLUTION INTRAMUSCULAR; INTRAVENOUS at 14:03

## 2025-06-30 RX ADMIN — ONDANSETRON 4 MG: 2 INJECTION INTRAMUSCULAR; INTRAVENOUS at 13:52

## 2025-06-30 RX ADMIN — PROPOFOL 140 MG: 10 INJECTION, EMULSION INTRAVENOUS at 12:24

## 2025-06-30 RX ADMIN — SODIUM CHLORIDE, SODIUM LACTATE, POTASSIUM CHLORIDE, CALCIUM CHLORIDE: 600; 310; 30; 20 INJECTION, SOLUTION INTRAVENOUS at 12:15

## 2025-06-30 RX ADMIN — FENTANYL CITRATE 25 MCG: 50 INJECTION, SOLUTION INTRAMUSCULAR; INTRAVENOUS at 15:07

## 2025-06-30 RX ADMIN — DEXAMETHASONE SODIUM PHOSPHATE 4 MG: 4 INJECTION, SOLUTION INTRA-ARTICULAR; INTRALESIONAL; INTRAMUSCULAR; INTRAVENOUS; SOFT TISSUE at 12:42

## 2025-06-30 RX ADMIN — FENTANYL CITRATE 25 MCG: 50 INJECTION, SOLUTION INTRAMUSCULAR; INTRAVENOUS at 13:30

## 2025-06-30 RX ADMIN — LIDOCAINE HYDROCHLORIDE 100 MG: 20 INJECTION, SOLUTION INFILTRATION; PERINEURAL at 12:24

## 2025-06-30 RX ADMIN — CEFAZOLIN SODIUM 2 G: 2 SOLUTION INTRAVENOUS at 12:15

## 2025-06-30 RX ADMIN — Medication 50 MG: at 12:25

## 2025-06-30 RX ADMIN — PROPOFOL 60 MG: 10 INJECTION, EMULSION INTRAVENOUS at 12:57

## 2025-06-30 RX ADMIN — FENTANYL CITRATE 75 MCG: 50 INJECTION, SOLUTION INTRAMUSCULAR; INTRAVENOUS at 12:24

## 2025-06-30 RX ADMIN — Medication 10 MG: at 13:26

## 2025-06-30 RX ADMIN — PROPOFOL 150 MCG/KG/MIN: 10 INJECTION, EMULSION INTRAVENOUS at 12:24

## 2025-06-30 RX ADMIN — INDOCYANINE GREEN AND WATER 2.5 MG: KIT at 12:04

## 2025-06-30 RX ADMIN — OXYCODONE HYDROCHLORIDE 5 MG: 5 TABLET ORAL at 15:04

## 2025-06-30 NOTE — ANESTHESIA CARE TRANSFER NOTE
Patient: Lisette LIN[place]N [place]    Procedure: Procedure(s):  CHOLECYSTECTOMY, ROBOT-ASSISTED, LAPAROSCOPIC, WITHOUT CHOLANGIOGRAM       Diagnosis: Sludge in gallbladder [K82.8]  Diagnosis Additional Information: No value filed.    Anesthesia Type:   General     Note:    Oropharynx: oropharynx clear of all foreign objects and spontaneously breathing  Level of Consciousness: drowsy  Oxygen Supplementation: room air    Independent Airway: airway patency satisfactory and stable  Dentition: dentition unchanged  Vital Signs Stable: post-procedure vital signs reviewed and stable  Report to RN Given: handoff report given  Patient transferred to: PACU    Handoff Report: Identifed the Patient, Identified the Reponsible Provider, Reviewed the pertinent medical history, Discussed the surgical course, Reviewed Intra-OP anesthesia mangement and issues during anesthesia, Set expectations for post-procedure period and Allowed opportunity for questions and acknowledgement of understanding      Vitals:  Vitals Value Taken Time   /82 06/30/25 14:22   Temp 28  C (82.4  F) 06/30/25 14:25   Pulse 73 06/30/25 14:25   Resp     SpO2 99 % 06/30/25 14:25   Vitals shown include unfiled device data.    Electronically Signed By: ELIZA Pérez CRNA  June 30, 2025  2:26 PM

## 2025-06-30 NOTE — DISCHARGE INSTRUCTIONS
"[facility]  Home Care Following Anesthesia  For 24 hours after surgery:  Get plenty of rest.  A responsible adult must stay with you for at least 24 hours after you leave the surgery center.  Do not drive or use heavy equipment.  If you have weakness or tingling, don't drive or use heavy equipment until this feeling goes away.   Do not drink alcohol.   Avoid strenuous or risky activities.  Ask for help when climbing stairs.  You may feel lightheaded.  IF so, sit for a few minutes before standing.  Have someone help you get up.   If you have nausea (feel sick to your stomach): Drink only clear liquids such as apple juice, ginger ale, broth or 7-Up.  Rest may also help.  Be sure to drink enough fluids.  Move to a regular diet as you feel able.   You may have a slight fever.  Call the doctor if your fever is over 100 F (37.7 C) (taken under the tongue) or lasts longer than 24 hours.  You may have a dry mouth, a sore throat, muscle aches or trouble sleeping. These should go away after 24 hours.  Do not make important or legal decisions.   It is recommended to avoid smoking.        Today you received a Marcaine or bupivacaine block to numb the nerves near your surgery site.  This is a block using local anesthetic or \"numbing\" medication injected around the nerves to anesthetize or \"numb\" the area supplied by those nerves.  This block is injected into the muscle layer near your surgical site.  The medication may numb the location where you had surgery for 6-18 hours, but may last up to 24 hours.  If your surgical site is an arm or leg you should be careful with your affected limb, since it is possible to injure your limb without being aware of it due to the numbing.  Until full feeling returns, you should guard against bumping or hitting your limb, and avoid extreme hot or cold temperatures on the skin.  As the block wears off, the feeling will return as a tingling or prickly " sensation near your surgical site.  You will experience more discomfort from your incision as the feeling returns.  You may want to take a pain pill (a narcotic or Tylenol if this was prescribed by your surgeon) when you start to experience mild pain before the pain beccomes more severe.  If your pain medications do not control your pain you should notifiy your surgeon.    Tips for taking pain medications  To get the best pain relief possible, remember these points:  Take pain medications as directed, before pain becomes severe.  Pain medication can upset your stomach: taking it with food may help.  Constipation is a common side effect of pain medication. Drink plenty of  fluids.  Eat foods high in fiber. Take a stool softener if recommended by your doctor or pharmacist.  Do not drink alcohol, drive or operate machinery while taking pain medications.  Ask about other ways to control pain, such as with heat, ice or relaxation.    Tylenol/Acetaminophen Consumption    If you feel your pain relief is insufficient, you may take Tylenol/Acetaminophen in addition to your narcotic pain medication.   Be careful not to exceed 4,000 mg of Tylenol/Acetaminophen in a 24 hour period from all sources.  If you are taking extra strength Tylenol/acetaminophen (500 mg), the maximum dose is 8 tablets in 24 hours.  If you are taking regular strength acetaminophen (325 mg), the maximum dose is 12 tablets in 24 hours.  May have ibuprofen after 9pm. May have Tylenol (acetaminophen) any time.     Call a doctor for any of the following:  Signs of infection (fever, growing tenderness at the surgery site, a large amount of drainage or bleeding, severe pain, foul-smelling drainage, redness, swelling).  It has been over 8 to 10 hours since surgery and you are still not able to urinate (pass water).  Headache for over 24 hours.  Numbness, tingling or weakness the day after surgery (if you had spinal anesthesia).  Signs of Covid-19 infection  (temperature over 100 degrees, shortness of breath, cough, loss of taste/smell, generalized body aches, persistent headache, chills, sore throat, nausea/vomiting/diarrhea)    Your doctor is:  Dr. Peter Charles, General Surgery: [phone]                    After hours and weekends call the hospital @ [phone] and ask for the resident on call for:  General Surgery  For emergency care, call the:  [facility]:  [phone] (TTY for hearing impaired: [phone])

## 2025-06-30 NOTE — ANESTHESIA PROCEDURE NOTES
Airway         Procedure Start/Stop Times: 6/30/2025 12:28 PM  Staff -        CRNA: Ale Suarez APRN CRNA       Performed By: anesthesiologistIndications and Patient Condition       Indications for airway management: airway protection       Induction type:intravenous       Mask difficulty assessment: 2 - vent by mask + OA or adjuvant +/- NMBA    Final Airway Details       Final airway type: endotracheal airway       Successful airway: ETT - single  Endotracheal Airway Details        ETT size (mm): 7.0       Cuffed: yes       Successful intubation technique: direct laryngoscopy       DL Blade Type: MAC 3       Grade View of Cords: 2       Adjucts: stylet       Position: Right       Measured from: lips       Secured at (cm): 23       Bite block used: None    Post intubation assessment        Placement verified by: capnometry and equal breath sounds        Number of attempts at approach: 1       Number of other approaches attempted: 0       Secured with: tape       Ease of procedure: easy       Dentition: Intact and Unchanged    Medication(s) Administered   Medication Administration Time: 6/30/2025 12:28 PM    Additional Comments       DL by med student unable to visualize cords, then DL by Jaden, able to pass ett without problem.

## 2025-06-30 NOTE — ANESTHESIA POSTPROCEDURE EVALUATION
Patient: Lisette LINCOLN [place]    Procedure: Procedure(s):  CHOLECYSTECTOMY, ROBOT-ASSISTED, LAPAROSCOPIC, WITHOUT CHOLANGIOGRAM       Anesthesia Type:  General    Note:  Disposition: Outpatient   Postop Pain Control: Uneventful            Sign Out: Well controlled pain   PONV: No   Neuro/Psych: Uneventful            Sign Out: Acceptable/Baseline neuro status   Airway/Respiratory: Uneventful            Sign Out: Acceptable/Baseline resp. status   CV/Hemodynamics: Uneventful            Sign Out: Acceptable CV status; No obvious hypovolemia; No obvious fluid overload   Other NRE: NONE   DID A NON-ROUTINE EVENT OCCUR?            Last vitals:  Vitals Value Taken Time   /73 06/30/25 15:15   Temp 37.2  C (98.96  F) 06/30/25 15:16   Pulse 71 06/30/25 15:16   Resp 18 06/30/25 15:15   SpO2 97 % 06/30/25 15:18   Vitals shown include unfiled device data.    Electronically Signed By: Will Stanley MD  June 30, 2025  4:07 PM

## 2025-06-30 NOTE — OP NOTE
Operative report    Preop diagnosis symptomatic cholelithiasis  Postop diagnosis same    Procedure: Laparoscopic cholecystectomy robot-assisted    Surgeon AYUSH Charles  Assistant: EMILEE Roy    Anesthesia General endotracheal & local infiltration Marcaine.    Indications for procedure: Patient presents with symptomatic cholelithiasis, informed consent was obtained.    Operative findings: Normal cystic duct cystic artery anatomy confirmed by firefly    Operative procedure:   Patient brought to the operating room put under general anesthesia, abdomen widely prepped and draped in usual sterile fashion.  Infraumbilical skin incision was made , fascia secured with O vicryl, open technique used per routine and technique to place Skaggs port.  Abdomen Insufflated.  5 Mm Port Placed Left Subcostal per Routine. Two 8 Mm Ports Were Placed Left and Right of [place] Port per  routine and technique with Marcaine placed under direct vision into preperitoneal space laterally.    Robot was docked at this point and attention turned to the console where the gallbladder was retracted with the accessory port grasper; the gallbladder was pulled out laterally.  The cystic duct and cystic artery were identified with the help of firefly technique.  Dissection was carried out to isolate the cystic duct as well as the cystic artery.  Cystic artery hemostasis was obtained using the fenestrated bipolar.  The cystic duct was then clipped proximally distally using Hem-o-sidney clips.  The cystic duct then transected using cutting cautery and the gallbladder taken off the liver bed use electrocautery.  Hemostasis at liver bed was complete.  At this point the robot was undocked and the gallbladder delivered through the infraumbilical port site using my standard routine and technique.  The abdomen is deflated our ports removed, the fascial defect closed with the 0 Vicryl, skin was subcuticular.    Estimated blood loss minimal  Pathology gallbladder for  permanent    Patient was taken recovery room where she was without difficulty or apparent complication.

## 2025-06-30 NOTE — ANESTHESIA PREPROCEDURE EVALUATION
Anesthesia Pre-Procedure Evaluation    Patient: Lisetet LINCOLN Ukaegbu   MRN: [id number] : 1965          Procedure : Procedure(s):  CHOLECYSTECTOMY, ROBOT-ASSISTED, LAPAROSCOPIC, WITHOUT CHOLANGIOGRAM         Past Medical History:   Diagnosis Date    Allergic rhinitis     Allergic rhinitis due to animal dander     Amblyopia     LT    Anxiety     Arthritis     Chronic sinusitis     Colon polyp     Depressive disorder     Endometriosis     Gastroesophageal reflux disease     Heart attack (H)         Hoarseness On and off    I have had a history of vical fold nodules. Last noted ?    House dust mite allergy     Hypertension     Moderate persistent asthma     Pneumonia     Recurrent otitis media     Rhinitis, allergic to other allergen     Seasonal allergic rhinitis 05 skin tests pos. for: cat/dog/horse/DM/M/T/G/RW per Dr. Granado    pt. did IT from  to 07 to: cat/dog/DM/M/T/G/W dc'd per self.     Type 2 diabetes mellitus without complication, without long-term current use of insulin (H) 2017      Past Surgical History:   Procedure Laterality Date    COLONOSCOPY  10/2020    COLONOSCOPY N/A 10/10/2023    Procedure: Colonoscopy - with Biopsy;  Surgeon: Lulu Fields MD;  Location: [facility]    ESOPHAGOSCOPY, GASTROSCOPY, DUODENOSCOPY (EGD), COMBINED N/A 10/10/2023    Procedure: Esophagoscopy, gastroscopy, duodenoscopy (EGD), combined with Biopsies;  Surgeon: Lulu Fields MD;  Location: [facility]    EXCISE EXOSTOSIS FOOT Right 10/02/2018    Procedure: EXCISE EXOSTOSIS FOOT;  Right foot removal of first tarsometatarsal joint dorsal bossing;  Surgeon: Will Barraza DPM;  Location:  OR    GYN SURGERY  2016    Hysterectomy complete    HYSTEROSCOPY      OPTICAL TRACKING SYSTEM ENDOSCOPIC SINUS SURGERY Bilateral 2023    Procedure: stealth guided bilateral turbinate reduction with maxillary antrostomy and bilateral anterior ethmoidectomy.;  Surgeon: Tiffany  Keri BRITO MD;  Location: [facility] OR      Allergies   Allergen Reactions    Ampicillin Rash    Cipro [Quinolones] Anaphylaxis    Macrobid [Nitrofuran Derivatives] GI Disturbance    Ciprofloxacin Itching and Rash      Social History     Tobacco Use    Smoking status: Never     Passive exposure: Never    Smokeless tobacco: Never    Tobacco comments:     Not a smoker   Substance Use Topics    Alcohol use: Not Currently     Comment: Occasional social drinker (rarely)      Wt Readings from Last 1 Encounters:   06/30/25 59 kg (130 lb)        Anesthesia Evaluation            ROS/MED HX  ENT/Pulmonary:     (+)           allergic rhinitis,          Moderate Persistent, asthma  Treatment: Inhaler daily and Inhaler prn,                 Neurologic:  - neg neurologic ROS     Cardiovascular:     (+)  hypertension- -  CAD -  - -                                      METS/Exercise Tolerance: >4 METS    Hematologic:  - neg hematologic  ROS     Musculoskeletal:   (+)  arthritis,             GI/Hepatic:  - neg GI/hepatic ROS   (+) GERD,                   Renal/Genitourinary:  - neg Renal ROS     Endo:  - neg endo ROS   (+)  type II DM,   Not using insulin,                 Psychiatric/Substance Use:     (+) psychiatric history 1 and depression       Infectious Disease:  - neg infectious disease ROS     Malignancy:  - neg malignancy ROS     Other:  - neg other ROS            Physical Exam    OUTSIDE LABS:  CBC:   Lab Results   Component Value Date    WBC 4.9 08/07/2024    WBC 6.1 03/04/2024    HGB 12.6 06/23/2025    HGB 12.3 08/07/2024    HCT 36.9 08/07/2024    HCT 36.7 03/04/2024     06/23/2025     08/07/2024     BMP:   Lab Results   Component Value Date     06/23/2025     08/07/2024    POTASSIUM 3.5 06/23/2025    POTASSIUM 4.1 08/07/2024    CHLORIDE 105 06/23/2025    CHLORIDE 104 08/07/2024    CO2 26 06/23/2025    CO2 24 08/07/2024    BUN 12.8 06/23/2025    BUN 10.5 08/07/2024    CR 0.75 06/23/2025    CR 0.74  "08/07/2024     (H) 06/23/2025    GLC 83 08/07/2024     COAGS: No results found for: \"PTT\", \"INR\", \"FIBR\"  POC:   Lab Results   Component Value Date     (H) 10/15/2020     HEPATIC:   Lab Results   Component Value Date    ALBUMIN 4.8 08/03/2023    PROTTOTAL 7.4 08/03/2023    ALT 20 08/03/2023    AST 28 08/03/2023    ALKPHOS 42 08/03/2023    BILITOTAL 0.3 08/03/2023     OTHER:   Lab Results   Component Value Date    A1C 5.4 06/23/2025    ARIANA 9.8 06/23/2025    LIPASE 162 04/15/2016    TSH 1.29 08/07/2024       Anesthesia Plan    ASA Status:  3      NPO Status: NPO Appropriate   Anesthesia Type: General.  Airway: oral.  Induction: intravenous.  Maintenance: TIVA.        Consents    Anesthesia Plan(s) and associated risks, benefits, and realistic alternatives discussed. Questions answered and patient/representative(s) expressed understanding.     - Discussed:     - Discussed with:  Patient               Postoperative Care         Comments:                   Will Stanley MD    I have reviewed the pertinent notes and labs in the chart from the past 30 days and (re)examined the patient.  Any updates or changes from those notes are reflected in this note.    Clinically Significant Risk Factors Present on Admission                 # Drug Induced Platelet Defect: home medication list includes an antiplatelet medication                 # Asthma: noted on problem list              "

## 2025-07-08 ENCOUNTER — PATIENT OUTREACH (OUTPATIENT)
Dept: CARE COORDINATION | Facility: CLINIC | Age: 60
End: 2025-07-08
Payer: COMMERCIAL

## 2025-07-22 ENCOUNTER — PATIENT OUTREACH (OUTPATIENT)
Dept: CARE COORDINATION | Facility: CLINIC | Age: 60
End: 2025-07-22
Payer: COMMERCIAL

## 2025-07-25 DIAGNOSIS — E78.5 HYPERLIPIDEMIA LDL GOAL <100: ICD-10-CM

## 2025-07-28 RX ORDER — ROSUVASTATIN CALCIUM 5 MG/1
5 TABLET, COATED ORAL DAILY
Qty: 90 TABLET | Refills: 0 | Status: SHIPPED | OUTPATIENT
Start: 2025-07-28

## (undated) DEVICE — BNDG ELASTIC 4"X5YDS STERILE 6611-4S

## (undated) DEVICE — SYR 50ML SLIP TIP W/O NDL 309654

## (undated) DEVICE — TRACKER ENT OTS INSTRUMENT FUSION 9733533

## (undated) DEVICE — ESU GROUND PAD ADULT W/CORD E7507

## (undated) DEVICE — PACK ENT ENDOSCOPY CUSTOM ASC

## (undated) DEVICE — TRACKER PATIENT NON-INVASIVE AXIEM 9734887XOM

## (undated) DEVICE — DRAPE WARMER 66X44" ORS-300

## (undated) DEVICE — DAVINCI XI CLIP APPLIER LG HEM-O-CLIP 8MM 470230

## (undated) DEVICE — DRAPE STERI TOWEL SM 1000

## (undated) DEVICE — SU VICRYL+ 0 27IN CT-2 VLT VCP334H

## (undated) DEVICE — SOL WATER IRRIG 500ML BOTTLE 2F7113

## (undated) DEVICE — PREP CHLORAPREP 26ML TINTED ORANGE  260815

## (undated) DEVICE — NDL 19GA 1.5"

## (undated) DEVICE — WIPE INSTRUMENT MEROCEL 400200

## (undated) DEVICE — SUCTION MANIFOLD NEPTUNE 2 SYS 1 PORT 702-025-000

## (undated) DEVICE — TUBING SUCTION MEDI-VAC 1/4"X20' N620A

## (undated) DEVICE — BNDG KLING 3" 2232

## (undated) DEVICE — DAVINCI XI DRAPE ARM 470015

## (undated) DEVICE — SU MONOCRYL 4-0 PS-2 27" UND Y426H

## (undated) DEVICE — BLADE SINUS TRICUT STR 4MMX13CM FUSION ROTATE W/TRACKING

## (undated) DEVICE — GOWN XLG DISP 9545

## (undated) DEVICE — GLOVE PROTEXIS POWDER FREE SMT 6.5  2D72PT65X

## (undated) DEVICE — NDL 25GA 1.5" 305127

## (undated) DEVICE — SYR 30ML LL W/O NDL 302832

## (undated) DEVICE — SUCTION CATH AIRLIFE TRI-FLO W/CONTROL PORT 14FR  T60C

## (undated) DEVICE — DRSG STERI STRIP 1/2X4" R1547

## (undated) DEVICE — ENDO BITE BLOCK ADULT OMNI-BLOC

## (undated) DEVICE — GOWN IMPERVIOUS BREATHABLE 2XL/XLONG

## (undated) DEVICE — SU VICRYL 4-0 PS-2 18" UND J496H

## (undated) DEVICE — GLOVE EXAM NITRILE LG PF LATEX FREE 5064

## (undated) DEVICE — ENDO TROCAR FIRST ENTRY KII FIOS Z-THRD 05X100MM CTF03

## (undated) DEVICE — TUBING IRRIGATOR STRAIGHTSHOT XPS 1895522

## (undated) DEVICE — SU VICRYL 3-0 PS-2 18" UND J497H

## (undated) DEVICE — KIT ENDO TURNOVER/PROCEDURE CARRY-ON 101822

## (undated) DEVICE — DAVINCI XI FCP BIPOLAR FENESTRATED 470205

## (undated) DEVICE — TUBING SMOKE EVAC PNEUVIEW 9660-XE

## (undated) DEVICE — DAVINCI XI SEAL UNIVERSAL 5-8MM 470361

## (undated) DEVICE — SU ETHILON 3-0 PS-2 18" 1669H

## (undated) DEVICE — DRAPE MAYO STAND 23X54 8337

## (undated) DEVICE — DRSG KERLIX 4 1/2"X4YDS ROLL 6715

## (undated) DEVICE — DRAPE C-ARM MINI 5423

## (undated) DEVICE — SOL WATER IRRIG 1000ML BOTTLE 07139-09

## (undated) DEVICE — ESU SUCTION CAUTERY 10FR FOOT CONTROL E2505-10FR

## (undated) DEVICE — Device

## (undated) DEVICE — ESU COBLATOR REFLEX ULTRA 45DEG W/CABLE EICA4845-01

## (undated) DEVICE — SYR 30ML SLIP TIP W/O NDL 302833

## (undated) DEVICE — SOL NACL 0.9% INJ 1000ML BAG 2B1324X

## (undated) DEVICE — DAVINCI XI CAUTERY HOOK 470183

## (undated) DEVICE — DAVINCI XI DRAPE COLUMN 470341

## (undated) DEVICE — PACK EXTREMITY SOP15EXFSD

## (undated) DEVICE — ESU PENCIL SMOKE EVAC W/ROCKER SWITCH 0703-047-000

## (undated) DEVICE — SYR 10ML LL W/O NDL

## (undated) DEVICE — PACK LAP CHOLE CUSTOM ASC

## (undated) DEVICE — SPECIMEN TRAP STRYKER NEPTUNE IN-LINE 0700-050-000

## (undated) DEVICE — SPECIMEN CONTAINER W/10% BUFFERED FORMALIN 120ML 591201

## (undated) DEVICE — SPONGE COTTONOID 2X1/2" 80-1406

## (undated) DEVICE — GLOVE PROTEXIS W/NEU-THERA 8.5  2D73TE85

## (undated) DEVICE — SPECIMEN CONTAINER 3OZ W/FORMALIN 59901

## (undated) DEVICE — LINEN TOWEL PACK X5 5464

## (undated) DEVICE — GOWN IMPERVIOUS 2XL BLUE

## (undated) DEVICE — PREP CHLORAPREP 26ML TINTED HI-LITE ORANGE 930815

## (undated) DEVICE — SNARE CAPIVATOR ROUND COLD SNR BX10 M00561101

## (undated) DEVICE — DRSG GAUZE 4X4" TRAY 6939

## (undated) DEVICE — DRSG HOLDER NASAL DALE 600

## (undated) DEVICE — SYR 03ML LL W/O NDL 309657

## (undated) DEVICE — ENDO SHEATH STORZ SHARPSITE ENDOSCRUB 0DEG 4MM 1912000

## (undated) DEVICE — SOL NACL 0.9% IRRIG 500ML BOTTLE 2F7123

## (undated) DEVICE — CLIP ENDO HEMO-LOC PURPLE LG 544240

## (undated) DEVICE — PREP SKIN SCRUB TRAY 4461A

## (undated) RX ORDER — FENTANYL CITRATE 50 UG/ML
INJECTION, SOLUTION INTRAMUSCULAR; INTRAVENOUS
Status: DISPENSED
Start: 2020-10-15

## (undated) RX ORDER — FENTANYL CITRATE 50 UG/ML
INJECTION, SOLUTION INTRAMUSCULAR; INTRAVENOUS
Status: DISPENSED
Start: 2018-10-02

## (undated) RX ORDER — CEFAZOLIN SODIUM 2 G/50ML
SOLUTION INTRAVENOUS
Status: DISPENSED
Start: 2025-06-30

## (undated) RX ORDER — FENTANYL CITRATE 50 UG/ML
INJECTION, SOLUTION INTRAMUSCULAR; INTRAVENOUS
Status: DISPENSED
Start: 2025-06-30

## (undated) RX ORDER — DEXAMETHASONE SODIUM PHOSPHATE 4 MG/ML
INJECTION, SOLUTION INTRA-ARTICULAR; INTRALESIONAL; INTRAMUSCULAR; INTRAVENOUS; SOFT TISSUE
Status: DISPENSED
Start: 2025-06-30

## (undated) RX ORDER — IVABRADINE 5 MG/1
TABLET, FILM COATED ORAL
Status: DISPENSED
Start: 2022-03-15

## (undated) RX ORDER — LIDOCAINE HYDROCHLORIDE 10 MG/ML
INJECTION, SOLUTION EPIDURAL; INFILTRATION; INTRACAUDAL; PERINEURAL
Status: DISPENSED
Start: 2018-10-02

## (undated) RX ORDER — PROPOFOL 10 MG/ML
INJECTION, EMULSION INTRAVENOUS
Status: DISPENSED
Start: 2025-06-30

## (undated) RX ORDER — KETOROLAC TROMETHAMINE 30 MG/ML
INJECTION, SOLUTION INTRAMUSCULAR; INTRAVENOUS
Status: DISPENSED
Start: 2025-06-30

## (undated) RX ORDER — ACETAMINOPHEN 325 MG/1
TABLET ORAL
Status: DISPENSED
Start: 2018-10-02

## (undated) RX ORDER — CLINDAMYCIN PHOSPHATE 150 MG/ML
INJECTION, SOLUTION INTRAVENOUS
Status: DISPENSED
Start: 2018-10-02

## (undated) RX ORDER — BUPIVACAINE HYDROCHLORIDE 5 MG/ML
INJECTION, SOLUTION EPIDURAL; INTRACAUDAL; PERINEURAL
Status: DISPENSED
Start: 2025-06-30

## (undated) RX ORDER — GABAPENTIN 300 MG/1
CAPSULE ORAL
Status: DISPENSED
Start: 2018-10-02

## (undated) RX ORDER — ALBUTEROL SULFATE 0.83 MG/ML
SOLUTION RESPIRATORY (INHALATION)
Status: DISPENSED
Start: 2022-06-10

## (undated) RX ORDER — HYDROMORPHONE HYDROCHLORIDE 1 MG/ML
INJECTION, SOLUTION INTRAMUSCULAR; INTRAVENOUS; SUBCUTANEOUS
Status: DISPENSED
Start: 2025-06-30

## (undated) RX ORDER — ACETAMINOPHEN 325 MG/1
TABLET ORAL
Status: DISPENSED
Start: 2023-10-10

## (undated) RX ORDER — NITROGLYCERIN 0.4 MG/1
TABLET SUBLINGUAL
Status: DISPENSED
Start: 2022-03-15

## (undated) RX ORDER — METOPROLOL TARTRATE 100 MG
TABLET ORAL
Status: DISPENSED
Start: 2022-03-15

## (undated) RX ORDER — OXYCODONE HYDROCHLORIDE 5 MG/1
TABLET ORAL
Status: DISPENSED
Start: 2025-06-30

## (undated) RX ORDER — ONDANSETRON 2 MG/ML
INJECTION INTRAMUSCULAR; INTRAVENOUS
Status: DISPENSED
Start: 2025-06-30

## (undated) RX ORDER — INDOCYANINE GREEN AND WATER 25 MG
KIT INJECTION
Status: DISPENSED
Start: 2025-06-30